# Patient Record
Sex: FEMALE | Race: BLACK OR AFRICAN AMERICAN | NOT HISPANIC OR LATINO | Employment: FULL TIME | URBAN - METROPOLITAN AREA
[De-identification: names, ages, dates, MRNs, and addresses within clinical notes are randomized per-mention and may not be internally consistent; named-entity substitution may affect disease eponyms.]

---

## 2023-10-06 ENCOUNTER — APPOINTMENT (OUTPATIENT)
Dept: RADIOLOGY | Facility: HOSPITAL | Age: 23
End: 2023-10-06
Payer: COMMERCIAL

## 2023-10-06 ENCOUNTER — HOSPITAL ENCOUNTER (EMERGENCY)
Facility: HOSPITAL | Age: 23
Discharge: HOME | End: 2023-10-06
Attending: EMERGENCY MEDICINE
Payer: COMMERCIAL

## 2023-10-06 VITALS
BODY MASS INDEX: 27.42 KG/M2 | HEART RATE: 78 BPM | OXYGEN SATURATION: 96 % | WEIGHT: 149 LBS | HEIGHT: 62 IN | SYSTOLIC BLOOD PRESSURE: 113 MMHG | TEMPERATURE: 36.8 F | DIASTOLIC BLOOD PRESSURE: 64 MMHG | RESPIRATION RATE: 18 BRPM

## 2023-10-06 LAB
ABO GROUP (TYPE) IN BLOOD: NORMAL
ALBUMIN SERPL BCP-MCNC: 5.3 G/DL (ref 3.4–5)
ALP SERPL-CCNC: 71 U/L (ref 33–110)
ALT SERPL W P-5'-P-CCNC: 18 U/L (ref 7–45)
ANION GAP SERPL CALC-SCNC: 14 MMOL/L (ref 10–20)
ANTIBODY SCREEN: NORMAL
AST SERPL W P-5'-P-CCNC: 28 U/L (ref 9–39)
B-HCG SERPL-ACNC: 5093 MIU/ML
BASO STIPL BLD QL SMEAR: PRESENT
BASOPHILS # BLD MANUAL: 0.17 X10*3/UL (ref 0–0.1)
BASOPHILS NFR BLD MANUAL: 0.9 %
BILIRUB SERPL-MCNC: 9.1 MG/DL (ref 0–1.2)
BLOOD EXPIRATION DATE: NORMAL
BUN SERPL-MCNC: 6 MG/DL (ref 6–23)
CALCIUM SERPL-MCNC: 9.8 MG/DL (ref 8.6–10.6)
CHLORIDE SERPL-SCNC: 106 MMOL/L (ref 98–107)
CO2 SERPL-SCNC: 22 MMOL/L (ref 21–32)
CREAT SERPL-MCNC: 0.32 MG/DL (ref 0.5–1.05)
DISPENSE STATUS: NORMAL
EOSINOPHIL # BLD MANUAL: 0 X10*3/UL (ref 0–0.7)
EOSINOPHIL NFR BLD MANUAL: 0 %
ERYTHROCYTE [DISTWIDTH] IN BLOOD BY AUTOMATED COUNT: 23.9 % (ref 11.5–14.5)
GFR SERPL CREATININE-BSD FRML MDRD: >90 ML/MIN/1.73M*2
GLUCOSE SERPL-MCNC: 120 MG/DL (ref 74–99)
HCT VFR BLD AUTO: 17.3 % (ref 36–46)
HGB BLD-MCNC: 6.2 G/DL (ref 12–16)
HGB RETIC QN: 31 PG (ref 28–38)
IMM GRANULOCYTES # BLD AUTO: 0.12 X10*3/UL (ref 0–0.7)
IMM GRANULOCYTES NFR BLD AUTO: 0.6 % (ref 0–0.9)
IMMATURE RETIC FRACTION: 24.8 %
LDH SERPL L TO P-CCNC: 316 U/L (ref 84–246)
LYMPHOCYTES # BLD MANUAL: 1.61 X10*3/UL (ref 1.2–4.8)
LYMPHOCYTES NFR BLD MANUAL: 8.7 %
MCH RBC QN AUTO: 30.1 PG (ref 26–34)
MCHC RBC AUTO-ENTMCNC: 35.8 G/DL (ref 32–36)
MCV RBC AUTO: 84 FL (ref 80–100)
MONOCYTES # BLD MANUAL: 0.31 X10*3/UL (ref 0.1–1)
MONOCYTES NFR BLD MANUAL: 1.7 %
NEUTROPHILS # BLD MANUAL: 16.41 X10*3/UL (ref 1.2–7.7)
NEUTS BAND # BLD MANUAL: 0.17 X10*3/UL (ref 0–0.7)
NEUTS BAND NFR BLD MANUAL: 0.9 %
NEUTS SEG # BLD MANUAL: 16.24 X10*3/UL (ref 1.2–7)
NEUTS SEG NFR BLD MANUAL: 87.8 %
NRBC BLD-RTO: 1.5 /100 WBCS (ref 0–0)
PLATELET # BLD AUTO: 642 X10*3/UL (ref 150–450)
PMV BLD AUTO: 9.6 FL (ref 7.5–11.5)
POLYCHROMASIA BLD QL SMEAR: ABNORMAL
POTASSIUM SERPL-SCNC: 3.4 MMOL/L (ref 3.5–5.3)
PRODUCT BLOOD TYPE: 9500
PRODUCT CODE: NORMAL
PROT SERPL-MCNC: 8.8 G/DL (ref 6.4–8.2)
RBC # BLD AUTO: 2.06 X10*6/UL (ref 4–5.2)
RBC MORPH BLD: ABNORMAL
RETICS #: 0.57 X10*6/UL (ref 0.02–0.08)
RETICS/RBC NFR AUTO: 27.8 % (ref 0.5–2)
RH FACTOR (ANTIGEN D): NORMAL
SICKLE CELLS BLD QL SMEAR: ABNORMAL
SODIUM SERPL-SCNC: 139 MMOL/L (ref 136–145)
TARGETS BLD QL SMEAR: ABNORMAL
TOTAL CELLS COUNTED BLD: 115
UNIT ABO: NORMAL
UNIT NUMBER: NORMAL
UNIT RH: NORMAL
UNIT VOLUME: 271
WBC # BLD AUTO: 18.5 X10*3/UL (ref 4.4–11.3)
XM INTEP: NORMAL

## 2023-10-06 PROCEDURE — P9016 RBC LEUKOCYTES REDUCED: HCPCS

## 2023-10-06 PROCEDURE — 96361 HYDRATE IV INFUSION ADD-ON: CPT

## 2023-10-06 PROCEDURE — 2500000004 HC RX 250 GENERAL PHARMACY W/ HCPCS (ALT 636 FOR OP/ED): Mod: SE

## 2023-10-06 PROCEDURE — 36430 TRANSFUSION BLD/BLD COMPNT: CPT

## 2023-10-06 PROCEDURE — 76817 TRANSVAGINAL US OBSTETRIC: CPT | Performed by: RADIOLOGY

## 2023-10-06 PROCEDURE — 85027 COMPLETE CBC AUTOMATED: CPT | Performed by: EMERGENCY MEDICINE

## 2023-10-06 PROCEDURE — 2500000004 HC RX 250 GENERAL PHARMACY W/ HCPCS (ALT 636 FOR OP/ED): Mod: SE | Performed by: EMERGENCY MEDICINE

## 2023-10-06 PROCEDURE — 85045 AUTOMATED RETICULOCYTE COUNT: CPT | Performed by: EMERGENCY MEDICINE

## 2023-10-06 PROCEDURE — 83615 LACTATE (LD) (LDH) ENZYME: CPT | Performed by: EMERGENCY MEDICINE

## 2023-10-06 PROCEDURE — 36415 COLL VENOUS BLD VENIPUNCTURE: CPT | Performed by: EMERGENCY MEDICINE

## 2023-10-06 PROCEDURE — 96375 TX/PRO/DX INJ NEW DRUG ADDON: CPT

## 2023-10-06 PROCEDURE — 86901 BLOOD TYPING SEROLOGIC RH(D): CPT | Performed by: EMERGENCY MEDICINE

## 2023-10-06 PROCEDURE — 76830 TRANSVAGINAL US NON-OB: CPT

## 2023-10-06 PROCEDURE — 99284 EMERGENCY DEPT VISIT MOD MDM: CPT | Performed by: EMERGENCY MEDICINE

## 2023-10-06 PROCEDURE — 2580000001 HC RX 258 IV SOLUTIONS: Mod: SE | Performed by: EMERGENCY MEDICINE

## 2023-10-06 PROCEDURE — 76815 OB US LIMITED FETUS(S): CPT | Performed by: RADIOLOGY

## 2023-10-06 PROCEDURE — 84702 CHORIONIC GONADOTROPIN TEST: CPT | Performed by: EMERGENCY MEDICINE

## 2023-10-06 PROCEDURE — 86922 COMPATIBILITY TEST ANTIGLOB: CPT

## 2023-10-06 PROCEDURE — 96374 THER/PROPH/DIAG INJ IV PUSH: CPT

## 2023-10-06 PROCEDURE — 99285 EMERGENCY DEPT VISIT HI MDM: CPT | Performed by: EMERGENCY MEDICINE

## 2023-10-06 PROCEDURE — 96376 TX/PRO/DX INJ SAME DRUG ADON: CPT

## 2023-10-06 PROCEDURE — 80053 COMPREHEN METABOLIC PANEL: CPT | Performed by: EMERGENCY MEDICINE

## 2023-10-06 PROCEDURE — 85007 BL SMEAR W/DIFF WBC COUNT: CPT | Performed by: EMERGENCY MEDICINE

## 2023-10-06 RX ORDER — HYDROMORPHONE HYDROCHLORIDE 1 MG/ML
1 INJECTION, SOLUTION INTRAMUSCULAR; INTRAVENOUS; SUBCUTANEOUS
Status: DISPENSED | OUTPATIENT
Start: 2023-10-06 | End: 2023-10-06

## 2023-10-06 RX ORDER — HYDROMORPHONE HYDROCHLORIDE 1 MG/ML
INJECTION, SOLUTION INTRAMUSCULAR; INTRAVENOUS; SUBCUTANEOUS
Status: COMPLETED
Start: 2023-10-06 | End: 2023-10-06

## 2023-10-06 RX ORDER — METOCLOPRAMIDE HYDROCHLORIDE 5 MG/ML
10 INJECTION INTRAMUSCULAR; INTRAVENOUS ONCE
Status: COMPLETED | OUTPATIENT
Start: 2023-10-06 | End: 2023-10-06

## 2023-10-06 RX ORDER — HYDROMORPHONE HYDROCHLORIDE 1 MG/ML
1 INJECTION, SOLUTION INTRAMUSCULAR; INTRAVENOUS; SUBCUTANEOUS ONCE
Status: COMPLETED | OUTPATIENT
Start: 2023-10-06 | End: 2023-10-06

## 2023-10-06 RX ADMIN — METOCLOPRAMIDE 10 MG: 5 INJECTION, SOLUTION INTRAMUSCULAR; INTRAVENOUS at 08:30

## 2023-10-06 RX ADMIN — SODIUM CHLORIDE, POTASSIUM CHLORIDE, SODIUM LACTATE AND CALCIUM CHLORIDE 1000 ML: 600; 310; 30; 20 INJECTION, SOLUTION INTRAVENOUS at 09:15

## 2023-10-06 RX ADMIN — HYDROMORPHONE HYDROCHLORIDE 1 MG: 1 INJECTION, SOLUTION INTRAMUSCULAR; INTRAVENOUS; SUBCUTANEOUS at 10:05

## 2023-10-06 RX ADMIN — HYDROMORPHONE HYDROCHLORIDE 1 MG: 1 INJECTION, SOLUTION INTRAMUSCULAR; INTRAVENOUS; SUBCUTANEOUS at 19:03

## 2023-10-06 RX ADMIN — HYDROMORPHONE HYDROCHLORIDE 1 MG: 1 INJECTION, SOLUTION INTRAMUSCULAR; INTRAVENOUS; SUBCUTANEOUS at 09:16

## 2023-10-06 ASSESSMENT — ENCOUNTER SYMPTOMS
SHORTNESS OF BREATH: 0
NAUSEA: 1
BLOOD IN STOOL: 0
DIZZINESS: 1
CONSTIPATION: 0
HEADACHES: 0
COUGH: 0
DIAPHORESIS: 1
ABDOMINAL DISTENTION: 0
LIGHT-HEADEDNESS: 1
CHILLS: 1
VOMITING: 1
MYALGIAS: 1
DIARRHEA: 1

## 2023-10-06 ASSESSMENT — COLUMBIA-SUICIDE SEVERITY RATING SCALE - C-SSRS
6. HAVE YOU EVER DONE ANYTHING, STARTED TO DO ANYTHING, OR PREPARED TO DO ANYTHING TO END YOUR LIFE?: NO
1. IN THE PAST MONTH, HAVE YOU WISHED YOU WERE DEAD OR WISHED YOU COULD GO TO SLEEP AND NOT WAKE UP?: NO
2. HAVE YOU ACTUALLY HAD ANY THOUGHTS OF KILLING YOURSELF?: NO

## 2023-10-06 ASSESSMENT — PAIN DESCRIPTION - PAIN TYPE: TYPE: ACUTE PAIN

## 2023-10-06 ASSESSMENT — PAIN SCALES - GENERAL
PAINLEVEL_OUTOF10: 10 - WORST POSSIBLE PAIN
PAINLEVEL_OUTOF10: 6
PAINLEVEL_OUTOF10: 10 - WORST POSSIBLE PAIN

## 2023-10-06 ASSESSMENT — PAIN DESCRIPTION - LOCATION: LOCATION: ABDOMEN

## 2023-10-06 ASSESSMENT — PAIN - FUNCTIONAL ASSESSMENT: PAIN_FUNCTIONAL_ASSESSMENT: 0-10

## 2023-10-06 NOTE — DISCHARGE INSTRUCTIONS
Please follow up with OBGYN clinic within 2 days for a trend of a quantified HCG, follow up for ovarian cyst and miscarriage found on ultrasound, and to have a pelvic exam at that time. Please return to the Emergency Department if you begin to pass large clots, are soaking more than one pad per hour, if you are feeling light-headed or dizzy, if you develop fevers, worsening pain, or for any new worsening symptoms/concerns.

## 2023-10-06 NOTE — ED PROVIDER NOTES
Limitations to History: None  External Records Reviewed: Admission on 7/11/23  Independent Historians: Patient     HPI  Ruperto Pang is a 23 y.o. female with a history of sickle cell disease and 5 weeks pregnant presents today with abdominal cramping and spotting. The patient woke up this morning with nausea, vomiting, vaginal spotting, chills, lightheadedness, and sever lower abdominal cramping. The patient had a positive at home pregnancy test on 9/29. Last menstrual period was on 8/31. She reports passing a small clot with mostly vaginal spotting, not filling pads. She also reports hip pain feeling like this is triggering her sickle cell disease. She has one male sexual partner and no concern for STIs. She denies any SOB, CP, LE swelling.     She had a recent admission to the hospital for sickle cell disease with pain on 7/11/23. Her scd pain has been stable since. She takes folic acid everyday. She is currently having increased pain in her hips.    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     ROS  Review of Systems   Constitutional:  Positive for chills and diaphoresis.   Respiratory:  Negative for cough and shortness of breath.    Cardiovascular:  Negative for chest pain and leg swelling.  "  Gastrointestinal:  Positive for diarrhea, nausea and vomiting. Negative for abdominal distention, blood in stool and constipation.   Genitourinary:  Positive for pelvic pain and vaginal bleeding.   Musculoskeletal:  Positive for myalgias.   Neurological:  Positive for dizziness and light-headedness. Negative for syncope and headaches.     ------------------------------------------------------------------------------------------------------------------------------------------    /72   Pulse 72   Temp 36.5 °C (97.7 °F) (Tympanic)   Resp 16   Ht 1.575 m (5' 2\")   Wt 67.6 kg (149 lb)   LMP 2023 Comment:   SpO2 100%   BMI 27.25 kg/m²     Physical Exam  Constitutional:       General: She is not in acute distress.  HENT:      Head: Normocephalic and atraumatic.   Eyes:      General: Scleral icterus present.      Pupils: Pupils are equal, round, and reactive to light.   Cardiovascular:      Rate and Rhythm: Normal rate and regular rhythm.      Pulses: Normal pulses.      Heart sounds: Normal heart sounds.   Pulmonary:      Effort: Pulmonary effort is normal.      Breath sounds: Normal breath sounds.   Abdominal:      General: Abdomen is flat. There is no distension.      Palpations: Abdomen is soft. There is no mass.      Tenderness: There is generalized abdominal tenderness and tenderness in the right upper quadrant and suprapubic area. There is guarding. There is no rebound.   Skin:     General: Skin is warm and dry.   Neurological:      Mental Status: She is alert.     ------------------------------------------------------------------------------------------------------------------------------------------    Medical Decision Making:   Ruperto Pang is a 22 yo female with a past medical history of sickle cell disease and is 5 weeks pregnant with an at home pregnancy test who is here today for abdominal cramping and vaginal spotting. Dilaudid and Reglan were given for pain and nausea. Ruperto was " given 1 unit of blood for a hemoglobin of 6.2. Differential includes but is not limited to miscarriage, ectopic pregnancy, PID, ovarian torsion, appendicitis, menstruation, cyst.    CBC - WBC count elevated at 18.5, infection likely. Hemoglobin is 6.2 bellow baseline. Planning to transfuse a unit of blood.   CMP - Elevated total bilirubin at 9.1 potentially due to hemolysis. Previous bilirubin of 12.6 two months ago during admission, that dropped to around 7.   Quant HCG - 5,093 today. Will need a repeat quant in 2 days to trend in order to rule out or in miscarriage.   LDH - Elevated at 316 but decreased compared to labs 2 mo ago at 376.   Reticulocyte - increased at 27.8 which is elevated since last labs.  Type and screen - B positive. No need for Rhogam.     Transvaginal Ultrasound - Per Radiology read: Blood products within the endometrial canal with open cervical os. Findings are suggestive of failed pregnancy/miscarriage in progress. Multiple right ovarian cystic structures including a 5.5 cm complex structure consistent with a hemorrhagic cyst.    Symptoms, physical exam, and workup are consistent with an incomplete  with an open cervical os. The patient refused a pelvic exam at this time. She will be discharged and follow up with OBGYN clinic in 2 days for a trend of a quantified HCG, follow up for hemorrhagic cyst, and a pelvic exam at that time. We are transfusing the patient today due to hemoglobin being 6.2.     Patient signed off to incoming care team.     Discussed with: Attending.          Lenore Hernandez  10/06/23 [id number]

## 2023-10-06 NOTE — ED TRIAGE NOTES
"Pt states she is 5 weeks OB and has cramping and bleeding. Reports this is also \"triggering my sickle cell\"  "

## 2023-10-06 NOTE — ED PROVIDER NOTES
Limitations to History: None  External Records Reviewed: Admission on 7/11/23  Independent Historians: Patient      HPI  Ruperto Pang is a 23 y.o. female with a history of sickle cell disease and 5 weeks pregnant presents today with abdominal cramping and spotting. The patient woke up this morning with nausea, vomiting, vaginal spotting, chills, lightheadedness, and sever lower abdominal cramping. The patient had a positive at home pregnancy test on 9/29. Last menstrual period was on 8/31. She reports passing a small clot with mostly vaginal spotting, not filling pads. She also reports hip pain feeling like this is triggering her sickle cell disease. She has one male sexual partner and no concern for STIs. She denies any SOB, CP, LE swelling.      She had a recent admission to the hospital for sickle cell disease with pain on 7/11/23. Her scd pain has been stable since. She takes folic acid everyday. She is currently having increased pain in her hips.     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      ROS  Review of Systems   Constitutional:  Positive for chills and diaphoresis.   Respiratory:  Negative for cough and shortness of breath.    Cardiovascular:  Negative  "for chest pain and leg swelling.   Gastrointestinal:  Positive for diarrhea, nausea and vomiting. Negative for abdominal distention, blood in stool and constipation.   Genitourinary:  Positive for pelvic pain and vaginal bleeding.   Musculoskeletal:  Positive for myalgias.   Neurological:  Positive for dizziness and light-headedness. Negative for syncope and headaches.      ------------------------------------------------------------------------------------------------------------------------------------------     /72   Pulse 72   Temp 36.5 °C (97.7 °F) (Tympanic)   Resp 16   Ht 1.575 m (5' 2\")   Wt 67.6 kg (149 lb)   LMP 2023 Comment:   SpO2 100%   BMI 27.25 kg/m²      Physical Exam  Constitutional:       General: She is not in acute distress.  HENT:      Head: Normocephalic and atraumatic.   Eyes:      General: Scleral icterus present.      Pupils: Pupils are equal, round, and reactive to light.   Cardiovascular:      Rate and Rhythm: Normal rate and regular rhythm.      Pulses: Normal pulses.      Heart sounds: Normal heart sounds.   Pulmonary:      Effort: Pulmonary effort is normal.      Breath sounds: Normal breath sounds.   Abdominal:      General: Abdomen is flat. There is no distension.      Palpations: Abdomen is soft. There is no mass.      Tenderness: There is generalized abdominal tenderness and tenderness in the right upper quadrant and suprapubic area. There is guarding. There is no rebound.   Skin:     General: Skin is warm and dry.   Neurological:      Mental Status: She is alert.      ------------------------------------------------------------------------------------------------------------------------------------------     Medical Decision Making:   Ruperto Pang is a 24 yo female with a past medical history of sickle cell disease and is 5 weeks pregnant with an at home pregnancy test who is here today for abdominal cramping and vaginal spotting. Dilaudid and Reglan were " given for pain and nausea. Ruperto was given 1 unit of blood for a hemoglobin of 6.2. Differential includes but is not limited to miscarriage, ectopic pregnancy, PID, ovarian torsion, appendicitis, menstruation, cyst.     CBC - WBC count elevated at 18.5, infection likely. Hemoglobin is 6.2 bellow baseline. Planning to transfuse a unit of blood.   CMP - Elevated total bilirubin at 9.1 potentially due to hemolysis. Previous bilirubin of 12.6 two months ago during admission, that dropped to around 7.   Quant HCG - 5,093 today. Will need a repeat quant in 2 days to trend in order to rule out or in miscarriage.   LDH - Elevated at 316 but decreased compared to labs 2 mo ago at 376.   Reticulocyte - increased at 27.8 which is elevated since last labs.  Type and screen - B positive. No need for Rhogam.      Transvaginal Ultrasound - Per Radiology read: Blood products within the endometrial canal with open cervical os. Findings are suggestive of failed pregnancy/miscarriage in progress. Multiple right ovarian cystic structures including a 5.5 cm complex structure consistent with a hemorrhagic cyst.     Symptoms, physical exam, and workup are consistent with an incomplete  with an open cervical os. The patient declined a pelvic exam at this time. She is able to verbalize the risks of declining recommended pelvic exam and has capacity to decline. She will be discharged and follow up with OBGYN clinic in 2 days for a trend of a quantified HCG, follow up for hemorrhagic cyst, and a pelvic exam at that time. We are transfusing the patient today due to hemoglobin being 6.2.      Patient handed off to incoming care team pending transfusion and reevaluation.      Discussed with: Attending.     Lenore Hernandez, Acting Intern    I, or a resident under my supervision, was present with the medical student who participated in the documentation of this note.  I have personally seen and examined the patient and performed the medical  decision-making components. I have reviewed the medical student documentation and/or resident documentation and verified the findings in the note as written with additions or exceptions as stated in the body of the note.    MD Nitin Alfredo MD  10/06/23 [id number]

## 2023-10-08 NOTE — PROGRESS NOTES
Patient received in handoff at approximately 1500. Please see initial ED provider note for full history. In brief, this is a 23 year old female, reportedly now , with a history of sickle cell disease, at approximately 5 weeks OB (LMP ) who presented to the ED today with abdominal cramping and spotting.  Per signout discussion “reports passing a small clot with mostly vaginal spotting, not filling pads. She has one male sexual partner and no concern for STIs.” She refused pelvic exam, however, TVUS demonstrated the following:    IMPRESSION:  1.  Blood products within the endometrial canal with open cervical  os. No adnexal mass separate from the ovary is evident. The  constellation of findings are suggestive of failed  pregnancy/miscarriage in progress. Continued follow-up with serial  beta HCG levels and short-term ultrasound follow-up is recommended.  2. Multiple right ovarian cystic structures including a 5.5 cm  complex structure consistent with a hemorrhagic cyst. Attention on  follow-up is recommended.      Patient's labs were notable for leukocytosis of 18.5 (per chart review, prior WBC 21.4, 18.8, 18.1, 16.4, 13.0 within the last 2-3 months), anemia with Hb 6.2 (per chart review, prior Hb 6.5, 7.7, 7.2, 7.6 within the last 2-3 months), CMP without SYED or HAGMA, HCG 5093, reticulocyte % 27.8,  (prior 379).  Type and screen B positive (no indication for Rho-GUERLINE). Plan per signout is to transfuse 1 U PRBCs (ordered by prior team) and discharge with OB-GYN follow-up within 48 hours.  Patient re-evaluated.  She was awake, alert, talking with her significant other, and reported pain well controlled. She notes history of prior spontaneous . She also reports history of requiring prior transfusions.  She received her unit of PRBCs.  Discussed admission for observation and repeat CBC, as well as possible OB-GYN consult.  However, patient requested discharge without repeat labs and preferred  outpatient follow-up.  Discussed need to follow-up with OB-GYN within 48 hours, or if unable, could return to ED if needed.  The patient remained hemodynamically stable during ED shift.  All results from this ED visit were discussed.  Strict warning signs/precautions for return to the ED were discussed. The patient was instructed to follow-up with OB-GYN and their PCP or FP/IM clinic. If any medications were prescribed, common side effects and instructions for use were discussed.  An opportunity to ask questions was provided and all that were expressed were addressed at that time. The patient and/or family member(s) verbalized understanding and were in agreement with plan.

## 2023-10-09 ENCOUNTER — TELEPHONE (OUTPATIENT)
Dept: OBSTETRICS AND GYNECOLOGY | Facility: HOSPITAL | Age: 23
End: 2023-10-09
Payer: COMMERCIAL

## 2023-10-09 DIAGNOSIS — O20.9 VAGINAL BLEEDING IN PREGNANCY, FIRST TRIMESTER (HHS-HCC): Primary | ICD-10-CM

## 2023-10-09 NOTE — TELEPHONE ENCOUNTER
"Ms. Pang is a 23 y.o. female with sickle cell disease, calling in today after ED visit on 10/6, with a LMP of 8/31/23.  She presented to ED with vaginal bleeding, in pregnancy (approx 5 weeks based on LMP) she is s/p 1 unit of prBCs, and a TVU  (transvaginal ultrasound) that showed \"Blood products within the endometrial canal with open cervical os. Findings are suggestive of failed pregnancy/miscarriage in progress\" per radiology report.     The patient denies dizziness, endorses intermittent uterine  cramping. Denies bleeding through >1 pad an hour, reports that she has changed her pad 3 times today starting form 6am. She reports that she continues to pass very small clots, but is not enough to fill up a pad. She is calling to request a D&C.     I reviewed the patients case with Dr. Downey in office, she reviewed the patients Transvaginal ultrasound, and noted that the uterus does not appear to have a fetal sac/products of conception. Discussed findings with patient and reviewed warning signs.    Plan of care is as follows:  -Serial Beta HcG levels, plan to obtain follow up valueds tomorrow, then weekly.  -Patient to call office or present to ED with any signs/symptoms of infection.  -Advised to continue antibiotics as prescribed by ED.  -Provided emotional support, acknowledged that loss is a difficult life transition offered OB psych referral, patient declined at this time, warning signs reviewed, advised to call office if she desires psych referral in the future.  -Reviewed comfort measures with patient, encouraged rest, an hydration.  -Bleeding precautions, with signs and symptoms of infection closely reviewed with the patient.    Dispo: The patient verbalized understanding and is agreeable with plan above.   Provided patient with the offices direct extension for follow up.    Jahaira Zabala BSN, RN, CLC     "

## 2023-10-20 ENCOUNTER — APPOINTMENT (OUTPATIENT)
Dept: OBSTETRICS AND GYNECOLOGY | Facility: CLINIC | Age: 23
End: 2023-10-20
Payer: COMMERCIAL

## 2023-10-25 ENCOUNTER — HOSPITAL ENCOUNTER (EMERGENCY)
Facility: HOSPITAL | Age: 23
Discharge: LEFT BEFORE DISCHARGE | End: 2023-10-26
Payer: COMMERCIAL

## 2023-10-25 VITALS
HEART RATE: 73 BPM | WEIGHT: 148 LBS | BODY MASS INDEX: 27.23 KG/M2 | DIASTOLIC BLOOD PRESSURE: 72 MMHG | SYSTOLIC BLOOD PRESSURE: 114 MMHG | RESPIRATION RATE: 16 BRPM | TEMPERATURE: 98.4 F | HEIGHT: 62 IN | OXYGEN SATURATION: 93 %

## 2023-10-25 PROCEDURE — 99281 EMR DPT VST MAYX REQ PHY/QHP: CPT | Mod: 25

## 2023-10-25 PROCEDURE — 99283 EMERGENCY DEPT VISIT LOW MDM: CPT

## 2023-10-25 ASSESSMENT — COLUMBIA-SUICIDE SEVERITY RATING SCALE - C-SSRS
6. HAVE YOU EVER DONE ANYTHING, STARTED TO DO ANYTHING, OR PREPARED TO DO ANYTHING TO END YOUR LIFE?: NO
2. HAVE YOU ACTUALLY HAD ANY THOUGHTS OF KILLING YOURSELF?: NO
1. IN THE PAST MONTH, HAVE YOU WISHED YOU WERE DEAD OR WISHED YOU COULD GO TO SLEEP AND NOT WAKE UP?: NO

## 2023-10-26 ENCOUNTER — CLINICAL SUPPORT (OUTPATIENT)
Dept: EMERGENCY MEDICINE | Facility: HOSPITAL | Age: 23
End: 2023-10-26
Payer: COMMERCIAL

## 2023-10-26 LAB
ATRIAL RATE: 73 BPM
P AXIS: 29 DEGREES
P OFFSET: 166 MS
P ONSET: 124 MS
PR INTERVAL: 194 MS
Q ONSET: 221 MS
QRS COUNT: 12 BEATS
QRS DURATION: 86 MS
QT INTERVAL: 372 MS
QTC CALCULATION(BAZETT): 409 MS
QTC FREDERICIA: 397 MS
R AXIS: 62 DEGREES
T AXIS: 11 DEGREES
T OFFSET: 407 MS
VENTRICULAR RATE: 73 BPM

## 2023-10-26 PROCEDURE — 93005 ELECTROCARDIOGRAM TRACING: CPT

## 2023-10-26 NOTE — ED TRIAGE NOTES
Patient presents to ED for c/c of sickle cell pain x2 days. Patient reports this is typical of her pain, it is radiating down her legs, also endorsing headache and nausea. Patient takes oxycodone at home but has been out.

## 2023-11-25 ENCOUNTER — HOSPITAL ENCOUNTER (EMERGENCY)
Facility: HOSPITAL | Age: 23
Discharge: HOME | End: 2023-11-26
Attending: EMERGENCY MEDICINE
Payer: COMMERCIAL

## 2023-11-25 DIAGNOSIS — N39.0 UTI (URINARY TRACT INFECTION) WITH PYURIA: ICD-10-CM

## 2023-11-25 DIAGNOSIS — D57.09 SICKLE CELL DISEASE WITH CRISIS AND OTHER COMPLICATION (MULTI): Primary | ICD-10-CM

## 2023-11-25 PROCEDURE — 81025 URINE PREGNANCY TEST: CPT

## 2023-11-25 PROCEDURE — 99285 EMERGENCY DEPT VISIT HI MDM: CPT | Performed by: EMERGENCY MEDICINE

## 2023-11-25 PROCEDURE — 99285 EMERGENCY DEPT VISIT HI MDM: CPT | Mod: 25

## 2023-11-25 PROCEDURE — 99284 EMERGENCY DEPT VISIT MOD MDM: CPT | Mod: 25 | Performed by: EMERGENCY MEDICINE

## 2023-11-25 RX ORDER — HYDROMORPHONE HYDROCHLORIDE 1 MG/ML
1 INJECTION, SOLUTION INTRAMUSCULAR; INTRAVENOUS; SUBCUTANEOUS
Status: COMPLETED | OUTPATIENT
Start: 2023-11-25 | End: 2023-11-26

## 2023-11-25 RX ORDER — ONDANSETRON HYDROCHLORIDE 2 MG/ML
4 INJECTION, SOLUTION INTRAVENOUS ONCE
Status: COMPLETED | OUTPATIENT
Start: 2023-11-26 | End: 2023-11-26

## 2023-11-25 ASSESSMENT — PAIN SCALES - GENERAL
PAINLEVEL_OUTOF10: 8
PAINLEVEL_OUTOF10: 8

## 2023-11-25 ASSESSMENT — LIFESTYLE VARIABLES
HAVE PEOPLE ANNOYED YOU BY CRITICIZING YOUR DRINKING: NO
REASON UNABLE TO ASSESS: YES
EVER HAD A DRINK FIRST THING IN THE MORNING TO STEADY YOUR NERVES TO GET RID OF A HANGOVER: NO
EVER FELT BAD OR GUILTY ABOUT YOUR DRINKING: NO
HAVE YOU EVER FELT YOU SHOULD CUT DOWN ON YOUR DRINKING: NO

## 2023-11-25 ASSESSMENT — PAIN DESCRIPTION - DESCRIPTORS: DESCRIPTORS: ACHING

## 2023-11-25 ASSESSMENT — PAIN DESCRIPTION - PAIN TYPE: TYPE: ACUTE PAIN

## 2023-11-25 ASSESSMENT — PAIN DESCRIPTION - LOCATION: LOCATION: WRIST

## 2023-11-25 ASSESSMENT — PAIN DESCRIPTION - PROGRESSION: CLINICAL_PROGRESSION: GRADUALLY WORSENING

## 2023-11-25 ASSESSMENT — PAIN - FUNCTIONAL ASSESSMENT: PAIN_FUNCTIONAL_ASSESSMENT: 0-10

## 2023-11-26 VITALS
SYSTOLIC BLOOD PRESSURE: 114 MMHG | HEART RATE: 64 BPM | BODY MASS INDEX: 25.87 KG/M2 | RESPIRATION RATE: 16 BRPM | OXYGEN SATURATION: 93 % | DIASTOLIC BLOOD PRESSURE: 65 MMHG | WEIGHT: 146 LBS | TEMPERATURE: 98.5 F | HEIGHT: 63 IN

## 2023-11-26 LAB
ABO GROUP (TYPE) IN BLOOD: NORMAL
ALBUMIN SERPL BCP-MCNC: 4.5 G/DL (ref 3.4–5)
ALP SERPL-CCNC: 79 U/L (ref 33–110)
ALT SERPL W P-5'-P-CCNC: 22 U/L (ref 7–45)
ANION GAP SERPL CALC-SCNC: 15 MMOL/L (ref 10–20)
ANTIBODY SCREEN: NORMAL
APPEARANCE UR: CLEAR
AST SERPL W P-5'-P-CCNC: 25 U/L (ref 9–39)
BASOPHILS # BLD AUTO: 0.1 X10*3/UL (ref 0–0.1)
BASOPHILS NFR BLD AUTO: 0.7 %
BILIRUB SERPL-MCNC: 6.6 MG/DL (ref 0–1.2)
BILIRUB UR STRIP.AUTO-MCNC: NEGATIVE MG/DL
BLOOD EXPIRATION DATE: NORMAL
BUN SERPL-MCNC: 6 MG/DL (ref 6–23)
CALCIUM SERPL-MCNC: 9.6 MG/DL (ref 8.6–10.6)
CHLORIDE SERPL-SCNC: 107 MMOL/L (ref 98–107)
CO2 SERPL-SCNC: 20 MMOL/L (ref 21–32)
COLOR UR: YELLOW
CREAT SERPL-MCNC: 0.43 MG/DL (ref 0.5–1.05)
DISPENSE STATUS: NORMAL
EOSINOPHIL # BLD AUTO: 0.17 X10*3/UL (ref 0–0.7)
EOSINOPHIL NFR BLD AUTO: 1.3 %
ERYTHROCYTE [DISTWIDTH] IN BLOOD BY AUTOMATED COUNT: 23.9 % (ref 11.5–14.5)
ERYTHROCYTE [DISTWIDTH] IN BLOOD BY AUTOMATED COUNT: 26 % (ref 11.5–14.5)
GFR SERPL CREATININE-BSD FRML MDRD: >90 ML/MIN/1.73M*2
GLUCOSE SERPL-MCNC: 91 MG/DL (ref 74–99)
GLUCOSE UR STRIP.AUTO-MCNC: NEGATIVE MG/DL
HCT VFR BLD AUTO: 18.1 % (ref 36–46)
HCT VFR BLD AUTO: 19.9 % (ref 36–46)
HGB BLD-MCNC: 6.3 G/DL (ref 12–16)
HGB BLD-MCNC: 6.9 G/DL (ref 12–16)
HGB RETIC QN: 32 PG (ref 28–38)
HOLD SPECIMEN: NORMAL
IMM GRANULOCYTES # BLD AUTO: 0.06 X10*3/UL (ref 0–0.7)
IMM GRANULOCYTES NFR BLD AUTO: 0.4 % (ref 0–0.9)
IMMATURE RETIC FRACTION: 33.9 %
KETONES UR STRIP.AUTO-MCNC: NEGATIVE MG/DL
LEUKOCYTE ESTERASE UR QL STRIP.AUTO: ABNORMAL
LIPASE SERPL-CCNC: 30 U/L (ref 9–82)
LYMPHOCYTES # BLD AUTO: 5.11 X10*3/UL (ref 1.2–4.8)
LYMPHOCYTES NFR BLD AUTO: 38.2 %
MCH RBC QN AUTO: 30.5 PG (ref 26–34)
MCH RBC QN AUTO: 30.6 PG (ref 26–34)
MCHC RBC AUTO-ENTMCNC: 34.7 G/DL (ref 32–36)
MCHC RBC AUTO-ENTMCNC: 34.8 G/DL (ref 32–36)
MCV RBC AUTO: 88 FL (ref 80–100)
MCV RBC AUTO: 88 FL (ref 80–100)
MONOCYTES # BLD AUTO: 0.84 X10*3/UL (ref 0.1–1)
MONOCYTES NFR BLD AUTO: 6.3 %
MUCOUS THREADS #/AREA URNS AUTO: ABNORMAL /LPF
NEUTROPHILS # BLD AUTO: 7.1 X10*3/UL (ref 1.2–7.7)
NEUTROPHILS NFR BLD AUTO: 53.1 %
NITRITE UR QL STRIP.AUTO: NEGATIVE
NRBC BLD-RTO: 2.8 /100 WBCS (ref 0–0)
NRBC BLD-RTO: 5.6 /100 WBCS (ref 0–0)
PH UR STRIP.AUTO: 6 [PH]
PLATELET # BLD AUTO: 567 X10*3/UL (ref 150–450)
PLATELET # BLD AUTO: 632 X10*3/UL (ref 150–450)
POLYCHROMASIA BLD QL SMEAR: NORMAL
POTASSIUM SERPL-SCNC: 3.7 MMOL/L (ref 3.5–5.3)
PREGNANCY TEST URINE, POC: NEGATIVE
PRODUCT BLOOD TYPE: 5100
PRODUCT CODE: NORMAL
PROT SERPL-MCNC: 7.8 G/DL (ref 6.4–8.2)
PROT UR STRIP.AUTO-MCNC: NEGATIVE MG/DL
RBC # BLD AUTO: 2.06 X10*6/UL (ref 4–5.2)
RBC # BLD AUTO: 2.26 X10*6/UL (ref 4–5.2)
RBC # UR STRIP.AUTO: NEGATIVE /UL
RBC #/AREA URNS AUTO: ABNORMAL /HPF
RBC MORPH BLD: NORMAL
RETICS #: 0.72 X10*6/UL (ref 0.02–0.08)
RETICS/RBC NFR AUTO: 34.8 % (ref 0.5–2)
RH FACTOR (ANTIGEN D): NORMAL
SCHISTOCYTES BLD QL SMEAR: NORMAL
SICKLE CELLS BLD QL SMEAR: NORMAL
SODIUM SERPL-SCNC: 138 MMOL/L (ref 136–145)
SP GR UR STRIP.AUTO: 1.01
SQUAMOUS #/AREA URNS AUTO: ABNORMAL /HPF
TARGETS BLD QL SMEAR: NORMAL
UNIT ABO: NORMAL
UNIT NUMBER: NORMAL
UNIT RH: NORMAL
UNIT VOLUME: 350
UROBILINOGEN UR STRIP.AUTO-MCNC: <2 MG/DL
WBC # BLD AUTO: 12.3 X10*3/UL (ref 4.4–11.3)
WBC # BLD AUTO: 13.4 X10*3/UL (ref 4.4–11.3)
WBC #/AREA URNS AUTO: ABNORMAL /HPF
XM INTEP: NORMAL
YEAST BUDDING #/AREA UR COMP ASSIST: PRESENT /HPF

## 2023-11-26 PROCEDURE — 96374 THER/PROPH/DIAG INJ IV PUSH: CPT

## 2023-11-26 PROCEDURE — 85045 AUTOMATED RETICULOCYTE COUNT: CPT

## 2023-11-26 PROCEDURE — 36415 COLL VENOUS BLD VENIPUNCTURE: CPT

## 2023-11-26 PROCEDURE — P9016 RBC LEUKOCYTES REDUCED: HCPCS

## 2023-11-26 PROCEDURE — 86850 RBC ANTIBODY SCREEN: CPT

## 2023-11-26 PROCEDURE — 2500000001 HC RX 250 WO HCPCS SELF ADMINISTERED DRUGS (ALT 637 FOR MEDICARE OP): Mod: SE

## 2023-11-26 PROCEDURE — 86922 COMPATIBILITY TEST ANTIGLOB: CPT

## 2023-11-26 PROCEDURE — 96375 TX/PRO/DX INJ NEW DRUG ADDON: CPT

## 2023-11-26 PROCEDURE — 96376 TX/PRO/DX INJ SAME DRUG ADON: CPT

## 2023-11-26 PROCEDURE — 2500000004 HC RX 250 GENERAL PHARMACY W/ HCPCS (ALT 636 FOR OP/ED): Mod: SE

## 2023-11-26 PROCEDURE — 80053 COMPREHEN METABOLIC PANEL: CPT

## 2023-11-26 PROCEDURE — 36415 COLL VENOUS BLD VENIPUNCTURE: CPT | Performed by: EMERGENCY MEDICINE

## 2023-11-26 PROCEDURE — 83690 ASSAY OF LIPASE: CPT

## 2023-11-26 PROCEDURE — 85025 COMPLETE CBC W/AUTO DIFF WBC: CPT

## 2023-11-26 PROCEDURE — 85027 COMPLETE CBC AUTOMATED: CPT | Mod: 91 | Performed by: EMERGENCY MEDICINE

## 2023-11-26 PROCEDURE — 81001 URINALYSIS AUTO W/SCOPE: CPT

## 2023-11-26 PROCEDURE — 36430 TRANSFUSION BLD/BLD COMPNT: CPT

## 2023-11-26 PROCEDURE — 87086 URINE CULTURE/COLONY COUNT: CPT

## 2023-11-26 RX ORDER — HYDROMORPHONE HYDROCHLORIDE 1 MG/ML
1 INJECTION, SOLUTION INTRAMUSCULAR; INTRAVENOUS; SUBCUTANEOUS ONCE
Status: COMPLETED | OUTPATIENT
Start: 2023-11-26 | End: 2023-11-26

## 2023-11-26 RX ORDER — FLUCONAZOLE 150 MG/1
150 TABLET ORAL ONCE
Status: COMPLETED | OUTPATIENT
Start: 2023-11-26 | End: 2023-11-26

## 2023-11-26 RX ORDER — NITROFURANTOIN 25; 75 MG/1; MG/1
100 CAPSULE ORAL 2 TIMES DAILY
Qty: 10 CAPSULE | Refills: 0 | Status: SHIPPED | OUTPATIENT
Start: 2023-11-26 | End: 2023-12-01

## 2023-11-26 RX ORDER — FLUCONAZOLE 150 MG/1
150 TABLET ORAL ONCE
Qty: 1 TABLET | Refills: 0 | Status: SHIPPED | OUTPATIENT
Start: 2023-11-26 | End: 2023-11-26

## 2023-11-26 RX ORDER — OXYCODONE HYDROCHLORIDE 5 MG/1
5 CAPSULE ORAL EVERY 6 HOURS PRN
Qty: 12 CAPSULE | Refills: 0 | Status: SHIPPED | OUTPATIENT
Start: 2023-11-26 | End: 2023-12-01

## 2023-11-26 RX ORDER — NITROFURANTOIN 25; 75 MG/1; MG/1
100 CAPSULE ORAL ONCE
Status: COMPLETED | OUTPATIENT
Start: 2023-11-26 | End: 2023-11-26

## 2023-11-26 RX ADMIN — FLUCONAZOLE 150 MG: 150 TABLET ORAL at 03:03

## 2023-11-26 RX ADMIN — HYDROMORPHONE HYDROCHLORIDE 1 MG: 1 INJECTION, SOLUTION INTRAMUSCULAR; INTRAVENOUS; SUBCUTANEOUS at 00:15

## 2023-11-26 RX ADMIN — ONDANSETRON 4 MG: 2 INJECTION INTRAMUSCULAR; INTRAVENOUS at 00:15

## 2023-11-26 RX ADMIN — HYDROMORPHONE HYDROCHLORIDE 1 MG: 1 INJECTION, SOLUTION INTRAMUSCULAR; INTRAVENOUS; SUBCUTANEOUS at 01:16

## 2023-11-26 RX ADMIN — NITROFURANTOIN MONOHYDRATE/MACROCRYSTALS 100 MG: 75; 25 CAPSULE ORAL at 03:03

## 2023-11-26 RX ADMIN — HYDROMORPHONE HYDROCHLORIDE 1 MG: 1 INJECTION, SOLUTION INTRAMUSCULAR; INTRAVENOUS; SUBCUTANEOUS at 09:31

## 2023-11-26 RX ADMIN — HYDROMORPHONE HYDROCHLORIDE 1 MG: 1 INJECTION, SOLUTION INTRAMUSCULAR; INTRAVENOUS; SUBCUTANEOUS at 02:48

## 2023-11-26 ASSESSMENT — PAIN SCALES - GENERAL
PAINLEVEL_OUTOF10: 6
PAINLEVEL_OUTOF10: 8
PAINLEVEL_OUTOF10: 7
PAINLEVEL_OUTOF10: 6
PAINLEVEL_OUTOF10: 8

## 2023-11-26 ASSESSMENT — PAIN DESCRIPTION - LOCATION: LOCATION: BACK

## 2023-11-26 ASSESSMENT — PAIN - FUNCTIONAL ASSESSMENT
PAIN_FUNCTIONAL_ASSESSMENT: 0-10

## 2023-11-26 NOTE — ED TRIAGE NOTES
Patient reports 8/10 pain in her bilateral legs, ankles and wrist that feels lie her sickle cell pain

## 2023-11-26 NOTE — PROGRESS NOTES
I received Ruperto Pang in signout from Dr. Huff.  Please see the previous note for all HPI, PE and MDM up to the time of signout at 0100.    Labs Reviewed   CBC WITH AUTO DIFFERENTIAL - Abnormal       Result Value    WBC 13.4 (*)     nRBC 5.6 (*)     RBC 2.06 (*)     Hemoglobin 6.3 (*)     Hematocrit 18.1 (*)     MCV 88      MCH 30.6      MCHC 34.8      RDW 26.0 (*)     Platelets 632 (*)     Neutrophils % 53.1      Immature Granulocytes %, Automated 0.4      Lymphocytes % 38.2      Monocytes % 6.3      Eosinophils % 1.3      Basophils % 0.7      Neutrophils Absolute 7.10      Immature Granulocytes Absolute, Automated 0.06      Lymphocytes Absolute 5.11 (*)     Monocytes Absolute 0.84      Eosinophils Absolute 0.17      Basophils Absolute 0.10     COMPREHENSIVE METABOLIC PANEL - Abnormal    Glucose 91      Sodium 138      Potassium 3.7      Chloride 107      Bicarbonate 20 (*)     Anion Gap 15      Urea Nitrogen 6      Creatinine 0.43 (*)     eGFR >90      Calcium 9.6      Albumin 4.5      Alkaline Phosphatase 79      Total Protein 7.8      AST 25      Bilirubin, Total 6.6 (*)     ALT 22     RETICULOCYTES - Abnormal    Retic % 34.8 (*)     Retic Absolute 0.718 (*)     Reticulocyte Hemoglobin 32      Immature Retic fraction 33.9 (*)    URINALYSIS WITH REFLEX MICROSCOPIC AND CULTURE - Abnormal    Color, Urine Yellow      Appearance, Urine Clear      Specific Gravity, Urine 1.010      pH, Urine 6.0      Protein, Urine NEGATIVE      Glucose, Urine NEGATIVE      Blood, Urine NEGATIVE      Ketones, Urine NEGATIVE      Bilirubin, Urine NEGATIVE      Urobilinogen, Urine <2.0      Nitrite, Urine NEGATIVE      Leukocyte Esterase, Urine MODERATE (2+) (*)    MICROSCOPIC ONLY, URINE - Abnormal    WBC, Urine 21-50 (*)     RBC, Urine 3-5      Squamous Epithelial Cells, Urine 1-9 (SPARSE)      Budding Yeast, Urine PRESENT (*)     Mucus, Urine 1+     CBC - Abnormal    WBC 12.3 (*)     nRBC 2.8 (*)     RBC 2.26 (*)      Hemoglobin 6.9 (*)     Hematocrit 19.9 (*)     MCV 88      MCH 30.5      MCHC 34.7      RDW 23.9 (*)     Platelets 567 (*)    LIPASE - Normal    Lipase 30      Narrative:     Venipuncture immediately after or during the administration of Metamizole may lead to falsely low results. Testing should be performed immediately prior to Metamizole dosing.   POCT PREGNANCY, URINE - Normal    Preg Test, Ur Negative     URINE CULTURE   URINALYSIS WITH REFLEX MICROSCOPIC AND CULTURE    Narrative:     The following orders were created for panel order Urinalysis with Reflex Microscopic and Culture.  Procedure                               Abnormality         Status                     ---------                               -----------         ------                     Urinalysis with Reflex M...[[id number]]  Abnormal            Final result               Extra Urine Gray Tube[[id number]]                            Final result                 Please view results for these tests on the individual orders.   TYPE AND SCREEN    ABO TYPE B      Rh TYPE POS      ANTIBODY SCREEN NEG     EXTRA URINE GRAY TUBE    Extra Tube Hold for add-ons.     PREPARE RBC    PRODUCT CODE [id number]      Unit Number [id number]      Unit ABO O      Unit RH POS      XM INTEP COMP      Dispense Status TR      Blood Expiration Date December 12, 2023 23:59 EST      PRODUCT BLOOD TYPE 5100      UNIT VOLUME 350     MORPHOLOGY    RBC Morphology See Below      Polychromasia Marked      RBC Fragments Few      Sickle Cells Few      Target Cells Few         In brief Ruperto Pang is an 23 y.o. female presenting for sickle cell pain crisis. Patient has been treated with IV dilaudid. At the time of signout we were awaiting 1U blood transfusion. Labs reviewed, significant for UA showing 21-50 WBCs and budding yeast. Upon further questioning of the pt, she endorses urinary frequency and urgency. Will treat UTI with macrobid and likely vaginal candidiasis with  diflucan.     There was a transition of care from myself to the oncoming provider. At that time, post-transfusion CBC pending. Anticipate discharge afterwards.

## 2023-11-26 NOTE — ED PROVIDER NOTES
CC: Sickle Cell Pain Crisis     History provided by: Patient  Limitations to History: None    HPI:    This is a 22 y/o F with PMHx of Sickle Cell who presents with acute sickle cell pain crisis. Patient states symptoms began this morning with back pain that progressively radiated diffusely. States this pain is consistent with previous pain crises. Denies any chest pain, shortness of breath, abdominal pain, nausea, vomiting, fever, chills, dizziness, or lightheadedness. Has not followed up with sickle cell specialist in over two months. Only medication taken today was extra strength tylenol, last dose at 1800, however unable to control pain.       ???????????????????????????????????????????????????????????????  Triage Vitals:  T 36.8 °C (98.3 °F)  HR 94  /72  RR 18  O2 98 % None (Room air)    Physical Exam  Constitutional:       General: She is not in acute distress.  HENT:      Head: Atraumatic.   Eyes:      General: Scleral icterus present.   Cardiovascular:      Rate and Rhythm: Normal rate and regular rhythm.   Pulmonary:      Effort: Pulmonary effort is normal.      Breath sounds: Normal breath sounds. No wheezing.   Abdominal:      General: Abdomen is flat.      Tenderness: There is abdominal tenderness (RUQ). There is no guarding or rebound.   Musculoskeletal:         General: No swelling. Normal range of motion.   Skin:     General: Skin is warm and dry.   Neurological:      General: No focal deficit present.      Mental Status: She is alert and oriented to person, place, and time.   Psychiatric:         Mood and Affect: Mood normal.         Behavior: Behavior normal.        ???????????????????????????????????????????????????????????????  ED Course/Treatment/Medical Decision Making  MDM:    Patient presenting with acute sickle cell pain crisis. Labs ordered. Patient given Dilaudid 1 mg q1h PRN and Zofran. Hgb was 6.3, patient was subsequently consented and administered another unit of blood.  Reticulocyte count elevated c/w Sickle Cell Pain Crisis. Repeat CBC will be done and assessment for more blood at that time. Patient was signed out to overnight resident.     ED Course: See MDM     EKG Interpretation:   See ED Course/Below: See MDM     Independent Interpretation of Studies:  I independently interpreted labs/imaging as stated in ED Course or below.    Differential diagnoses considered include but are not limited to: See MDM/Below: See MDM    Social Determinants Limiting Care:  None identified      Disposition: Signed out to overnight resident, pending repeat CBC and pain progression in ED.       Dominguez Huff MD   Transitional Year, PGY-1    I reviewed the case with the attending ED physician. The attending ED physician agrees with the plan. Patient and/or patient´s representative was counseled regarding labs, imaging, likely diagnosis, and plan. All questions were answered.    Disclaimer: This note was dictated by speech recognition.  Attempt at proofreading was made to minimize errors.  Errors in transcription may be present.  Please call if questions.    Procedures ? SmartLinks last updated 11/25/2023 11:56 PM        Dominguez Huff MD  Resident  11/26/23 0112       Dominguez Huff MD  Resident  11/26/23 0115  --------------------------------------------    This patient was seen by the resident physician. I have seen and examined the patient, agree with the workup, evaluation, management and diagnosis. The care plan has been discussed and I concur.    My assessment reveals the following:    HPI:  Patient is a 22 y/o female with h/o sickle cell disease presenting with typical pain crisis in back spreading to arms and legs. No F/C/CP/SOB/cough/N/V/abd pain. Usually has oxycodone at home, but ran out because has not been to see her sickle cell doctor as schedule; supposed to see every 2 months; last seen in July on last admission.     PE:  Vital signs reviewed in nursing triage note, EMR  flowsheets, and at patient's bedside  GEN: Patient is awake, alert, calm, cooperative, and in mild painful distress.  HEAD: Normocephalic and atraumatic.  EYES: Anicteric sclera.  MOUTH: Mucous membranes moist.  CV: Regular rate and rhythm. (+) s1/s2. No murmurs/rubs/gallops.  PULM: CTAB. No wheezes, rales, or crackles.  GI: Soft, non-tender, non-distended without rebound or guarding.  EXT: No deformities noted.   NEURO: Moves all extremities.   SKIN: Warm, dry. No erythema or ecchymosis.    Labs Reviewed   CBC WITH AUTO DIFFERENTIAL - Abnormal       Result Value    WBC 13.4 (*)     nRBC 5.6 (*)     RBC 2.06 (*)     Hemoglobin 6.3 (*)     Hematocrit 18.1 (*)     MCV 88      MCH 30.6      MCHC 34.8      RDW 26.0 (*)     Platelets 632 (*)     Neutrophils % 53.1      Immature Granulocytes %, Automated 0.4      Lymphocytes % 38.2      Monocytes % 6.3      Eosinophils % 1.3      Basophils % 0.7      Neutrophils Absolute 7.10      Immature Granulocytes Absolute, Automated 0.06      Lymphocytes Absolute 5.11 (*)     Monocytes Absolute 0.84      Eosinophils Absolute 0.17      Basophils Absolute 0.10     COMPREHENSIVE METABOLIC PANEL - Abnormal    Glucose 91      Sodium 138      Potassium 3.7      Chloride 107      Bicarbonate 20 (*)     Anion Gap 15      Urea Nitrogen 6      Creatinine 0.43 (*)     eGFR >90      Calcium 9.6      Albumin 4.5      Alkaline Phosphatase 79      Total Protein 7.8      AST 25      Bilirubin, Total 6.6 (*)     ALT 22     RETICULOCYTES - Abnormal    Retic % 34.8 (*)     Retic Absolute 0.718 (*)     Reticulocyte Hemoglobin 32      Immature Retic fraction 33.9 (*)    URINALYSIS WITH REFLEX MICROSCOPIC AND CULTURE - Abnormal    Color, Urine Yellow      Appearance, Urine Clear      Specific Gravity, Urine 1.010      pH, Urine 6.0      Protein, Urine NEGATIVE      Glucose, Urine NEGATIVE      Blood, Urine NEGATIVE      Ketones, Urine NEGATIVE      Bilirubin, Urine NEGATIVE      Urobilinogen, Urine <2.0       Nitrite, Urine NEGATIVE      Leukocyte Esterase, Urine MODERATE (2+) (*)    MICROSCOPIC ONLY, URINE - Abnormal    WBC, Urine 21-50 (*)     RBC, Urine 3-5      Squamous Epithelial Cells, Urine 1-9 (SPARSE)      Budding Yeast, Urine PRESENT (*)     Mucus, Urine 1+     LIPASE - Normal    Lipase 30      Narrative:     Venipuncture immediately after or during the administration of Metamizole may lead to falsely low results. Testing should be performed immediately prior to Metamizole dosing.   POCT PREGNANCY, URINE - Normal    Preg Test, Ur Negative     URINE CULTURE   TYPE AND SCREEN    ABO TYPE B      Rh TYPE POS      ANTIBODY SCREEN NEG     URINALYSIS WITH REFLEX MICROSCOPIC AND CULTURE    Narrative:     The following orders were created for panel order Urinalysis with Reflex Microscopic and Culture.  Procedure                               Abnormality         Status                     ---------                               -----------         ------                     Urinalysis with Reflex M...[[id number]]  Abnormal            Final result               Extra Urine Gray Tube[[id number]]                            In process                   Please view results for these tests on the individual orders.   EXTRA URINE GRAY TUBE   PREPARE RBC   MORPHOLOGY    RBC Morphology See Below      Polychromasia Marked      RBC Fragments Few      Sickle Cells Few      Target Cells Few         Medical Decision Making:  - IV  - Labs  - Dilaudid IV  - Zofran IV  - Transfuse pRBC to 7 which patient's baseline per her.   - Re-evaluation    Differential Diagnoses Considered: Sickle cell pain crisis    Chronic Medical Conditions Significantly Affecting Care: Sickle cell disease    External Records Reviewed: I reviewed recent and relevant outside records including: Previous ED visits and hospital admission discharge summaries.     MD Sanjiv Ramirez MD  11/26/23 7153

## 2023-11-26 NOTE — PROGRESS NOTES
I received Ruperto Pang in signout from Dr. Bhatt.  Please see the previous note for all HPI, PE and MDM up to the time of signout at 0700.    In brief Ruperto Pang is an 23 y.o. female presenting for   Chief Complaint   Patient presents with    Sickle Cell Pain Crisis   .  At the time of signout we were awaiting: CBC and pain control    Patients Hb idalia to 6.9 after 1u pRBCs, with a baseline Hb around 6-7. She did request an additional dose of pain medication so 1mg IV dilaudid ordered and felt symptomatically better after that dose and comfortable with discharge home.  We did talk about admission for sickle cell crisis however she declined this and states that she just needs to follow-up with her hematologist.  She is hemodynamically stable and safe for discharge home      Pt Disposition: discharge home    Procedures          Claudia Archer DO

## 2023-11-27 LAB — BACTERIA UR CULT: NO GROWTH

## 2023-12-04 ENCOUNTER — LAB (OUTPATIENT)
Dept: LAB | Facility: HOSPITAL | Age: 23
End: 2023-12-04
Payer: COMMERCIAL

## 2023-12-04 ENCOUNTER — OFFICE VISIT (OUTPATIENT)
Dept: HEMATOLOGY/ONCOLOGY | Facility: HOSPITAL | Age: 23
End: 2023-12-04
Payer: COMMERCIAL

## 2023-12-04 ENCOUNTER — SOCIAL WORK (OUTPATIENT)
Dept: HEMATOLOGY/ONCOLOGY | Facility: HOSPITAL | Age: 23
End: 2023-12-04

## 2023-12-04 VITALS
RESPIRATION RATE: 20 BRPM | DIASTOLIC BLOOD PRESSURE: 64 MMHG | WEIGHT: 144.18 LBS | BODY MASS INDEX: 25.54 KG/M2 | SYSTOLIC BLOOD PRESSURE: 121 MMHG | TEMPERATURE: 98.4 F | HEART RATE: 82 BPM | OXYGEN SATURATION: 93 %

## 2023-12-04 DIAGNOSIS — D57.00 SICKLE CELL DISEASE WITH CRISIS (MULTI): Primary | ICD-10-CM

## 2023-12-04 DIAGNOSIS — D57.00 SICKLE CELL DISEASE WITH CRISIS (MULTI): ICD-10-CM

## 2023-12-04 LAB
ALBUMIN SERPL BCP-MCNC: 5.1 G/DL (ref 3.4–5)
ALP SERPL-CCNC: 83 U/L (ref 33–110)
ALT SERPL W P-5'-P-CCNC: 30 U/L (ref 7–45)
ANION GAP SERPL CALC-SCNC: 14 MMOL/L (ref 10–20)
AST SERPL W P-5'-P-CCNC: 32 U/L (ref 9–39)
BASOPHILS # BLD AUTO: 0.17 X10*3/UL (ref 0–0.1)
BASOPHILS NFR BLD AUTO: 1.4 %
BILIRUB SERPL-MCNC: 7 MG/DL (ref 0–1.2)
BUN SERPL-MCNC: 6 MG/DL (ref 6–23)
CALCIUM SERPL-MCNC: 10.1 MG/DL (ref 8.6–10.3)
CHLORIDE SERPL-SCNC: 105 MMOL/L (ref 98–107)
CO2 SERPL-SCNC: 23 MMOL/L (ref 21–32)
CREAT SERPL-MCNC: 0.39 MG/DL (ref 0.5–1.05)
EOSINOPHIL # BLD AUTO: 0.1 X10*3/UL (ref 0–0.7)
EOSINOPHIL NFR BLD AUTO: 0.8 %
ERYTHROCYTE [DISTWIDTH] IN BLOOD BY AUTOMATED COUNT: 21.8 % (ref 11.5–14.5)
FERRITIN SERPL-MCNC: 1834 NG/ML (ref 8–150)
FOLATE SERPL-MCNC: 11.6 NG/ML
GFR SERPL CREATININE-BSD FRML MDRD: >90 ML/MIN/1.73M*2
GLUCOSE SERPL-MCNC: 93 MG/DL (ref 74–99)
HCT VFR BLD AUTO: 21.8 % (ref 36–46)
HGB BLD-MCNC: 7.6 G/DL (ref 12–16)
HGB RETIC QN: 32 PG (ref 28–38)
IMM GRANULOCYTES # BLD AUTO: 0.04 X10*3/UL (ref 0–0.7)
IMM GRANULOCYTES NFR BLD AUTO: 0.3 % (ref 0–0.9)
IMMATURE RETIC FRACTION: 23.8 %
IRON SATN MFR SERPL: 23 % (ref 25–45)
IRON SERPL-MCNC: 66 UG/DL (ref 35–150)
LDH SERPL L TO P-CCNC: 317 U/L (ref 84–246)
LYMPHOCYTES # BLD AUTO: 3.7 X10*3/UL (ref 1.2–4.8)
LYMPHOCYTES NFR BLD AUTO: 30.5 %
MCH RBC QN AUTO: 30.5 PG (ref 26–34)
MCHC RBC AUTO-ENTMCNC: 34.9 G/DL (ref 32–36)
MCV RBC AUTO: 88 FL (ref 80–100)
MONOCYTES # BLD AUTO: 0.75 X10*3/UL (ref 0.1–1)
MONOCYTES NFR BLD AUTO: 6.2 %
NEUTROPHILS # BLD AUTO: 7.36 X10*3/UL (ref 1.2–7.7)
NEUTROPHILS NFR BLD AUTO: 60.8 %
NRBC BLD-RTO: 0.3 /100 WBCS (ref 0–0)
PLATELET # BLD AUTO: 718 X10*3/UL (ref 150–450)
POLYCHROMASIA BLD QL SMEAR: NORMAL
POTASSIUM SERPL-SCNC: 4 MMOL/L (ref 3.5–5.3)
PROT SERPL-MCNC: 8.8 G/DL (ref 6.4–8.2)
RBC # BLD AUTO: 2.49 X10*6/UL (ref 4–5.2)
RBC MORPH BLD: NORMAL
RETICS #: 0.52 X10*6/UL (ref 0.02–0.08)
RETICS/RBC NFR AUTO: 20.9 % (ref 0.5–2)
SICKLE CELLS BLD QL SMEAR: NORMAL
SODIUM SERPL-SCNC: 138 MMOL/L (ref 136–145)
TARGETS BLD QL SMEAR: NORMAL
TIBC SERPL-MCNC: 283 UG/DL (ref 240–445)
UIBC SERPL-MCNC: 217 UG/DL (ref 110–370)
VIT B12 SERPL-MCNC: 451 PG/ML (ref 211–911)
WBC # BLD AUTO: 12.1 X10*3/UL (ref 4.4–11.3)

## 2023-12-04 PROCEDURE — 82746 ASSAY OF FOLIC ACID SERUM: CPT

## 2023-12-04 PROCEDURE — 83550 IRON BINDING TEST: CPT

## 2023-12-04 PROCEDURE — 99214 OFFICE O/P EST MOD 30 MIN: CPT | Performed by: INTERNAL MEDICINE

## 2023-12-04 PROCEDURE — 83540 ASSAY OF IRON: CPT

## 2023-12-04 PROCEDURE — 82607 VITAMIN B-12: CPT

## 2023-12-04 PROCEDURE — 83615 LACTATE (LD) (LDH) ENZYME: CPT

## 2023-12-04 PROCEDURE — 82728 ASSAY OF FERRITIN: CPT

## 2023-12-04 PROCEDURE — 85045 AUTOMATED RETICULOCYTE COUNT: CPT

## 2023-12-04 PROCEDURE — 84075 ASSAY ALKALINE PHOSPHATASE: CPT

## 2023-12-04 PROCEDURE — 36415 COLL VENOUS BLD VENIPUNCTURE: CPT

## 2023-12-04 PROCEDURE — 1036F TOBACCO NON-USER: CPT | Performed by: INTERNAL MEDICINE

## 2023-12-04 PROCEDURE — 85025 COMPLETE CBC W/AUTO DIFF WBC: CPT

## 2023-12-04 RX ORDER — OXYCODONE HYDROCHLORIDE 5 MG/1
5 TABLET ORAL EVERY 6 HOURS PRN
Qty: 10 TABLET | Refills: 0 | Status: SHIPPED | OUTPATIENT
Start: 2023-12-04 | End: 2023-12-11

## 2023-12-04 RX ORDER — AMOXICILLIN 250 MG
1 CAPSULE ORAL DAILY
Qty: 30 TABLET | Refills: 2 | Status: SHIPPED | OUTPATIENT
Start: 2023-12-04

## 2023-12-04 ASSESSMENT — ENCOUNTER SYMPTOMS
PSYCHIATRIC NEGATIVE: 1
RESPIRATORY NEGATIVE: 1
EYES NEGATIVE: 1
CONSTITUTIONAL NEGATIVE: 1
HEMATOLOGIC/LYMPHATIC NEGATIVE: 1
BACK PAIN: 1
CONSTIPATION: 1
NEUROLOGICAL NEGATIVE: 1
CARDIOVASCULAR NEGATIVE: 1

## 2023-12-04 ASSESSMENT — PAIN SCALES - GENERAL: PAINLEVEL: 6

## 2023-12-04 NOTE — PROGRESS NOTES
[facility] received [facility]  documentation for patient on 12/4/23. Forms were completed and signed by Abi Segundo CNP     Documentation submitted via fax to [facility] [phone]    Patient updated and provided copy of documents for their records.     Patient denies any further psychosocial or support service needs at this time.     Patient encouraged to contact [facility] if any needs arise.      This is a [place] Patient. A note was placed in the demographic area under comments indicating:     PER TASK / DR. FORD 10/14/21 - PLEASE SCHEDULE THIS PATIENT AS THE LAST PATIENT OF THE DAY WHEN SCHEDULING

## 2023-12-04 NOTE — PROGRESS NOTES
Patient ID: Ruperto Pang is a 23 y.o. female.  Referring Physician: No referring provider defined for this encounter.  Primary Care Provider: No Assigned PCP Generic Provider, MD  Visit Type: Follow Up      Subjective    HPI  Ruperto presents for visit. 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 Last week she went to ED for pain and was treated and released after a unit of PRBCS. She has a few pills of oxycodone, left. She has night time oxygen at home. Is not on hydroxyurea right now. Would like to review oxybryta info again, gave info and will discuss in one months time.     HbSS disease, intermittently treated with hydroxyurea  History of miscarriage 2020  Chronic pain  COVID-19 infection, 2020  History of Acute Chest Syndrome  COVID-19 infection 2020    Review of Systems   Constitutional: Negative.    HENT:  Negative.     Eyes: Negative.    Respiratory: Negative.     Cardiovascular: Negative.    Gastrointestinal:  Positive for constipation.   Genitourinary: Negative.     Musculoskeletal:  Positive for back pain.   Skin: Negative.    Neurological: Negative.    Hematological: Negative.    Psychiatric/Behavioral: Negative.          Objective   BSA: 1.71 meters squared  /64 (BP Location: Left arm, Patient Position: Sitting, BP Cuff Size: Adult)   Pulse 82   Temp 36.9 °C (98.4 °F) (Temporal)   Resp 20   Wt 65.4 kg (144 lb 2.9 oz)   LMP 2023 Comment:   SpO2 93%   BMI 25.54 kg/m²      has a past medical history of Encounter for contraceptive management, unspecified (10/14/2016), Encounter for screening for disorder due to exposure to contaminants, Hb-SS disease with acute chest syndrome (CMS/HCC) (2016), and Personal history of diseases of the blood and blood-forming organs and certain disorders involving the immune mechanism (2022).   has no past surgical history on file.  No family history on file.  Oncology History    No history exists.       Ruperto Pang  reports that she has  never smoked. She has never used smokeless tobacco.  She  has no history on file for alcohol use.  She  has no history on file for drug use.    Physical Exam  HENT:      Head: Normocephalic.      Nose: Nose normal.      Mouth/Throat:      Mouth: Mucous membranes are moist.   Eyes:      Pupils: Pupils are equal, round, and reactive to light.   Cardiovascular:      Rate and Rhythm: Normal rate and regular rhythm.      Pulses: Normal pulses.      Heart sounds: Normal heart sounds.   Pulmonary:      Effort: Pulmonary effort is normal.      Breath sounds: Normal breath sounds.   Abdominal:      General: Bowel sounds are normal.      Palpations: Abdomen is soft.   Musculoskeletal:         General: Normal range of motion.   Skin:     General: Skin is warm and dry.   Neurological:      General: No focal deficit present.      Mental Status: She is alert and oriented to person, place, and time.   Psychiatric:         Mood and Affect: Mood normal.         Behavior: Behavior normal.        No results found for requested labs within last 30 days.   WBC   Date/Time Value Ref Range Status   11/26/2023 09:00 AM 12.3 (H) 4.4 - 11.3 x10*3/uL Final   11/26/2023 12:08 AM 13.4 (H) 4.4 - 11.3 x10*3/uL Final   10/06/2023 08:32 AM 18.5 (H) 4.4 - 11.3 x10*3/uL Final     nRBC   Date Value Ref Range Status   11/26/2023 2.8 (H) 0.0 - 0.0 /100 WBCs Final   11/26/2023 5.6 (H) 0.0 - 0.0 /100 WBCs Final   10/06/2023 1.5 (H) 0.0 - 0.0 /100 WBCs Final     RBC   Date Value Ref Range Status   11/26/2023 2.26 (L) 4.00 - 5.20 x10*6/uL Final   11/26/2023 2.06 (L) 4.00 - 5.20 x10*6/uL Final   10/06/2023 2.06 (L) 4.00 - 5.20 x10*6/uL Final     Hemoglobin   Date Value Ref Range Status   11/26/2023 6.9 (L) 12.0 - 16.0 g/dL Final   11/26/2023 6.3 (LL) 12.0 - 16.0 g/dL Final   10/06/2023 6.2 (LL) 12.0 - 16.0 g/dL Final     Hematocrit   Date Value Ref Range Status   11/26/2023 19.9 (L) 36.0 - 46.0 % Final   11/26/2023 18.1 (L) 36.0 - 46.0 % Final   10/06/2023  17.3 (L) 36.0 - 46.0 % Final     MCV   Date/Time Value Ref Range Status   11/26/2023 09:00 AM 88 80 - 100 fL Final   11/26/2023 12:08 AM 88 80 - 100 fL Final   10/06/2023 08:32 AM 84 80 - 100 fL Final     MCH   Date/Time Value Ref Range Status   11/26/2023 09:00 AM 30.5 26.0 - 34.0 pg Final   11/26/2023 12:08 AM 30.6 26.0 - 34.0 pg Final   10/06/2023 08:32 AM 30.1 26.0 - 34.0 pg Final     MCHC   Date/Time Value Ref Range Status   11/26/2023 09:00 AM 34.7 32.0 - 36.0 g/dL Final   11/26/2023 12:08 AM 34.8 32.0 - 36.0 g/dL Final   10/06/2023 08:32 AM 35.8 32.0 - 36.0 g/dL Final     RDW   Date/Time Value Ref Range Status   11/26/2023 09:00 AM 23.9 (H) 11.5 - 14.5 % Final   11/26/2023 12:08 AM 26.0 (H) 11.5 - 14.5 % Final   10/06/2023 08:32 AM 23.9 (H) 11.5 - 14.5 % Final     Platelets   Date/Time Value Ref Range Status   11/26/2023 09:00  (H) 150 - 450 x10*3/uL Final   11/26/2023 12:08  (H) 150 - 450 x10*3/uL Final   10/06/2023 08:32  (H) 150 - 450 x10*3/uL Final     MPV   Date/Time Value Ref Range Status   10/06/2023 08:32 AM 9.6 7.5 - 11.5 fL Final     Neutrophils %   Date/Time Value Ref Range Status   11/26/2023 12:08 AM 53.1 40.0 - 80.0 % Final   08/17/2023 01:38 PM 62.2 40.0 - 80.0 % Final   07/14/2023 07:22 AM 69.7 40.0 - 80.0 % Final   07/13/2023 07:01 AM 68.8 40.0 - 80.0 % Final     Immature Granulocytes %, Automated   Date/Time Value Ref Range Status   11/26/2023 12:08 AM 0.4 0.0 - 0.9 % Final     Comment:     Immature Granulocyte Count (IG) includes promyelocytes, myelocytes and metamyelocytes but does not include bands. Percent differential counts (%) should be interpreted in the context of the absolute cell counts (cells/UL).   10/06/2023 08:32 AM 0.6 0.0 - 0.9 % Final     Comment:     Immature Granulocyte Count (IG) includes promyelocytes, myelocytes and metamyelocytes but does not include bands. Percent differential counts (%) should be interpreted in the context of the absolute cell  counts (cells/UL).   08/17/2023 01:38 PM 0.5 0.0 - 0.9 % Final     Comment:      Immature Granulocyte Count (IG) includes promyelocytes,    myelocytes and metamyelocytes but does not include bands.   Percent differential counts (%) should be interpreted in the   context of the absolute cell counts (cells/L).     07/14/2023 07:22 AM 0.7 0.0 - 0.9 % Final     Comment:      Immature Granulocyte Count (IG) includes promyelocytes,    myelocytes and metamyelocytes but does not include bands.   Percent differential counts (%) should be interpreted in the   context of the absolute cell counts (cells/L).     07/13/2023 07:01 AM 0.6 0.0 - 0.9 % Final     Comment:      Immature Granulocyte Count (IG) includes promyelocytes,    myelocytes and metamyelocytes but does not include bands.   Percent differential counts (%) should be interpreted in the   context of the absolute cell counts (cells/L).       Lymphocytes %, Manual   Date/Time Value Ref Range Status   10/06/2023 08:32 AM 8.7 13.0 - 44.0 % Final     Lymphocytes %   Date/Time Value Ref Range Status   11/26/2023 12:08 AM 38.2 13.0 - 44.0 % Final   08/17/2023 01:38 PM 29.1 13.0 - 44.0 % Final   07/14/2023 07:22 AM 17.9 13.0 - 44.0 % Final   07/13/2023 07:01 AM 19.2 13.0 - 44.0 % Final   01/09/2018 06:27 PM 7.0 (L) 28.0 - 48.0 % Final   01/08/2018 10:41 PM 6.0 (L) 28.0 - 48.0 % Final   01/08/2018 02:58 AM 2.0 (L) 28.0 - 48.0 % Final     Monocytes %, Manual   Date/Time Value Ref Range Status   10/06/2023 08:32 AM 1.7 2.0 - 10.0 % Final     Monocytes %   Date/Time Value Ref Range Status   11/26/2023 12:08 AM 6.3 2.0 - 10.0 % Final   08/17/2023 01:38 PM 6.3 2.0 - 10.0 % Final   07/14/2023 07:22 AM 9.2 2.0 - 10.0 % Final   07/13/2023 07:01 AM 9.5 2.0 - 10.0 % Final   01/09/2018 06:27 PM 8.0 3.0 - 9.0 % Final   01/08/2018 10:41 PM 2.0 (L) 3.0 - 9.0 % Final   01/08/2018 02:58 AM 2.0 (L) 3.0 - 9.0 % Final     Eosinophils %, Manual   Date/Time Value Ref Range Status   10/06/2023  08:32 AM 0.0 0.0 - 6.0 % Final     Eosinophils %   Date/Time Value Ref Range Status   11/26/2023 12:08 AM 1.3 0.0 - 6.0 % Final   08/17/2023 01:38 PM 0.6 0.0 - 6.0 % Final   07/14/2023 07:22 AM 1.8 0.0 - 6.0 % Final   07/13/2023 07:01 AM 1.2 0.0 - 6.0 % Final   01/09/2018 06:27 PM 3.0 0.0 - 5.0 % Final   01/08/2018 10:41 PM 0.0 0.0 - 5.0 % Final   01/08/2018 02:58 AM 2.0 0.0 - 5.0 % Final     Basophils %, Manual   Date/Time Value Ref Range Status   10/06/2023 08:32 AM 0.9 0.0 - 2.0 % Final     Basophils %   Date/Time Value Ref Range Status   11/26/2023 12:08 AM 0.7 0.0 - 2.0 % Final   08/17/2023 01:38 PM 1.3 0.0 - 2.0 % Final   07/14/2023 07:22 AM 0.7 0.0 - 2.0 % Final   07/13/2023 07:01 AM 0.7 0.0 - 2.0 % Final   01/09/2018 06:27 PM 0.0 0.0 - 1.0 % Final   01/08/2018 10:41 PM 0.0 0.0 - 1.0 % Final   01/08/2018 02:58 AM 0.0 0.0 - 1.0 % Final     Neutrophils Absolute   Date/Time Value Ref Range Status   11/26/2023 12:08 AM 7.10 1.20 - 7.70 x10*3/uL Final     Comment:     Percent differential counts (%) should be interpreted in the context of the absolute cell counts (cells/uL).   08/17/2023 01:38 PM 6.31 1.20 - 7.70 x10E9/L Final   07/14/2023 07:22 AM 9.07 (H) 1.20 - 7.70 x10E9/L Final   07/13/2023 07:01 AM 11.30 (H) 1.20 - 7.70 x10E9/L Final     Immature Granulocytes Absolute, Automated   Date/Time Value Ref Range Status   11/26/2023 12:08 AM 0.06 0.00 - 0.70 x10*3/uL Final   10/06/2023 08:32 AM 0.12 0.00 - 0.70 x10*3/uL Final     Lymphocytes Absolute   Date/Time Value Ref Range Status   11/26/2023 12:08 AM 5.11 (H) 1.20 - 4.80 x10*3/uL Final   08/17/2023 01:38 PM 2.95 1.20 - 4.80 x10E9/L Final   07/14/2023 07:22 AM 2.33 1.20 - 4.80 x10E9/L Final   07/13/2023 07:01 AM 3.16 1.20 - 4.80 x10E9/L Final     Monocytes Absolute   Date/Time Value Ref Range Status   11/26/2023 12:08 AM 0.84 0.10 - 1.00 x10*3/uL Final   08/17/2023 01:38 PM 0.64 0.10 - 1.00 x10E9/L Final   07/14/2023 07:22 AM 1.20 (H) 0.10 - 1.00 x10E9/L Final  "  07/13/2023 07:01 AM 1.56 (H) 0.10 - 1.00 x10E9/L Final     Eosinophils Absolute   Date/Time Value Ref Range Status   11/26/2023 12:08 AM 0.17 0.00 - 0.70 x10*3/uL Final   08/17/2023 01:38 PM 0.06 0.00 - 0.70 x10E9/L Final   07/14/2023 07:22 AM 0.24 0.00 - 0.70 x10E9/L Final   07/13/2023 07:01 AM 0.20 0.00 - 0.70 x10E9/L Final   01/09/2018 06:27 PM 0.58 0.00 - 0.70 x10E9/L Final   01/08/2018 10:41 PM 0.00 0.00 - 0.70 x10E9/L Final   01/08/2018 02:58 AM 0.85 (H) 0.00 - 0.70 x10E9/L Final     Eosinophils Absolute, Manual   Date/Time Value Ref Range Status   10/06/2023 08:32 AM 0.00 0.00 - 0.70 x10*3/uL Final     Basophils Absolute   Date/Time Value Ref Range Status   11/26/2023 12:08 AM 0.10 0.00 - 0.10 x10*3/uL Final   08/17/2023 01:38 PM 0.13 (H) 0.00 - 0.10 x10E9/L Final     Comment:     Automated WBC differential has been confirmed by manual smear.   07/14/2023 07:22 AM 0.09 0.00 - 0.10 x10E9/L Final   07/13/2023 07:01 AM 0.12 (H) 0.00 - 0.10 x10E9/L Final   01/09/2018 06:27 PM 0.00 0.00 - 0.10 x10E9/L Final   01/08/2018 10:41 PM 0.00 0.00 - 0.10 x10E9/L Final   01/08/2018 02:58 AM 0.00 0.00 - 0.10 x10E9/L Final     Basophils Absolute, Manual   Date/Time Value Ref Range Status   10/06/2023 08:32 AM 0.17 (H) 0.00 - 0.10 x10*3/uL Final       No components found for: \"PT\"  aPTT   Date/Time Value Ref Range Status   07/13/2023 10:50 AM 28 27 - 38 sec Final     Comment:     Note new reference range as of 6/20/2023 at 10:00am.   07/12/2023 04:17 PM Canceled 27 - 38 sec Corrected     Comment:     Note new reference range as of 6/20/2023 at 10:00am.  This is a corrected result. Previous value was 36 , verified at 07/12/2023   17:18     07/10/2023 01:57 PM CANCELED       Comment:     Note new reference range as of 6/20/2023 at 10:00am.    Result canceled by the ancillary.         Assessment/Plan      Refer to optho  Refer to PCP  Refer to GYN for contraception management   RTC in 1 month   Refilled oxycodone and senna for " constipation     Labs reviewed and stable     No changes in her current oral pain regimen.  Discussed with oral Tylenol or ibuprofen 600 mg every 6-8 hours as needed for mild-to-moderate pain.  Oral oxycodone 5 mg every 6 hours as needed for moderate to severe and breakthrough pain.  I did review her OARRS and there were no aberrant opioid prescriptions of concern.  Also reviewed nonpharmacologic methods of pain control which includes warm pads or baths, massage and distraction techniques.  2.  Discussed restarting disease modifying therapy with oral hydroxyurea 1000 mg daily Monday through Sunday.  Emphasized importance of medication compliance.  Discussed possible side effects of hydroxyurea and need for monitoring for toxicities of medication with a CBC and CMP.   Also discussed other disease modifying therapy with oral Oxbryta which she would be a good candidate for on account of her chronic anemia as well as IV Adakveo. These can be given in addition to oxbryta  3.  Observe elevated ferritin levels for now, and will hold off starting her on chelation therapy.  4.  We will observe urine microalbuminuria and repeat a urine albumin levels in 6 months time.  In the meantime we will follow renal function closely.  5.  Observe anemia and no PRBC transfusion is currently indicated.  She will continue daily folic acid.  6.  We will make arrangements for a sleep study and nocturnal oximetry test. Depending on saturation level, we will consider restarting supplemental oxygen at night       Problem List Items Addressed This Visit    None           Abi Segundo, APRN-CNP

## 2023-12-18 ENCOUNTER — APPOINTMENT (OUTPATIENT)
Dept: HEMATOLOGY/ONCOLOGY | Facility: HOSPITAL | Age: 23
End: 2023-12-18
Payer: COMMERCIAL

## 2023-12-27 PROCEDURE — 87086 URINE CULTURE/COLONY COUNT: CPT

## 2023-12-28 ENCOUNTER — LAB REQUISITION (OUTPATIENT)
Dept: LAB | Facility: HOSPITAL | Age: 23
End: 2023-12-28
Payer: COMMERCIAL

## 2023-12-28 DIAGNOSIS — N39.0 URINARY TRACT INFECTION, SITE NOT SPECIFIED: ICD-10-CM

## 2023-12-28 LAB — BACTERIA UR CULT: NORMAL

## 2024-01-06 ENCOUNTER — HOSPITAL ENCOUNTER (EMERGENCY)
Facility: HOSPITAL | Age: 24
Discharge: HOME | End: 2024-01-06
Attending: EMERGENCY MEDICINE
Payer: COMMERCIAL

## 2024-01-06 VITALS
SYSTOLIC BLOOD PRESSURE: 110 MMHG | HEIGHT: 61 IN | BODY MASS INDEX: 28.13 KG/M2 | TEMPERATURE: 97.3 F | OXYGEN SATURATION: 94 % | DIASTOLIC BLOOD PRESSURE: 64 MMHG | RESPIRATION RATE: 16 BRPM | WEIGHT: 149 LBS | HEART RATE: 88 BPM

## 2024-01-06 DIAGNOSIS — D57.00 SICKLE CELL PAIN CRISIS (MULTI): Primary | ICD-10-CM

## 2024-01-06 DIAGNOSIS — G44.209 TENSION HEADACHE: ICD-10-CM

## 2024-01-06 LAB
ALBUMIN SERPL BCP-MCNC: 4.6 G/DL (ref 3.4–5)
ALP SERPL-CCNC: 83 U/L (ref 33–110)
ALT SERPL W P-5'-P-CCNC: 33 U/L (ref 7–45)
ANION GAP SERPL CALC-SCNC: 14 MMOL/L (ref 10–20)
AST SERPL W P-5'-P-CCNC: 46 U/L (ref 9–39)
BASOPHILS # BLD AUTO: 0.1 X10*3/UL (ref 0–0.1)
BASOPHILS NFR BLD AUTO: 1 %
BILIRUB SERPL-MCNC: 7.2 MG/DL (ref 0–1.2)
BUN SERPL-MCNC: 7 MG/DL (ref 6–23)
CALCIUM SERPL-MCNC: 9.5 MG/DL (ref 8.6–10.6)
CHLORIDE SERPL-SCNC: 106 MMOL/L (ref 98–107)
CO2 SERPL-SCNC: 23 MMOL/L (ref 21–32)
CREAT SERPL-MCNC: 0.39 MG/DL (ref 0.5–1.05)
EOSINOPHIL # BLD AUTO: 0.09 X10*3/UL (ref 0–0.7)
EOSINOPHIL NFR BLD AUTO: 0.9 %
ERYTHROCYTE [DISTWIDTH] IN BLOOD BY AUTOMATED COUNT: 19.4 % (ref 11.5–14.5)
FLUAV RNA RESP QL NAA+PROBE: NOT DETECTED
FLUBV RNA RESP QL NAA+PROBE: NOT DETECTED
GFR SERPL CREATININE-BSD FRML MDRD: >90 ML/MIN/1.73M*2
GLUCOSE SERPL-MCNC: 96 MG/DL (ref 74–99)
HCT VFR BLD AUTO: 18.2 % (ref 36–46)
HGB BLD-MCNC: 6.9 G/DL (ref 12–16)
HGB RETIC QN: 28 PG (ref 28–38)
HOLD SPECIMEN: NORMAL
IMM GRANULOCYTES # BLD AUTO: 0.04 X10*3/UL (ref 0–0.7)
IMM GRANULOCYTES NFR BLD AUTO: 0.4 % (ref 0–0.9)
IMMATURE RETIC FRACTION: 23.5 %
LDH SERPL L TO P-CCNC: 337 U/L (ref 84–246)
LYMPHOCYTES # BLD AUTO: 2.57 X10*3/UL (ref 1.2–4.8)
LYMPHOCYTES NFR BLD AUTO: 25.4 %
MCH RBC QN AUTO: 31.9 PG (ref 26–34)
MCHC RBC AUTO-ENTMCNC: 37.9 G/DL (ref 32–36)
MCV RBC AUTO: 84 FL (ref 80–100)
MONOCYTES # BLD AUTO: 0.63 X10*3/UL (ref 0.1–1)
MONOCYTES NFR BLD AUTO: 6.2 %
NEUTROPHILS # BLD AUTO: 6.68 X10*3/UL (ref 1.2–7.7)
NEUTROPHILS NFR BLD AUTO: 66.1 %
NRBC BLD-RTO: 3.6 /100 WBCS (ref 0–0)
PLATELET # BLD AUTO: 548 X10*3/UL (ref 150–450)
POTASSIUM SERPL-SCNC: 3.9 MMOL/L (ref 3.5–5.3)
PREGNANCY TEST URINE, POC: NEGATIVE
PROT SERPL-MCNC: 7.9 G/DL (ref 6.4–8.2)
RBC # BLD AUTO: 2.16 X10*6/UL (ref 4–5.2)
RETICS #: 0.63 X10*6/UL (ref 0.02–0.08)
RETICS/RBC NFR AUTO: 29 % (ref 0.5–2)
SARS-COV-2 RNA RESP QL NAA+PROBE: NOT DETECTED
SODIUM SERPL-SCNC: 139 MMOL/L (ref 136–145)
WBC # BLD AUTO: 10.1 X10*3/UL (ref 4.4–11.3)

## 2024-01-06 PROCEDURE — 99285 EMERGENCY DEPT VISIT HI MDM: CPT | Performed by: EMERGENCY MEDICINE

## 2024-01-06 PROCEDURE — 87636 SARSCOV2 & INF A&B AMP PRB: CPT

## 2024-01-06 PROCEDURE — 99284 EMERGENCY DEPT VISIT MOD MDM: CPT | Performed by: EMERGENCY MEDICINE

## 2024-01-06 PROCEDURE — 85025 COMPLETE CBC W/AUTO DIFF WBC: CPT

## 2024-01-06 PROCEDURE — 96375 TX/PRO/DX INJ NEW DRUG ADDON: CPT

## 2024-01-06 PROCEDURE — 85045 AUTOMATED RETICULOCYTE COUNT: CPT

## 2024-01-06 PROCEDURE — 96374 THER/PROPH/DIAG INJ IV PUSH: CPT

## 2024-01-06 PROCEDURE — 96361 HYDRATE IV INFUSION ADD-ON: CPT

## 2024-01-06 PROCEDURE — 2500000004 HC RX 250 GENERAL PHARMACY W/ HCPCS (ALT 636 FOR OP/ED)

## 2024-01-06 PROCEDURE — 96372 THER/PROPH/DIAG INJ SC/IM: CPT

## 2024-01-06 PROCEDURE — 36415 COLL VENOUS BLD VENIPUNCTURE: CPT

## 2024-01-06 PROCEDURE — 81025 URINE PREGNANCY TEST: CPT

## 2024-01-06 PROCEDURE — 83615 LACTATE (LD) (LDH) ENZYME: CPT

## 2024-01-06 PROCEDURE — 80053 COMPREHEN METABOLIC PANEL: CPT

## 2024-01-06 RX ORDER — PROCHLORPERAZINE EDISYLATE 5 MG/ML
INJECTION INTRAMUSCULAR; INTRAVENOUS
Status: COMPLETED
Start: 2024-01-06 | End: 2024-01-06

## 2024-01-06 RX ORDER — KETOROLAC TROMETHAMINE 30 MG/ML
30 INJECTION, SOLUTION INTRAMUSCULAR; INTRAVENOUS ONCE
Status: COMPLETED | OUTPATIENT
Start: 2024-01-06 | End: 2024-01-06

## 2024-01-06 RX ORDER — ONDANSETRON HYDROCHLORIDE 2 MG/ML
4 INJECTION, SOLUTION INTRAVENOUS ONCE
Status: DISCONTINUED | OUTPATIENT
Start: 2024-01-06 | End: 2024-01-06

## 2024-01-06 RX ORDER — PROCHLORPERAZINE EDISYLATE 5 MG/ML
5 INJECTION INTRAMUSCULAR; INTRAVENOUS ONCE
Status: COMPLETED | OUTPATIENT
Start: 2024-01-06 | End: 2024-01-06

## 2024-01-06 RX ORDER — HYDROMORPHONE HYDROCHLORIDE 1 MG/ML
1 INJECTION, SOLUTION INTRAMUSCULAR; INTRAVENOUS; SUBCUTANEOUS ONCE
Status: COMPLETED | OUTPATIENT
Start: 2024-01-06 | End: 2024-01-06

## 2024-01-06 RX ADMIN — KETOROLAC TROMETHAMINE 30 MG: 30 INJECTION, SOLUTION INTRAMUSCULAR; INTRAVENOUS at 17:28

## 2024-01-06 RX ADMIN — PROCHLORPERAZINE EDISYLATE 5 MG: 5 INJECTION INTRAMUSCULAR; INTRAVENOUS at 16:49

## 2024-01-06 RX ADMIN — HYDROMORPHONE HYDROCHLORIDE 1 MG: 1 INJECTION, SOLUTION INTRAMUSCULAR; INTRAVENOUS; SUBCUTANEOUS at 16:45

## 2024-01-06 RX ADMIN — SODIUM CHLORIDE, POTASSIUM CHLORIDE, SODIUM LACTATE AND CALCIUM CHLORIDE 1000 ML: 600; 310; 30; 20 INJECTION, SOLUTION INTRAVENOUS at 16:49

## 2024-01-06 ASSESSMENT — PAIN SCALES - GENERAL
PAINLEVEL_OUTOF10: 8
PAINLEVEL_OUTOF10: 7
PAINLEVEL_OUTOF10: 10 - WORST POSSIBLE PAIN

## 2024-01-06 ASSESSMENT — LIFESTYLE VARIABLES
HAVE YOU EVER FELT YOU SHOULD CUT DOWN ON YOUR DRINKING: NO
HAVE PEOPLE ANNOYED YOU BY CRITICIZING YOUR DRINKING: NO
EVER FELT BAD OR GUILTY ABOUT YOUR DRINKING: NO
REASON UNABLE TO ASSESS: NO
EVER HAD A DRINK FIRST THING IN THE MORNING TO STEADY YOUR NERVES TO GET RID OF A HANGOVER: NO

## 2024-01-06 ASSESSMENT — COLUMBIA-SUICIDE SEVERITY RATING SCALE - C-SSRS
2. HAVE YOU ACTUALLY HAD ANY THOUGHTS OF KILLING YOURSELF?: NO
1. IN THE PAST MONTH, HAVE YOU WISHED YOU WERE DEAD OR WISHED YOU COULD GO TO SLEEP AND NOT WAKE UP?: NO
6. HAVE YOU EVER DONE ANYTHING, STARTED TO DO ANYTHING, OR PREPARED TO DO ANYTHING TO END YOUR LIFE?: NO

## 2024-01-06 ASSESSMENT — PAIN - FUNCTIONAL ASSESSMENT: PAIN_FUNCTIONAL_ASSESSMENT: 0-10

## 2024-01-06 NOTE — DISCHARGE INSTRUCTIONS
Please follow up with you sickle cell clinic provider for continued management of pain medications at home.

## 2024-01-06 NOTE — ED PROVIDER NOTES
HPI   Chief Complaint   Patient presents with    Sickle Cell Pain Crisis     Pain in joints       HPI    Patient is a 23-year-old woman with past medical history of sickle cell anemia to the emergency room for joint pain.  Patient states that for the past 2 days she has been having increased joint pain, back pain that feels similar to her sickle cell crisis.  Patient additionally has headaches 2 days ago, at intermittent associated with nausea but no vomiting.  Patient has a history of headaches onset this feels similar to her previous headaches, rated 7 out of 10, denies photophobia, phonophobia.  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  Patient currently is being treated for a urinary tract infection and is on Macrobid about 3 doses left.  She says she ran out of her oxycodone that she takes for her sickle cell pain crisis about 2 days ago and has not been able to refill her prescription.  Patient has taken Excedrin and has provided some relief for the headache.          [place] Coma Scale Score: 15                  Patient History   Past Medical History:   Diagnosis Date    Encounter for contraceptive management, unspecified 10/14/2016    Contraception management    Encounter for screening for disorder due to exposure to contaminants     Screening for lead exposure    Hb-SS disease with acute chest syndrome (CMS/[facility]) 12/14/2016    Acute chest syndrome due to Hgb-S disease    Personal history of diseases of the blood and blood-forming organs and certain disorders involving the immune mechanism 03/02/2022    History of sickle cell anemia     No past surgical history on file.  No family history on file.  Social History     Tobacco Use    Smoking status: Never    Smokeless tobacco: Never   Substance Use Topics    Alcohol use: Not on file    Drug use: Not on file       Physical Exam   ED Triage Vitals [01/06/24 1531]   Temp  Heart Rate Resp BP   36.3 °C (97.3 °F) 88 16 110/64      SpO2 Temp src Heart Rate Source Patient Position   94 % -- -- Sitting      BP Location FiO2 (%)     Right arm --       Physical Exam  Vitals reviewed.   Constitutional:       Appearance: Normal appearance. She is normal weight.   HENT:      Head: Normocephalic and atraumatic.      Right Ear: External ear normal.      Left Ear: External ear normal.      Nose: Nose normal.      Mouth/Throat:      Mouth: Mucous membranes are moist.      Pharynx: Oropharynx is clear.   Eyes:      Conjunctiva/sclera: Conjunctivae normal.      Pupils: Pupils are equal, round, and reactive to light.   Cardiovascular:      Rate and Rhythm: Normal rate and regular rhythm.   Pulmonary:      Effort: Pulmonary effort is normal. No respiratory distress.      Breath sounds: Normal breath sounds. No stridor. No wheezing, rhonchi or rales.   Abdominal:      General: Abdomen is flat. Bowel sounds are normal. There is no distension.      Palpations: Abdomen is soft.      Tenderness: There is no abdominal tenderness. There is no guarding or rebound.   Musculoskeletal:         General: Normal range of motion.      Cervical back: Normal range of motion and neck supple.   Skin:     General: Skin is warm.   Neurological:      General: No focal deficit present.      Mental Status: She is alert and oriented to person, place, and time. Mental status is at baseline.      Cranial Nerves: No cranial nerve deficit.      Sensory: No sensory deficit.      Motor: No weakness.      Coordination: Coordination normal.      Gait: Gait normal.   Psychiatric:         Mood and Affect: Mood normal.         Behavior: Behavior normal.         Thought Content: Thought content normal.         Judgment: Judgment normal.         ED Course & MDM   Diagnoses as of 01/07/24 0653   Sickle cell pain crisis (CMS/HCC)   Tension headache       Medical Decision Making  Patient is a 23-year-old woman with past medical history of  sickle cell anemia to the emergency room for joint pain.  Patient states that for the past 2 days she has been having increased joint pain, back pain that feels similar to her sickle cell crisis.  Patient additionally has headaches 2 days ago, at intermittent associated with nausea but no vomiting.  Patient has a history of headaches onset this feels similar to her previous headaches, rated 7 out of 10, denies photophobia, phonophobia.     Differential diagnosis includes sickle cell crisis, acute chest syndrome, pneumonia, PE, tension headache, cluster headache, migraine, subarachnoid hemorrhage, meningitis.    Pregnancy is negative.  COVID, influenza not detected.  CMP shows a minimally elevated AST, total bili of 7.2 around her baseline.  Reticulocyte percent, absolute, immature were elevated but at her baseline.  CBC shows a hemoglobin of 6.9 which is around her baseline.  LDH is elevated but at her baseline.    Patient offered transfusion of packed red blood cells but patient denied.  Patient states that she normally gets transfused around hemoglobin of 6.2.  Was given 1 L of LR, 30 mg of Toradol IM, Compazine 5 mg IV, Dilaudid 1 mg IV.  States that she she feels much better, pain is resolved and her body, headache is improved along with the nausea.  Given the patient has no febrile or cold-like symptoms, no neck stiffness or tenderness, this is less likely meningitis.  Patient is not complaining of any chest pain, shortness of breath, not tachypneic making this less likely pneumonia, acute chest syndrome, PE.  Patient is not complaining of a sudden onset headache that is the worst of her life, no neurodeficits, making this less likely a subarachnoid hemorrhage.  This is most likely a sickle cell pain crisis concomitant with a primary tension headache.  Patient states that she is back to her baseline and like to head home. Please return to the ED or see your doctor if any worsening or new symptoms.      Procedure  Procedures     Yoko Blanchard MD  Resident  01/07/24 0869

## 2024-01-06 NOTE — Clinical Note
Ruperto Pang was seen and treated in our emergency department on 1/6/2024.  She may return to work on 01/08/2024.       If you have any questions or concerns, please don't hesitate to call.      Yoko Blanchard MD

## 2024-01-10 ENCOUNTER — APPOINTMENT (OUTPATIENT)
Dept: OBSTETRICS AND GYNECOLOGY | Facility: CLINIC | Age: 24
End: 2024-01-10
Payer: COMMERCIAL

## 2024-01-24 ENCOUNTER — APPOINTMENT (OUTPATIENT)
Dept: OBSTETRICS AND GYNECOLOGY | Facility: CLINIC | Age: 24
End: 2024-01-24
Payer: COMMERCIAL

## 2024-01-27 ENCOUNTER — HOSPITAL ENCOUNTER (EMERGENCY)
Facility: HOSPITAL | Age: 24
Discharge: HOME | End: 2024-01-27
Attending: STUDENT IN AN ORGANIZED HEALTH CARE EDUCATION/TRAINING PROGRAM
Payer: COMMERCIAL

## 2024-01-27 ENCOUNTER — CLINICAL SUPPORT (OUTPATIENT)
Dept: EMERGENCY MEDICINE | Facility: HOSPITAL | Age: 24
End: 2024-01-27
Payer: COMMERCIAL

## 2024-01-27 VITALS
RESPIRATION RATE: 16 BRPM | TEMPERATURE: 98.2 F | DIASTOLIC BLOOD PRESSURE: 59 MMHG | WEIGHT: 152 LBS | HEIGHT: 61 IN | BODY MASS INDEX: 28.7 KG/M2 | HEART RATE: 71 BPM | OXYGEN SATURATION: 93 % | SYSTOLIC BLOOD PRESSURE: 99 MMHG

## 2024-01-27 DIAGNOSIS — D57.00 SICKLE CELL DISEASE WITH CRISIS (MULTI): ICD-10-CM

## 2024-01-27 DIAGNOSIS — D64.9 ANEMIA, UNSPECIFIED TYPE: Primary | ICD-10-CM

## 2024-01-27 LAB
ABO GROUP (TYPE) IN BLOOD: NORMAL
ALBUMIN SERPL BCP-MCNC: 4.7 G/DL (ref 3.4–5)
ALP SERPL-CCNC: 82 U/L (ref 33–110)
ALT SERPL W P-5'-P-CCNC: 28 U/L (ref 7–45)
ANION GAP SERPL CALC-SCNC: 11 MMOL/L (ref 10–20)
ANTIBODY SCREEN: NORMAL
AST SERPL W P-5'-P-CCNC: 34 U/L (ref 9–39)
ATRIAL RATE: 75 BPM
BASOPHILS # BLD MANUAL: 0.59 X10*3/UL (ref 0–0.1)
BASOPHILS NFR BLD MANUAL: 4 %
BILIRUB SERPL-MCNC: 7.1 MG/DL (ref 0–1.2)
BLOOD EXPIRATION DATE: NORMAL
BUN SERPL-MCNC: 7 MG/DL (ref 6–23)
CALCIUM SERPL-MCNC: 9.5 MG/DL (ref 8.6–10.6)
CHLORIDE SERPL-SCNC: 108 MMOL/L (ref 98–107)
CO2 SERPL-SCNC: 25 MMOL/L (ref 21–32)
CREAT SERPL-MCNC: 0.47 MG/DL (ref 0.5–1.05)
DISPENSE STATUS: NORMAL
EGFRCR SERPLBLD CKD-EPI 2021: >90 ML/MIN/1.73M*2
EOSINOPHIL # BLD MANUAL: 0.15 X10*3/UL (ref 0–0.7)
EOSINOPHIL NFR BLD MANUAL: 1 %
ERYTHROCYTE [DISTWIDTH] IN BLOOD BY AUTOMATED COUNT: 24.3 % (ref 11.5–14.5)
GLUCOSE SERPL-MCNC: 92 MG/DL (ref 74–99)
HCT VFR BLD AUTO: 17 % (ref 36–46)
HGB BLD-MCNC: 6 G/DL (ref 12–16)
HGB RETIC QN: 30 PG (ref 28–38)
HOWELL-JOLLY BOD BLD QL SMEAR: PRESENT
IMM GRANULOCYTES # BLD AUTO: 0.08 X10*3/UL (ref 0–0.7)
IMM GRANULOCYTES NFR BLD AUTO: 0.5 % (ref 0–0.9)
IMMATURE RETIC FRACTION: 34.4 %
LDH SERPL L TO P-CCNC: 291 U/L (ref 84–246)
LYMPHOCYTES # BLD MANUAL: 4.14 X10*3/UL (ref 1.2–4.8)
LYMPHOCYTES NFR BLD MANUAL: 28 %
MCH RBC QN AUTO: 29.4 PG (ref 26–34)
MCHC RBC AUTO-ENTMCNC: 35.3 G/DL (ref 32–36)
MCV RBC AUTO: 83 FL (ref 80–100)
MONOCYTES # BLD MANUAL: 0.3 X10*3/UL (ref 0.1–1)
MONOCYTES NFR BLD MANUAL: 2 %
NEUTS SEG # BLD MANUAL: 11.84 X10*3/UL (ref 1.2–7)
NEUTS SEG NFR BLD MANUAL: 80 %
NRBC BLD-RTO: 4.6 /100 WBCS (ref 0–0)
P AXIS: 2 DEGREES
P OFFSET: 183 MS
P ONSET: 131 MS
PAPPENHEIMER BOD BLD QL SMEAR: PRESENT
PLATELET # BLD AUTO: 606 X10*3/UL (ref 150–450)
POLYCHROMASIA BLD QL SMEAR: ABNORMAL
POTASSIUM SERPL-SCNC: 3.9 MMOL/L (ref 3.5–5.3)
PR INTERVAL: 180 MS
PRODUCT BLOOD TYPE: 5100
PRODUCT CODE: NORMAL
PROT SERPL-MCNC: 8.3 G/DL (ref 6.4–8.2)
Q ONSET: 221 MS
QRS COUNT: 12 BEATS
QRS DURATION: 78 MS
QT INTERVAL: 364 MS
QTC CALCULATION(BAZETT): 406 MS
QTC FREDERICIA: 391 MS
R AXIS: 9 DEGREES
RBC # BLD AUTO: 2.04 X10*6/UL (ref 4–5.2)
RBC MORPH BLD: ABNORMAL
RETICS #: 0.6 X10*6/UL (ref 0.02–0.08)
RETICS/RBC NFR AUTO: 29.3 % (ref 0.5–2)
RH FACTOR (ANTIGEN D): NORMAL
SCHISTOCYTES BLD QL SMEAR: ABNORMAL
SICKLE CELLS BLD QL SMEAR: ABNORMAL
SODIUM SERPL-SCNC: 140 MMOL/L (ref 136–145)
T AXIS: -89 DEGREES
T OFFSET: 403 MS
TARGETS BLD QL SMEAR: ABNORMAL
UNIT ABO: NORMAL
UNIT NUMBER: NORMAL
UNIT RH: NORMAL
UNIT VOLUME: 288
VENTRICULAR RATE: 75 BPM
WBC # BLD AUTO: 14.8 X10*3/UL (ref 4.4–11.3)
XM INTEP: NORMAL

## 2024-01-27 PROCEDURE — 36430 TRANSFUSION BLD/BLD COMPNT: CPT

## 2024-01-27 PROCEDURE — 86901 BLOOD TYPING SEROLOGIC RH(D): CPT | Performed by: STUDENT IN AN ORGANIZED HEALTH CARE EDUCATION/TRAINING PROGRAM

## 2024-01-27 PROCEDURE — 85045 AUTOMATED RETICULOCYTE COUNT: CPT | Performed by: STUDENT IN AN ORGANIZED HEALTH CARE EDUCATION/TRAINING PROGRAM

## 2024-01-27 PROCEDURE — 93005 ELECTROCARDIOGRAM TRACING: CPT

## 2024-01-27 PROCEDURE — 96374 THER/PROPH/DIAG INJ IV PUSH: CPT

## 2024-01-27 PROCEDURE — 83615 LACTATE (LD) (LDH) ENZYME: CPT | Performed by: STUDENT IN AN ORGANIZED HEALTH CARE EDUCATION/TRAINING PROGRAM

## 2024-01-27 PROCEDURE — 96375 TX/PRO/DX INJ NEW DRUG ADDON: CPT

## 2024-01-27 PROCEDURE — 36415 COLL VENOUS BLD VENIPUNCTURE: CPT | Performed by: STUDENT IN AN ORGANIZED HEALTH CARE EDUCATION/TRAINING PROGRAM

## 2024-01-27 PROCEDURE — 85027 COMPLETE CBC AUTOMATED: CPT | Performed by: STUDENT IN AN ORGANIZED HEALTH CARE EDUCATION/TRAINING PROGRAM

## 2024-01-27 PROCEDURE — 99285 EMERGENCY DEPT VISIT HI MDM: CPT | Performed by: STUDENT IN AN ORGANIZED HEALTH CARE EDUCATION/TRAINING PROGRAM

## 2024-01-27 PROCEDURE — 85007 BL SMEAR W/DIFF WBC COUNT: CPT | Performed by: STUDENT IN AN ORGANIZED HEALTH CARE EDUCATION/TRAINING PROGRAM

## 2024-01-27 PROCEDURE — 96376 TX/PRO/DX INJ SAME DRUG ADON: CPT

## 2024-01-27 PROCEDURE — 2500000004 HC RX 250 GENERAL PHARMACY W/ HCPCS (ALT 636 FOR OP/ED): Mod: SE | Performed by: STUDENT IN AN ORGANIZED HEALTH CARE EDUCATION/TRAINING PROGRAM

## 2024-01-27 PROCEDURE — 99285 EMERGENCY DEPT VISIT HI MDM: CPT | Mod: 25

## 2024-01-27 PROCEDURE — 84075 ASSAY ALKALINE PHOSPHATASE: CPT | Performed by: STUDENT IN AN ORGANIZED HEALTH CARE EDUCATION/TRAINING PROGRAM

## 2024-01-27 PROCEDURE — P9016 RBC LEUKOCYTES REDUCED: HCPCS

## 2024-01-27 PROCEDURE — 86922 COMPATIBILITY TEST ANTIGLOB: CPT

## 2024-01-27 RX ORDER — HYDROMORPHONE HYDROCHLORIDE 1 MG/ML
1 INJECTION, SOLUTION INTRAMUSCULAR; INTRAVENOUS; SUBCUTANEOUS
Status: COMPLETED | OUTPATIENT
Start: 2024-01-27 | End: 2024-01-27

## 2024-01-27 RX ORDER — HYDROMORPHONE HYDROCHLORIDE 1 MG/ML
1 INJECTION, SOLUTION INTRAMUSCULAR; INTRAVENOUS; SUBCUTANEOUS ONCE
Status: COMPLETED | OUTPATIENT
Start: 2024-01-27 | End: 2024-01-27

## 2024-01-27 RX ORDER — KETOROLAC TROMETHAMINE 15 MG/ML
15 INJECTION, SOLUTION INTRAMUSCULAR; INTRAVENOUS ONCE
Status: COMPLETED | OUTPATIENT
Start: 2024-01-27 | End: 2024-01-27

## 2024-01-27 RX ORDER — ONDANSETRON HYDROCHLORIDE 2 MG/ML
4 INJECTION, SOLUTION INTRAVENOUS ONCE
Status: COMPLETED | OUTPATIENT
Start: 2024-01-27 | End: 2024-01-27

## 2024-01-27 RX ADMIN — KETOROLAC TROMETHAMINE 15 MG: 15 INJECTION, SOLUTION INTRAMUSCULAR; INTRAVENOUS at 10:45

## 2024-01-27 RX ADMIN — HYDROMORPHONE HYDROCHLORIDE 1 MG: 1 INJECTION, SOLUTION INTRAMUSCULAR; INTRAVENOUS; SUBCUTANEOUS at 11:58

## 2024-01-27 RX ADMIN — HYDROMORPHONE HYDROCHLORIDE 1 MG: 1 INJECTION, SOLUTION INTRAMUSCULAR; INTRAVENOUS; SUBCUTANEOUS at 13:22

## 2024-01-27 RX ADMIN — HYDROMORPHONE HYDROCHLORIDE 1 MG: 1 INJECTION, SOLUTION INTRAMUSCULAR; INTRAVENOUS; SUBCUTANEOUS at 16:48

## 2024-01-27 RX ADMIN — HYDROMORPHONE HYDROCHLORIDE 1 MG: 1 INJECTION, SOLUTION INTRAMUSCULAR; INTRAVENOUS; SUBCUTANEOUS at 10:45

## 2024-01-27 RX ADMIN — ONDANSETRON 4 MG: 2 INJECTION INTRAMUSCULAR; INTRAVENOUS at 10:45

## 2024-01-27 ASSESSMENT — LIFESTYLE VARIABLES
EVER HAD A DRINK FIRST THING IN THE MORNING TO STEADY YOUR NERVES TO GET RID OF A HANGOVER: NO
REASON UNABLE TO ASSESS: NO
EVER FELT BAD OR GUILTY ABOUT YOUR DRINKING: NO
HAVE PEOPLE ANNOYED YOU BY CRITICIZING YOUR DRINKING: NO
HAVE YOU EVER FELT YOU SHOULD CUT DOWN ON YOUR DRINKING: NO

## 2024-01-27 ASSESSMENT — PAIN SCALES - GENERAL
PAINLEVEL_OUTOF10: 9
PAINLEVEL_OUTOF10: 7
PAINLEVEL_OUTOF10: 10 - WORST POSSIBLE PAIN
PAINLEVEL_OUTOF10: 6

## 2024-01-27 ASSESSMENT — PAIN - FUNCTIONAL ASSESSMENT
PAIN_FUNCTIONAL_ASSESSMENT: 0-10

## 2024-01-27 NOTE — DISCHARGE INSTRUCTIONS
Follow-up with sickle cell clinic, call to get refills of your medications.  Return to the emergency department if you get worsening dizziness or lightheadedness, pain, or other acute concerns.

## 2024-01-27 NOTE — ED PROVIDER NOTES
CC: Sickle Cell Pain Crisis (SCC, Dizziness, Nausea no Vomiting )     HPI:  23-year-old female with history of sickle cell disease and chronic anemia presents to the emergency department with concern for hip and leg pain consistent with her usual vaso-occlusive pain, starting yesterday.  Patient in her usual state of health until yesterday, started to experience pain in her hips radiating into her legs.  No chest pain or trouble breathing, no headaches.  Has also had dizziness and lightheadedness over the last 2 to 3 days, has a history of anemia and prior blood transfusions.    Usually takes oxycodone at home for pain, but states she ran out a few days ago.    Records Reviewed:  Recent available ED and inpatient notes reviewed in EMR.    PMHx/PSHx:  Per HPI.   - has a past medical history of Encounter for contraceptive management, unspecified, Encounter for screening for disorder due to exposure to contaminants, Hb-SS disease with acute chest syndrome (CMS/HCC), and Personal history of diseases of the blood and blood-forming organs and certain disorders involving the immune mechanism.  - has no past surgical history on file.  - does not have a problem list on file.    Medications:  Reviewed in EMR. See EMR for complete list of medications and doses.    Allergies:  Iodinated contrast media    Social History:  - Tobacco:  reports that she has never smoked. She has never used smokeless tobacco.   - Alcohol:  has no history on file for alcohol use.   - Illicit Drugs:  has no history on file for drug use.     ROS:  Per HPI.       ???????????????????????????????????????????????????????????????  Triage Vitals:  T 36.4 °C (97.5 °F)  HR 84  /69  RR 16  O2 (!) 93 % None (Room air)    Physical Exam  Vitals and nursing note reviewed.   Constitutional:       General: She is not in acute distress.  HENT:      Head: Atraumatic.   Eyes:      General: No scleral icterus.     Conjunctiva/sclera: Conjunctivae normal.    Cardiovascular:      Rate and Rhythm: Regular rhythm.      Heart sounds: No murmur heard.     No friction rub. No gallop.   Pulmonary:      Effort: No respiratory distress.      Breath sounds: Normal breath sounds. No wheezing or rales.   Chest:      Chest wall: No tenderness.   Abdominal:      Palpations: Abdomen is soft.      Tenderness: There is no abdominal tenderness.   Musculoskeletal:      Right lower leg: No edema.      Left lower leg: No edema.   Skin:     General: Skin is warm and dry.      Findings: No rash.   Neurological:      Mental Status: She is alert.      Cranial Nerves: No facial asymmetry.   Psychiatric:         Mood and Affect: Mood normal.         Behavior: Behavior normal.       ???????????????????????????????????????????????????????????????  Assessment and Plan:  23-year-old female with history of sickle cell disease presents to the emergency department with concern for pain consistent with her usual vaso-occlusive pain.  Lab work was ordered along with a type and screen given history of anemia and recent hemoglobin of 6.9 without transfusion at that time (patient preference).  The patient was given Dilaudid 1 mg every 1 hour x 3 doses for pain along with Toradol and Zofran.  No chest pain or trouble breathing or other symptoms of acute chest to warrant chest imaging.    ED Course:  Pain improved after IV pain medications.  Lab work demonstrates worsening anemia with hemoglobin 6.0.  Patient consented and transfused 1 unit PRBC without complication.  Feeling better on reevaluation, lightheadedness is improved.  Requesting discharge, the patient is comfortable with plan to follow-up with her hematologist as outpatient to receive refills on her home medications.    Social Determinants Limiting Care:  None identified    Disposition:  Discharge home    --  Salud Robert MD  Emergency Medicine, PGY-3      Procedures ? [employer] last updated 1/27/2024 10:45 AM         Salud Robert,  MD  Resident  01/27/24 3581

## 2024-01-30 ENCOUNTER — HOSPITAL ENCOUNTER (EMERGENCY)
Facility: HOSPITAL | Age: 24
Discharge: HOME | End: 2024-01-30
Attending: EMERGENCY MEDICINE
Payer: COMMERCIAL

## 2024-01-30 ENCOUNTER — APPOINTMENT (OUTPATIENT)
Dept: RADIOLOGY | Facility: HOSPITAL | Age: 24
End: 2024-01-30
Payer: COMMERCIAL

## 2024-01-30 ENCOUNTER — CLINICAL SUPPORT (OUTPATIENT)
Dept: EMERGENCY MEDICINE | Facility: HOSPITAL | Age: 24
End: 2024-01-30
Payer: COMMERCIAL

## 2024-01-30 VITALS
BODY MASS INDEX: 28.7 KG/M2 | DIASTOLIC BLOOD PRESSURE: 53 MMHG | OXYGEN SATURATION: 96 % | HEIGHT: 61 IN | WEIGHT: 152 LBS | RESPIRATION RATE: 18 BRPM | TEMPERATURE: 97.8 F | SYSTOLIC BLOOD PRESSURE: 124 MMHG | HEART RATE: 95 BPM

## 2024-01-30 DIAGNOSIS — R68.89 FLU-LIKE SYMPTOMS: Primary | ICD-10-CM

## 2024-01-30 LAB
ALBUMIN SERPL BCP-MCNC: 4.6 G/DL (ref 3.4–5)
ALP SERPL-CCNC: 100 U/L (ref 33–110)
ALT SERPL W P-5'-P-CCNC: 48 U/L (ref 7–45)
ANION GAP SERPL CALC-SCNC: 11 MMOL/L (ref 10–20)
AST SERPL W P-5'-P-CCNC: 52 U/L (ref 9–39)
B-HCG SERPL-ACNC: 3 MIU/ML
BASOPHILS # BLD AUTO: 0.08 X10*3/UL (ref 0–0.1)
BASOPHILS NFR BLD AUTO: 0.5 %
BILIRUB SERPL-MCNC: 7.2 MG/DL (ref 0–1.2)
BUN SERPL-MCNC: 7 MG/DL (ref 6–23)
CALCIUM SERPL-MCNC: 9.8 MG/DL (ref 8.6–10.6)
CHLORIDE SERPL-SCNC: 103 MMOL/L (ref 98–107)
CO2 SERPL-SCNC: 26 MMOL/L (ref 21–32)
CREAT SERPL-MCNC: 0.45 MG/DL (ref 0.5–1.05)
EGFRCR SERPLBLD CKD-EPI 2021: >90 ML/MIN/1.73M*2
EOSINOPHIL # BLD AUTO: 0.14 X10*3/UL (ref 0–0.7)
EOSINOPHIL NFR BLD AUTO: 0.9 %
ERYTHROCYTE [DISTWIDTH] IN BLOOD BY AUTOMATED COUNT: 19.3 % (ref 11.5–14.5)
FLUAV RNA RESP QL NAA+PROBE: NOT DETECTED
FLUBV RNA RESP QL NAA+PROBE: NOT DETECTED
GLUCOSE SERPL-MCNC: 83 MG/DL (ref 74–99)
HCT VFR BLD AUTO: 18.6 % (ref 36–46)
HGB BLD-MCNC: 6.6 G/DL (ref 12–16)
HGB RETIC QN: 27 PG (ref 28–38)
IMM GRANULOCYTES # BLD AUTO: 0.05 X10*3/UL (ref 0–0.7)
IMM GRANULOCYTES NFR BLD AUTO: 0.3 % (ref 0–0.9)
IMMATURE RETIC FRACTION: 14.8 %
LDH SERPL L TO P-CCNC: 299 U/L (ref 84–246)
LYMPHOCYTES # BLD AUTO: 1.36 X10*3/UL (ref 1.2–4.8)
LYMPHOCYTES NFR BLD AUTO: 8.6 %
MCH RBC QN AUTO: 28.9 PG (ref 26–34)
MCHC RBC AUTO-ENTMCNC: 35.5 G/DL (ref 32–36)
MCV RBC AUTO: 82 FL (ref 80–100)
MONOCYTES # BLD AUTO: 0.79 X10*3/UL (ref 0.1–1)
MONOCYTES NFR BLD AUTO: 5 %
NEUTROPHILS # BLD AUTO: 13.45 X10*3/UL (ref 1.2–7.7)
NEUTROPHILS NFR BLD AUTO: 84.7 %
NRBC BLD-RTO: 0.3 /100 WBCS (ref 0–0)
PLATELET # BLD AUTO: 672 X10*3/UL (ref 150–450)
POTASSIUM SERPL-SCNC: 3.7 MMOL/L (ref 3.5–5.3)
PROT SERPL-MCNC: 8.5 G/DL (ref 6.4–8.2)
RBC # BLD AUTO: 2.28 X10*6/UL (ref 4–5.2)
RETICS #: 0.44 X10*6/UL (ref 0.02–0.08)
RETICS/RBC NFR AUTO: 19.4 % (ref 0.5–2)
SARS-COV-2 RNA RESP QL NAA+PROBE: NOT DETECTED
SODIUM SERPL-SCNC: 136 MMOL/L (ref 136–145)
WBC # BLD AUTO: 15.9 X10*3/UL (ref 4.4–11.3)

## 2024-01-30 PROCEDURE — 83615 LACTATE (LD) (LDH) ENZYME: CPT

## 2024-01-30 PROCEDURE — 87502 INFLUENZA DNA AMP PROBE: CPT

## 2024-01-30 PROCEDURE — 71046 X-RAY EXAM CHEST 2 VIEWS: CPT | Mod: FOREIGN READ | Performed by: RADIOLOGY

## 2024-01-30 PROCEDURE — 83010 ASSAY OF HAPTOGLOBIN QUANT: CPT

## 2024-01-30 PROCEDURE — 84075 ASSAY ALKALINE PHOSPHATASE: CPT

## 2024-01-30 PROCEDURE — 85025 COMPLETE CBC W/AUTO DIFF WBC: CPT

## 2024-01-30 PROCEDURE — 99284 EMERGENCY DEPT VISIT MOD MDM: CPT | Performed by: EMERGENCY MEDICINE

## 2024-01-30 PROCEDURE — 36415 COLL VENOUS BLD VENIPUNCTURE: CPT | Mod: WESLAB

## 2024-01-30 PROCEDURE — 93005 ELECTROCARDIOGRAM TRACING: CPT

## 2024-01-30 PROCEDURE — 84702 CHORIONIC GONADOTROPIN TEST: CPT

## 2024-01-30 PROCEDURE — 71046 X-RAY EXAM CHEST 2 VIEWS: CPT

## 2024-01-30 PROCEDURE — 85045 AUTOMATED RETICULOCYTE COUNT: CPT

## 2024-01-30 PROCEDURE — 99283 EMERGENCY DEPT VISIT LOW MDM: CPT | Performed by: EMERGENCY MEDICINE

## 2024-01-30 ASSESSMENT — PAIN - FUNCTIONAL ASSESSMENT: PAIN_FUNCTIONAL_ASSESSMENT: 0-10

## 2024-01-30 ASSESSMENT — PAIN SCALES - GENERAL: PAINLEVEL_OUTOF10: 7

## 2024-01-30 NOTE — ED TRIAGE NOTES
Pt c/o chills, weakness, runny nose, palms and asoles of feet tender, hives. Pt received a blood transfusion on Saturday and is concerned for allergic reaction. Pt is a sickle cell pt, resp even and unlabored, feels like her breathing is shallow, denies sickle cell pain

## 2024-01-30 NOTE — ED PROVIDER NOTES
CC: Allergic Reaction     HPI: Patient is a 23-year-old female with history of sickle cell disease, chronic anemia presenting to the emergency department today for flulike illness in the setting of recent blood transfusion.  Reportedly was seen in the emergency department 3 days ago for sickle cell pain crisis at that time was found to have a hemoglobin of 6.  Given 1 units of packed red blood cells without complication at that time.  Over the course of the past 24 hours, she has developed fevers, chills, dermatitis-like rash to the face and back that is pruritic, and generalized malaise.  Has never had a reaction like this before with blood.  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  Reports intermittent shortness of breath as well without chest pain or her typical sickle cell symptoms.  Denies abdominal pain or changes in urination/stool for us today      Records Reviewed:  Recent available ED and inpatient notes reviewed in EMR.    PMHx/PSHx:  Per HPI.   - has a past medical history of Encounter for contraceptive management, unspecified, Encounter for screening for disorder due to exposure to contaminants, Hb-SS disease with acute chest syndrome (CMS/HCC), and Personal history of diseases of the blood and blood-forming organs and certain disorders involving the immune mechanism.  - has no past surgical history on file.  - does not have a problem list on file.    Medications:  Reviewed in EMR. See EMR for complete list of medications and doses.    Allergies:  Iodinated contrast media    Social History:  - Tobacco:  reports that she has never smoked. She has never used smokeless tobacco.   - Alcohol:  has no history on file for alcohol use.   - Illicit Drugs:  has no history on file for drug use.     ROS:  Per HPI.       ???????????????????????????????????????????????????????????????  Triage Vitals:  T 36.6 °C (97.8 °F)  HR 95  /53  RR 18  O2 96 %       Physical Exam  Vitals and nursing note reviewed.   Constitutional:       General: She is not in acute distress.     Appearance: She is well-developed.   HENT:      Head: Normocephalic and atraumatic.   Eyes:      Conjunctiva/sclera: Conjunctivae normal.   Cardiovascular:      Rate and Rhythm: Normal rate and regular rhythm.      Heart sounds: No murmur heard.  Pulmonary:      Effort: Pulmonary effort is normal. No respiratory distress.      Breath sounds: Normal breath sounds.   Abdominal:      Palpations: Abdomen is soft.      Tenderness: There is no abdominal tenderness.   Musculoskeletal:         General: No swelling.      Cervical back: Neck supple.   Skin:     General: Skin is warm and dry.      Capillary Refill: Capillary refill takes less than 2 seconds.      Comments: Eczema-like papular rash to the right cheek and right flank   Neurological:      Mental Status: She is alert.   Psychiatric:         Mood and Affect: Mood normal.       ???????????????????????????????????????????????????????????????    Medical Decision Making   Patient is a 23-year-old female presenting the emergency department today for flulike illness in the setting of recent blood transfusion.  On arrival, vital signs within normal limits, afebrile for us.  On examination, patient has very mild eczema-like macular rash to the right cheek and right flank region.  Clear heart and lung sounds bilaterally.  Given her recent transfusion I think it is possible that she could be having a delayed transfusion reaction or [id number] disease secondary to the blood transfusion versus a superimposed viral process occurring today.  Will get repeat labs including CBC, CMP, haptoglobin, reticulocyte count, and LDH for hemolytic rule out today.  Will also get a chest x-ray and EKG to rule out acute chest.    Update: COVID/flu negative.  LDH at baseline of 299.  Reticulocyte count appropriately elevated at 19.4%.  White count mildly elevated from  baseline of 15.9 with associated left shift today.  Chest x-ray showed no acute cardiopulmonary process.  Lower concern for acute chest at this time.  Chemistries relatively unremarkable.  At this time I think the likely cause of patient's symptoms today is viral process rather than a delayed transfusion reaction given her normal vital signs, labs, and appearance.  Will be discharged home with close PCP follow-up and strict return precautions.      Diagnoses as of 01/30/24 1154   Flu-like symptoms       Social Determinants Limiting Care:  None identified    Disposition:   Discharge    Jun Ingram MD  Emergency Medicine PGY2      Procedures ? [employer] last updated 1/30/2024 11:54 AM          Jun Ingram MD  Resident  01/30/24 1153

## 2024-01-31 LAB
ATRIAL RATE: 72 BPM
HAPTOGLOB SERPL-MCNC: <10 MG/DL (ref 37–246)
P AXIS: 39 DEGREES
P OFFSET: 171 MS
P ONSET: 115 MS
PR INTERVAL: 212 MS
Q ONSET: 221 MS
QRS COUNT: 12 BEATS
QRS DURATION: 80 MS
QT INTERVAL: 350 MS
QTC CALCULATION(BAZETT): 383 MS
QTC FREDERICIA: 372 MS
R AXIS: 24 DEGREES
T AXIS: 266 DEGREES
T OFFSET: 396 MS
VENTRICULAR RATE: 72 BPM

## 2024-02-21 ENCOUNTER — TELEPHONE (OUTPATIENT)
Dept: HEMATOLOGY/ONCOLOGY | Facility: HOSPITAL | Age: 24
End: 2024-02-21
Payer: COMMERCIAL

## 2024-02-21 ENCOUNTER — HOSPITAL ENCOUNTER (EMERGENCY)
Facility: HOSPITAL | Age: 24
Discharge: HOME | End: 2024-02-21
Attending: EMERGENCY MEDICINE
Payer: COMMERCIAL

## 2024-02-21 ENCOUNTER — APPOINTMENT (OUTPATIENT)
Dept: HEMATOLOGY/ONCOLOGY | Facility: HOSPITAL | Age: 24
End: 2024-02-21
Payer: COMMERCIAL

## 2024-02-21 VITALS
TEMPERATURE: 97.9 F | HEART RATE: 80 BPM | SYSTOLIC BLOOD PRESSURE: 117 MMHG | OXYGEN SATURATION: 98 % | DIASTOLIC BLOOD PRESSURE: 73 MMHG | RESPIRATION RATE: 16 BRPM

## 2024-02-21 DIAGNOSIS — D57.00 SICKLE CELL PAIN CRISIS (MULTI): Primary | ICD-10-CM

## 2024-02-21 LAB
ALBUMIN SERPL BCP-MCNC: 4.4 G/DL (ref 3.4–5)
ALP SERPL-CCNC: 66 U/L (ref 33–110)
ALT SERPL W P-5'-P-CCNC: 20 U/L (ref 7–45)
ANION GAP SERPL CALC-SCNC: 15 MMOL/L (ref 10–20)
AST SERPL W P-5'-P-CCNC: 57 U/L (ref 9–39)
BASOPHILS # BLD AUTO: 0.08 X10*3/UL (ref 0–0.1)
BASOPHILS NFR BLD AUTO: 0.7 %
BILIRUB SERPL-MCNC: 6.7 MG/DL (ref 0–1.2)
BUN SERPL-MCNC: 3 MG/DL (ref 6–23)
CALCIUM SERPL-MCNC: 9 MG/DL (ref 8.6–10.6)
CHLORIDE SERPL-SCNC: 107 MMOL/L (ref 98–107)
CO2 SERPL-SCNC: 22 MMOL/L (ref 21–32)
CREAT SERPL-MCNC: 0.42 MG/DL (ref 0.5–1.05)
EGFRCR SERPLBLD CKD-EPI 2021: >90 ML/MIN/1.73M*2
EOSINOPHIL # BLD AUTO: 0.19 X10*3/UL (ref 0–0.7)
EOSINOPHIL NFR BLD AUTO: 1.7 %
ERYTHROCYTE [DISTWIDTH] IN BLOOD BY AUTOMATED COUNT: 28.4 % (ref 11.5–14.5)
GLUCOSE SERPL-MCNC: 82 MG/DL (ref 74–99)
HCT VFR BLD AUTO: 17.6 % (ref 36–46)
HGB BLD-MCNC: 6.5 G/DL (ref 12–16)
HGB RETIC QN: 31 PG (ref 28–38)
HOWELL-JOLLY BOD BLD QL SMEAR: PRESENT
IMM GRANULOCYTES # BLD AUTO: 0.05 X10*3/UL (ref 0–0.7)
IMM GRANULOCYTES NFR BLD AUTO: 0.4 % (ref 0–0.9)
IMMATURE RETIC FRACTION: 28.4 %
LDH SERPL L TO P-CCNC: 483 U/L (ref 84–246)
LYMPHOCYTES # BLD AUTO: 2.92 X10*3/UL (ref 1.2–4.8)
LYMPHOCYTES NFR BLD AUTO: 25.7 %
MCH RBC QN AUTO: 31.1 PG (ref 26–34)
MCHC RBC AUTO-ENTMCNC: 36.9 G/DL (ref 32–36)
MCV RBC AUTO: 84 FL (ref 80–100)
MONOCYTES # BLD AUTO: 0.81 X10*3/UL (ref 0.1–1)
MONOCYTES NFR BLD AUTO: 7.1 %
NEUTROPHILS # BLD AUTO: 7.29 X10*3/UL (ref 1.2–7.7)
NEUTROPHILS NFR BLD AUTO: 64.4 %
NRBC BLD-RTO: 14.6 /100 WBCS (ref 0–0)
PAPPENHEIMER BOD BLD QL SMEAR: PRESENT
PLATELET # BLD AUTO: 603 X10*3/UL (ref 150–450)
POLYCHROMASIA BLD QL SMEAR: NORMAL
POTASSIUM SERPL-SCNC: 4.7 MMOL/L (ref 3.5–5.3)
PREGNANCY TEST URINE, POC: NEGATIVE
PROT SERPL-MCNC: 7.5 G/DL (ref 6.4–8.2)
RBC # BLD AUTO: 2.09 X10*6/UL (ref 4–5.2)
RBC MORPH BLD: NORMAL
RETICS #: 0.72 X10*6/UL (ref 0.02–0.08)
RETICS/RBC NFR AUTO: 34.6 % (ref 0.5–2)
SCHISTOCYTES BLD QL SMEAR: NORMAL
SICKLE CELLS BLD QL SMEAR: NORMAL
SODIUM SERPL-SCNC: 139 MMOL/L (ref 136–145)
TARGETS BLD QL SMEAR: NORMAL
WBC # BLD AUTO: 11.3 X10*3/UL (ref 4.4–11.3)

## 2024-02-21 PROCEDURE — 2500000004 HC RX 250 GENERAL PHARMACY W/ HCPCS (ALT 636 FOR OP/ED): Performed by: STUDENT IN AN ORGANIZED HEALTH CARE EDUCATION/TRAINING PROGRAM

## 2024-02-21 PROCEDURE — 96375 TX/PRO/DX INJ NEW DRUG ADDON: CPT

## 2024-02-21 PROCEDURE — 85045 AUTOMATED RETICULOCYTE COUNT: CPT | Performed by: PHYSICIAN ASSISTANT

## 2024-02-21 PROCEDURE — 83615 LACTATE (LD) (LDH) ENZYME: CPT | Performed by: PHYSICIAN ASSISTANT

## 2024-02-21 PROCEDURE — 96374 THER/PROPH/DIAG INJ IV PUSH: CPT

## 2024-02-21 PROCEDURE — 36415 COLL VENOUS BLD VENIPUNCTURE: CPT | Performed by: PHYSICIAN ASSISTANT

## 2024-02-21 PROCEDURE — 85025 COMPLETE CBC W/AUTO DIFF WBC: CPT | Performed by: PHYSICIAN ASSISTANT

## 2024-02-21 PROCEDURE — 2500000004 HC RX 250 GENERAL PHARMACY W/ HCPCS (ALT 636 FOR OP/ED): Performed by: PHYSICIAN ASSISTANT

## 2024-02-21 PROCEDURE — 80053 COMPREHEN METABOLIC PANEL: CPT | Performed by: PHYSICIAN ASSISTANT

## 2024-02-21 PROCEDURE — 96376 TX/PRO/DX INJ SAME DRUG ADON: CPT

## 2024-02-21 PROCEDURE — 99285 EMERGENCY DEPT VISIT HI MDM: CPT | Performed by: PHYSICIAN ASSISTANT

## 2024-02-21 PROCEDURE — 99284 EMERGENCY DEPT VISIT MOD MDM: CPT | Mod: 25

## 2024-02-21 PROCEDURE — 96372 THER/PROPH/DIAG INJ SC/IM: CPT

## 2024-02-21 RX ORDER — HYDROMORPHONE HYDROCHLORIDE 1 MG/ML
1 INJECTION, SOLUTION INTRAMUSCULAR; INTRAVENOUS; SUBCUTANEOUS ONCE
Status: COMPLETED | OUTPATIENT
Start: 2024-02-21 | End: 2024-02-21

## 2024-02-21 RX ORDER — KETOROLAC TROMETHAMINE 15 MG/ML
15 INJECTION, SOLUTION INTRAMUSCULAR; INTRAVENOUS ONCE
Status: COMPLETED | OUTPATIENT
Start: 2024-02-21 | End: 2024-02-21

## 2024-02-21 RX ORDER — HYDROMORPHONE HYDROCHLORIDE 1 MG/ML
1 INJECTION, SOLUTION INTRAMUSCULAR; INTRAVENOUS; SUBCUTANEOUS
Status: COMPLETED | OUTPATIENT
Start: 2024-02-21 | End: 2024-02-21

## 2024-02-21 RX ORDER — ONDANSETRON HYDROCHLORIDE 2 MG/ML
4 INJECTION, SOLUTION INTRAVENOUS ONCE
Status: COMPLETED | OUTPATIENT
Start: 2024-02-21 | End: 2024-02-21

## 2024-02-21 RX ADMIN — ONDANSETRON 4 MG: 2 INJECTION INTRAMUSCULAR; INTRAVENOUS at 12:38

## 2024-02-21 RX ADMIN — HYDROMORPHONE HYDROCHLORIDE 1 MG: 1 INJECTION, SOLUTION INTRAMUSCULAR; INTRAVENOUS; SUBCUTANEOUS at 13:27

## 2024-02-21 RX ADMIN — HYDROMORPHONE HYDROCHLORIDE 1 MG: 1 INJECTION, SOLUTION INTRAMUSCULAR; INTRAVENOUS; SUBCUTANEOUS at 18:59

## 2024-02-21 RX ADMIN — KETOROLAC TROMETHAMINE 15 MG: 15 INJECTION, SOLUTION INTRAMUSCULAR; INTRAVENOUS at 13:01

## 2024-02-21 RX ADMIN — HYDROMORPHONE HYDROCHLORIDE 1 MG: 1 INJECTION, SOLUTION INTRAMUSCULAR; INTRAVENOUS; SUBCUTANEOUS at 15:19

## 2024-02-21 RX ADMIN — HYDROMORPHONE HYDROCHLORIDE 1 MG: 1 INJECTION, SOLUTION INTRAMUSCULAR; INTRAVENOUS; SUBCUTANEOUS at 12:35

## 2024-02-21 ASSESSMENT — COLUMBIA-SUICIDE SEVERITY RATING SCALE - C-SSRS
1. IN THE PAST MONTH, HAVE YOU WISHED YOU WERE DEAD OR WISHED YOU COULD GO TO SLEEP AND NOT WAKE UP?: NO
2. HAVE YOU ACTUALLY HAD ANY THOUGHTS OF KILLING YOURSELF?: NO
6. HAVE YOU EVER DONE ANYTHING, STARTED TO DO ANYTHING, OR PREPARED TO DO ANYTHING TO END YOUR LIFE?: NO

## 2024-02-21 ASSESSMENT — PAIN SCALES - GENERAL
PAINLEVEL_OUTOF10: 6
PAINLEVEL_OUTOF10: 8
PAINLEVEL_OUTOF10: 8

## 2024-02-21 NOTE — ED PROVIDER NOTES
This is a 24-year-old female with a past medical history of sickle cell disease who presents to the ED with sickle cell crisis pain.  Pain is located in her low back, radiates down her hips and into her thighs bilaterally.  She states that this is the normal location for her sickle cell pain.  Her pain started yesterday.  She denies any redness or swelling associated with her pain.  She tried taking ibuprofen at home without any relief of her symptoms.  She states that she does not currently have any opiate medications to take at home for her pain which is why she came to the ED.  She denies chest pain, shortness of breath, fevers, chills, joint swelling/redness, difficulty ambulating, recent falls, trauma, or injuries.  She states otherwise she has been in her normal state of health.      History provided by:  Patient   used: No             Visit Vitals  /73   Pulse 80   Temp 36.6 °C (97.9 °F) (Temporal)   Resp 16   LMP 2023 Comment:    SpO2 98%   Breastfeeding Unknown   OB Status Having periods   Smoking Status Never          Physical Exam     Physical exam:   General: Vitals noted, no distress. Afebrile.   EENT:  Hearing grossly intact. Normal phonation. MMM. Airway patient. PERRL. EOMI.   Neck: No midline tenderness or paraspinal tenderness. FROM.   Cardiac: Regular, rate, rhythm. Normal S1 and S2.  No murmurs, gallops, rubs.   Pulmonary: Good air exchange. Lungs clear bilaterally. No wheezes, rhonchi, rales. No accessory muscle use.   Abdomen: Soft, nonsurgical. Nontender. No peritoneal signs. Normoactive bowel sounds.   Back: No CVA tenderness.  No midline T or L-spine tenderness palpation.  Bilateral paraspinal lumbar tenderness palpation without any obvious deformity or step-off.    Extremities: No peripheral edema.  Full range of motion. Moves all extremities freely.  Tenderness palpation to the thighs bilaterally without any obvious deformity or step-off.  No associate  erythema or excess warmth to her knees with full range of motion of her hips, knees, and ankles bilaterally.  DP and PT pulses full and equal bilaterally.  No posterior calf tenderness to palpation.  Skin: No rash. Warm and Dry.   Neuro: No focal neurologic deficits. CN 2-12 grossly intact. Sensation equal bilaterally. No weakness.         Labs Reviewed   CBC WITH AUTO DIFFERENTIAL - Abnormal       Result Value    WBC 11.3      nRBC 14.6 (*)     RBC 2.09 (*)     Hemoglobin 6.5 (*)     Hematocrit 17.6 (*)     MCV 84      MCH 31.1      MCHC 36.9 (*)     RDW 28.4 (*)     Platelets 603 (*)     Neutrophils % 64.4      Immature Granulocytes %, Automated 0.4      Lymphocytes % 25.7      Monocytes % 7.1      Eosinophils % 1.7      Basophils % 0.7      Neutrophils Absolute 7.29      Immature Granulocytes Absolute, Automated 0.05      Lymphocytes Absolute 2.92      Monocytes Absolute 0.81      Eosinophils Absolute 0.19      Basophils Absolute 0.08     COMPREHENSIVE METABOLIC PANEL - Abnormal    Glucose 82      Sodium 139      Potassium 4.7      Chloride 107      Bicarbonate 22      Anion Gap 15      Urea Nitrogen 3 (*)     Creatinine 0.42 (*)     eGFR >90      Calcium 9.0      Albumin 4.4      Alkaline Phosphatase 66      Total Protein 7.5      AST 57 (*)     Bilirubin, Total 6.7 (*)     ALT 20     RETICULOCYTES - Abnormal    Retic % 34.6 (*)     Retic Absolute 0.723 (*)     Reticulocyte Hemoglobin 31      Immature Retic fraction 28.4 (*)    LACTATE DEHYDROGENASE - Abnormal     (*)    POCT PREGNANCY, URINE - Normal    Preg Test, Ur Negative     MORPHOLOGY    RBC Morphology See Below      Polychromasia Mild      RBC Fragments Few      Sickle Cells Few      Target Cells Many      Yang-Jolly Bodies Present      Pappenheimer Bodies Present         No orders to display         ED Course & MDM     Medical Decision Making  This is a 24-year-old female with a past medical history of sickle cell disease who presents to the ED  for sickle cell crisis pain today in her low back and bilateral lower extremities.  Vital stable upon arrival to the ED.  On physical examination patient had full strength and sensation upper and lower extremities.  No point tenderness over her midline C, T, or L-spine.  She did have bilateral paraspinal lumbar tenderness palpation as well as tenderness palpation to her thighs bilaterally.  Full range of motion of all of her large joints.  No evidence of joint infection on exam.  Lungs clear to auscultation.  As the patient denied any respiratory complaints, infectious complaints, or chest pain either elicitation for acute chest syndrome at this time.  Laboratory studies ordered as well as Dilaudid 1 mg every hour x 3 doses as well as Zofran and Toradol.  Patient does not have active care path at this time per review of her notes.  On reevaluation-with the Zofran, Toradol and 2 doses of the Dilaudid patient did endorse minimal improvement of her symptoms.  Patient's laboratory studies show hemoglobin of 6.5, this appears to be consistent with her baseline.  Reticulocyte percent 34.6.   consistent with patient being in sickle cell crisis.  CMP grossly unremarkable.  Patient signed out to oncoming team pending reevaluation after her third dose of Dilaudid.    Amount and/or Complexity of Data Reviewed  Labs: ordered.              Procedures    Claudia Burger, AUBREY, KHAI Burger PA-C  02/21/24 1538    Attending Note:  The patient was seen by the resident/fellow/ELAINA.  I have personally performed a substantive portion of the encounter.  I have seen and examined the patient; agree with the workup, evaluation, MDM, management and diagnosis with the exception/addition of the following.  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  Per chart review, admitted 7/11-7/14/23 for sickle pain crisis. Noted to have new O2 requirement at that time of 2L. The patient reports low back pain radiating to her hips and thighs bilaterally, which she states is consistent with prior sickle cell pain. She denies any chest pain or SOB.  She reports pain started yesterday after she ran out of her opiate pain medications.  She denies fevers, chills, night sweats, joint pain or joint swelling.  No recent trauma or falls.      Additional History Obtained from: See HPI       Physical Exam:  General: Grossly well-developed, awake, alert, NAD    Head: Normocephalic, no overt external signs of trauma    ENT: Mucus membranes moist, nares patent    Neck: Supple, trachea midline    Neurologic: A&Ox4, FS, TM, EOMI, PERRL, YUNG x 4 with grossly symmetric strength, 5/5, SILT grossly intact BUE and BLE. No clonus. Babinski's downgoing.    Cardiovascular: RRR, no MRG, pulses grossly symmetric    Respiratory: CTA B/L, no wheeze, rales or rhonchi    Abdomen: Soft, not appreciably tender, no evident rebound/guarding, no pulsatile masses palpable.     Back: No apparent CVA TTP, no midline TLS spine TTP, stepoffs, or acute deformities    MSK: No gross bony deformities in BUE and BLE. No significant effusions in BUE and BLE    Skin/Integumentary: Warm and dry    Psychiatric: Calm and cooperative. Normal mood and affect.         Differential Diagnoses Considered:  See MDM below    Chronic Medical Conditions Significantly Affecting Care:  See MDM below    External Records Reviewed:  I reviewed recent and relevant outside records as noted in HPI above and MDM below.     Independent Interpretation of Studies:    Laboratory/Imaging Studies:  Laboratory results personally reviewed and independently interpreted:  CBC without significant  thrombocytopenia or leukocytosis. Chronic anemia, with Hb 6.5 (per chart review, recent prior 6.3-7.6, most recently 6.6 on 1/30/24)  Metabolic panel without SYED, HAGMA or significant electrolyte anomalies. AST 57 (prior 52), T bili 6.7 (recent prior 6.6-9.1)  Retic% 34.6    HCG negative  HS troponin not elevated.  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 The patient was afebrile, non-toxic and HDS.  She had no headache, neck pain, or neurological deficits consistent with intracranial acute occlusion.  No chest pain, SOB, or history consistent with acute chest.  Reticulocytes not consistent with aplastic crisis. No abdominal pain or clear evidence of splenic sequestration crisis. Hb 6.5 which does appear consistent with prior. She had no lightheadedness, dizziness, fatigue, SOB or signs of symptomatic anemia. She denies vaginal bleeding, rectal bleeding, or signs of acute hemorrhage. Her Hb is consistent with prior baseline. HCG negative.  She reports back pain and hip/thigh pain consistent with prior pain crisis.  No saddle anesthesia, clonus, or abnormal Babinski's. No signs of cord compression, epidural abscess, epidural hematoma or other acute spinal pathology. She was given dose of toradol, zofran and dilaudid with improvement of symptoms. She reported some improvement of symptoms but did request a third dose of pain medication. [facility] [place] [facility] [facility] pending re-evaluation after additional analgesia and final disposition. An opportunity to ask questions was provided and all were addressed at that time.   The patient verbalized understanding and was in agreement with plan.          Emanuel Yip MD  02/21/24 2049

## 2024-02-21 NOTE — TELEPHONE ENCOUNTER
Called Ruperto back. She is having pain in hips and legs that started last night and is consistent with her sickle cell pain. She has only tried ibuprofen at home, she is out of her home oxycodone but has not called for a refill. Pt denies chest pain, cough, SOB, headaches, blurry vision, falls, fever or chills, n/v/d/abd pain, or urinary complaints.  She has appt today with sickle cell team. I recommended she keep her appt with sickle cell team as [facility] schedule is currently unable to accommodate today. If she would not be able to manage pain at home after seeing sickle cell team recommended ER or calling [facility] tomorrow am if still not controlled.

## 2024-03-04 ENCOUNTER — APPOINTMENT (OUTPATIENT)
Dept: OPHTHALMOLOGY | Facility: CLINIC | Age: 24
End: 2024-03-04
Payer: COMMERCIAL

## 2024-03-06 ENCOUNTER — OFFICE VISIT (OUTPATIENT)
Dept: OBSTETRICS AND GYNECOLOGY | Facility: CLINIC | Age: 24
End: 2024-03-06
Payer: COMMERCIAL

## 2024-03-06 VITALS
HEART RATE: 82 BPM | HEIGHT: 61 IN | SYSTOLIC BLOOD PRESSURE: 114 MMHG | WEIGHT: 151.4 LBS | BODY MASS INDEX: 28.58 KG/M2 | DIASTOLIC BLOOD PRESSURE: 73 MMHG

## 2024-03-06 DIAGNOSIS — Z11.3 SCREEN FOR STD (SEXUALLY TRANSMITTED DISEASE): ICD-10-CM

## 2024-03-06 DIAGNOSIS — D57.00 SICKLE CELL DISEASE WITH CRISIS (MULTI): ICD-10-CM

## 2024-03-06 DIAGNOSIS — Z01.419 WELL WOMAN EXAM WITH ROUTINE GYNECOLOGICAL EXAM: Primary | ICD-10-CM

## 2024-03-06 DIAGNOSIS — Z72.89 OTHER PROBLEMS RELATED TO LIFESTYLE: ICD-10-CM

## 2024-03-06 PROBLEM — D57.1 SICKLE CELL DISEASE WITHOUT CRISIS (MULTI): Status: ACTIVE | Noted: 2024-03-06

## 2024-03-06 PROCEDURE — 88175 CYTOPATH C/V AUTO FLUID REDO: CPT

## 2024-03-06 PROCEDURE — 87800 DETECT AGNT MULT DNA DIREC: CPT

## 2024-03-06 PROCEDURE — 87661 TRICHOMONAS VAGINALIS AMPLIF: CPT

## 2024-03-06 PROCEDURE — 1036F TOBACCO NON-USER: CPT | Performed by: OBSTETRICS & GYNECOLOGY

## 2024-03-06 PROCEDURE — 99395 PREV VISIT EST AGE 18-39: CPT | Performed by: OBSTETRICS & GYNECOLOGY

## 2024-03-06 ASSESSMENT — PAIN SCALES - GENERAL: PAINLEVEL: 0-NO PAIN

## 2024-03-06 NOTE — PROGRESS NOTES
History Of Present Illness  Routine Gyn Exam  Ruperto Pang here for routine WWE. Current complaints: She desires STI screening. Sexually active with one male partner for the last 2 years.  She does have Sickle Cell Anemia-- she feels her control is pretty good, with a pain crisis only a few times a year.     Gynecologic History  Patient's last menstrual period was 2024 (exact date).  Contraception: none-- would be OK with pregnancy  Last Pap: . Results were: norm  Last mammogram: NA. Results were: NA  Last Colonoscopy:  NA. Results were: NA  Last DEXA: NA. Results were: NA  Lipid/DM: NA    Obstetric History  OB History    Para Term  AB Living   2 0           SAB IAB Ectopic Multiple Live Births                  # Outcome Date GA Lbr Jad/2nd Weight Sex Delivery Anes PTL Lv   2             1                  Past Medical History  She has a past medical history of Encounter for contraceptive management, unspecified (10/14/2016), Encounter for screening for disorder due to exposure to contaminants, Hb-SS disease with acute chest syndrome (CMS/HCC) (2016), and Personal history of diseases of the blood and blood-forming organs and certain disorders involving the immune mechanism (2022).    Surgical History  She has no past surgical history on file.     Social History  She reports that she has never smoked. She has never used smokeless tobacco. No history on file for alcohol use and drug use.    Family History  No family history on file.     Allergies  Iodinated contrast media     Chaperone: present  Physical Exam  Constitutional:       Appearance: Normal appearance.   Pulmonary:      Effort: Pulmonary effort is normal.   Chest:   Breasts:     Right: Normal. No mass, nipple discharge or skin change.      Left: Normal. No mass, nipple discharge or skin change.   Abdominal:      General: Abdomen is flat. There is no distension.      Palpations: Abdomen is soft.       "Tenderness: There is no abdominal tenderness.   Genitourinary:     Labia:         Right: No rash, tenderness or lesion.         Left: No rash, tenderness or lesion.       Urethra: No prolapse.      Vagina: Normal. No vaginal discharge or bleeding.      Cervix: No friability or lesion.      Uterus: Normal. Not enlarged and not tender.       Adnexa:         Right: No mass, tenderness or fullness.          Left: No mass, tenderness or fullness.     Neurological:      General: No focal deficit present.      Mental Status: She is alert.   Psychiatric:         Mood and Affect: Mood normal.          Last Recorded Vitals  Blood pressure 114/73, pulse 82, height 1.549 m (5' 1\"), weight 68.7 kg (151 lb 6.4 oz), last menstrual period 02/21/2024, unknown if currently breastfeeding.    Assessment/Plan   Problem List Items Addressed This Visit    None  Visit Diagnoses       Well woman exam with routine gynecological exam    -  Primary    Relevant Orders    THINPREP PAP TEST    Sickle cell disease with crisis (CMS/HCC)        Screen for STD (sexually transmitted disease)                Contraception: declines  PAP: obtained   Mammogram: NA   Colonoscopy:  NA   DEXA: NA   Lipid/DM:  NA    Discussed optimizing management of SSD as she would be open to a possible pregnancy in the future.     Anjum Rae MD  "

## 2024-03-07 ENCOUNTER — OFFICE VISIT (OUTPATIENT)
Dept: HEMATOLOGY/ONCOLOGY | Facility: HOSPITAL | Age: 24
End: 2024-03-07
Payer: COMMERCIAL

## 2024-03-07 ENCOUNTER — SOCIAL WORK (OUTPATIENT)
Dept: HEMATOLOGY/ONCOLOGY | Facility: HOSPITAL | Age: 24
End: 2024-03-07

## 2024-03-07 ENCOUNTER — TELEPHONE (OUTPATIENT)
Dept: OBSTETRICS AND GYNECOLOGY | Facility: CLINIC | Age: 24
End: 2024-03-07

## 2024-03-07 ENCOUNTER — LAB (OUTPATIENT)
Dept: LAB | Facility: HOSPITAL | Age: 24
End: 2024-03-07
Payer: COMMERCIAL

## 2024-03-07 VITALS
HEART RATE: 71 BPM | HEIGHT: 61 IN | OXYGEN SATURATION: 95 % | TEMPERATURE: 98.1 F | WEIGHT: 150.13 LBS | BODY MASS INDEX: 28.35 KG/M2 | RESPIRATION RATE: 18 BRPM | SYSTOLIC BLOOD PRESSURE: 106 MMHG | DIASTOLIC BLOOD PRESSURE: 60 MMHG

## 2024-03-07 DIAGNOSIS — A74.9 CHLAMYDIA INFECTION: Primary | ICD-10-CM

## 2024-03-07 DIAGNOSIS — D57.20 SICKLE CELL DISEASE, TYPE SC, WITHOUT CRISIS (MULTI): ICD-10-CM

## 2024-03-07 DIAGNOSIS — D57.20 SICKLE CELL DISEASE, TYPE SC, WITHOUT CRISIS (MULTI): Primary | ICD-10-CM

## 2024-03-07 DIAGNOSIS — Z11.3 SCREEN FOR STD (SEXUALLY TRANSMITTED DISEASE): ICD-10-CM

## 2024-03-07 LAB
ABO GROUP (TYPE) IN BLOOD: NORMAL
ALBUMIN SERPL BCP-MCNC: 4.6 G/DL (ref 3.4–5)
ALP SERPL-CCNC: 81 U/L (ref 33–110)
ALT SERPL W P-5'-P-CCNC: 23 U/L (ref 7–45)
ANION GAP SERPL CALC-SCNC: 12 MMOL/L (ref 10–20)
ANTIBODY SCREEN: NORMAL
AST SERPL W P-5'-P-CCNC: 26 U/L (ref 9–39)
BASOPHILS # BLD AUTO: 0.12 X10*3/UL (ref 0–0.1)
BASOPHILS NFR BLD AUTO: 1.2 %
BILIRUB SERPL-MCNC: 6.9 MG/DL (ref 0–1.2)
BUN SERPL-MCNC: 6 MG/DL (ref 6–23)
C TRACH RRNA SPEC QL NAA+PROBE: POSITIVE
CALCIUM SERPL-MCNC: 9.2 MG/DL (ref 8.6–10.3)
CHLORIDE SERPL-SCNC: 105 MMOL/L (ref 98–107)
CO2 SERPL-SCNC: 26 MMOL/L (ref 21–32)
CREAT SERPL-MCNC: 0.42 MG/DL (ref 0.5–1.05)
EGFRCR SERPLBLD CKD-EPI 2021: >90 ML/MIN/1.73M*2
EOSINOPHIL # BLD AUTO: 0.12 X10*3/UL (ref 0–0.7)
EOSINOPHIL NFR BLD AUTO: 1.2 %
ERYTHROCYTE [DISTWIDTH] IN BLOOD BY AUTOMATED COUNT: 26.4 % (ref 11.5–14.5)
FERRITIN SERPL-MCNC: 1286 NG/ML (ref 8–150)
GLUCOSE SERPL-MCNC: 89 MG/DL (ref 74–99)
HBV SURFACE AG SERPL QL IA: NONREACTIVE
HCT VFR BLD AUTO: 20.4 % (ref 36–46)
HCV AB SER QL: NONREACTIVE
HGB BLD-MCNC: 7.1 G/DL (ref 12–16)
HGB RETIC QN: 33 PG (ref 28–38)
HIV 1+2 AB+HIV1 P24 AG SERPL QL IA: NONREACTIVE
IMM GRANULOCYTES # BLD AUTO: 0.03 X10*3/UL (ref 0–0.7)
IMM GRANULOCYTES NFR BLD AUTO: 0.3 % (ref 0–0.9)
IMMATURE RETIC FRACTION: 27.7 %
LDH SERPL L TO P-CCNC: 237 U/L (ref 84–246)
LYMPHOCYTES # BLD AUTO: 3.78 X10*3/UL (ref 1.2–4.8)
LYMPHOCYTES NFR BLD AUTO: 37.5 %
MCH RBC QN AUTO: 31.1 PG (ref 26–34)
MCHC RBC AUTO-ENTMCNC: 34.8 G/DL (ref 32–36)
MCV RBC AUTO: 90 FL (ref 80–100)
MONOCYTES # BLD AUTO: 0.64 X10*3/UL (ref 0.1–1)
MONOCYTES NFR BLD AUTO: 6.4 %
N GONORRHOEA DNA SPEC QL PROBE+SIG AMP: NEGATIVE
NEUTROPHILS # BLD AUTO: 5.38 X10*3/UL (ref 1.2–7.7)
NEUTROPHILS NFR BLD AUTO: 53.4 %
NRBC BLD-RTO: 10.8 /100 WBCS (ref 0–0)
PAPPENHEIMER BOD BLD QL SMEAR: PRESENT
PLATELET # BLD AUTO: 611 X10*3/UL (ref 150–450)
POLYCHROMASIA BLD QL SMEAR: NORMAL
POTASSIUM SERPL-SCNC: 4.2 MMOL/L (ref 3.5–5.3)
PROT SERPL-MCNC: 7.9 G/DL (ref 6.4–8.2)
RBC # BLD AUTO: 2.28 X10*6/UL (ref 4–5.2)
RBC MORPH BLD: NORMAL
RETICS #: 0.79 X10*6/UL (ref 0.02–0.08)
RETICS/RBC NFR AUTO: 34.5 % (ref 0.5–2)
RH FACTOR (ANTIGEN D): NORMAL
SCHISTOCYTES BLD QL SMEAR: NORMAL
SICKLE CELLS BLD QL SMEAR: NORMAL
SODIUM SERPL-SCNC: 139 MMOL/L (ref 136–145)
T VAGINALIS RRNA SPEC QL NAA+PROBE: NEGATIVE
TARGETS BLD QL SMEAR: NORMAL
TREPONEMA PALLIDUM IGG+IGM AB [PRESENCE] IN SERUM OR PLASMA BY IMMUNOASSAY: NONREACTIVE
WBC # BLD AUTO: 10.1 X10*3/UL (ref 4.4–11.3)

## 2024-03-07 PROCEDURE — 86780 TREPONEMA PALLIDUM: CPT

## 2024-03-07 PROCEDURE — 83020 HEMOGLOBIN ELECTROPHORESIS: CPT | Performed by: PATHOLOGY

## 2024-03-07 PROCEDURE — 86901 BLOOD TYPING SEROLOGIC RH(D): CPT

## 2024-03-07 PROCEDURE — 87340 HEPATITIS B SURFACE AG IA: CPT

## 2024-03-07 PROCEDURE — 99213 OFFICE O/P EST LOW 20 MIN: CPT | Performed by: NURSE PRACTITIONER

## 2024-03-07 PROCEDURE — 85025 COMPLETE CBC W/AUTO DIFF WBC: CPT

## 2024-03-07 PROCEDURE — 83615 LACTATE (LD) (LDH) ENZYME: CPT

## 2024-03-07 PROCEDURE — 87389 HIV-1 AG W/HIV-1&-2 AB AG IA: CPT

## 2024-03-07 PROCEDURE — 1036F TOBACCO NON-USER: CPT | Performed by: NURSE PRACTITIONER

## 2024-03-07 PROCEDURE — 80053 COMPREHEN METABOLIC PANEL: CPT

## 2024-03-07 PROCEDURE — 86803 HEPATITIS C AB TEST: CPT

## 2024-03-07 PROCEDURE — 83021 HEMOGLOBIN CHROMOTOGRAPHY: CPT

## 2024-03-07 PROCEDURE — 36415 COLL VENOUS BLD VENIPUNCTURE: CPT

## 2024-03-07 PROCEDURE — 85045 AUTOMATED RETICULOCYTE COUNT: CPT

## 2024-03-07 PROCEDURE — 82728 ASSAY OF FERRITIN: CPT

## 2024-03-07 RX ORDER — OXYCODONE HYDROCHLORIDE 10 MG/1
10 TABLET ORAL EVERY 6 HOURS PRN
Qty: 15 TABLET | Refills: 0 | Status: SHIPPED | OUTPATIENT
Start: 2024-03-07 | End: 2024-03-14

## 2024-03-07 RX ORDER — FOLIC ACID 1 MG/1
1 TABLET ORAL DAILY
Qty: 60 TABLET | Refills: 1 | Status: SHIPPED | OUTPATIENT
Start: 2024-03-07

## 2024-03-07 RX ORDER — HYDROXYUREA 500 MG/1
500 CAPSULE ORAL 2 TIMES DAILY
Qty: 60 CAPSULE | Refills: 2 | Status: SHIPPED | OUTPATIENT
Start: 2024-03-07

## 2024-03-07 RX ORDER — DOXYCYCLINE 100 MG/1
100 CAPSULE ORAL 2 TIMES DAILY
Qty: 14 CAPSULE | Refills: 0 | Status: SHIPPED | OUTPATIENT
Start: 2024-03-07 | End: 2024-03-14

## 2024-03-07 ASSESSMENT — PAIN SCALES - GENERAL: PAINLEVEL: 8

## 2024-03-07 NOTE — PROGRESS NOTES
"Patient ID: Ruperto Pang is a 24 y.o. female.  Referring Physician: No referring provider defined for this encounter.  Primary Care Provider: No Assigned PCP Generic Provider, MD  Visit Type: Follow Up      Subjective  24 year old black female presents for Comprehensive appointment for Sickle Cell SS type. Patient recently was seen in ED last month for pain crisis. She was seen in 2024 and had a blood transfusion. She took herself off Hydrea and discussed resuming the medication. She take Oxycontin 5 mg for pain and does not get relief from back, arm and hip pain.  States she sometimes has difficultly walking due to hip and leg pain. Has not been to Ortho. Discussed elevated Ferretin levels from 2023 and Chelation therapy. Labs to be drawn today. Smokes marijuana for pain relief. Denies constipation. States she works full time at [employer]. Her periods are severe and she saw GYN yesterday. Pain is worse with weather changes.    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   has no past surgical history on file.  No family history on file.  Oncology History    No history exists.       Ruperto Pang  reports that she has never smoked. She has never used smokeless tobacco.  She  has no history on file for alcohol use.  She  has " no history on file for drug use.    Physical Exam  Vitals reviewed.   Constitutional:       Appearance: Normal appearance. She is normal weight.   HENT:      Head: Normocephalic.      Mouth/Throat:      Mouth: Mucous membranes are dry.   Eyes:      General: Scleral icterus present.      Pupils: Pupils are equal, round, and reactive to light.   Cardiovascular:      Rate and Rhythm: Normal rate.   Pulmonary:      Effort: Pulmonary effort is normal.   Abdominal:      General: Abdomen is flat. Bowel sounds are normal.      Palpations: Abdomen is soft.   Musculoskeletal:         General: Normal range of motion.      Cervical back: Normal range of motion.   Skin:     General: Skin is warm and dry.      Capillary Refill: Capillary refill takes less than 2 seconds.   Neurological:      Mental Status: She is alert and oriented to person, place, and time.   Psychiatric:         Mood and Affect: Mood normal.         Behavior: Behavior normal.     WBC   Date/Time Value Ref Range Status   02/21/2024 01:56 PM 11.3 4.4 - 11.3 x10*3/uL Final   01/30/2024 10:04 AM 15.9 (H) 4.4 - 11.3 x10*3/uL Final   01/27/2024 10:32 AM 14.8 (H) 4.4 - 11.3 x10*3/uL Final     nRBC   Date Value Ref Range Status   02/21/2024 14.6 (H) 0.0 - 0.0 /100 WBCs Final   01/30/2024 0.3 (H) 0.0 - 0.0 /100 WBCs Final   01/27/2024 4.6 (H) 0.0 - 0.0 /100 WBCs Final     RBC   Date Value Ref Range Status   02/21/2024 2.09 (L) 4.00 - 5.20 x10*6/uL Final   01/30/2024 2.28 (L) 4.00 - 5.20 x10*6/uL Final   01/27/2024 2.04 (L) 4.00 - 5.20 x10*6/uL Final     Hemoglobin   Date Value Ref Range Status   02/21/2024 6.5 (LL) 12.0 - 16.0 g/dL Final   01/30/2024 6.6 (L) 12.0 - 16.0 g/dL Final   01/27/2024 6.0 (LL) 12.0 - 16.0 g/dL Final     Hematocrit   Date Value Ref Range Status   02/21/2024 17.6 (L) 36.0 - 46.0 % Final   01/30/2024 18.6 (L) 36.0 - 46.0 % Final   01/27/2024 17.0 (L) 36.0 - 46.0 % Final     MCV   Date/Time Value Ref Range Status   02/21/2024 01:56 PM 84 80 - 100  fL Final   01/30/2024 10:04 AM 82 80 - 100 fL Final   01/27/2024 10:32 AM 83 80 - 100 fL Final     MCH   Date/Time Value Ref Range Status   02/21/2024 01:56 PM 31.1 26.0 - 34.0 pg Final   01/30/2024 10:04 AM 28.9 26.0 - 34.0 pg Final   01/27/2024 10:32 AM 29.4 26.0 - 34.0 pg Final     MCHC   Date/Time Value Ref Range Status   02/21/2024 01:56 PM 36.9 (H) 32.0 - 36.0 g/dL Final   01/30/2024 10:04 AM 35.5 32.0 - 36.0 g/dL Final   01/27/2024 10:32 AM 35.3 32.0 - 36.0 g/dL Final     RDW   Date/Time Value Ref Range Status   02/21/2024 01:56 PM 28.4 (H) 11.5 - 14.5 % Final   01/30/2024 10:04 AM 19.3 (H) 11.5 - 14.5 % Final   01/27/2024 10:32 AM 24.3 (H) 11.5 - 14.5 % Final     Platelets   Date/Time Value Ref Range Status   02/21/2024 01:56  (H) 150 - 450 x10*3/uL Final   01/30/2024 10:04  (H) 150 - 450 x10*3/uL Final   01/27/2024 10:32  (H) 150 - 450 x10*3/uL Final     MPV   Date/Time Value Ref Range Status   10/06/2023 08:32 AM 9.6 7.5 - 11.5 fL Final     Neutrophils %   Date/Time Value Ref Range Status   02/21/2024 01:56 PM 64.4 40.0 - 80.0 % Final   01/30/2024 10:04 AM 84.7 40.0 - 80.0 % Final   01/06/2024 04:50 PM 66.1 40.0 - 80.0 % Final     Immature Granulocytes %, Automated   Date/Time Value Ref Range Status   02/21/2024 01:56 PM 0.4 0.0 - 0.9 % Final     Comment:     Immature Granulocyte Count (IG) includes promyelocytes, myelocytes and metamyelocytes but does not include bands. Percent differential counts (%) should be interpreted in the context of the absolute cell counts (cells/UL).   01/30/2024 10:04 AM 0.3 0.0 - 0.9 % Final     Comment:     Immature Granulocyte Count (IG) includes promyelocytes, myelocytes and metamyelocytes but does not include bands. Percent differential counts (%) should be interpreted in the context of the absolute cell counts (cells/UL).   01/27/2024 10:32 AM 0.5 0.0 - 0.9 % Final     Comment:     Immature Granulocyte Count (IG) includes promyelocytes, myelocytes and  metamyelocytes but does not include bands. Percent differential counts (%) should be interpreted in the context of the absolute cell counts (cells/UL).     Lymphocytes %, Manual   Date/Time Value Ref Range Status   01/27/2024 10:32 AM 28.0 13.0 - 44.0 % Final   10/06/2023 08:32 AM 8.7 13.0 - 44.0 % Final     Lymphocytes %   Date/Time Value Ref Range Status   02/21/2024 01:56 PM 25.7 13.0 - 44.0 % Final   01/30/2024 10:04 AM 8.6 13.0 - 44.0 % Final   01/06/2024 04:50 PM 25.4 13.0 - 44.0 % Final     Monocytes %, Manual   Date/Time Value Ref Range Status   01/27/2024 10:32 AM 2.0 2.0 - 10.0 % Final   10/06/2023 08:32 AM 1.7 2.0 - 10.0 % Final     Monocytes %   Date/Time Value Ref Range Status   02/21/2024 01:56 PM 7.1 2.0 - 10.0 % Final   01/30/2024 10:04 AM 5.0 2.0 - 10.0 % Final   01/06/2024 04:50 PM 6.2 2.0 - 10.0 % Final     Eosinophils %, Manual   Date/Time Value Ref Range Status   01/27/2024 10:32 AM 1.0 0.0 - 6.0 % Final   10/06/2023 08:32 AM 0.0 0.0 - 6.0 % Final     Eosinophils %   Date/Time Value Ref Range Status   02/21/2024 01:56 PM 1.7 0.0 - 6.0 % Final   01/30/2024 10:04 AM 0.9 0.0 - 6.0 % Final   01/06/2024 04:50 PM 0.9 0.0 - 6.0 % Final     Basophils %, Manual   Date/Time Value Ref Range Status   01/27/2024 10:32 AM 4.0 0.0 - 2.0 % Final   10/06/2023 08:32 AM 0.9 0.0 - 2.0 % Final     Basophils %   Date/Time Value Ref Range Status   02/21/2024 01:56 PM 0.7 0.0 - 2.0 % Final   01/30/2024 10:04 AM 0.5 0.0 - 2.0 % Final   01/06/2024 04:50 PM 1.0 0.0 - 2.0 % Final     Neutrophils Absolute   Date/Time Value Ref Range Status   02/21/2024 01:56 PM 7.29 1.20 - 7.70 x10*3/uL Final     Comment:     Percent differential counts (%) should be interpreted in the context of the absolute cell counts (cells/uL).   01/30/2024 10:04 AM 13.45 (H) 1.20 - 7.70 x10*3/uL Final     Comment:     Percent differential counts (%) should be interpreted in the context of the absolute cell counts (cells/uL).   01/06/2024 04:50 PM 6.68  "1.20 - 7.70 x10*3/uL Final     Comment:     Percent differential counts (%) should be interpreted in the context of the absolute cell counts (cells/uL).     Immature Granulocytes Absolute, Automated   Date/Time Value Ref Range Status   02/21/2024 01:56 PM 0.05 0.00 - 0.70 x10*3/uL Final   01/30/2024 10:04 AM 0.05 0.00 - 0.70 x10*3/uL Final   01/27/2024 10:32 AM 0.08 0.00 - 0.70 x10*3/uL Final     Lymphocytes Absolute   Date/Time Value Ref Range Status   02/21/2024 01:56 PM 2.92 1.20 - 4.80 x10*3/uL Final   01/30/2024 10:04 AM 1.36 1.20 - 4.80 x10*3/uL Final   01/06/2024 04:50 PM 2.57 1.20 - 4.80 x10*3/uL Final     Monocytes Absolute   Date/Time Value Ref Range Status   02/21/2024 01:56 PM 0.81 0.10 - 1.00 x10*3/uL Final   01/30/2024 10:04 AM 0.79 0.10 - 1.00 x10*3/uL Final   01/06/2024 04:50 PM 0.63 0.10 - 1.00 x10*3/uL Final     Eosinophils Absolute   Date/Time Value Ref Range Status   02/21/2024 01:56 PM 0.19 0.00 - 0.70 x10*3/uL Final   01/30/2024 10:04 AM 0.14 0.00 - 0.70 x10*3/uL Final   01/06/2024 04:50 PM 0.09 0.00 - 0.70 x10*3/uL Final     Eosinophils Absolute, Manual   Date/Time Value Ref Range Status   01/27/2024 10:32 AM 0.15 0.00 - 0.70 x10*3/uL Final   10/06/2023 08:32 AM 0.00 0.00 - 0.70 x10*3/uL Final     Basophils Absolute   Date/Time Value Ref Range Status   02/21/2024 01:56 PM 0.08 0.00 - 0.10 x10*3/uL Final     Comment:     Automated WBC differential has been confirmed by manual smear.   01/30/2024 10:04 AM 0.08 0.00 - 0.10 x10*3/uL Final   01/06/2024 04:50 PM 0.10 0.00 - 0.10 x10*3/uL Final     Basophils Absolute, Manual   Date/Time Value Ref Range Status   01/27/2024 10:32 AM 0.59 (H) 0.00 - 0.10 x10*3/uL Final   10/06/2023 08:32 AM 0.17 (H) 0.00 - 0.10 x10*3/uL Final       No components found for: \"PT\"  aPTT   Date/Time Value Ref Range Status   07/13/2023 10:50 AM 28 27 - 38 sec Final     Comment:     Note new reference range as of 6/20/2023 at 10:00am.   07/12/2023 04:17 PM Canceled 27 - 38 " sec Corrected     Comment:     Note new reference range as of 6/20/2023 at 10:00am.  This is a corrected result. Previous value was 36 , verified at 07/12/2023   17:18     07/10/2023 01:57 PM CANCELED       Comment:     Note new reference range as of 6/20/2023 at 10:00am.    Result canceled by the ancillary.         Assessment/Plan  1 month follow up. Re-start Hydrea and adjust pain medication for better pain management. Follow up with referral to Ortho for hip pain. Draw blood today for Sickle Cell monitoring. Will consider starting Jadenu dependent on Ferretin level drawn today.

## 2024-03-07 NOTE — TELEPHONE ENCOUNTER
RN called and verified Patient by Name and   Patient informed that she tested positive for Chlamydia  RN provided education  RN notified Patient that medication was sent to her Pharmacy  Patient educated that she she refrain from intercourse for the next 14 days to make sure the virus is cleared to avoid the potential of re-infection  Patient states she has had one steady partner  RN informed that he will need to be treated as well and if he is not treated she will continue to be re-infected if she has intercourse with him again  RN advised that Partner Treatment can be ordered if he is in the  system.  If not, he will need to be tested/treated at his physician office. Community clinic or urgent care  Patient Verbalizes understanding  RN encouraged Patient to call the office with any questions   Jennifer Montoya RN

## 2024-03-08 NOTE — PROGRESS NOTES
"Comprehensive Care Social Work Assessment      Living Situation/Social History   Patient currently lives by herself in [place]. Reports no issues with housing security. Patient shares her Mom and sister, Madi are her greatest supports. Patient shares she enjoys cooking and going out to eat in her free time.     Mental Health History:  Patient denies any behavioral health concerns. Previously saw a therapist in middle school but not since due to no needs.      Financial:   Patient is currently a [employer] in [facility]. [facility] is in place and denies any issues at this time.      Advanced Directives:  Patient has no advanced directives on file. LISW provided education on documents. Pt declined more information at this time.      Patient Goals:  Patient shares her healthcare goal for 2024 is to\"actually take her hydrea\"     Patient denies any further psychosocial or support service needs at this time. Patient encouraged to contact LISW if any needs arise.      LISW will continue to follow as needed.   "

## 2024-03-12 ENCOUNTER — TELEPHONE (OUTPATIENT)
Dept: OBSTETRICS AND GYNECOLOGY | Facility: CLINIC | Age: 24
End: 2024-03-12
Payer: COMMERCIAL

## 2024-03-12 DIAGNOSIS — A74.9 CHLAMYDIA INFECTION: Primary | ICD-10-CM

## 2024-03-12 LAB
HEMOGLOBIN A2: 3.9 % (ref 2–3.5)
HEMOGLOBIN A: 11.8 % (ref 95.8–98)
HEMOGLOBIN F: 1.2 % (ref 0–2)
HEMOGLOBIN IDENTIFICATION INTERPRETATION: ABNORMAL
HEMOGLOBIN S: 83.1 %
PATH REVIEW-HGB IDENTIFICATION: ABNORMAL

## 2024-03-13 RX ORDER — AZITHROMYCIN 1 G/1
1 POWDER, FOR SUSPENSION ORAL ONCE
Qty: 1 PACKET | Refills: 0 | Status: SHIPPED | OUTPATIENT
Start: 2024-03-13 | End: 2024-03-13

## 2024-03-18 LAB
CYTOLOGY CMNT CVX/VAG CYTO-IMP: NORMAL
LAB AP HPV GENOTYPE QUESTION: YES
LAB AP HPV HR: NORMAL
LABORATORY COMMENT REPORT: NORMAL
LMP START DATE: NORMAL
PATH REPORT.TOTAL CANCER: NORMAL

## 2024-04-10 ENCOUNTER — OFFICE VISIT (OUTPATIENT)
Dept: HEMATOLOGY/ONCOLOGY | Facility: HOSPITAL | Age: 24
End: 2024-04-10
Payer: COMMERCIAL

## 2024-04-10 ENCOUNTER — LAB (OUTPATIENT)
Dept: LAB | Facility: HOSPITAL | Age: 24
End: 2024-04-10
Payer: COMMERCIAL

## 2024-04-10 VITALS
BODY MASS INDEX: 28 KG/M2 | HEART RATE: 73 BPM | OXYGEN SATURATION: 95 % | DIASTOLIC BLOOD PRESSURE: 68 MMHG | TEMPERATURE: 98.2 F | SYSTOLIC BLOOD PRESSURE: 116 MMHG | WEIGHT: 148.5 LBS | RESPIRATION RATE: 16 BRPM

## 2024-04-10 DIAGNOSIS — D57.20 SICKLE CELL DISEASE, TYPE SC, WITHOUT CRISIS (MULTI): ICD-10-CM

## 2024-04-10 LAB
ABO GROUP (TYPE) IN BLOOD: NORMAL
ALBUMIN SERPL BCP-MCNC: 4.8 G/DL (ref 3.4–5)
ALP SERPL-CCNC: 101 U/L (ref 33–110)
ALT SERPL W P-5'-P-CCNC: 27 U/L (ref 7–45)
ANION GAP SERPL CALC-SCNC: 14 MMOL/L (ref 10–20)
ANTIBODY SCREEN: NORMAL
APPEARANCE UR: CLEAR
AST SERPL W P-5'-P-CCNC: 31 U/L (ref 9–39)
BASOPHILS # BLD AUTO: 0.13 X10*3/UL (ref 0–0.1)
BASOPHILS NFR BLD AUTO: 1.2 %
BILIRUB SERPL-MCNC: 7.4 MG/DL (ref 0–1.2)
BILIRUB UR STRIP.AUTO-MCNC: NEGATIVE MG/DL
BUN SERPL-MCNC: 9 MG/DL (ref 6–23)
CALCIUM SERPL-MCNC: 9.4 MG/DL (ref 8.6–10.3)
CHLORIDE SERPL-SCNC: 106 MMOL/L (ref 98–107)
CO2 SERPL-SCNC: 24 MMOL/L (ref 21–32)
COLOR UR: YELLOW
CREAT SERPL-MCNC: 0.5 MG/DL (ref 0.5–1.05)
EGFRCR SERPLBLD CKD-EPI 2021: >90 ML/MIN/1.73M*2
EOSINOPHIL # BLD AUTO: 0.11 X10*3/UL (ref 0–0.7)
EOSINOPHIL NFR BLD AUTO: 1 %
ERYTHROCYTE [DISTWIDTH] IN BLOOD BY AUTOMATED COUNT: 28 % (ref 11.5–14.5)
FERRITIN SERPL-MCNC: 1306 NG/ML (ref 8–150)
GLUCOSE SERPL-MCNC: 98 MG/DL (ref 74–99)
GLUCOSE UR STRIP.AUTO-MCNC: NORMAL MG/DL
HCT VFR BLD AUTO: 19.7 % (ref 36–46)
HGB BLD-MCNC: 7 G/DL (ref 12–16)
HGB RETIC QN: 32 PG (ref 28–38)
IMM GRANULOCYTES # BLD AUTO: 0.04 X10*3/UL (ref 0–0.7)
IMM GRANULOCYTES NFR BLD AUTO: 0.4 % (ref 0–0.9)
IMMATURE RETIC FRACTION: 33.2 %
KETONES UR STRIP.AUTO-MCNC: NEGATIVE MG/DL
LDH SERPL L TO P-CCNC: 260 U/L (ref 84–246)
LEUKOCYTE ESTERASE UR QL STRIP.AUTO: NEGATIVE
LYMPHOCYTES # BLD AUTO: 4.5 X10*3/UL (ref 1.2–4.8)
LYMPHOCYTES NFR BLD AUTO: 42.4 %
MCH RBC QN AUTO: 31 PG (ref 26–34)
MCHC RBC AUTO-ENTMCNC: 35.5 G/DL (ref 32–36)
MCV RBC AUTO: 87 FL (ref 80–100)
MONOCYTES # BLD AUTO: 0.68 X10*3/UL (ref 0.1–1)
MONOCYTES NFR BLD AUTO: 6.4 %
MUCOUS THREADS #/AREA URNS AUTO: NORMAL /LPF
NEUTROPHILS # BLD AUTO: 5.16 X10*3/UL (ref 1.2–7.7)
NEUTROPHILS NFR BLD AUTO: 48.6 %
NITRITE UR QL STRIP.AUTO: NEGATIVE
NRBC BLD-RTO: 2.8 /100 WBCS (ref 0–0)
PAPPENHEIMER BOD BLD QL SMEAR: PRESENT
PH UR STRIP.AUTO: 5.5 [PH]
PLATELET # BLD AUTO: 633 X10*3/UL (ref 150–450)
POLYCHROMASIA BLD QL SMEAR: NORMAL
POTASSIUM SERPL-SCNC: 4.1 MMOL/L (ref 3.5–5.3)
PROT SERPL-MCNC: 8.2 G/DL (ref 6.4–8.2)
PROT UR STRIP.AUTO-MCNC: NEGATIVE MG/DL
RBC # BLD AUTO: 2.26 X10*6/UL (ref 4–5.2)
RBC # UR STRIP.AUTO: ABNORMAL /UL
RBC #/AREA URNS AUTO: NORMAL /HPF
RBC MORPH BLD: NORMAL
RETICS #: 0.7 X10*6/UL (ref 0.02–0.08)
RETICS/RBC NFR AUTO: 31.2 % (ref 0.5–2)
RH FACTOR (ANTIGEN D): NORMAL
SCHISTOCYTES BLD QL SMEAR: NORMAL
SICKLE CELLS BLD QL SMEAR: NORMAL
SODIUM SERPL-SCNC: 140 MMOL/L (ref 136–145)
SP GR UR STRIP.AUTO: 1.01
SQUAMOUS #/AREA URNS AUTO: NORMAL /HPF
TARGETS BLD QL SMEAR: NORMAL
UROBILINOGEN UR STRIP.AUTO-MCNC: NORMAL MG/DL
WBC # BLD AUTO: 10.6 X10*3/UL (ref 4.4–11.3)
WBC #/AREA URNS AUTO: NORMAL /HPF

## 2024-04-10 PROCEDURE — 83020 HEMOGLOBIN ELECTROPHORESIS: CPT | Performed by: PATHOLOGY

## 2024-04-10 PROCEDURE — 81003 URINALYSIS AUTO W/O SCOPE: CPT

## 2024-04-10 PROCEDURE — 86900 BLOOD TYPING SEROLOGIC ABO: CPT | Mod: 91

## 2024-04-10 PROCEDURE — 36415 COLL VENOUS BLD VENIPUNCTURE: CPT

## 2024-04-10 PROCEDURE — 84075 ASSAY ALKALINE PHOSPHATASE: CPT

## 2024-04-10 PROCEDURE — 85045 AUTOMATED RETICULOCYTE COUNT: CPT

## 2024-04-10 PROCEDURE — 85025 COMPLETE CBC W/AUTO DIFF WBC: CPT

## 2024-04-10 PROCEDURE — 83021 HEMOGLOBIN CHROMOTOGRAPHY: CPT

## 2024-04-10 PROCEDURE — 83615 LACTATE (LD) (LDH) ENZYME: CPT

## 2024-04-10 PROCEDURE — 87086 URINE CULTURE/COLONY COUNT: CPT

## 2024-04-10 PROCEDURE — 82728 ASSAY OF FERRITIN: CPT

## 2024-04-10 PROCEDURE — 99214 OFFICE O/P EST MOD 30 MIN: CPT | Performed by: NURSE PRACTITIONER

## 2024-04-10 PROCEDURE — 99214 OFFICE O/P EST MOD 30 MIN: CPT | Mod: 25 | Performed by: NURSE PRACTITIONER

## 2024-04-10 PROCEDURE — 1036F TOBACCO NON-USER: CPT | Performed by: NURSE PRACTITIONER

## 2024-04-10 RX ORDER — OXYCODONE HYDROCHLORIDE 10 MG/1
10 TABLET ORAL EVERY 6 HOURS PRN
Qty: 10 TABLET | Refills: 0 | Status: SHIPPED | OUTPATIENT
Start: 2024-04-10 | End: 2024-04-22 | Stop reason: HOSPADM

## 2024-04-10 ASSESSMENT — ENCOUNTER SYMPTOMS
OCCASIONAL FEELINGS OF UNSTEADINESS: 0
LOSS OF SENSATION IN FEET: 0
DEPRESSION: 0

## 2024-04-10 ASSESSMENT — PAIN SCALES - GENERAL: PAINLEVEL: 4

## 2024-04-10 NOTE — PROGRESS NOTES
Patient ID: Ruperto Pang is a 24 y.o. female.  Referring Physician: Brook Harris, APRN-CNP  [address]  Department of Medicine-Hematology [place] [address]  Primary Care Provider: No Assigned PCP Generic Provider, MD  Visit Type: Follow Up      Subjective  24 year old black female with Sickle Cess disease SS type. She takes Hydrea for disease modification. Uses Ibuprofen and Oxycodone for pain management. Uses Oxycodone when she does not have to go to work. Pain is in her hips, back, wrists and arms. She smoke marijuana for pain relief occaisonally. States she feels achy and woozy today. Her period is due any day now. She is pleasant and does work. She uses oxygen at home at night. No recent hospitalizations or use of ED or ACC. Denies constipation.        HPI    Review of Systems - Oncology     Objective   BSA: 1.7 meters squared  /68 (BP Location: Right arm, Patient Position: Sitting, BP Cuff Size: Adult)   Pulse 73   Temp 36.8 °C (98.2 °F) (Skin)   Resp 16   Wt 67.4 kg (148 lb 8 oz)   SpO2 95%   BMI 28.00 kg/m²      has a past medical history of Encounter for contraceptive management, unspecified (10/14/2016), Encounter for screening for disorder due to exposure to contaminants, Hb-SS disease with acute chest syndrome (CMS/HCC) (12/14/2016), and Personal history of diseases of the blood and blood-forming organs and certain disorders involving the immune mechanism (03/02/2022).   has no past surgical history on file.  No family history on file.  Oncology History    No history exists.       Ruperto Pang  reports that she has never smoked. She has never used smokeless tobacco.  She  has no history on file for alcohol use.  She  has no history on file for drug use.    Physical Exam  Vitals reviewed.   Constitutional:       Appearance: Normal appearance.   HENT:      Head: Atraumatic.      Nose: Nose normal.      Mouth/Throat:      Mouth: Mucous membranes are moist.   Eyes:       General: Scleral icterus present.      Pupils: Pupils are equal, round, and reactive to light.   Cardiovascular:      Rate and Rhythm: Regular rhythm. Tachycardia present.   Pulmonary:      Breath sounds: Normal breath sounds.   Abdominal:      General: Abdomen is flat.   Musculoskeletal:         General: Normal range of motion.      Cervical back: Normal range of motion.   Skin:     General: Skin is warm and dry.      Capillary Refill: Capillary refill takes less than 2 seconds.   Neurological:      General: No focal deficit present.      Mental Status: She is alert and oriented to person, place, and time.   Psychiatric:         Mood and Affect: Mood normal.         Behavior: Behavior normal.     WBC   Date/Time Value Ref Range Status   04/10/2024 03:04 PM 10.6 4.4 - 11.3 x10*3/uL Preliminary   03/07/2024 12:02 PM 10.1 4.4 - 11.3 x10*3/uL Final   02/21/2024 01:56 PM 11.3 4.4 - 11.3 x10*3/uL Final     nRBC   Date Value Ref Range Status   04/10/2024 2.8 (H) 0.0 - 0.0 /100 WBCs Preliminary   03/07/2024 10.8 (H) 0.0 - 0.0 /100 WBCs Final   02/21/2024 14.6 (H) 0.0 - 0.0 /100 WBCs Final     RBC   Date Value Ref Range Status   04/10/2024 2.26 (L) 4.00 - 5.20 x10*6/uL Preliminary   03/07/2024 2.28 (L) 4.00 - 5.20 x10*6/uL Final   02/21/2024 2.09 (L) 4.00 - 5.20 x10*6/uL Final     Hemoglobin   Date Value Ref Range Status   04/10/2024 7.0 (L) 12.0 - 16.0 g/dL Preliminary   03/07/2024 7.1 (L) 12.0 - 16.0 g/dL Final   02/21/2024 6.5 (LL) 12.0 - 16.0 g/dL Final     Hematocrit   Date Value Ref Range Status   04/10/2024 19.7 (L) 36.0 - 46.0 % Preliminary   03/07/2024 20.4 (L) 36.0 - 46.0 % Final   02/21/2024 17.6 (L) 36.0 - 46.0 % Final     MCV   Date/Time Value Ref Range Status   04/10/2024 03:04 PM 87 80 - 100 fL Preliminary   03/07/2024 12:02 PM 90 80 - 100 fL Final   02/21/2024 01:56 PM 84 80 - 100 fL Final     MCH   Date/Time Value Ref Range Status   04/10/2024 03:04 PM 31.0 26.0 - 34.0 pg Preliminary   03/07/2024 12:02  PM 31.1 26.0 - 34.0 pg Final   02/21/2024 01:56 PM 31.1 26.0 - 34.0 pg Final     MCHC   Date/Time Value Ref Range Status   04/10/2024 03:04 PM 35.5 32.0 - 36.0 g/dL Preliminary   03/07/2024 12:02 PM 34.8 32.0 - 36.0 g/dL Final   02/21/2024 01:56 PM 36.9 (H) 32.0 - 36.0 g/dL Final     RDW   Date/Time Value Ref Range Status   04/10/2024 03:04 PM 28.0 (H) 11.5 - 14.5 % Preliminary   03/07/2024 12:02 PM 26.4 (H) 11.5 - 14.5 % Final   02/21/2024 01:56 PM 28.4 (H) 11.5 - 14.5 % Final     Platelets   Date/Time Value Ref Range Status   04/10/2024 03:04  (H) 150 - 450 x10*3/uL Preliminary   03/07/2024 12:02  (H) 150 - 450 x10*3/uL Final   02/21/2024 01:56  (H) 150 - 450 x10*3/uL Final     MPV   Date/Time Value Ref Range Status   10/06/2023 08:32 AM 9.6 7.5 - 11.5 fL Final     Neutrophils %   Date/Time Value Ref Range Status   03/07/2024 12:02 PM 53.4 40.0 - 80.0 % Final   02/21/2024 01:56 PM 64.4 40.0 - 80.0 % Final   01/30/2024 10:04 AM 84.7 40.0 - 80.0 % Final     Immature Granulocytes %, Automated   Date/Time Value Ref Range Status   03/07/2024 12:02 PM 0.3 0.0 - 0.9 % Final     Comment:     Immature Granulocyte Count (IG) includes promyelocytes, myelocytes and metamyelocytes but does not include bands. Percent differential counts (%) should be interpreted in the context of the absolute cell counts (cells/UL).   02/21/2024 01:56 PM 0.4 0.0 - 0.9 % Final     Comment:     Immature Granulocyte Count (IG) includes promyelocytes, myelocytes and metamyelocytes but does not include bands. Percent differential counts (%) should be interpreted in the context of the absolute cell counts (cells/UL).   01/30/2024 10:04 AM 0.3 0.0 - 0.9 % Final     Comment:     Immature Granulocyte Count (IG) includes promyelocytes, myelocytes and metamyelocytes but does not include bands. Percent differential counts (%) should be interpreted in the context of the absolute cell counts (cells/UL).     Lymphocytes %, Manual   Date/Time  Value Ref Range Status   01/27/2024 10:32 AM 28.0 13.0 - 44.0 % Final   10/06/2023 08:32 AM 8.7 13.0 - 44.0 % Final     Lymphocytes %   Date/Time Value Ref Range Status   03/07/2024 12:02 PM 37.5 13.0 - 44.0 % Final   02/21/2024 01:56 PM 25.7 13.0 - 44.0 % Final   01/30/2024 10:04 AM 8.6 13.0 - 44.0 % Final     Monocytes %, Manual   Date/Time Value Ref Range Status   01/27/2024 10:32 AM 2.0 2.0 - 10.0 % Final   10/06/2023 08:32 AM 1.7 2.0 - 10.0 % Final     Monocytes %   Date/Time Value Ref Range Status   03/07/2024 12:02 PM 6.4 2.0 - 10.0 % Final   02/21/2024 01:56 PM 7.1 2.0 - 10.0 % Final   01/30/2024 10:04 AM 5.0 2.0 - 10.0 % Final     Eosinophils %, Manual   Date/Time Value Ref Range Status   01/27/2024 10:32 AM 1.0 0.0 - 6.0 % Final   10/06/2023 08:32 AM 0.0 0.0 - 6.0 % Final     Eosinophils %   Date/Time Value Ref Range Status   03/07/2024 12:02 PM 1.2 0.0 - 6.0 % Final   02/21/2024 01:56 PM 1.7 0.0 - 6.0 % Final   01/30/2024 10:04 AM 0.9 0.0 - 6.0 % Final     Basophils %, Manual   Date/Time Value Ref Range Status   01/27/2024 10:32 AM 4.0 0.0 - 2.0 % Final   10/06/2023 08:32 AM 0.9 0.0 - 2.0 % Final     Basophils %   Date/Time Value Ref Range Status   03/07/2024 12:02 PM 1.2 0.0 - 2.0 % Final   02/21/2024 01:56 PM 0.7 0.0 - 2.0 % Final   01/30/2024 10:04 AM 0.5 0.0 - 2.0 % Final     Neutrophils Absolute   Date/Time Value Ref Range Status   03/07/2024 12:02 PM 5.38 1.20 - 7.70 x10*3/uL Final     Comment:     Percent differential counts (%) should be interpreted in the context of the absolute cell counts (cells/uL).   02/21/2024 01:56 PM 7.29 1.20 - 7.70 x10*3/uL Final     Comment:     Percent differential counts (%) should be interpreted in the context of the absolute cell counts (cells/uL).   01/30/2024 10:04 AM 13.45 (H) 1.20 - 7.70 x10*3/uL Final     Comment:     Percent differential counts (%) should be interpreted in the context of the absolute cell counts (cells/uL).     Immature Granulocytes Absolute,  "Automated   Date/Time Value Ref Range Status   03/07/2024 12:02 PM 0.03 0.00 - 0.70 x10*3/uL Final   02/21/2024 01:56 PM 0.05 0.00 - 0.70 x10*3/uL Final   01/30/2024 10:04 AM 0.05 0.00 - 0.70 x10*3/uL Final     Lymphocytes Absolute   Date/Time Value Ref Range Status   03/07/2024 12:02 PM 3.78 1.20 - 4.80 x10*3/uL Final   02/21/2024 01:56 PM 2.92 1.20 - 4.80 x10*3/uL Final   01/30/2024 10:04 AM 1.36 1.20 - 4.80 x10*3/uL Final     Monocytes Absolute   Date/Time Value Ref Range Status   03/07/2024 12:02 PM 0.64 0.10 - 1.00 x10*3/uL Final   02/21/2024 01:56 PM 0.81 0.10 - 1.00 x10*3/uL Final   01/30/2024 10:04 AM 0.79 0.10 - 1.00 x10*3/uL Final     Eosinophils Absolute   Date/Time Value Ref Range Status   03/07/2024 12:02 PM 0.12 0.00 - 0.70 x10*3/uL Final   02/21/2024 01:56 PM 0.19 0.00 - 0.70 x10*3/uL Final   01/30/2024 10:04 AM 0.14 0.00 - 0.70 x10*3/uL Final     Eosinophils Absolute, Manual   Date/Time Value Ref Range Status   01/27/2024 10:32 AM 0.15 0.00 - 0.70 x10*3/uL Final   10/06/2023 08:32 AM 0.00 0.00 - 0.70 x10*3/uL Final     Basophils Absolute   Date/Time Value Ref Range Status   03/07/2024 12:02 PM 0.12 (H) 0.00 - 0.10 x10*3/uL Final     Comment:     Automated WBC differential has been confirmed by manual smear.   02/21/2024 01:56 PM 0.08 0.00 - 0.10 x10*3/uL Final     Comment:     Automated WBC differential has been confirmed by manual smear.   01/30/2024 10:04 AM 0.08 0.00 - 0.10 x10*3/uL Final     Basophils Absolute, Manual   Date/Time Value Ref Range Status   01/27/2024 10:32 AM 0.59 (H) 0.00 - 0.10 x10*3/uL Final   10/06/2023 08:32 AM 0.17 (H) 0.00 - 0.10 x10*3/uL Final       No components found for: \"PT\"  aPTT   Date/Time Value Ref Range Status   07/13/2023 10:50 AM 28 27 - 38 sec Final     Comment:     Note new reference range as of 6/20/2023 at 10:00am.   07/12/2023 04:17 PM Canceled 27 - 38 sec Corrected     Comment:     Note new reference range as of 6/20/2023 at 10:00am.  This is a corrected " result. Previous value was 36 , verified at 07/12/2023   17:18     07/10/2023 01:57 PM CANCELED       Comment:     Note new reference range as of 6/20/2023 at 10:00am.    Result canceled by the ancillary.         Assessment/Plan  2 month follow up.   Continue with Hydrea.   Follow up with Ortho on 4/26/2024. Continue with Ibuprofen for mild pain and Oxycodone for more severe pain.   Do blood work today for Sickle Cell panel.

## 2024-04-11 LAB
BACTERIA UR CULT: NORMAL
HEMOGLOBIN A2: 3.4 % (ref 2–3.5)
HEMOGLOBIN A: 6.1 % (ref 95.8–98)
HEMOGLOBIN F: 2 % (ref 0–2)
HEMOGLOBIN IDENTIFICATION INTERPRETATION: ABNORMAL
HEMOGLOBIN S: 88.5 %
PATH REVIEW-HGB IDENTIFICATION: ABNORMAL

## 2024-04-12 ENCOUNTER — APPOINTMENT (OUTPATIENT)
Dept: ORTHOPEDIC SURGERY | Facility: HOSPITAL | Age: 24
End: 2024-04-12
Payer: COMMERCIAL

## 2024-04-19 ENCOUNTER — HOSPITAL ENCOUNTER (INPATIENT)
Facility: HOSPITAL | Age: 24
LOS: 3 days | Discharge: HOME | DRG: 811 | End: 2024-04-22
Attending: EMERGENCY MEDICINE | Admitting: INTERNAL MEDICINE
Payer: COMMERCIAL

## 2024-04-19 ENCOUNTER — CLINICAL SUPPORT (OUTPATIENT)
Dept: EMERGENCY MEDICINE | Facility: HOSPITAL | Age: 24
DRG: 811 | End: 2024-04-19
Payer: COMMERCIAL

## 2024-04-19 ENCOUNTER — APPOINTMENT (OUTPATIENT)
Dept: RADIOLOGY | Facility: HOSPITAL | Age: 24
DRG: 811 | End: 2024-04-19
Payer: COMMERCIAL

## 2024-04-19 DIAGNOSIS — J18.9 PNEUMONIA OF RIGHT UPPER LOBE DUE TO INFECTIOUS ORGANISM: ICD-10-CM

## 2024-04-19 DIAGNOSIS — D57.00 SICKLE CELL CRISIS (MULTI): Primary | ICD-10-CM

## 2024-04-19 LAB
ABO GROUP (TYPE) IN BLOOD: NORMAL
ALBUMIN SERPL BCP-MCNC: 4.6 G/DL (ref 3.4–5)
ALP SERPL-CCNC: 75 U/L (ref 33–110)
ALT SERPL W P-5'-P-CCNC: 21 U/L (ref 7–45)
AMPHETAMINES UR QL SCN: ABNORMAL
ANION GAP SERPL CALC-SCNC: 14 MMOL/L (ref 10–20)
ANTIBODY SCREEN: NORMAL
APPEARANCE UR: CLEAR
AST SERPL W P-5'-P-CCNC: 35 U/L (ref 9–39)
BACTERIA #/AREA URNS AUTO: ABNORMAL /HPF
BARBITURATES UR QL SCN: ABNORMAL
BASOPHILS # BLD MANUAL: 0 X10*3/UL (ref 0–0.1)
BASOPHILS NFR BLD MANUAL: 0 %
BENZODIAZ UR QL SCN: ABNORMAL
BILIRUB SERPL-MCNC: 10.4 MG/DL (ref 0–1.2)
BILIRUB UR STRIP.AUTO-MCNC: NEGATIVE MG/DL
BUN SERPL-MCNC: 6 MG/DL (ref 6–23)
BZE UR QL SCN: ABNORMAL
CALCIUM SERPL-MCNC: 9.3 MG/DL (ref 8.6–10.6)
CANNABINOIDS UR QL SCN: ABNORMAL
CHLORIDE SERPL-SCNC: 104 MMOL/L (ref 98–107)
CO2 SERPL-SCNC: 23 MMOL/L (ref 21–32)
COLOR UR: YELLOW
CREAT SERPL-MCNC: 0.5 MG/DL (ref 0.5–1.05)
EGFRCR SERPLBLD CKD-EPI 2021: >90 ML/MIN/1.73M*2
EOSINOPHIL # BLD MANUAL: 0 X10*3/UL (ref 0–0.7)
EOSINOPHIL NFR BLD MANUAL: 0 %
ERYTHROCYTE [DISTWIDTH] IN BLOOD BY AUTOMATED COUNT: 23.1 % (ref 11.5–14.5)
FENTANYL+NORFENTANYL UR QL SCN: ABNORMAL
FLUAV RNA RESP QL NAA+PROBE: NOT DETECTED
FLUBV RNA RESP QL NAA+PROBE: NOT DETECTED
GLUCOSE SERPL-MCNC: 75 MG/DL (ref 74–99)
GLUCOSE UR STRIP.AUTO-MCNC: NORMAL MG/DL
HCT VFR BLD AUTO: 16.4 % (ref 36–46)
HGB BLD-MCNC: 6 G/DL (ref 12–16)
HGB RETIC QN: 28 PG (ref 28–38)
IMM GRANULOCYTES # BLD AUTO: 0.07 X10*3/UL (ref 0–0.7)
IMM GRANULOCYTES NFR BLD AUTO: 0.6 % (ref 0–0.9)
IMMATURE RETIC FRACTION: 26.5 %
KETONES UR STRIP.AUTO-MCNC: NEGATIVE MG/DL
LACTATE SERPL-SCNC: 0.7 MMOL/L (ref 0.4–2)
LDH SERPL L TO P-CCNC: 367 U/L (ref 84–246)
LEUKOCYTE ESTERASE UR QL STRIP.AUTO: NEGATIVE
LYMPHOCYTES # BLD MANUAL: 1.1 X10*3/UL (ref 1.2–4.8)
LYMPHOCYTES NFR BLD MANUAL: 8.8 %
MCH RBC QN AUTO: 29.6 PG (ref 26–34)
MCHC RBC AUTO-ENTMCNC: 36.6 G/DL (ref 32–36)
MCV RBC AUTO: 81 FL (ref 80–100)
METHADONE UR QL SCN: ABNORMAL
MONOCYTES # BLD MANUAL: 0.21 X10*3/UL (ref 0.1–1)
MONOCYTES NFR BLD MANUAL: 1.7 %
NEUTS SEG # BLD MANUAL: 11.19 X10*3/UL (ref 1.2–7)
NEUTS SEG NFR BLD MANUAL: 89.5 %
NITRITE UR QL STRIP.AUTO: NEGATIVE
NRBC BLD-RTO: 4.6 /100 WBCS (ref 0–0)
OPIATES UR QL SCN: ABNORMAL
OXYCODONE+OXYMORPHONE UR QL SCN: ABNORMAL
PCP UR QL SCN: ABNORMAL
PH UR STRIP.AUTO: 6 [PH]
PLATELET # BLD AUTO: 487 X10*3/UL (ref 150–450)
POLYCHROMASIA BLD QL SMEAR: ABNORMAL
POTASSIUM SERPL-SCNC: 3.9 MMOL/L (ref 3.5–5.3)
PREGNANCY TEST URINE, POC: NEGATIVE
PROT SERPL-MCNC: 8.1 G/DL (ref 6.4–8.2)
PROT UR STRIP.AUTO-MCNC: ABNORMAL MG/DL
RBC # BLD AUTO: 2.03 X10*6/UL (ref 4–5.2)
RBC # UR STRIP.AUTO: ABNORMAL /UL
RBC #/AREA URNS AUTO: ABNORMAL /HPF
RBC MORPH BLD: ABNORMAL
RETICS #: 0.61 X10*6/UL (ref 0.02–0.08)
RETICS/RBC NFR AUTO: 30.1 % (ref 0.5–2)
RH FACTOR (ANTIGEN D): NORMAL
RSV RNA RESP QL NAA+PROBE: NOT DETECTED
SARS-COV-2 RNA RESP QL NAA+PROBE: NOT DETECTED
SCHISTOCYTES BLD QL SMEAR: ABNORMAL
SICKLE CELLS BLD QL SMEAR: ABNORMAL
SODIUM SERPL-SCNC: 137 MMOL/L (ref 136–145)
SP GR UR STRIP.AUTO: 1.01
SQUAMOUS #/AREA URNS AUTO: ABNORMAL /HPF
TARGETS BLD QL SMEAR: ABNORMAL
TOTAL CELLS COUNTED BLD: 114
UROBILINOGEN UR STRIP.AUTO-MCNC: ABNORMAL MG/DL
WBC # BLD AUTO: 12.5 X10*3/UL (ref 4.4–11.3)
WBC #/AREA URNS AUTO: ABNORMAL /HPF

## 2024-04-19 PROCEDURE — 96374 THER/PROPH/DIAG INJ IV PUSH: CPT

## 2024-04-19 PROCEDURE — 83020 HEMOGLOBIN ELECTROPHORESIS: CPT | Performed by: PHYSICIAN ASSISTANT

## 2024-04-19 PROCEDURE — 81001 URINALYSIS AUTO W/SCOPE: CPT

## 2024-04-19 PROCEDURE — 85027 COMPLETE CBC AUTOMATED: CPT

## 2024-04-19 PROCEDURE — 85007 BL SMEAR W/DIFF WBC COUNT: CPT

## 2024-04-19 PROCEDURE — 2500000001 HC RX 250 WO HCPCS SELF ADMINISTERED DRUGS (ALT 637 FOR MEDICARE OP)

## 2024-04-19 PROCEDURE — 80307 DRUG TEST PRSMV CHEM ANLYZR: CPT | Performed by: PHYSICIAN ASSISTANT

## 2024-04-19 PROCEDURE — 71046 X-RAY EXAM CHEST 2 VIEWS: CPT

## 2024-04-19 PROCEDURE — 36415 COLL VENOUS BLD VENIPUNCTURE: CPT

## 2024-04-19 PROCEDURE — 86900 BLOOD TYPING SEROLOGIC ABO: CPT | Mod: 91

## 2024-04-19 PROCEDURE — 99223 1ST HOSP IP/OBS HIGH 75: CPT | Performed by: PHYSICIAN ASSISTANT

## 2024-04-19 PROCEDURE — 86922 COMPATIBILITY TEST ANTIGLOB: CPT | Mod: 91

## 2024-04-19 PROCEDURE — 87637 SARSCOV2&INF A&B&RSV AMP PRB: CPT

## 2024-04-19 PROCEDURE — 85045 AUTOMATED RETICULOCYTE COUNT: CPT

## 2024-04-19 PROCEDURE — 1170000001 HC PRIVATE ONCOLOGY ROOM DAILY

## 2024-04-19 PROCEDURE — 83021 HEMOGLOBIN CHROMOTOGRAPHY: CPT | Performed by: PHYSICIAN ASSISTANT

## 2024-04-19 PROCEDURE — 87040 BLOOD CULTURE FOR BACTERIA: CPT

## 2024-04-19 PROCEDURE — 80053 COMPREHEN METABOLIC PANEL: CPT

## 2024-04-19 PROCEDURE — 2500000004 HC RX 250 GENERAL PHARMACY W/ HCPCS (ALT 636 FOR OP/ED): Performed by: PHYSICIAN ASSISTANT

## 2024-04-19 PROCEDURE — 81025 URINE PREGNANCY TEST: CPT

## 2024-04-19 PROCEDURE — 99285 EMERGENCY DEPT VISIT HI MDM: CPT | Performed by: EMERGENCY MEDICINE

## 2024-04-19 PROCEDURE — 93010 ELECTROCARDIOGRAM REPORT: CPT | Performed by: EMERGENCY MEDICINE

## 2024-04-19 PROCEDURE — 71046 X-RAY EXAM CHEST 2 VIEWS: CPT | Mod: FOREIGN READ | Performed by: RADIOLOGY

## 2024-04-19 PROCEDURE — 83615 LACTATE (LD) (LDH) ENZYME: CPT

## 2024-04-19 PROCEDURE — 96375 TX/PRO/DX INJ NEW DRUG ADDON: CPT

## 2024-04-19 PROCEDURE — 83605 ASSAY OF LACTIC ACID: CPT

## 2024-04-19 PROCEDURE — 99285 EMERGENCY DEPT VISIT HI MDM: CPT | Mod: 25

## 2024-04-19 PROCEDURE — 93005 ELECTROCARDIOGRAM TRACING: CPT

## 2024-04-19 PROCEDURE — 2500000004 HC RX 250 GENERAL PHARMACY W/ HCPCS (ALT 636 FOR OP/ED)

## 2024-04-19 RX ORDER — ENOXAPARIN SODIUM 100 MG/ML
40 INJECTION SUBCUTANEOUS EVERY 24 HOURS
Status: DISCONTINUED | OUTPATIENT
Start: 2024-04-19 | End: 2024-04-22 | Stop reason: HOSPADM

## 2024-04-19 RX ORDER — LIDOCAINE 560 MG/1
1 PATCH PERCUTANEOUS; TOPICAL; TRANSDERMAL DAILY
Status: DISCONTINUED | OUTPATIENT
Start: 2024-04-19 | End: 2024-04-22 | Stop reason: HOSPADM

## 2024-04-19 RX ORDER — CYCLOBENZAPRINE HCL 10 MG
10 TABLET ORAL ONCE
Status: COMPLETED | OUTPATIENT
Start: 2024-04-19 | End: 2024-04-19

## 2024-04-19 RX ORDER — KETOROLAC TROMETHAMINE 15 MG/ML
15 INJECTION, SOLUTION INTRAMUSCULAR; INTRAVENOUS ONCE
Status: COMPLETED | OUTPATIENT
Start: 2024-04-19 | End: 2024-04-19

## 2024-04-19 RX ORDER — HYDROMORPHONE HYDROCHLORIDE 1 MG/ML
1 INJECTION, SOLUTION INTRAMUSCULAR; INTRAVENOUS; SUBCUTANEOUS
Status: DISCONTINUED | OUTPATIENT
Start: 2024-04-19 | End: 2024-04-19

## 2024-04-19 RX ORDER — VANCOMYCIN HYDROCHLORIDE 1 G/200ML
2 INJECTION, SOLUTION INTRAVENOUS ONCE
Status: COMPLETED | OUTPATIENT
Start: 2024-04-19 | End: 2024-04-19

## 2024-04-19 RX ORDER — DOCUSATE SODIUM 100 MG/1
100 CAPSULE, LIQUID FILLED ORAL 2 TIMES DAILY PRN
Status: DISCONTINUED | OUTPATIENT
Start: 2024-04-19 | End: 2024-04-22 | Stop reason: HOSPADM

## 2024-04-19 RX ORDER — KETOROLAC TROMETHAMINE 15 MG/ML
15 INJECTION, SOLUTION INTRAMUSCULAR; INTRAVENOUS EVERY 6 HOURS
Status: DISCONTINUED | OUTPATIENT
Start: 2024-04-19 | End: 2024-04-22 | Stop reason: HOSPADM

## 2024-04-19 RX ORDER — POLYETHYLENE GLYCOL 3350 17 G/17G
17 POWDER, FOR SOLUTION ORAL 2 TIMES DAILY PRN
Status: DISCONTINUED | OUTPATIENT
Start: 2024-04-19 | End: 2024-04-22 | Stop reason: HOSPADM

## 2024-04-19 RX ORDER — HYDROMORPHONE HYDROCHLORIDE 1 MG/ML
1 INJECTION, SOLUTION INTRAMUSCULAR; INTRAVENOUS; SUBCUTANEOUS
Status: DISCONTINUED | OUTPATIENT
Start: 2024-04-19 | End: 2024-04-21

## 2024-04-19 RX ORDER — CEFTRIAXONE 1 G/50ML
1 INJECTION, SOLUTION INTRAVENOUS EVERY 24 HOURS
Status: DISCONTINUED | OUTPATIENT
Start: 2024-04-19 | End: 2024-04-20

## 2024-04-19 RX ORDER — PANTOPRAZOLE SODIUM 40 MG/1
40 TABLET, DELAYED RELEASE ORAL
Status: DISCONTINUED | OUTPATIENT
Start: 2024-04-20 | End: 2024-04-22 | Stop reason: HOSPADM

## 2024-04-19 RX ORDER — ACETAMINOPHEN 325 MG/1
975 TABLET ORAL ONCE
Status: COMPLETED | OUTPATIENT
Start: 2024-04-19 | End: 2024-04-19

## 2024-04-19 RX ORDER — HYDROMORPHONE HYDROCHLORIDE 1 MG/ML
1 INJECTION, SOLUTION INTRAMUSCULAR; INTRAVENOUS; SUBCUTANEOUS
Status: COMPLETED | OUTPATIENT
Start: 2024-04-19 | End: 2024-04-19

## 2024-04-19 RX ORDER — DIPHENHYDRAMINE HCL 25 MG
25 CAPSULE ORAL ONCE AS NEEDED
Status: DISCONTINUED | OUTPATIENT
Start: 2024-04-19 | End: 2024-04-21

## 2024-04-19 RX ORDER — FOLIC ACID 1 MG/1
1 TABLET ORAL DAILY
Status: DISCONTINUED | OUTPATIENT
Start: 2024-04-19 | End: 2024-04-22 | Stop reason: HOSPADM

## 2024-04-19 RX ORDER — DIPHENHYDRAMINE HCL 25 MG
25 CAPSULE ORAL EVERY 6 HOURS PRN
Status: DISCONTINUED | OUTPATIENT
Start: 2024-04-19 | End: 2024-04-22 | Stop reason: HOSPADM

## 2024-04-19 RX ORDER — ONDANSETRON HYDROCHLORIDE 2 MG/ML
4 INJECTION, SOLUTION INTRAVENOUS EVERY 6 HOURS PRN
Status: DISCONTINUED | OUTPATIENT
Start: 2024-04-19 | End: 2024-04-22 | Stop reason: HOSPADM

## 2024-04-19 RX ADMIN — HYDROMORPHONE HYDROCHLORIDE 1 MG: 1 INJECTION, SOLUTION INTRAMUSCULAR; INTRAVENOUS; SUBCUTANEOUS at 14:52

## 2024-04-19 RX ADMIN — HYDROMORPHONE HYDROCHLORIDE 1 MG: 1 INJECTION, SOLUTION INTRAMUSCULAR; INTRAVENOUS; SUBCUTANEOUS at 13:53

## 2024-04-19 RX ADMIN — CYCLOBENZAPRINE HYDROCHLORIDE 10 MG: 10 TABLET, FILM COATED ORAL at 13:54

## 2024-04-19 RX ADMIN — PIPERACILLIN SODIUM AND TAZOBACTAM SODIUM 4.5 G: 4; .5 INJECTION, SOLUTION INTRAVENOUS at 14:58

## 2024-04-19 RX ADMIN — AZITHROMYCIN 500 MG: 500 INJECTION, POWDER, LYOPHILIZED, FOR SOLUTION INTRAVENOUS at 19:40

## 2024-04-19 RX ADMIN — KETOROLAC TROMETHAMINE 15 MG: 15 INJECTION, SOLUTION INTRAMUSCULAR; INTRAVENOUS at 19:40

## 2024-04-19 RX ADMIN — HYDROMORPHONE HYDROCHLORIDE 1 MG: 1 INJECTION, SOLUTION INTRAMUSCULAR; INTRAVENOUS; SUBCUTANEOUS at 19:40

## 2024-04-19 RX ADMIN — HYDROMORPHONE HYDROCHLORIDE 1 MG: 1 INJECTION, SOLUTION INTRAMUSCULAR; INTRAVENOUS; SUBCUTANEOUS at 16:30

## 2024-04-19 RX ADMIN — KETOROLAC TROMETHAMINE 15 MG: 15 INJECTION, SOLUTION INTRAMUSCULAR; INTRAVENOUS at 13:53

## 2024-04-19 RX ADMIN — ACETAMINOPHEN 975 MG: 325 TABLET ORAL at 13:55

## 2024-04-19 RX ADMIN — CEFTRIAXONE SODIUM 1 G: 1 INJECTION, SOLUTION INTRAVENOUS at 21:51

## 2024-04-19 RX ADMIN — VANCOMYCIN HYDROCHLORIDE 2 G: 1 INJECTION, SOLUTION INTRAVENOUS at 16:30

## 2024-04-19 RX ADMIN — ONDANSETRON 4 MG: 2 INJECTION INTRAMUSCULAR; INTRAVENOUS at 13:53

## 2024-04-19 SDOH — SOCIAL STABILITY: SOCIAL INSECURITY: HAVE YOU HAD ANY THOUGHTS OF HARMING ANYONE ELSE?: NO

## 2024-04-19 SDOH — SOCIAL STABILITY: SOCIAL INSECURITY: DOES ANYONE TRY TO KEEP YOU FROM HAVING/CONTACTING OTHER FRIENDS OR DOING THINGS OUTSIDE YOUR HOME?: NO

## 2024-04-19 SDOH — SOCIAL STABILITY: SOCIAL INSECURITY: WERE YOU ABLE TO COMPLETE ALL THE BEHAVIORAL HEALTH SCREENINGS?: YES

## 2024-04-19 SDOH — SOCIAL STABILITY: SOCIAL INSECURITY: HAS ANYONE EVER THREATENED TO HURT YOUR FAMILY OR YOUR PETS?: NO

## 2024-04-19 SDOH — SOCIAL STABILITY: SOCIAL INSECURITY: ARE THERE ANY APPARENT SIGNS OF INJURIES/BEHAVIORS THAT COULD BE RELATED TO ABUSE/NEGLECT?: NO

## 2024-04-19 SDOH — SOCIAL STABILITY: SOCIAL INSECURITY: DO YOU FEEL UNSAFE GOING BACK TO THE PLACE WHERE YOU ARE LIVING?: NO

## 2024-04-19 SDOH — SOCIAL STABILITY: SOCIAL INSECURITY: ABUSE: ADULT

## 2024-04-19 SDOH — SOCIAL STABILITY: SOCIAL INSECURITY: ARE YOU OR HAVE YOU BEEN THREATENED OR ABUSED PHYSICALLY, EMOTIONALLY, OR SEXUALLY BY ANYONE?: NO

## 2024-04-19 SDOH — SOCIAL STABILITY: SOCIAL INSECURITY: HAVE YOU HAD THOUGHTS OF HARMING ANYONE ELSE?: NO

## 2024-04-19 SDOH — SOCIAL STABILITY: SOCIAL INSECURITY: DO YOU FEEL ANYONE HAS EXPLOITED OR TAKEN ADVANTAGE OF YOU FINANCIALLY OR OF YOUR PERSONAL PROPERTY?: NO

## 2024-04-19 ASSESSMENT — ACTIVITIES OF DAILY LIVING (ADL)
HEARING - RIGHT EAR: FUNCTIONAL
WALKS IN HOME: INDEPENDENT
JUDGMENT_ADEQUATE_SAFELY_COMPLETE_DAILY_ACTIVITIES: YES
FEEDING YOURSELF: INDEPENDENT
GROOMING: INDEPENDENT
BATHING: INDEPENDENT
ADEQUATE_TO_COMPLETE_ADL: YES
DRESSING YOURSELF: INDEPENDENT
HEARING - LEFT EAR: FUNCTIONAL
TOILETING: INDEPENDENT
PATIENT'S MEMORY ADEQUATE TO SAFELY COMPLETE DAILY ACTIVITIES?: YES
LACK_OF_TRANSPORTATION: PATIENT DECLINED

## 2024-04-19 ASSESSMENT — ENCOUNTER SYMPTOMS
CHEST TIGHTNESS: 0
WHEEZING: 0
APPETITE CHANGE: 0
WEAKNESS: 0
NAUSEA: 1
FATIGUE: 1
ABDOMINAL PAIN: 0
HEADACHES: 0
VOMITING: 0
COUGH: 0
DIZZINESS: 0
CHILLS: 1
DYSURIA: 0
SHORTNESS OF BREATH: 0
DIARRHEA: 0
RHINORRHEA: 1
CONSTIPATION: 0
FEVER: 1

## 2024-04-19 ASSESSMENT — PATIENT HEALTH QUESTIONNAIRE - PHQ9
SUM OF ALL RESPONSES TO PHQ9 QUESTIONS 1 & 2: 0
1. LITTLE INTEREST OR PLEASURE IN DOING THINGS: NOT AT ALL
2. FEELING DOWN, DEPRESSED OR HOPELESS: NOT AT ALL

## 2024-04-19 ASSESSMENT — COGNITIVE AND FUNCTIONAL STATUS - GENERAL
DAILY ACTIVITIY SCORE: 24
PATIENT BASELINE BEDBOUND: NO
MOBILITY SCORE: 24

## 2024-04-19 ASSESSMENT — LIFESTYLE VARIABLES
HOW MANY STANDARD DRINKS CONTAINING ALCOHOL DO YOU HAVE ON A TYPICAL DAY: 1 OR 2
HAVE YOU EVER FELT YOU SHOULD CUT DOWN ON YOUR DRINKING: NO
HOW OFTEN DO YOU HAVE 6 OR MORE DRINKS ON ONE OCCASION: NEVER
HAVE PEOPLE ANNOYED YOU BY CRITICIZING YOUR DRINKING: NO
TOTAL SCORE: 0
EVER FELT BAD OR GUILTY ABOUT YOUR DRINKING: NO
AUDIT-C TOTAL SCORE: 1
SKIP TO QUESTIONS 9-10: 1
HOW OFTEN DO YOU HAVE A DRINK CONTAINING ALCOHOL: MONTHLY OR LESS
AUDIT-C TOTAL SCORE: 1
EVER HAD A DRINK FIRST THING IN THE MORNING TO STEADY YOUR NERVES TO GET RID OF A HANGOVER: NO

## 2024-04-19 ASSESSMENT — PAIN SCALES - GENERAL
PAINLEVEL_OUTOF10: 6
PAINLEVEL_OUTOF10: 6
PAINLEVEL_OUTOF10: 7

## 2024-04-19 ASSESSMENT — PAIN - FUNCTIONAL ASSESSMENT
PAIN_FUNCTIONAL_ASSESSMENT: 0-10

## 2024-04-19 ASSESSMENT — PAIN DESCRIPTION - LOCATION: LOCATION: BACK

## 2024-04-19 ASSESSMENT — PAIN DESCRIPTION - PAIN TYPE: TYPE: ACUTE PAIN

## 2024-04-19 ASSESSMENT — PAIN DESCRIPTION - PROGRESSION: CLINICAL_PROGRESSION: NOT CHANGED

## 2024-04-19 NOTE — ED PROVIDER NOTES
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  She has 1 day of rhinorrhea.  She is not having chest pain but does have midthoracic back pain and arm and leg pain that is consistent with her typical sickle cell pain.  She has had acute chest in the past as a child.  She did not take any pain medications prior to arrival.  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  Feeling nauseous but no vomiting or diarrhea.  No falls or traumas.  No history of blood clots is not on thinners.                          [place] Coma Scale Score: 15                  Patient History   Past Medical History:   Diagnosis Date    Encounter for contraceptive management, unspecified 10/14/2016    Contraception management    Encounter for screening for disorder due to exposure to contaminants     Screening for lead exposure    Hb-SS disease with acute chest syndrome (Multi) 12/14/2016    Acute chest syndrome due to Hgb-S disease    Personal history of diseases of the blood and blood-forming organs and certain disorders involving the immune mechanism 03/02/2022    History of sickle cell anemia     No past surgical history on file.  No family history on file.  Social History     Tobacco Use    Smoking status: Never    Smokeless tobacco: Never   Substance Use Topics    Alcohol use: Not on file    Drug use: Not on file       Physical Exam   ED Triage Vitals [04/19/24 1220]   Temperature Heart Rate Respirations BP   (!) 38.2 °C (100.8 °F) (!) 105 16 115/68      Pulse Ox Temp src Heart Rate Source Patient Position   96 % -- -- --      BP Location FiO2 (%)     -- --       Physical Exam  Vitals and nursing note reviewed.   Constitutional:       General: She is  not in acute distress.     Appearance: She is well-developed.   HENT:      Head: Normocephalic and atraumatic.      Mouth/Throat:      Mouth: Mucous membranes are dry.   Eyes:      Extraocular Movements: Extraocular movements intact.      Conjunctiva/sclera: Conjunctivae normal.      Pupils: Pupils are equal, round, and reactive to light.   Cardiovascular:      Rate and Rhythm: Regular rhythm. Tachycardia present.      Pulses: Normal pulses.      Heart sounds: Normal heart sounds. No murmur heard.  Pulmonary:      Effort: Pulmonary effort is normal. No respiratory distress.      Breath sounds: Normal breath sounds. No wheezing.   Abdominal:      Palpations: Abdomen is soft.      Tenderness: There is no abdominal tenderness.   Musculoskeletal:         General: Tenderness (Back arms and legs) present. No swelling.      Cervical back: Neck supple.   Skin:     General: Skin is warm and dry.      Capillary Refill: Capillary refill takes less than 2 seconds.   Neurological:      General: No focal deficit present.      Mental Status: She is alert.   Psychiatric:         Mood and Affect: Mood normal.         ED Course & MDM   ED Course as of 04/19/24 1426   Fri Apr 19, 2024   1249 EKG 4/19/2024 1239 - sinus rhythm with first-degree AV block, rate 80.  Leftward axis.  AR of 220 with incomplete right bundle branch block.  QT prolonged at 516.  No ST segment changes or signs of acute ischemia.  Largely unchanged from prior EKGs dated 4/8/2024.  Abnormal EKG. [BK]   8709 Patient seen in conjunction with assigned resident, agree with management.  24-year-old female presenting for sickle cell pain in back.  Febrile on arrival, has had several days of myalgias with runny nose today.  No cough, chest pain, or trouble breathing.  Sickle cell labs were drawn along with viral swabs, Dilaudid for pain control.  Fluids for rehydration.  Chest x-ray to rule out acute chest.  Given her fever will likely need admission. [BK]      ED Course  User Index  [BK] Salud Rboert MD         Diagnoses as of 04/19/24 1426   Sickle cell crisis (Multi)   Pneumonia of right upper lobe due to infectious organism     Labs Reviewed   RETICULOCYTES - Abnormal       Result Value    Retic % 30.1 (*)     Retic Absolute 0.612 (*)     Reticulocyte Hemoglobin 28      Immature Retic fraction 26.5 (*)    LACTATE DEHYDROGENASE - Abnormal     (*)    COMPREHENSIVE METABOLIC PANEL - Abnormal    Glucose 75      Sodium 137      Potassium 3.9      Chloride 104      Bicarbonate 23      Anion Gap 14      Urea Nitrogen 6      Creatinine 0.50      eGFR >90      Calcium 9.3      Albumin 4.6      Alkaline Phosphatase 75      Total Protein 8.1      AST 35      Bilirubin, Total 10.4 (*)     ALT 21     CBC WITH AUTO DIFFERENTIAL - Abnormal    WBC 12.5 (*)     nRBC 4.6 (*)     RBC 2.03 (*)     Hemoglobin 6.0 (*)     Hematocrit 16.4 (*)     MCV 81      MCH 29.6      MCHC 36.6 (*)     RDW 23.1 (*)     Platelets 487 (*)     Neutrophils %        Immature Granulocytes %, Automated        Lymphocytes %        Monocytes %        Eosinophils %        Basophils %        Neutrophils Absolute        Lymphocytes Absolute        Monocytes Absolute        Eosinophils Absolute        Basophils Absolute       LACTATE - Normal    Lactate 0.7      Narrative:     Venipuncture immediately after or during the administration of Metamizole may lead to falsely low results. Testing should be performed immediately  prior to Metamizole dosing.   POCT PREGNANCY, URINE - Normal    Preg Test, Ur Negative     BLOOD CULTURE   BLOOD CULTURE   MRSA SURVEILLANCE FOR VANCOMYCIN DE-ESCALATION, PCR   URINALYSIS WITH REFLEX CULTURE AND MICROSCOPIC    Narrative:     The following orders were created for panel order Urinalysis with Reflex Culture and Microscopic.  Procedure                               Abnormality         Status                     ---------                               -----------         ------                      Urinalysis with Reflex C...[[id number]]                                                 Extra Urine Gray Tube[[id number]]                                                         Please view results for these tests on the individual orders.   URINALYSIS WITH REFLEX CULTURE AND MICROSCOPIC   EXTRA URINE CHRISTENSEN TUBE   INFLUENZA A AND B PCR   SARS-COV-2 PCR   RSV PCR   TYPE AND SCREEN       XR chest 2 views   Final Result   1.  Subtle opacity in the right lower lobe, which may indicate early   pneumonia.   2.  Mild cardiomegaly, consistent with sickle cell disease.   Signed by Von Loredo MD          Medical Decision Making  Patient is a 24-year-old female with sickle cell disease who presents for evaluation of pain fatigue malaise and general flulike symptoms.  She is initially febrile and tachycardic upon arrival.  She was cultured and sepsis timer was started.  She was given fluids.  She is not in any respiratory distress at this time but will obtain chest x-ray.  On evaluation there is a subtle right lower lobe opacity that is concerning for consolidation.  Will start being Zosyn and azithromycin.  Patient labs are significant for a 1 point hemoglobin dropped to 6 as well as elevated LDH reticulocyte.  Patient does have elevated leukocytosis at 12.5.  I do have concern for acute chest will versus early pneumonia.  Admit to hematology service for close monitoring and management.  Patient's sickle cell pain was treated with her typical sickle cell crisis management plan.  Patient is agreeable with plan for admission and all questions answered.    Patient was seen and discussed with Dr. Jones Gamboa DO  PGY-2 Emergency Medicine          Procedure  Procedures       Paige Gamboa DO  Resident  04/19/24 8582

## 2024-04-19 NOTE — H&P
"History Of Present Illness  Ruperto Pang is a 24 y.o. 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 Symptoms started 3 days ago who has gotten worse for 1 day.  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 Feeling nauseous but no vomiting or diarrhea. No falls or traumas. Denies chest pain, SOB, abdominal pain. Does have some mild rhinorrhea.     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    Surgical History  She has no past surgical history on file.    Oncology History    No history exists.     Social History  She reports that she has never smoked. She has never used smokeless tobacco. No history on file for alcohol use and drug use.     Allergies  Iodinated contrast media    Review of Systems   Constitutional:  Positive for chills, fatigue and fever. Negative for appetite change.   HENT:  Positive for rhinorrhea.    Respiratory:  Negative for cough, chest tightness, shortness of breath and wheezing.    Gastrointestinal:  Positive for nausea. Negative for abdominal pain, constipation, diarrhea and vomiting.   Genitourinary:  Negative for dysuria.   Neurological:  Negative for dizziness, weakness and headaches.        Physical Exam  Constitutional:       Appearance: Normal appearance.   Eyes:      Extraocular Movements: Extraocular movements intact.   Cardiovascular:      Rate and Rhythm: Normal rate and " "regular rhythm.   Pulmonary:      Effort: Pulmonary effort is normal.      Breath sounds: Normal breath sounds.   Abdominal:      General: Abdomen is flat. Bowel sounds are normal.      Palpations: Abdomen is soft.   Musculoskeletal:         General: Tenderness present. No swelling.   Neurological:      General: No focal deficit present.      Mental Status: She is alert and oriented to person, place, and time.          Last Recorded Vitals  Blood pressure 103/65, pulse 84, temperature (!) 38.2 °C (100.8 °F), resp. rate 18, height 1.549 m (5' 1\"), weight 67.1 kg (148 lb), SpO2 95%, unknown if currently breastfeeding.    Relevant Results  Labs reviewed.    XR chest 2 views  Result Date: 4/19/2024  1.  Subtle opacity in the right lower lobe, which may indicate early pneumonia. 2.  Mild cardiomegaly, consistent with sickle cell disease. Signed by Von Loredo MD      Assessment/Plan   Principal Problem:    Sickle cell crisis (Multi)  Ruperto Pagn is a 24 y.o. 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 Symptoms started 3 days ago who has gotten worse for 1 day. CXR done in the ED shows subtle opacity in the right lower lobe concerning for pneumonia. On admit started on Azithromycin and Ceftriaxone (4/19-->).     # Hg SS pain crisis   - OARRS reviewed, no aberrant behavior noted, last script for Oxycodone 10 mg X 7 days was filled on 3/7/2024   - No current carepath  - For pain management will start IV Dilaudid 1mg q3 hrs PRN   - Lidocaine patch ordered  - Toradol 15mg q 6hrs X3 days with Ppi support   - On Hydrea 500mg BID at home with questionable compliance, MCV 81, hgbF 2%. Will hold in setting of infection    - Hg on admit 6, baseline Hg~ 6.5-7  - Lysis labs above baseline  - HgbS 88.5% (4/10), repeat pending "   - Utox pending   - bowel regimen for opioid induced constipation, zofran PO for opioid induced nausea, benadryl PO for opioid induced pruritus   - Nocturnal hypoxia, uses 2L of oxygen PRN   - Encourage incentive spirometry     # Fever   - Likely from PNA   - T-max 100.8F  - WBC 12.5  - CXR Subtle opacity in the right lower lobe, which may indicate early pneumonia. 2.  Mild cardiomegaly, consistent with sickle cell disease.  - Given 1 dose of Azithromycin, vanco and Zosyn in ED (4/19), will start  CTX and Azithro (4/19--->)  - Blood cult sent 4/19   - Influenza A+B, Covid, RSV negative  - Respiratory viral panel pending  - Legionella, strep pneumo pending     # Iron overload  - Not on current therapy  - 4/10: Ferritin 1306, Avoid  simple transfusions     # GI/DVT ppx  - Protonix   - lovenox, SCDs, encourage ambulation      # Dispo  - Full code  - DC home pending improvement in pain     I spent 120 minutes in the professional and overall care of this patient.    Evelin Linares PA-C

## 2024-04-20 LAB
ALBUMIN SERPL BCP-MCNC: 4.3 G/DL (ref 3.4–5)
ALP SERPL-CCNC: 80 U/L (ref 33–110)
ALT SERPL W P-5'-P-CCNC: 27 U/L (ref 7–45)
AMPHETAMINES UR QL SCN: ABNORMAL
ANION GAP SERPL CALC-SCNC: 15 MMOL/L (ref 10–20)
APPEARANCE UR: ABNORMAL
AST SERPL W P-5'-P-CCNC: 37 U/L (ref 9–39)
BARBITURATES UR QL SCN: ABNORMAL
BILIRUB SERPL-MCNC: 9.6 MG/DL (ref 0–1.2)
BILIRUB UR STRIP.AUTO-MCNC: NEGATIVE MG/DL
BUN SERPL-MCNC: 12 MG/DL (ref 6–23)
BZE UR QL SCN: ABNORMAL
CALCIUM SERPL-MCNC: 8.9 MG/DL (ref 8.6–10.6)
CANNABINOIDS UR QL SCN: ABNORMAL
CHLORIDE SERPL-SCNC: 105 MMOL/L (ref 98–107)
CO2 SERPL-SCNC: 23 MMOL/L (ref 21–32)
COLOR UR: YELLOW
CREAT SERPL-MCNC: 0.84 MG/DL (ref 0.5–1.05)
CREAT UR-MCNC: 100.2 MG/DL (ref 20–320)
EGFRCR SERPLBLD CKD-EPI 2021: >90 ML/MIN/1.73M*2
ERYTHROCYTE [DISTWIDTH] IN BLOOD BY AUTOMATED COUNT: 23.5 % (ref 11.5–14.5)
GLUCOSE SERPL-MCNC: 84 MG/DL (ref 74–99)
GLUCOSE UR STRIP.AUTO-MCNC: NORMAL MG/DL
HCT VFR BLD AUTO: 15.3 % (ref 36–46)
HGB BLD-MCNC: 5.5 G/DL (ref 12–16)
HGB RETIC QN: 29 PG (ref 28–38)
HOLD SPECIMEN: NORMAL
IMMATURE RETIC FRACTION: 22.1 %
KETONES UR STRIP.AUTO-MCNC: NEGATIVE MG/DL
LDH SERPL L TO P-CCNC: 295 U/L (ref 84–246)
LEGIONELLA AG UR QL: NEGATIVE
LEUKOCYTE ESTERASE UR QL STRIP.AUTO: NEGATIVE
MCH RBC QN AUTO: 30.2 PG (ref 26–34)
MCHC RBC AUTO-ENTMCNC: 35.9 G/DL (ref 32–36)
MCV RBC AUTO: 84 FL (ref 80–100)
MRSA DNA SPEC QL NAA+PROBE: NOT DETECTED
NITRITE UR QL STRIP.AUTO: NEGATIVE
NRBC BLD-RTO: 0.6 /100 WBCS (ref 0–0)
PCP UR QL SCN: ABNORMAL
PH UR STRIP.AUTO: 5.5 [PH]
PLATELET # BLD AUTO: 581 X10*3/UL (ref 150–450)
POTASSIUM SERPL-SCNC: 3.7 MMOL/L (ref 3.5–5.3)
PROT SERPL-MCNC: 7.6 G/DL (ref 6.4–8.2)
PROT UR STRIP.AUTO-MCNC: ABNORMAL MG/DL
RBC # BLD AUTO: 1.82 X10*6/UL (ref 4–5.2)
RBC # UR STRIP.AUTO: NEGATIVE /UL
RBC #/AREA URNS AUTO: NORMAL /HPF
RETICS #: 0.56 X10*6/UL (ref 0.02–0.08)
RETICS/RBC NFR AUTO: 31 % (ref 0.5–2)
S PNEUM AG UR QL: POSITIVE
SODIUM SERPL-SCNC: 139 MMOL/L (ref 136–145)
SP GR UR STRIP.AUTO: 1.02
SQUAMOUS #/AREA URNS AUTO: NORMAL /HPF
UROBILINOGEN UR STRIP.AUTO-MCNC: ABNORMAL MG/DL
WBC # BLD AUTO: 8.6 X10*3/UL (ref 4.4–11.3)
WBC #/AREA URNS AUTO: NORMAL /HPF

## 2024-04-20 PROCEDURE — 87899 AGENT NOS ASSAY W/OPTIC: CPT | Performed by: PHYSICIAN ASSISTANT

## 2024-04-20 PROCEDURE — 2500000001 HC RX 250 WO HCPCS SELF ADMINISTERED DRUGS (ALT 637 FOR MEDICARE OP): Performed by: PHYSICIAN ASSISTANT

## 2024-04-20 PROCEDURE — 87641 MR-STAPH DNA AMP PROBE: CPT

## 2024-04-20 PROCEDURE — 83615 LACTATE (LD) (LDH) ENZYME: CPT | Performed by: PHYSICIAN ASSISTANT

## 2024-04-20 PROCEDURE — 80349 CANNABINOIDS NATURAL: CPT | Performed by: PHYSICIAN ASSISTANT

## 2024-04-20 PROCEDURE — 87632 RESP VIRUS 6-11 TARGETS: CPT | Performed by: PHYSICIAN ASSISTANT

## 2024-04-20 PROCEDURE — 85045 AUTOMATED RETICULOCYTE COUNT: CPT | Performed by: PHYSICIAN ASSISTANT

## 2024-04-20 PROCEDURE — 36415 COLL VENOUS BLD VENIPUNCTURE: CPT | Performed by: PHYSICIAN ASSISTANT

## 2024-04-20 PROCEDURE — 80307 DRUG TEST PRSMV CHEM ANLYZR: CPT | Mod: MUE | Performed by: PHYSICIAN ASSISTANT

## 2024-04-20 PROCEDURE — 80346 BENZODIAZEPINES1-12: CPT | Performed by: PHYSICIAN ASSISTANT

## 2024-04-20 PROCEDURE — 2500000001 HC RX 250 WO HCPCS SELF ADMINISTERED DRUGS (ALT 637 FOR MEDICARE OP)

## 2024-04-20 PROCEDURE — 81003 URINALYSIS AUTO W/O SCOPE: CPT | Performed by: PHYSICIAN ASSISTANT

## 2024-04-20 PROCEDURE — 85027 COMPLETE CBC AUTOMATED: CPT | Performed by: PHYSICIAN ASSISTANT

## 2024-04-20 PROCEDURE — 2500000004 HC RX 250 GENERAL PHARMACY W/ HCPCS (ALT 636 FOR OP/ED): Performed by: PHYSICIAN ASSISTANT

## 2024-04-20 PROCEDURE — 1170000001 HC PRIVATE ONCOLOGY ROOM DAILY

## 2024-04-20 PROCEDURE — 84075 ASSAY ALKALINE PHOSPHATASE: CPT | Performed by: PHYSICIAN ASSISTANT

## 2024-04-20 PROCEDURE — 87449 NOS EACH ORGANISM AG IA: CPT | Performed by: PHYSICIAN ASSISTANT

## 2024-04-20 RX ORDER — LEVOFLOXACIN 750 MG/1
750 TABLET ORAL
Status: DISCONTINUED | OUTPATIENT
Start: 2024-04-20 | End: 2024-04-22 | Stop reason: HOSPADM

## 2024-04-20 RX ORDER — IBUPROFEN 200 MG
400 TABLET ORAL EVERY 6 HOURS PRN
COMMUNITY

## 2024-04-20 RX ADMIN — KETOROLAC TROMETHAMINE 15 MG: 15 INJECTION, SOLUTION INTRAMUSCULAR; INTRAVENOUS at 18:50

## 2024-04-20 RX ADMIN — POLYETHYLENE GLYCOL 3350 17 G: 17 POWDER, FOR SOLUTION ORAL at 09:03

## 2024-04-20 RX ADMIN — DOCUSATE SODIUM 100 MG: 100 CAPSULE, LIQUID FILLED ORAL at 06:09

## 2024-04-20 RX ADMIN — HYDROMORPHONE HYDROCHLORIDE 1 MG: 1 INJECTION, SOLUTION INTRAMUSCULAR; INTRAVENOUS; SUBCUTANEOUS at 18:50

## 2024-04-20 RX ADMIN — KETOROLAC TROMETHAMINE 15 MG: 15 INJECTION, SOLUTION INTRAMUSCULAR; INTRAVENOUS at 08:56

## 2024-04-20 RX ADMIN — FOLIC ACID 1 MG: 1 TABLET ORAL at 08:56

## 2024-04-20 RX ADMIN — HYDROMORPHONE HYDROCHLORIDE 1 MG: 1 INJECTION, SOLUTION INTRAMUSCULAR; INTRAVENOUS; SUBCUTANEOUS at 11:04

## 2024-04-20 RX ADMIN — LEVOFLOXACIN 750 MG: 750 TABLET, FILM COATED ORAL at 13:57

## 2024-04-20 RX ADMIN — KETOROLAC TROMETHAMINE 15 MG: 15 INJECTION, SOLUTION INTRAMUSCULAR; INTRAVENOUS at 01:45

## 2024-04-20 RX ADMIN — KETOROLAC TROMETHAMINE 15 MG: 15 INJECTION, SOLUTION INTRAMUSCULAR; INTRAVENOUS at 13:57

## 2024-04-20 RX ADMIN — PANTOPRAZOLE SODIUM 40 MG: 40 TABLET, DELAYED RELEASE ORAL at 06:09

## 2024-04-20 ASSESSMENT — COGNITIVE AND FUNCTIONAL STATUS - GENERAL
DAILY ACTIVITIY SCORE: 24
DAILY ACTIVITIY SCORE: 24
MOBILITY SCORE: 24
MOBILITY SCORE: 24

## 2024-04-20 ASSESSMENT — PAIN SCALES - GENERAL
PAINLEVEL_OUTOF10: 4
PAINLEVEL_OUTOF10: 7
PAINLEVEL_OUTOF10: 4
PAINLEVEL_OUTOF10: 4
PAINLEVEL_OUTOF10: 5 - MODERATE PAIN
PAINLEVEL_OUTOF10: 7

## 2024-04-20 ASSESSMENT — PAIN - FUNCTIONAL ASSESSMENT
PAIN_FUNCTIONAL_ASSESSMENT: 0-10

## 2024-04-20 ASSESSMENT — PAIN DESCRIPTION - LOCATION: LOCATION: BACK

## 2024-04-20 ASSESSMENT — PAIN DESCRIPTION - DESCRIPTORS: DESCRIPTORS: ACHING

## 2024-04-20 NOTE — PROGRESS NOTES
Child Life Assessment:   Reason for Consult  Reason for Consult: Coping skill development/planning  Referral Source: Self    Patient Intervention(s)  Type of Intervention Performed: Healing environment interventions  Healing Environment Intervention(s): Assessment, Opportunity for choice and control, Orientation to services, Coping skill development/planning    Evaluation  Evaluation/Plan of Care: Patient/family receptive    Session Details: Certified Child Life Specialist (CCLS) introduced child life services to patient and engaged in conversation regarding patient's coping with hospitalization and pain.  Patient expressed feeling more comfortable than when she was first admitted.  CCLS offered additional coping tools for relaxation.  Patient chose a stress ball and adult coloring pages, denying the need for additional resources at this time.  Child life available if needed throughout patient's admission.    RIGOBERTO Arevalo, CCLS  Epic Secure Chat

## 2024-04-20 NOTE — PROGRESS NOTES
Pharmacy Medication History Review    Ruperto Pang is a 24 y.o. female admitted for Sickle cell crisis (Multi). Pharmacy reviewed the patient's npvcu-je-apxtuhvqa medications and allergies for accuracy.    The list below reflects the updated PTA list. Comments regarding how patient may be taking medications differently can be found in the Admit Orders Activity  Prior to Admission Medications   Prescriptions Informant   folic acid (Folvite) 1 mg tablet Self   Sig: Take 1 tablet (1 mg) by mouth once daily.   hydroxyurea (Hydrea) 500 mg capsule Self   Sig: Take 1 capsule (500 mg total) by mouth 2 times a day.   ibuprofen 200 mg tablet Self   Sig: Take 2 tablets (400 mg) by mouth every 6 hours if needed for mild pain (1 - 3) or moderate pain (4 - 6).   oxyCODONE (Roxicodone) 10 mg immediate release tablet    Sig: Take 1 tablet (10 mg) by mouth every 6 hours if needed for severe pain (7 - 10) for up to 7 days.   sennosides-docusate sodium (Senna-S) 8.6-50 mg tablet Self   Sig: Take 1 tablet by mouth once daily.      Facility-Administered Medications: None        The list below reflects the updated allergy list. Please review each documented allergy for additional clarification and justification.  Allergies  Reviewed by Ernestina Rose PharmD on 4/20/2024        Severity Reactions Comments    Iodinated Contrast Media Not Specified Hives             Patient accepts M2B at discharge. Pharmacy has been updated to [facility] pharmacy.    Sources used: Pharmacy dispense history, OARRs, patient interview (great historian- knew drug name, dose and frequency), and heme/onc note from 4/10    Below are additional concerns with the patient's PTA list.  NONE    Ernestina Rose PharmD, [facility]  Transitions of Care Pharmacist  [facility]  Please reach out via Secure Chat for questions, or if no response call [employer] or [employer]

## 2024-04-20 NOTE — CARE PLAN
The patient's goals for the shift include      The clinical goals for the shift include Patient will report decrease pain level < 7 by the end of the shift 4/20/24 @ 0700      Problem: Pain  Goal: Takes deep breaths with improved pain control throughout the shift  Outcome: Progressing  Goal: Turns in bed with improved pain control throughout the shift  Outcome: Progressing  Goal: Walks with improved pain control throughout the shift  Outcome: Progressing  Goal: Performs ADL's with improved pain control throughout shift  Outcome: Progressing  Goal: Participates in PT with improved pain control throughout the shift  Outcome: Progressing  Goal: Free from opioid side effects throughout the shift  Outcome: Progressing  Goal: Free from acute confusion related to pain meds throughout the shift  Outcome: Progressing

## 2024-04-20 NOTE — PROGRESS NOTES
"Ruperto Pang is a 24 y.o. female on day 1 of admission presenting with Sickle cell crisis (Multi).    Subjective   Seen at bedside, reports pain is a 5/10. Denies N/V/D/C, chest pain, cough, shortness of breath.  Appetite is poor, has not had a BM in a couple days. We discussed maintaining pain medications as ordered, and increasing her bowel regimen.        Objective     Physical Exam  Constitutional:       General: She is not in acute distress.     Appearance: She is not toxic-appearing.   HENT:      Head: Normocephalic and atraumatic.   Eyes:      General: Scleral icterus present.      Extraocular Movements: Extraocular movements intact.   Cardiovascular:      Rate and Rhythm: Normal rate and regular rhythm.   Pulmonary:      Effort: No respiratory distress.   Abdominal:      General: Abdomen is flat.      Palpations: Abdomen is soft.      Tenderness: There is no abdominal tenderness.   Musculoskeletal:         General: No swelling or tenderness.   Skin:     Coloration: Skin is not jaundiced.      Findings: No bruising or erythema.   Neurological:      Mental Status: She is oriented to person, place, and time. Mental status is at baseline.         Last Recorded Vitals  Blood pressure 99/60, pulse 79, temperature 36.4 °C (97.5 °F), temperature source Temporal, resp. rate 16, height 1.549 m (5' 1\"), weight 67.1 kg (148 lb), SpO2 96%, unknown if currently breastfeeding.  Intake/Output last 3 Shifts:  I/O last 3 completed shifts:  In: 400 (6 mL/kg) [IV Piggyback:400]  Out: 800 (11.9 mL/kg) [Urine:800 (0.3 mL/kg/hr)]  Weight: 67.1 kg       Assessment/Plan   Principal Problem:    Sickle cell crisis (Multi)    Ruperto Pang is a 24 y.o. 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 Symptoms started 3 days ago who " has gotten worse for 1 day. CXR done in the ED shows subtle opacity in the right lower lobe concerning for pneumonia. On admit started on Azithromycin and Ceftriaxone (4/19-4/20), DC'd given no infectious findings. DC pending pain improvement.       # Hg SS pain crisis   - OARRS reviewed, no aberrant behavior noted, last script for Oxycodone 10 mg X 7 days was filled on 3/7/2024   - No current carepath  - CXR (4/19) Subtle opacity in the right lower lobe, which may indicate early pneumonia  - Given 1 dose of Azithromycin, vanco and Zosyn in ED (4/19), started CTX and Azithro (4/19-4/20), DC'd  given no further infectious findings and CXR consistent with prior   - Blood cult sent 4/19, NGTD   - Influenza A+B, Covid, RSV negative  - Respiratory viral panel pending  - Legionella, strep pneumo pending   - For pain management started IV Dilaudid 1mg q3 hrs PRN   - Lidocaine patch ordered, pt refusing   - urine HCG negative on admit, started Toradol 15mg q 6hrs X3 days with PPI support   - On Hydrea 500mg BID at home with questionable compliance, MCV 81, hgbF 2%. Will hold   - Hg on admit 6, baseline Hgb ~ 6-7  - LDH and tBili increased from baseline on admit, now downtrending 4/20   - HgbS 88.5% (4/10), repeat pending   - Utox (4/19) positive for cannabis and oxycodone   - bowel regimen for opioid induced constipation, zofran PO for opioid induced nausea, benadryl PO for opioid induced pruritus   - Nocturnal hypoxia, uses 2L of oxygen PRN at home   - Encourage incentive spirometry      # Iron overload  - Not on current therapy  - 4/10: Ferritin 1306, Avoid  simple transfusions     # GI/DVT ppx  - Protonix   - lovenox, SCDs, encourage ambulation      # Dispo  - Full code  - DC home pending improvement in pain        I spent >60 minutes in the professional and overall care of this patient.      Erica Chong, CARLOS-CNP    Assessment and plan discussed with attending physician, Dr. Chester.

## 2024-04-21 LAB
ALBUMIN SERPL BCP-MCNC: 4.4 G/DL (ref 3.4–5)
ALP SERPL-CCNC: 84 U/L (ref 33–110)
ALT SERPL W P-5'-P-CCNC: 32 U/L (ref 7–45)
ANION GAP SERPL CALC-SCNC: 15 MMOL/L (ref 10–20)
AST SERPL W P-5'-P-CCNC: 37 U/L (ref 9–39)
ATRIAL RATE: 91 BPM
BILIRUB SERPL-MCNC: 6.7 MG/DL (ref 0–1.2)
BLOOD EXPIRATION DATE: NORMAL
BUN SERPL-MCNC: 10 MG/DL (ref 6–23)
CALCIUM SERPL-MCNC: 9.3 MG/DL (ref 8.6–10.6)
CHLORIDE SERPL-SCNC: 103 MMOL/L (ref 98–107)
CO2 SERPL-SCNC: 24 MMOL/L (ref 21–32)
CREAT SERPL-MCNC: 0.84 MG/DL (ref 0.5–1.05)
DISPENSE STATUS: NORMAL
EGFRCR SERPLBLD CKD-EPI 2021: >90 ML/MIN/1.73M*2
ERYTHROCYTE [DISTWIDTH] IN BLOOD BY AUTOMATED COUNT: 22.1 % (ref 11.5–14.5)
GLUCOSE SERPL-MCNC: 80 MG/DL (ref 74–99)
HCT VFR BLD AUTO: 15.5 % (ref 36–46)
HGB BLD-MCNC: 5.4 G/DL (ref 12–16)
HGB RETIC QN: 27 PG (ref 28–38)
IMMATURE RETIC FRACTION: 22.8 %
LDH SERPL L TO P-CCNC: 307 U/L (ref 84–246)
MCH RBC QN AUTO: 29.3 PG (ref 26–34)
MCHC RBC AUTO-ENTMCNC: 34.8 G/DL (ref 32–36)
MCV RBC AUTO: 84 FL (ref 80–100)
NRBC BLD-RTO: 3.8 /100 WBCS (ref 0–0)
P AXIS: 24 DEGREES
P OFFSET: 173 MS
P ONSET: 120 MS
PLATELET # BLD AUTO: 558 X10*3/UL (ref 150–450)
POTASSIUM SERPL-SCNC: 4.2 MMOL/L (ref 3.5–5.3)
PR INTERVAL: 202 MS
PRODUCT BLOOD TYPE: 7300
PRODUCT CODE: NORMAL
PROT SERPL-MCNC: 7.4 G/DL (ref 6.4–8.2)
Q ONSET: 221 MS
QRS COUNT: 15 BEATS
QRS DURATION: 80 MS
QT INTERVAL: 324 MS
QTC CALCULATION(BAZETT): 398 MS
QTC FREDERICIA: 372 MS
R AXIS: 0 DEGREES
RBC # BLD AUTO: 1.84 X10*6/UL (ref 4–5.2)
RETICS #: 0.54 X10*6/UL (ref 0.02–0.08)
RETICS/RBC NFR AUTO: 29.2 % (ref 0.5–2)
SODIUM SERPL-SCNC: 138 MMOL/L (ref 136–145)
T AXIS: 174 DEGREES
T OFFSET: 383 MS
UNIT ABO: NORMAL
UNIT NUMBER: NORMAL
UNIT RH: NORMAL
UNIT VOLUME: 350
VENTRICULAR RATE: 91 BPM
WBC # BLD AUTO: 9.4 X10*3/UL (ref 4.4–11.3)
XM INTEP: NORMAL

## 2024-04-21 PROCEDURE — 2500000004 HC RX 250 GENERAL PHARMACY W/ HCPCS (ALT 636 FOR OP/ED): Performed by: PHYSICIAN ASSISTANT

## 2024-04-21 PROCEDURE — 85045 AUTOMATED RETICULOCYTE COUNT: CPT | Performed by: PHYSICIAN ASSISTANT

## 2024-04-21 PROCEDURE — 2500000001 HC RX 250 WO HCPCS SELF ADMINISTERED DRUGS (ALT 637 FOR MEDICARE OP)

## 2024-04-21 PROCEDURE — 2500000005 HC RX 250 GENERAL PHARMACY W/O HCPCS: Performed by: PHYSICIAN ASSISTANT

## 2024-04-21 PROCEDURE — 36430 TRANSFUSION BLD/BLD COMPNT: CPT

## 2024-04-21 PROCEDURE — 2500000004 HC RX 250 GENERAL PHARMACY W/ HCPCS (ALT 636 FOR OP/ED)

## 2024-04-21 PROCEDURE — 1170000001 HC PRIVATE ONCOLOGY ROOM DAILY

## 2024-04-21 PROCEDURE — P9040 RBC LEUKOREDUCED IRRADIATED: HCPCS

## 2024-04-21 PROCEDURE — 85027 COMPLETE CBC AUTOMATED: CPT | Performed by: PHYSICIAN ASSISTANT

## 2024-04-21 PROCEDURE — 80053 COMPREHEN METABOLIC PANEL: CPT | Performed by: PHYSICIAN ASSISTANT

## 2024-04-21 PROCEDURE — 83615 LACTATE (LD) (LDH) ENZYME: CPT | Performed by: PHYSICIAN ASSISTANT

## 2024-04-21 PROCEDURE — 36415 COLL VENOUS BLD VENIPUNCTURE: CPT | Performed by: PHYSICIAN ASSISTANT

## 2024-04-21 PROCEDURE — 2500000001 HC RX 250 WO HCPCS SELF ADMINISTERED DRUGS (ALT 637 FOR MEDICARE OP): Performed by: PHYSICIAN ASSISTANT

## 2024-04-21 RX ORDER — HYDROMORPHONE HYDROCHLORIDE 1 MG/ML
1 INJECTION, SOLUTION INTRAMUSCULAR; INTRAVENOUS; SUBCUTANEOUS EVERY 4 HOURS PRN
Status: DISCONTINUED | OUTPATIENT
Start: 2024-04-21 | End: 2024-04-22 | Stop reason: HOSPADM

## 2024-04-21 RX ORDER — OXYCODONE HYDROCHLORIDE 10 MG/1
10 TABLET ORAL EVERY 4 HOURS PRN
Status: DISCONTINUED | OUTPATIENT
Start: 2024-04-21 | End: 2024-04-22 | Stop reason: HOSPADM

## 2024-04-21 RX ADMIN — LIDOCAINE 1 PATCH: 4 PATCH TOPICAL at 08:27

## 2024-04-21 RX ADMIN — PANTOPRAZOLE SODIUM 40 MG: 40 TABLET, DELAYED RELEASE ORAL at 06:36

## 2024-04-21 RX ADMIN — OXYCODONE HYDROCHLORIDE 10 MG: 10 TABLET ORAL at 21:45

## 2024-04-21 RX ADMIN — KETOROLAC TROMETHAMINE 15 MG: 15 INJECTION, SOLUTION INTRAMUSCULAR; INTRAVENOUS at 01:13

## 2024-04-21 RX ADMIN — KETOROLAC TROMETHAMINE 15 MG: 15 INJECTION, SOLUTION INTRAMUSCULAR; INTRAVENOUS at 12:50

## 2024-04-21 RX ADMIN — HYDROMORPHONE HYDROCHLORIDE 1 MG: 1 INJECTION, SOLUTION INTRAMUSCULAR; INTRAVENOUS; SUBCUTANEOUS at 16:57

## 2024-04-21 RX ADMIN — FOLIC ACID 1 MG: 1 TABLET ORAL at 08:27

## 2024-04-21 RX ADMIN — ONDANSETRON 4 MG: 2 INJECTION INTRAMUSCULAR; INTRAVENOUS at 15:40

## 2024-04-21 RX ADMIN — HYDROMORPHONE HYDROCHLORIDE 1 MG: 1 INJECTION, SOLUTION INTRAMUSCULAR; INTRAVENOUS; SUBCUTANEOUS at 06:36

## 2024-04-21 RX ADMIN — KETOROLAC TROMETHAMINE 15 MG: 15 INJECTION, SOLUTION INTRAMUSCULAR; INTRAVENOUS at 18:51

## 2024-04-21 RX ADMIN — LEVOFLOXACIN 750 MG: 750 TABLET, FILM COATED ORAL at 08:27

## 2024-04-21 RX ADMIN — KETOROLAC TROMETHAMINE 15 MG: 15 INJECTION, SOLUTION INTRAMUSCULAR; INTRAVENOUS at 08:27

## 2024-04-21 ASSESSMENT — PAIN - FUNCTIONAL ASSESSMENT
PAIN_FUNCTIONAL_ASSESSMENT: 0-10

## 2024-04-21 ASSESSMENT — COGNITIVE AND FUNCTIONAL STATUS - GENERAL
DAILY ACTIVITIY SCORE: 24
MOBILITY SCORE: 24
MOBILITY SCORE: 24
DAILY ACTIVITIY SCORE: 24

## 2024-04-21 ASSESSMENT — PAIN SCALES - GENERAL
PAINLEVEL_OUTOF10: 0 - NO PAIN
PAINLEVEL_OUTOF10: 5 - MODERATE PAIN
PAINLEVEL_OUTOF10: 6
PAINLEVEL_OUTOF10: 7
PAINLEVEL_OUTOF10: 6
PAINLEVEL_OUTOF10: 6
PAINLEVEL_OUTOF10: 7
PAINLEVEL_OUTOF10: 7

## 2024-04-21 ASSESSMENT — PAIN DESCRIPTION - DESCRIPTORS: DESCRIPTORS: ACHING;DISCOMFORT

## 2024-04-21 NOTE — CARE PLAN
The patient's goals for the shift include      The clinical goals for the shift include remain free from falls and harm - pain <7      Problem: Pain  Goal: Takes deep breaths with improved pain control throughout the shift  Outcome: Progressing  Goal: Turns in bed with improved pain control throughout the shift  Outcome: Progressing  Goal: Walks with improved pain control throughout the shift  Outcome: Progressing  Goal: Performs ADL's with improved pain control throughout shift  Outcome: Progressing  Goal: Participates in PT with improved pain control throughout the shift  Outcome: Progressing  Goal: Free from opioid side effects throughout the shift  Outcome: Progressing  Goal: Free from acute confusion related to pain meds throughout the shift  Outcome: Progressing

## 2024-04-21 NOTE — PROGRESS NOTES
04/21/24 1318   Discharge Planning   Living Arrangements Family members   Support Systems Family members   Assistance Needed None   Type of Residence Private residence   Home or Post Acute Services None       From home alone, support systems are Mother and Sister. Admit for sickle cell, is working and per chart has FMLA in place; anticipate no needs at DC once medically stable.

## 2024-04-21 NOTE — CARE PLAN
Problem: Pain  Goal: Takes deep breaths with improved pain control throughout the shift  Outcome: Progressing  Goal: Turns in bed with improved pain control throughout the shift  Outcome: Progressing  Goal: Walks with improved pain control throughout the shift  Outcome: Progressing  Goal: Performs ADL's with improved pain control throughout shift  Outcome: Progressing  Goal: Participates in PT with improved pain control throughout the shift  Outcome: Progressing  Goal: Free from opioid side effects throughout the shift  Outcome: Progressing  Goal: Free from acute confusion related to pain meds throughout the shift  Outcome: Progressing   The patient's goals for the shift include      The clinical goals for the shift include pt will remain safe and free from injury during shift on 04/21 @0700

## 2024-04-21 NOTE — PROGRESS NOTES
"Ruperto Pang is a 24 y.o. female on day 2 of admission presenting with Sickle cell crisis (Multi).    Subjective   Seen at bedside. Reports pain is better today than yesterday. Denies N/V/D/C, chest pain, cough, shortness of breath.  Has not eaten yet this AM but reports her appetite seems better. Last BM yesterday. Plan to rotate PO and IVP today.        Objective     Physical Exam  Constitutional:       General: She is not in acute distress.     Appearance: She is not toxic-appearing.   HENT:      Head: Normocephalic and atraumatic.   Eyes:      General: Scleral icterus present.      Extraocular Movements: Extraocular movements intact.   Cardiovascular:      Rate and Rhythm: Normal rate and regular rhythm.   Pulmonary:      Effort: No respiratory distress.   Abdominal:      General: Abdomen is flat.      Palpations: Abdomen is soft.      Tenderness: There is no abdominal tenderness.   Musculoskeletal:         General: No swelling or tenderness.   Skin:     Coloration: Skin is not jaundiced.      Findings: No bruising or erythema.   Neurological:      Mental Status: She is oriented to person, place, and time. Mental status is at baseline.         Last Recorded Vitals  Blood pressure 106/56, pulse 79, temperature 36.9 °C (98.4 °F), temperature source Temporal, resp. rate 16, height 1.549 m (5' 1\"), weight 67.1 kg (148 lb), SpO2 97%, unknown if currently breastfeeding.  Intake/Output last 3 Shifts:  I/O last 3 completed shifts:  In: - (0 mL/kg)   Out: 800 (11.9 mL/kg) [Urine:800 (0.3 mL/kg/hr)]  Weight: 67.1 kg         Assessment/Plan   Principal Problem:    Sickle cell crisis (Multi)    Ruperto Pang is a 24 y.o. 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 Symptoms started 3 days ago who has gotten worse for " 1 day. CXR done in the ED shows subtle opacity in the right lower lobe concerning for pneumonia. On admit started on Azithromycin and Ceftriaxone (4/19-4/20), DC'd 4/20 and started PO levofloxacin given + strep pneumo antigen. Started rotating PO/IVP on 4/21. DC pending pain improvement.       # Hg SS pain crisis   - OARRS reviewed, no aberrant behavior noted, last script for Oxycodone 10 mg X 7 days was filled on 3/7/2024   - No current carepath  - CXR (4/19) Subtle opacity in the right lower lobe, which may indicate early pneumonia  - Given 1 dose of Azithromycin, vanco and Zosyn in ED (4/19), started CTX and Azithro (4/19-4/20), DC'd 4/20 and started PO levofloxacin given + strep pneumo antigen   - Blood cultures 4/19, NGTD   - Influenza A+B, Covid, RSV negative  - Respiratory viral panel pending  - urine Legionella negative, strep pneumo POSITIVE 4/19   - pain management: IV Dilaudid 1mg q3 hrs PRN (4/19--4/21), transitioned to rotating PO oxycodone 10mg and IVP dilaudid q4h 4/21   - Lidocaine patch ordered, pt refusing   - urine HCG negative on admit, started Toradol 15mg q 6hrs X3 days with PPI support   - On Hydrea 500mg BID at home with questionable compliance, MCV 81, hgbF 2%. Will hold   - Hgb on admit 6, baseline Hgb ~ 6-7  - Hgb (4/20) = 5.5, possible transfusion pending 4/21 Hgb level    - LDH and tBili increased from baseline on admit, now downtrending 4/20   - HgbS 88.5% (4/10), repeat pending   - Utox (4/19) positive for cannabis and oxycodone   - bowel regimen for opioid induced constipation, zofran PO for opioid induced nausea, benadryl PO for opioid induced pruritus   - Nocturnal hypoxia, uses 2L of oxygen PRN at home   - Encourage incentive spirometry      # Iron overload  - Not on current therapy  - 4/10: Ferritin 1306, Avoid  simple transfusions     # GI/DVT ppx  - Protonix   - lovenox, SCDs, encourage ambulation      # Dispo  - Full code  - DC home pending improvement in pain        I spent  >60 minutes in the professional and overall care of this patient.      CARLOS Porter-CNP    Assessment and plan discussed with attending physician, Dr. Chester.

## 2024-04-22 ENCOUNTER — PHARMACY VISIT (OUTPATIENT)
Dept: PHARMACY | Facility: CLINIC | Age: 24
End: 2024-04-22
Payer: COMMERCIAL

## 2024-04-22 VITALS
SYSTOLIC BLOOD PRESSURE: 98 MMHG | WEIGHT: 148 LBS | OXYGEN SATURATION: 95 % | RESPIRATION RATE: 18 BRPM | BODY MASS INDEX: 27.94 KG/M2 | TEMPERATURE: 97.2 F | HEART RATE: 73 BPM | DIASTOLIC BLOOD PRESSURE: 60 MMHG | HEIGHT: 61 IN

## 2024-04-22 LAB
ALBUMIN SERPL BCP-MCNC: 4.1 G/DL (ref 3.4–5)
ALP SERPL-CCNC: 91 U/L (ref 33–110)
ALT SERPL W P-5'-P-CCNC: 28 U/L (ref 7–45)
ANION GAP SERPL CALC-SCNC: 12 MMOL/L (ref 10–20)
AST SERPL W P-5'-P-CCNC: 32 U/L (ref 9–39)
BILIRUB SERPL-MCNC: 4.8 MG/DL (ref 0–1.2)
BUN SERPL-MCNC: 15 MG/DL (ref 6–23)
CALCIUM SERPL-MCNC: 9.1 MG/DL (ref 8.6–10.6)
CHLORIDE SERPL-SCNC: 105 MMOL/L (ref 98–107)
CO2 SERPL-SCNC: 25 MMOL/L (ref 21–32)
CREAT SERPL-MCNC: 0.98 MG/DL (ref 0.5–1.05)
EGFRCR SERPLBLD CKD-EPI 2021: 83 ML/MIN/1.73M*2
ERYTHROCYTE [DISTWIDTH] IN BLOOD BY AUTOMATED COUNT: 21.1 % (ref 11.5–14.5)
GLUCOSE SERPL-MCNC: 78 MG/DL (ref 74–99)
HCT VFR BLD AUTO: 17.8 % (ref 36–46)
HEMOGLOBIN A2: 3.7 % (ref 2–3.5)
HEMOGLOBIN A: 5.5 % (ref 95.8–98)
HEMOGLOBIN F: 2.3 % (ref 0–2)
HEMOGLOBIN IDENTIFICATION INTERPRETATION: ABNORMAL
HEMOGLOBIN S: 88.5 %
HGB BLD-MCNC: 6.2 G/DL (ref 12–16)
HGB RETIC QN: 26 PG (ref 28–38)
IMMATURE RETIC FRACTION: 15.6 %
LDH SERPL L TO P-CCNC: 306 U/L (ref 84–246)
MCH RBC QN AUTO: 29.1 PG (ref 26–34)
MCHC RBC AUTO-ENTMCNC: 34.8 G/DL (ref 32–36)
MCV RBC AUTO: 84 FL (ref 80–100)
NRBC BLD-RTO: 2.8 /100 WBCS (ref 0–0)
PATH REVIEW-HGB IDENTIFICATION: ABNORMAL
PLATELET # BLD AUTO: 507 X10*3/UL (ref 150–450)
POTASSIUM SERPL-SCNC: 4.3 MMOL/L (ref 3.5–5.3)
PROT SERPL-MCNC: 7.3 G/DL (ref 6.4–8.2)
RBC # BLD AUTO: 2.13 X10*6/UL (ref 4–5.2)
RETICS #: 0.51 X10*6/UL (ref 0.02–0.08)
RETICS/RBC NFR AUTO: 23.6 % (ref 0.5–2)
SODIUM SERPL-SCNC: 138 MMOL/L (ref 136–145)
WBC # BLD AUTO: 10.6 X10*3/UL (ref 4.4–11.3)

## 2024-04-22 PROCEDURE — 2500000004 HC RX 250 GENERAL PHARMACY W/ HCPCS (ALT 636 FOR OP/ED): Performed by: PHYSICIAN ASSISTANT

## 2024-04-22 PROCEDURE — 2500000001 HC RX 250 WO HCPCS SELF ADMINISTERED DRUGS (ALT 637 FOR MEDICARE OP): Performed by: PHYSICIAN ASSISTANT

## 2024-04-22 PROCEDURE — 36415 COLL VENOUS BLD VENIPUNCTURE: CPT | Performed by: PHYSICIAN ASSISTANT

## 2024-04-22 PROCEDURE — 2500000001 HC RX 250 WO HCPCS SELF ADMINISTERED DRUGS (ALT 637 FOR MEDICARE OP)

## 2024-04-22 PROCEDURE — RXMED WILLOW AMBULATORY MEDICATION CHARGE

## 2024-04-22 PROCEDURE — 83615 LACTATE (LD) (LDH) ENZYME: CPT | Performed by: PHYSICIAN ASSISTANT

## 2024-04-22 PROCEDURE — 99239 HOSP IP/OBS DSCHRG MGMT >30: CPT | Performed by: PHYSICIAN ASSISTANT

## 2024-04-22 PROCEDURE — 85045 AUTOMATED RETICULOCYTE COUNT: CPT | Performed by: PHYSICIAN ASSISTANT

## 2024-04-22 PROCEDURE — 85027 COMPLETE CBC AUTOMATED: CPT | Performed by: PHYSICIAN ASSISTANT

## 2024-04-22 PROCEDURE — 80053 COMPREHEN METABOLIC PANEL: CPT | Performed by: PHYSICIAN ASSISTANT

## 2024-04-22 RX ORDER — LEVOFLOXACIN 750 MG/1
750 TABLET ORAL
Qty: 7 TABLET | Refills: 0 | Status: SHIPPED | OUTPATIENT
Start: 2024-04-23 | End: 2024-04-30

## 2024-04-22 RX ADMIN — KETOROLAC TROMETHAMINE 15 MG: 15 INJECTION, SOLUTION INTRAMUSCULAR; INTRAVENOUS at 01:17

## 2024-04-22 RX ADMIN — PANTOPRAZOLE SODIUM 40 MG: 40 TABLET, DELAYED RELEASE ORAL at 06:08

## 2024-04-22 RX ADMIN — KETOROLAC TROMETHAMINE 15 MG: 15 INJECTION, SOLUTION INTRAMUSCULAR; INTRAVENOUS at 08:15

## 2024-04-22 RX ADMIN — FOLIC ACID 1 MG: 1 TABLET ORAL at 08:15

## 2024-04-22 RX ADMIN — LEVOFLOXACIN 750 MG: 750 TABLET, FILM COATED ORAL at 08:15

## 2024-04-22 ASSESSMENT — COGNITIVE AND FUNCTIONAL STATUS - GENERAL
DAILY ACTIVITIY SCORE: 24
MOBILITY SCORE: 24

## 2024-04-22 ASSESSMENT — PAIN SCALES - GENERAL
PAINLEVEL_OUTOF10: 6
PAINLEVEL_OUTOF10: 5 - MODERATE PAIN

## 2024-04-22 ASSESSMENT — PAIN - FUNCTIONAL ASSESSMENT
PAIN_FUNCTIONAL_ASSESSMENT: 0-10
PAIN_FUNCTIONAL_ASSESSMENT: 0-10

## 2024-04-22 NOTE — CARE PLAN
The patient's goals for the shift include      The clinical goals for the shift include pt will remain safe and free from injury during shift on 04/21 @0700      Problem: Pain  Goal: Takes deep breaths with improved pain control throughout the shift  Outcome: Progressing  Goal: Turns in bed with improved pain control throughout the shift  Outcome: Progressing  Goal: Walks with improved pain control throughout the shift  Outcome: Progressing  Goal: Performs ADL's with improved pain control throughout shift  Outcome: Progressing  Goal: Participates in PT with improved pain control throughout the shift  Outcome: Progressing  Goal: Free from opioid side effects throughout the shift  Outcome: Progressing  Goal: Free from acute confusion related to pain meds throughout the shift  Outcome: Progressing

## 2024-04-22 NOTE — CARE PLAN
Problem: Pain  Goal: Takes deep breaths with improved pain control throughout the shift  Outcome: Progressing  Goal: Turns in bed with improved pain control throughout the shift  Outcome: Progressing  Goal: Walks with improved pain control throughout the shift  Outcome: Progressing  Goal: Performs ADL's with improved pain control throughout shift  Outcome: Progressing  Goal: Participates in PT with improved pain control throughout the shift  Outcome: Progressing  Goal: Free from opioid side effects throughout the shift  Outcome: Progressing  Goal: Free from acute confusion related to pain meds throughout the shift  Outcome: Progressing   The patient's goals for the shift include      The clinical goals for the shift include Pt will remain safe and free of injury during shift on 04/22 @0700

## 2024-04-22 NOTE — DISCHARGE SUMMARY
"Discharge Diagnosis  Sickle cell crisis (Multi)    Issues Requiring Follow-Up  None    Hospital Course  Ruperto Pang is a 24 y.o. 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 Symptoms started 3 days ago who has gotten worse for 1 day. CXR done in the ED shows subtle opacity in the right lower lobe concerning for pneumonia. On admit started on Azithromycin and Ceftriaxone (4/19-4/20), DC'd 4/20 and started PO levofloxacin given + strep pneumo antigen. She was discharge with Levaquin for 7 days. On the day of discharge, the patient reported feeling well and pain was controlled. Vitals and labs were stable. She has a FUV with sickle cell team on 5/2.  Attending has reviewed all labs and vitals, and discussed and agreed with the discharge plan prior to patient discharge.  Patient discharged in stable condition. > 30 minutes spent on discharge planning.    Pertinent Physical Exam At Time of Discharge  Physical Exam  General: alert, awake, and well-developed  Skin: warm, dry, intact  Head/Neck: NCAT, supple  Eyes: EOMI, no scleral icterus  Mouth: OMM, no erythema or discharge   Lungs: CTA, no adventitious breath sounds  Cardiovascular: RRR,no m/r/g, no edema  Abdomen: +BS, soft, non-tender, non-distended  Neuro: normal movement, normal speech   Psych: normal affect and mood    Home Medications     Medication List      START taking these medications     levoFLOXacin 750 mg tablet; Commonly known as: Levaquin; Take 1 tablet   (750 mg) by mouth once every 24 hours for 7 days.; Start taking on: April 23, 2024     CONTINUE taking these medications     folic acid 1 mg tablet; Commonly known as: Folvite; Take 1 tablet (1 mg)   by mouth once daily.   hydroxyurea 500 mg capsule; Commonly known as: Hydrea; Take 1 capsule "   (500 mg total) by mouth 2 times a day.   ibuprofen 200 mg tablet   sennosides-docusate sodium 8.6-50 mg tablet; Commonly known as: Senna-S;   Take 1 tablet by mouth once daily.     STOP taking these medications     oxyCODONE 10 mg immediate release tablet; Commonly known as: Roxicodone       Outpatient Follow-Up  Future Appointments   Date Time Provider Department Center   4/26/2024  1:45 PM Tyrese Hogan MD [id number] [facility]   5/2/2024 10:30 AM Miguelito Arroyo OD [id number] [place]   6/12/2024  2:30 PM Brook Harris APRN-CNP [id number] [facility]       Evelin Linares PA-C

## 2024-04-23 LAB
1OH-MIDAZOLAM UR CFM-MCNC: <25 NG/ML
6MAM UR CFM-MCNC: <25 NG/ML
7AMINOCLONAZEPAM UR CFM-MCNC: <25 NG/ML
A-OH ALPRAZ UR CFM-MCNC: <25 NG/ML
ADENOVIRUS RVP, VIRC: NOT DETECTED
ALPRAZ UR CFM-MCNC: <25 NG/ML
BACTERIA BLD CULT: NORMAL
BACTERIA BLD CULT: NORMAL
CHLORDIAZEP UR CFM-MCNC: <25 NG/ML
CLONAZEPAM UR CFM-MCNC: <25 NG/ML
CODEINE UR CFM-MCNC: <50 NG/ML
DIAZEPAM UR CFM-MCNC: <25 NG/ML
EDDP UR CFM-MCNC: <25 NG/ML
ENTEROVIRUS/RHINOVIRUS RVP, VIRC: NOT DETECTED
FENTANYL UR CFM-MCNC: <2.5 NG/ML
HUMAN BOCAVIRUS RVP, VIRC: NOT DETECTED
HUMAN CORONAVIRUS RVP, VIRC: NOT DETECTED
HYDROCODONE CTO UR CFM-MCNC: <25 NG/ML
HYDROMORPHONE UR CFM-MCNC: 1905 NG/ML
INFLUENZA A , VIRC: NOT DETECTED
INFLUENZA A H1N1-09 , VIRC: NOT DETECTED
INFLUENZA B PCR, VIRC: NOT DETECTED
LORAZEPAM UR CFM-MCNC: <25 NG/ML
METAPNEUMOVIRUS , VIRC: NOT DETECTED
METHADONE UR CFM-MCNC: <25 NG/ML
MIDAZOLAM UR CFM-MCNC: <25 NG/ML
MORPHINE UR CFM-MCNC: <50 NG/ML
NORDIAZEPAM UR CFM-MCNC: <25 NG/ML
NORFENTANYL UR CFM-MCNC: <2.5 NG/ML
NORHYDROCODONE UR CFM-MCNC: <25 NG/ML
NOROXYCODONE UR CFM-MCNC: 188 NG/ML
NORTRAMADOL UR-MCNC: <50 NG/ML
OXAZEPAM UR CFM-MCNC: <25 NG/ML
OXYCODONE UR CFM-MCNC: 50 NG/ML
OXYMORPHONE UR CFM-MCNC: 145 NG/ML
PARAINFLUENZA PCR, VIRC: NOT DETECTED
RSV PCR, RVP, VIRC: NOT DETECTED
TEMAZEPAM UR CFM-MCNC: <25 NG/ML
TRAMADOL UR CFM-MCNC: <50 NG/ML
ZOLPIDEM UR CFM-MCNC: <25 NG/ML
ZOLPIDEM UR-MCNC: <25 NG/ML

## 2024-04-25 LAB — CARBOXYTHC UR-MCNC: 152 NG/ML

## 2024-04-26 ENCOUNTER — APPOINTMENT (OUTPATIENT)
Dept: ORTHOPEDIC SURGERY | Facility: HOSPITAL | Age: 24
End: 2024-04-26
Payer: COMMERCIAL

## 2024-04-29 ENCOUNTER — APPOINTMENT (OUTPATIENT)
Dept: RADIOLOGY | Facility: CLINIC | Age: 24
End: 2024-04-29
Payer: COMMERCIAL

## 2024-05-02 ENCOUNTER — OFFICE VISIT (OUTPATIENT)
Dept: OPHTHALMOLOGY | Facility: CLINIC | Age: 24
End: 2024-05-02
Payer: COMMERCIAL

## 2024-05-02 DIAGNOSIS — H52.203 MYOPIC ASTIGMATISM OF BOTH EYES: Primary | ICD-10-CM

## 2024-05-02 DIAGNOSIS — H52.13 MYOPIC ASTIGMATISM OF BOTH EYES: Primary | ICD-10-CM

## 2024-05-02 DIAGNOSIS — D57.00 SICKLE CELL DISEASE WITH CRISIS (MULTI): ICD-10-CM

## 2024-05-02 PROCEDURE — 92014 COMPRE OPH EXAM EST PT 1/>: CPT | Performed by: OPTOMETRIST

## 2024-05-02 PROCEDURE — 92015 DETERMINE REFRACTIVE STATE: CPT | Performed by: OPTOMETRIST

## 2024-05-02 PROCEDURE — 92310 CONTACT LENS FITTING OU: CPT | Performed by: OPTOMETRIST

## 2024-05-02 ASSESSMENT — CONF VISUAL FIELD
OD_INFERIOR_NASAL_RESTRICTION: 0
OD_SUPERIOR_TEMPORAL_RESTRICTION: 0
OD_INFERIOR_TEMPORAL_RESTRICTION: 0
OS_NORMAL: 1
OD_SUPERIOR_NASAL_RESTRICTION: 0
OS_SUPERIOR_TEMPORAL_RESTRICTION: 0
OD_NORMAL: 1
OS_INFERIOR_TEMPORAL_RESTRICTION: 0
OS_SUPERIOR_NASAL_RESTRICTION: 0
OS_INFERIOR_NASAL_RESTRICTION: 0

## 2024-05-02 ASSESSMENT — REFRACTION_WEARINGRX
OD_SPHERE: -3.50
OS_AXIS: 040
OD_CYLINDER: -0.75
OS_SPHERE: -3.75
OD_AXIS: 135
OS_CYLINDER: -0.75

## 2024-05-02 ASSESSMENT — REFRACTION_MANIFEST
OD_AXIS: 135
OS_AXIS: 020
OS_CYLINDER: -0.50
OD_SPHERE: -3.25
OS_SPHERE: -3.25
OD_CYLINDER: -0.50

## 2024-05-02 ASSESSMENT — CUP TO DISC RATIO
OS_RATIO: .3
OD_RATIO: .3

## 2024-05-02 ASSESSMENT — VISUAL ACUITY
OD_SC: 20/250
OS_SC: 20/250
METHOD: SNELLEN - LINEAR

## 2024-05-02 ASSESSMENT — REFRACTION_CURRENTRX
OD_SPHERE: -3.50
OS_DIAMETER: 14.2
OS_BRAND: BIOTRUE 1 DAY
OD_BASECURVE: 8.6
OD_BRAND: BIOTRUE 1 DAY
OD_DIAMETER: 14.2
OS_BASECURVE: 8.6
OS_SPHERE: -3.50

## 2024-05-02 ASSESSMENT — EXTERNAL EXAM - RIGHT EYE: OD_EXAM: NORMAL

## 2024-05-02 ASSESSMENT — ENCOUNTER SYMPTOMS: EYES NEGATIVE: 1

## 2024-05-02 ASSESSMENT — TONOMETRY
OS_IOP_MMHG: 16
OD_IOP_MMHG: 16
IOP_METHOD: GOLDMANN APPLANATION

## 2024-05-02 ASSESSMENT — SLIT LAMP EXAM - LIDS
COMMENTS: GOOD POSITION
COMMENTS: GOOD POSITION

## 2024-05-02 ASSESSMENT — EXTERNAL EXAM - LEFT EYE: OS_EXAM: NORMAL

## 2024-05-02 NOTE — PROGRESS NOTES
A spectacle prescription was dispensed to be used as needed. A contact lens prescription was dispensed at the patient's request. CL fitting today and I and R completed.     (SS) sickle cell. No retinopathy.     The patient was asked to return to our clinic in one year or sooner if ocular or vision changes occur.

## 2024-05-03 ENCOUNTER — TELEPHONE (OUTPATIENT)
Dept: HEMATOLOGY/ONCOLOGY | Facility: HOSPITAL | Age: 24
End: 2024-05-03
Payer: COMMERCIAL

## 2024-05-03 ENCOUNTER — TELEPHONE (OUTPATIENT)
Dept: ADMISSION | Facility: HOSPITAL | Age: 24
End: 2024-05-03
Payer: COMMERCIAL

## 2024-05-03 DIAGNOSIS — D57.20 SICKLE CELL DISEASE, TYPE SC, WITHOUT CRISIS (MULTI): Primary | ICD-10-CM

## 2024-05-03 RX ORDER — OXYCODONE HYDROCHLORIDE 10 MG/1
10 TABLET ORAL EVERY 6 HOURS PRN
Qty: 10 TABLET | Refills: 0 | Status: SHIPPED | OUTPATIENT
Start: 2024-05-03 | End: 2024-05-10

## 2024-05-03 NOTE — TELEPHONE ENCOUNTER
Ruperto Pang called the refill line for Oxycodone. Requesting refills be sent to [facility] pharmacy; message sent to Sickle Cell team to submit.

## 2024-05-03 NOTE — TELEPHONE ENCOUNTER
This RN called patient in regard to message received about prescription for pain medicine not filled. RN noticed that order for pain medicine was in EPIC. RN called patient's pharmacy and spoke with pharmacy tech. Pharmacy tech confirmed that orders are shown in their system and they are now processing the request. RN relayed this information to patient. Patient verbalized understanding and was appreciative of call. No other issues at this time.

## 2024-05-09 ENCOUNTER — SOCIAL WORK (OUTPATIENT)
Dept: HEMATOLOGY/ONCOLOGY | Facility: HOSPITAL | Age: 24
End: 2024-05-09
Payer: COMMERCIAL

## 2024-05-09 NOTE — PROGRESS NOTES
[facility]W received FLMA documentation for patient on 5/9/2024. Forms were completed and signed by Magnus Dumas MD     Documentation submitted via fax ([phone]) Leave ID#: [id number]    Pt updated. Patient denies any further psychosocial or support service needs at this time. Patient encouraged to contact [facility] if any needs arise.     [facility] will continue to follow as needed.

## 2024-05-23 ENCOUNTER — SOCIAL WORK (OUTPATIENT)
Dept: HEMATOLOGY/ONCOLOGY | Facility: HOSPITAL | Age: 24
End: 2024-05-23
Payer: COMMERCIAL

## 2024-05-23 NOTE — PROGRESS NOTES
[facility]ISW received FLMA documentation for patient on 5/22/2024.  Forms were completed and signed by WAYNE Harris CNP. Documents submitted via fax to [facility] ([phone]) Pt updated.     LISW will continue to follow as needed.

## 2024-06-12 ENCOUNTER — OFFICE VISIT (OUTPATIENT)
Dept: HEMATOLOGY/ONCOLOGY | Facility: HOSPITAL | Age: 24
End: 2024-06-12
Payer: COMMERCIAL

## 2024-06-12 ENCOUNTER — LAB (OUTPATIENT)
Dept: LAB | Facility: HOSPITAL | Age: 24
End: 2024-06-12
Payer: COMMERCIAL

## 2024-06-12 ENCOUNTER — DOCUMENTATION (OUTPATIENT)
Dept: HEMATOLOGY/ONCOLOGY | Facility: HOSPITAL | Age: 24
End: 2024-06-12

## 2024-06-12 VITALS
OXYGEN SATURATION: 93 % | TEMPERATURE: 97.5 F | DIASTOLIC BLOOD PRESSURE: 61 MMHG | BODY MASS INDEX: 27.89 KG/M2 | SYSTOLIC BLOOD PRESSURE: 113 MMHG | WEIGHT: 147.6 LBS | RESPIRATION RATE: 16 BRPM | HEART RATE: 70 BPM

## 2024-06-12 DIAGNOSIS — D57.20 SICKLE CELL DISEASE, TYPE SC, WITHOUT CRISIS (MULTI): ICD-10-CM

## 2024-06-12 LAB
ABO GROUP (TYPE) IN BLOOD: NORMAL
ALBUMIN SERPL BCP-MCNC: 4.6 G/DL (ref 3.4–5)
ALP SERPL-CCNC: 65 U/L (ref 33–110)
ALT SERPL W P-5'-P-CCNC: 21 U/L (ref 7–45)
ANION GAP SERPL CALC-SCNC: 14 MMOL/L (ref 10–20)
ANTIBODY SCREEN: NORMAL
AST SERPL W P-5'-P-CCNC: 22 U/L (ref 9–39)
BASOPHILS # BLD MANUAL: 0.11 X10*3/UL (ref 0–0.1)
BASOPHILS NFR BLD MANUAL: 1 %
BILIRUB SERPL-MCNC: 7.4 MG/DL (ref 0–1.2)
BUN SERPL-MCNC: 3 MG/DL (ref 6–23)
BURR CELLS BLD QL SMEAR: ABNORMAL
CALCIUM SERPL-MCNC: 9.1 MG/DL (ref 8.6–10.3)
CHLORIDE SERPL-SCNC: 106 MMOL/L (ref 98–107)
CO2 SERPL-SCNC: 25 MMOL/L (ref 21–32)
CREAT SERPL-MCNC: 0.41 MG/DL (ref 0.5–1.05)
EGFRCR SERPLBLD CKD-EPI 2021: >90 ML/MIN/1.73M*2
EOSINOPHIL # BLD MANUAL: 0.11 X10*3/UL (ref 0–0.7)
EOSINOPHIL NFR BLD MANUAL: 1 %
ERYTHROCYTE [DISTWIDTH] IN BLOOD BY AUTOMATED COUNT: 24.9 % (ref 11.5–14.5)
FERRITIN SERPL-MCNC: 1132 NG/ML (ref 8–150)
GLUCOSE SERPL-MCNC: 88 MG/DL (ref 74–99)
HCT VFR BLD AUTO: 17.7 % (ref 36–46)
HGB BLD-MCNC: 6.1 G/DL (ref 12–16)
HGB RETIC QN: 32 PG (ref 28–38)
IMM GRANULOCYTES # BLD AUTO: 0.06 X10*3/UL (ref 0–0.7)
IMM GRANULOCYTES NFR BLD AUTO: 0.6 % (ref 0–0.9)
IMMATURE RETIC FRACTION: 22 %
LDH SERPL L TO P-CCNC: 231 U/L (ref 84–246)
LYMPHOCYTES # BLD MANUAL: 4.6 X10*3/UL (ref 1.2–4.8)
LYMPHOCYTES NFR BLD MANUAL: 43 %
MCH RBC QN AUTO: 29.9 PG (ref 26–34)
MCHC RBC AUTO-ENTMCNC: 34.5 G/DL (ref 32–36)
MCV RBC AUTO: 87 FL (ref 80–100)
MONOCYTES # BLD MANUAL: 0.64 X10*3/UL (ref 0.1–1)
MONOCYTES NFR BLD MANUAL: 6 %
NEUTS SEG # BLD MANUAL: 4.92 X10*3/UL (ref 1.2–7)
NEUTS SEG NFR BLD MANUAL: 46 %
NRBC BLD-RTO: 2.9 /100 WBCS (ref 0–0)
PAPPENHEIMER BOD BLD QL SMEAR: PRESENT
PLATELET # BLD AUTO: 637 X10*3/UL (ref 150–450)
POLYCHROMASIA BLD QL SMEAR: ABNORMAL
POTASSIUM SERPL-SCNC: 3.9 MMOL/L (ref 3.5–5.3)
PROT SERPL-MCNC: 7.8 G/DL (ref 6.4–8.2)
RBC # BLD AUTO: 2.04 X10*6/UL (ref 4–5.2)
RBC MORPH BLD: ABNORMAL
RETICS #: 0.8 X10*6/UL (ref 0.02–0.08)
RETICS/RBC NFR AUTO: 39.4 % (ref 0.5–2)
RH FACTOR (ANTIGEN D): NORMAL
SCHISTOCYTES BLD QL SMEAR: ABNORMAL
SICKLE CELLS BLD QL SMEAR: ABNORMAL
SODIUM SERPL-SCNC: 141 MMOL/L (ref 136–145)
TARGETS BLD QL SMEAR: ABNORMAL
TOTAL CELLS COUNTED BLD: 100
VARIANT LYMPHS # BLD MANUAL: 0.32 X10*3/UL (ref 0–0.5)
VARIANT LYMPHS NFR BLD: 3 %
WBC # BLD AUTO: 10.7 X10*3/UL (ref 4.4–11.3)

## 2024-06-12 PROCEDURE — 80053 COMPREHEN METABOLIC PANEL: CPT

## 2024-06-12 PROCEDURE — 83615 LACTATE (LD) (LDH) ENZYME: CPT

## 2024-06-12 PROCEDURE — 99214 OFFICE O/P EST MOD 30 MIN: CPT | Performed by: NURSE PRACTITIONER

## 2024-06-12 PROCEDURE — 86901 BLOOD TYPING SEROLOGIC RH(D): CPT

## 2024-06-12 PROCEDURE — 85007 BL SMEAR W/DIFF WBC COUNT: CPT

## 2024-06-12 PROCEDURE — 82728 ASSAY OF FERRITIN: CPT

## 2024-06-12 PROCEDURE — 1036F TOBACCO NON-USER: CPT | Performed by: NURSE PRACTITIONER

## 2024-06-12 PROCEDURE — 83021 HEMOGLOBIN CHROMOTOGRAPHY: CPT

## 2024-06-12 PROCEDURE — 85045 AUTOMATED RETICULOCYTE COUNT: CPT

## 2024-06-12 PROCEDURE — 36415 COLL VENOUS BLD VENIPUNCTURE: CPT

## 2024-06-12 PROCEDURE — 85027 COMPLETE CBC AUTOMATED: CPT

## 2024-06-12 RX ORDER — OXYCODONE HYDROCHLORIDE 10 MG/1
10 TABLET ORAL EVERY 6 HOURS PRN
Qty: 10 TABLET | Refills: 0 | Status: SHIPPED | OUTPATIENT
Start: 2024-06-12 | End: 2024-06-19

## 2024-06-12 RX ORDER — IBUPROFEN 200 MG
400 TABLET ORAL EVERY 6 HOURS PRN
Qty: 60 TABLET | Refills: 2 | Status: SHIPPED | OUTPATIENT
Start: 2024-06-12

## 2024-06-12 ASSESSMENT — PAIN SCALES - GENERAL: PAINLEVEL: 6

## 2024-06-12 NOTE — PROGRESS NOTES
Patient ID: Ruperto Pang is a 24 y.o. female.  Referring Physician: JANICE Castillo  [address]  Department of Medicine-Hematology [place] [address]  Primary Care Provider: JANICE Castillo  Visit Type: Follow Up      Subjective  24 year old black female presents for follow up for Sickle Cell S.S. type. She was hospitalized in April for crisis pain. She has not been taking Hydrea. She has been using Ibuprofen for pain since she is out of Oxycodone. Pain is in her legs and hips. Pain is worse with her periods and weather change. She has been checking her oxygen saturation and has been in mid 80's. She uses Oxygen at night but needs to call and re-order tank or compressor. Current pain level is 6/10. Denies constipation.        HPI    Review of Systems - Oncology     Objective   BSA: 1.7 meters squared  /61 (BP Location: Left arm, Patient Position: Sitting, BP Cuff Size: Adult)   Pulse 70   Temp 36.4 °C (97.5 °F) (Temporal)   Resp 16   Wt 67 kg (147 lb 9.6 oz)   SpO2 93%   BMI 27.89 kg/m²      has a past medical history of Encounter for contraceptive management, unspecified (10/14/2016), Encounter for screening for disorder due to exposure to contaminants, Hb-SS disease with acute chest syndrome (Multi) (12/14/2016), and Personal history of diseases of the blood and blood-forming organs and certain disorders involving the immune mechanism (03/02/2022).   has no past surgical history on file.  Family History   Problem Relation Name Age of Onset    Sickle cell anemia Sister       Oncology History    No history exists.       Ruperto Pang  reports that she has never smoked. She has never used smokeless tobacco.  She  reports no history of alcohol use.  She  reports no history of drug use.    Physical Exam  Vitals reviewed.   Constitutional:       Appearance: Normal appearance.   HENT:      Head: Normocephalic and atraumatic.   Eyes:      General: Scleral  icterus present.      Conjunctiva/sclera: Conjunctivae normal.      Pupils: Pupils are equal, round, and reactive to light.   Cardiovascular:      Rate and Rhythm: Normal rate and regular rhythm.   Pulmonary:      Effort: Pulmonary effort is normal.   Abdominal:      Palpations: Abdomen is soft.   Musculoskeletal:         General: Normal range of motion.      Cervical back: Normal range of motion.   Skin:     General: Skin is warm.      Capillary Refill: Capillary refill takes less than 2 seconds.   Neurological:      General: No focal deficit present.      Mental Status: She is alert.   Psychiatric:         Behavior: Behavior normal.       WBC   Date/Time Value Ref Range Status   04/22/2024 09:30 AM 10.6 4.4 - 11.3 x10*3/uL Final   04/21/2024 08:39 AM 9.4 4.4 - 11.3 x10*3/uL Final   04/20/2024 09:46 AM 8.6 4.4 - 11.3 x10*3/uL Final     nRBC   Date Value Ref Range Status   04/22/2024 2.8 (H) 0.0 - 0.0 /100 WBCs Final   04/21/2024 3.8 (H) 0.0 - 0.0 /100 WBCs Final   04/20/2024 0.6 (H) 0.0 - 0.0 /100 WBCs Final     RBC   Date Value Ref Range Status   04/22/2024 2.13 (L) 4.00 - 5.20 x10*6/uL Final   04/21/2024 1.84 (L) 4.00 - 5.20 x10*6/uL Final   04/20/2024 1.82 (L) 4.00 - 5.20 x10*6/uL Final     Hemoglobin   Date Value Ref Range Status   04/22/2024 6.2 (LL) 12.0 - 16.0 g/dL Final   04/21/2024 5.4 (LL) 12.0 - 16.0 g/dL Final   04/20/2024 5.5 (LL) 12.0 - 16.0 g/dL Final     Hematocrit   Date Value Ref Range Status   04/22/2024 17.8 (L) 36.0 - 46.0 % Final   04/21/2024 15.5 (L) 36.0 - 46.0 % Final   04/20/2024 15.3 (L) 36.0 - 46.0 % Final     MCV   Date/Time Value Ref Range Status   04/22/2024 09:30 AM 84 80 - 100 fL Final   04/21/2024 08:39 AM 84 80 - 100 fL Final   04/20/2024 09:46 AM 84 80 - 100 fL Final     MCH   Date/Time Value Ref Range Status   04/22/2024 09:30 AM 29.1 26.0 - 34.0 pg Final   04/21/2024 08:39 AM 29.3 26.0 - 34.0 pg Final   04/20/2024 09:46 AM 30.2 26.0 - 34.0 pg Final     MCHC   Date/Time Value  Ref Range Status   04/22/2024 09:30 AM 34.8 32.0 - 36.0 g/dL Final   04/21/2024 08:39 AM 34.8 32.0 - 36.0 g/dL Final   04/20/2024 09:46 AM 35.9 32.0 - 36.0 g/dL Final     RDW   Date/Time Value Ref Range Status   04/22/2024 09:30 AM 21.1 (H) 11.5 - 14.5 % Final   04/21/2024 08:39 AM 22.1 (H) 11.5 - 14.5 % Final   04/20/2024 09:46 AM 23.5 (H) 11.5 - 14.5 % Final     Platelets   Date/Time Value Ref Range Status   04/22/2024 09:30  (H) 150 - 450 x10*3/uL Final   04/21/2024 08:39  (H) 150 - 450 x10*3/uL Final   04/20/2024 09:46  (H) 150 - 450 x10*3/uL Final     MPV   Date/Time Value Ref Range Status   10/06/2023 08:32 AM 9.6 7.5 - 11.5 fL Final     Neutrophils %   Date/Time Value Ref Range Status   04/10/2024 03:04 PM 48.6 40.0 - 80.0 % Final   03/07/2024 12:02 PM 53.4 40.0 - 80.0 % Final   02/21/2024 01:56 PM 64.4 40.0 - 80.0 % Final     Immature Granulocytes %, Automated   Date/Time Value Ref Range Status   04/19/2024 01:16 PM 0.6 0.0 - 0.9 % Final     Comment:     Immature Granulocyte Count (IG) includes promyelocytes, myelocytes and metamyelocytes but does not include bands. Percent differential counts (%) should be interpreted in the context of the absolute cell counts (cells/UL).   04/10/2024 03:04 PM 0.4 0.0 - 0.9 % Final     Comment:     Immature Granulocyte Count (IG) includes promyelocytes, myelocytes and metamyelocytes but does not include bands. Percent differential counts (%) should be interpreted in the context of the absolute cell counts (cells/UL).   03/07/2024 12:02 PM 0.3 0.0 - 0.9 % Final     Comment:     Immature Granulocyte Count (IG) includes promyelocytes, myelocytes and metamyelocytes but does not include bands. Percent differential counts (%) should be interpreted in the context of the absolute cell counts (cells/UL).     Lymphocytes %, Manual   Date/Time Value Ref Range Status   04/19/2024 01:16 PM 8.8 13.0 - 44.0 % Final   01/27/2024 10:32 AM 28.0 13.0 - 44.0 % Final    10/06/2023 08:32 AM 8.7 13.0 - 44.0 % Final     Lymphocytes %   Date/Time Value Ref Range Status   04/10/2024 03:04 PM 42.4 13.0 - 44.0 % Final   03/07/2024 12:02 PM 37.5 13.0 - 44.0 % Final   02/21/2024 01:56 PM 25.7 13.0 - 44.0 % Final     Monocytes %, Manual   Date/Time Value Ref Range Status   04/19/2024 01:16 PM 1.7 2.0 - 10.0 % Final   01/27/2024 10:32 AM 2.0 2.0 - 10.0 % Final   10/06/2023 08:32 AM 1.7 2.0 - 10.0 % Final     Monocytes %   Date/Time Value Ref Range Status   04/10/2024 03:04 PM 6.4 2.0 - 10.0 % Final   03/07/2024 12:02 PM 6.4 2.0 - 10.0 % Final   02/21/2024 01:56 PM 7.1 2.0 - 10.0 % Final     Eosinophils %, Manual   Date/Time Value Ref Range Status   04/19/2024 01:16 PM 0.0 0.0 - 6.0 % Final   01/27/2024 10:32 AM 1.0 0.0 - 6.0 % Final   10/06/2023 08:32 AM 0.0 0.0 - 6.0 % Final     Eosinophils %   Date/Time Value Ref Range Status   04/10/2024 03:04 PM 1.0 0.0 - 6.0 % Final   03/07/2024 12:02 PM 1.2 0.0 - 6.0 % Final   02/21/2024 01:56 PM 1.7 0.0 - 6.0 % Final     Basophils %, Manual   Date/Time Value Ref Range Status   04/19/2024 01:16 PM 0.0 0.0 - 2.0 % Final   01/27/2024 10:32 AM 4.0 0.0 - 2.0 % Final   10/06/2023 08:32 AM 0.9 0.0 - 2.0 % Final     Basophils %   Date/Time Value Ref Range Status   04/10/2024 03:04 PM 1.2 0.0 - 2.0 % Final   03/07/2024 12:02 PM 1.2 0.0 - 2.0 % Final   02/21/2024 01:56 PM 0.7 0.0 - 2.0 % Final     Neutrophils Absolute   Date/Time Value Ref Range Status   04/10/2024 03:04 PM 5.16 1.20 - 7.70 x10*3/uL Final     Comment:     Percent differential counts (%) should be interpreted in the context of the absolute cell counts (cells/uL).   03/07/2024 12:02 PM 5.38 1.20 - 7.70 x10*3/uL Final     Comment:     Percent differential counts (%) should be interpreted in the context of the absolute cell counts (cells/uL).   02/21/2024 01:56 PM 7.29 1.20 - 7.70 x10*3/uL Final     Comment:     Percent differential counts (%) should be interpreted in the context of the absolute  "cell counts (cells/uL).     Immature Granulocytes Absolute, Automated   Date/Time Value Ref Range Status   04/19/2024 01:16 PM 0.07 0.00 - 0.70 x10*3/uL Final   04/10/2024 03:04 PM 0.04 0.00 - 0.70 x10*3/uL Final   03/07/2024 12:02 PM 0.03 0.00 - 0.70 x10*3/uL Final     Lymphocytes Absolute   Date/Time Value Ref Range Status   04/10/2024 03:04 PM 4.50 1.20 - 4.80 x10*3/uL Final   03/07/2024 12:02 PM 3.78 1.20 - 4.80 x10*3/uL Final   02/21/2024 01:56 PM 2.92 1.20 - 4.80 x10*3/uL Final     Monocytes Absolute   Date/Time Value Ref Range Status   04/10/2024 03:04 PM 0.68 0.10 - 1.00 x10*3/uL Final   03/07/2024 12:02 PM 0.64 0.10 - 1.00 x10*3/uL Final   02/21/2024 01:56 PM 0.81 0.10 - 1.00 x10*3/uL Final     Eosinophils Absolute   Date/Time Value Ref Range Status   04/10/2024 03:04 PM 0.11 0.00 - 0.70 x10*3/uL Final   03/07/2024 12:02 PM 0.12 0.00 - 0.70 x10*3/uL Final   02/21/2024 01:56 PM 0.19 0.00 - 0.70 x10*3/uL Final     Eosinophils Absolute, Manual   Date/Time Value Ref Range Status   04/19/2024 01:16 PM 0.00 0.00 - 0.70 x10*3/uL Final   01/27/2024 10:32 AM 0.15 0.00 - 0.70 x10*3/uL Final   10/06/2023 08:32 AM 0.00 0.00 - 0.70 x10*3/uL Final     Basophils Absolute   Date/Time Value Ref Range Status   04/10/2024 03:04 PM 0.13 (H) 0.00 - 0.10 x10*3/uL Final     Comment:     Automated WBC differential has been confirmed by manual smear.   03/07/2024 12:02 PM 0.12 (H) 0.00 - 0.10 x10*3/uL Final     Comment:     Automated WBC differential has been confirmed by manual smear.   02/21/2024 01:56 PM 0.08 0.00 - 0.10 x10*3/uL Final     Comment:     Automated WBC differential has been confirmed by manual smear.     Basophils Absolute, Manual   Date/Time Value Ref Range Status   04/19/2024 01:16 PM 0.00 0.00 - 0.10 x10*3/uL Final   01/27/2024 10:32 AM 0.59 (H) 0.00 - 0.10 x10*3/uL Final   10/06/2023 08:32 AM 0.17 (H) 0.00 - 0.10 x10*3/uL Final       No components found for: \"PT\"  aPTT   Date/Time Value Ref Range Status "   07/13/2023 10:50 AM 28 27 - 38 sec Final     Comment:     Note new reference range as of 6/20/2023 at 10:00am.   07/12/2023 04:17 PM Canceled 27 - 38 sec Corrected     Comment:     Note new reference range as of 6/20/2023 at 10:00am.  This is a corrected result. Previous value was 36 , verified at 07/12/2023   17:18     07/10/2023 01:57 PM CANCELED       Comment:     Note new reference range as of 6/20/2023 at 10:00am.    Result canceled by the ancillary.         Assessment/Plan  2 month follow up  Do blood work today  Start back taking Hydrea as ordered  Use Ibuprofen and Oxycodone 10 mg for pain management  Re-order Oxygen tanks and or compressor as needed

## 2024-06-13 LAB
HEMOGLOBIN A2: 3.3 % (ref 2–3.5)
HEMOGLOBIN A: 12.7 % (ref 95.8–98)
HEMOGLOBIN F: 2.1 % (ref 0–2)
HEMOGLOBIN IDENTIFICATION INTERPRETATION: ABNORMAL
HEMOGLOBIN S: 81.9 %
PATH REVIEW-HGB IDENTIFICATION: ABNORMAL

## 2024-06-29 ENCOUNTER — HOSPITAL ENCOUNTER (EMERGENCY)
Facility: HOSPITAL | Age: 24
Discharge: HOME | End: 2024-06-30
Attending: EMERGENCY MEDICINE
Payer: COMMERCIAL

## 2024-06-29 ENCOUNTER — APPOINTMENT (OUTPATIENT)
Dept: RADIOLOGY | Facility: HOSPITAL | Age: 24
End: 2024-06-29
Payer: COMMERCIAL

## 2024-06-29 ENCOUNTER — HOSPITAL ENCOUNTER (EMERGENCY)
Facility: HOSPITAL | Age: 24
Discharge: HOME | End: 2024-06-29
Payer: COMMERCIAL

## 2024-06-29 VITALS
HEART RATE: 74 BPM | RESPIRATION RATE: 17 BRPM | OXYGEN SATURATION: 95 % | SYSTOLIC BLOOD PRESSURE: 130 MMHG | TEMPERATURE: 97.6 F | DIASTOLIC BLOOD PRESSURE: 75 MMHG

## 2024-06-29 DIAGNOSIS — D57.00 SICKLE CELL PAIN CRISIS (MULTI): Primary | ICD-10-CM

## 2024-06-29 LAB — PREGNANCY TEST URINE, POC: NEGATIVE

## 2024-06-29 PROCEDURE — 81025 URINE PREGNANCY TEST: CPT

## 2024-06-29 PROCEDURE — 96375 TX/PRO/DX INJ NEW DRUG ADDON: CPT

## 2024-06-29 PROCEDURE — 99284 EMERGENCY DEPT VISIT MOD MDM: CPT | Mod: 25

## 2024-06-29 PROCEDURE — 85007 BL SMEAR W/DIFF WBC COUNT: CPT | Performed by: STUDENT IN AN ORGANIZED HEALTH CARE EDUCATION/TRAINING PROGRAM

## 2024-06-29 PROCEDURE — 2500000004 HC RX 250 GENERAL PHARMACY W/ HCPCS (ALT 636 FOR OP/ED): Performed by: STUDENT IN AN ORGANIZED HEALTH CARE EDUCATION/TRAINING PROGRAM

## 2024-06-29 PROCEDURE — 80053 COMPREHEN METABOLIC PANEL: CPT | Performed by: STUDENT IN AN ORGANIZED HEALTH CARE EDUCATION/TRAINING PROGRAM

## 2024-06-29 PROCEDURE — 96374 THER/PROPH/DIAG INJ IV PUSH: CPT

## 2024-06-29 PROCEDURE — 85045 AUTOMATED RETICULOCYTE COUNT: CPT | Performed by: STUDENT IN AN ORGANIZED HEALTH CARE EDUCATION/TRAINING PROGRAM

## 2024-06-29 PROCEDURE — 36415 COLL VENOUS BLD VENIPUNCTURE: CPT | Performed by: STUDENT IN AN ORGANIZED HEALTH CARE EDUCATION/TRAINING PROGRAM

## 2024-06-29 PROCEDURE — 85027 COMPLETE CBC AUTOMATED: CPT | Performed by: STUDENT IN AN ORGANIZED HEALTH CARE EDUCATION/TRAINING PROGRAM

## 2024-06-29 PROCEDURE — 4500999001 HC ED NO CHARGE

## 2024-06-29 PROCEDURE — 86901 BLOOD TYPING SEROLOGIC RH(D): CPT | Performed by: STUDENT IN AN ORGANIZED HEALTH CARE EDUCATION/TRAINING PROGRAM

## 2024-06-29 PROCEDURE — 83010 ASSAY OF HAPTOGLOBIN QUANT: CPT | Performed by: STUDENT IN AN ORGANIZED HEALTH CARE EDUCATION/TRAINING PROGRAM

## 2024-06-29 PROCEDURE — 71046 X-RAY EXAM CHEST 2 VIEWS: CPT

## 2024-06-29 RX ORDER — KETOROLAC TROMETHAMINE 15 MG/ML
15 INJECTION, SOLUTION INTRAMUSCULAR; INTRAVENOUS ONCE
Status: COMPLETED | OUTPATIENT
Start: 2024-06-29 | End: 2024-06-29

## 2024-06-29 RX ORDER — HYDROMORPHONE HYDROCHLORIDE 1 MG/ML
1 INJECTION, SOLUTION INTRAMUSCULAR; INTRAVENOUS; SUBCUTANEOUS
Status: COMPLETED | OUTPATIENT
Start: 2024-06-29 | End: 2024-06-30

## 2024-06-29 ASSESSMENT — LIFESTYLE VARIABLES
EVER FELT BAD OR GUILTY ABOUT YOUR DRINKING: NO
HAVE PEOPLE ANNOYED YOU BY CRITICIZING YOUR DRINKING: NO
EVER HAD A DRINK FIRST THING IN THE MORNING TO STEADY YOUR NERVES TO GET RID OF A HANGOVER: NO
HAVE YOU EVER FELT YOU SHOULD CUT DOWN ON YOUR DRINKING: NO
HAVE YOU EVER FELT YOU SHOULD CUT DOWN ON YOUR DRINKING: NO
TOTAL SCORE: 0
HAVE PEOPLE ANNOYED YOU BY CRITICIZING YOUR DRINKING: NO
EVER FELT BAD OR GUILTY ABOUT YOUR DRINKING: NO
EVER HAD A DRINK FIRST THING IN THE MORNING TO STEADY YOUR NERVES TO GET RID OF A HANGOVER: NO
TOTAL SCORE: 0

## 2024-06-29 ASSESSMENT — PAIN SCALES - GENERAL
PAINLEVEL_OUTOF10: 10 - WORST POSSIBLE PAIN
PAINLEVEL_OUTOF10: 10 - WORST POSSIBLE PAIN
PAINLEVEL_OUTOF10: 8

## 2024-06-29 ASSESSMENT — COLUMBIA-SUICIDE SEVERITY RATING SCALE - C-SSRS
2. HAVE YOU ACTUALLY HAD ANY THOUGHTS OF KILLING YOURSELF?: NO
6. HAVE YOU EVER DONE ANYTHING, STARTED TO DO ANYTHING, OR PREPARED TO DO ANYTHING TO END YOUR LIFE?: NO
6. HAVE YOU EVER DONE ANYTHING, STARTED TO DO ANYTHING, OR PREPARED TO DO ANYTHING TO END YOUR LIFE?: NO
1. IN THE PAST MONTH, HAVE YOU WISHED YOU WERE DEAD OR WISHED YOU COULD GO TO SLEEP AND NOT WAKE UP?: NO
1. IN THE PAST MONTH, HAVE YOU WISHED YOU WERE DEAD OR WISHED YOU COULD GO TO SLEEP AND NOT WAKE UP?: NO
2. HAVE YOU ACTUALLY HAD ANY THOUGHTS OF KILLING YOURSELF?: NO

## 2024-06-29 ASSESSMENT — PAIN - FUNCTIONAL ASSESSMENT
PAIN_FUNCTIONAL_ASSESSMENT: 0-10
PAIN_FUNCTIONAL_ASSESSMENT: 0-10

## 2024-06-29 ASSESSMENT — PAIN DESCRIPTION - PAIN TYPE: TYPE: ACUTE PAIN

## 2024-06-29 ASSESSMENT — PAIN DESCRIPTION - DESCRIPTORS
DESCRIPTORS: ACHING
DESCRIPTORS: ACHING

## 2024-06-29 ASSESSMENT — PAIN DESCRIPTION - FREQUENCY: FREQUENCY: CONSTANT/CONTINUOUS

## 2024-06-30 ENCOUNTER — CLINICAL SUPPORT (OUTPATIENT)
Dept: EMERGENCY MEDICINE | Facility: HOSPITAL | Age: 24
End: 2024-06-30
Payer: COMMERCIAL

## 2024-06-30 VITALS
RESPIRATION RATE: 14 BRPM | OXYGEN SATURATION: 96 % | DIASTOLIC BLOOD PRESSURE: 66 MMHG | SYSTOLIC BLOOD PRESSURE: 110 MMHG | TEMPERATURE: 97.9 F | HEART RATE: 70 BPM

## 2024-06-30 LAB
ABO GROUP (TYPE) IN BLOOD: NORMAL
ALBUMIN SERPL BCP-MCNC: 4.8 G/DL (ref 3.4–5)
ALP SERPL-CCNC: 87 U/L (ref 33–110)
ALT SERPL W P-5'-P-CCNC: 21 U/L (ref 7–45)
ANION GAP SERPL CALC-SCNC: 15 MMOL/L (ref 10–20)
ANTIBODY SCREEN: NORMAL
APPEARANCE UR: CLEAR
AST SERPL W P-5'-P-CCNC: 29 U/L (ref 9–39)
ATRIAL RATE: 67 BPM
BASO STIPL BLD QL SMEAR: PRESENT
BASOPHILS # BLD MANUAL: 0.44 X10*3/UL (ref 0–0.1)
BASOPHILS NFR BLD MANUAL: 3.4 %
BILIRUB SERPL-MCNC: 5.3 MG/DL (ref 0–1.2)
BILIRUB UR STRIP.AUTO-MCNC: NEGATIVE MG/DL
BUN SERPL-MCNC: 10 MG/DL (ref 6–23)
CALCIUM SERPL-MCNC: 9.5 MG/DL (ref 8.6–10.6)
CHLORIDE SERPL-SCNC: 106 MMOL/L (ref 98–107)
CO2 SERPL-SCNC: 20 MMOL/L (ref 21–32)
COLOR UR: NORMAL
CREAT SERPL-MCNC: 0.44 MG/DL (ref 0.5–1.05)
EGFRCR SERPLBLD CKD-EPI 2021: >90 ML/MIN/1.73M*2
EOSINOPHIL # BLD MANUAL: 0 X10*3/UL (ref 0–0.7)
EOSINOPHIL NFR BLD MANUAL: 0 %
ERYTHROCYTE [DISTWIDTH] IN BLOOD BY AUTOMATED COUNT: 22 % (ref 11.5–14.5)
GLUCOSE SERPL-MCNC: 84 MG/DL (ref 74–99)
GLUCOSE UR STRIP.AUTO-MCNC: NORMAL MG/DL
HAPTOGLOB SERPL-MCNC: <10 MG/DL (ref 37–246)
HCT VFR BLD AUTO: 16.5 % (ref 36–46)
HGB BLD-MCNC: 6.1 G/DL (ref 12–16)
HGB RETIC QN: 29 PG (ref 28–38)
HOLD SPECIMEN: NORMAL
IMM GRANULOCYTES # BLD AUTO: 0.06 X10*3/UL (ref 0–0.7)
IMM GRANULOCYTES NFR BLD AUTO: 0.5 % (ref 0–0.9)
IMMATURE RETIC FRACTION: 25.8 %
KETONES UR STRIP.AUTO-MCNC: NEGATIVE MG/DL
LEUKOCYTE ESTERASE UR QL STRIP.AUTO: NEGATIVE
LYMPHOCYTES # BLD MANUAL: 5.01 X10*3/UL (ref 1.2–4.8)
LYMPHOCYTES NFR BLD MANUAL: 38.5 %
MCH RBC QN AUTO: 29.9 PG (ref 26–34)
MCHC RBC AUTO-ENTMCNC: 37 G/DL (ref 32–36)
MCV RBC AUTO: 81 FL (ref 80–100)
MONOCYTES # BLD MANUAL: 1 X10*3/UL (ref 0.1–1)
MONOCYTES NFR BLD MANUAL: 7.7 %
NEUTROPHILS # BLD MANUAL: 5.89 X10*3/UL (ref 1.2–7.7)
NEUTS BAND # BLD MANUAL: 0.22 X10*3/UL (ref 0–0.7)
NEUTS BAND NFR BLD MANUAL: 1.7 %
NEUTS SEG # BLD MANUAL: 5.67 X10*3/UL (ref 1.2–7)
NEUTS SEG NFR BLD MANUAL: 43.6 %
NITRITE UR QL STRIP.AUTO: NEGATIVE
NRBC BLD-RTO: 3.6 /100 WBCS (ref 0–0)
P AXIS: 17 DEGREES
P OFFSET: 179 MS
P ONSET: 128 MS
PH UR STRIP.AUTO: 6 [PH]
PLATELET # BLD AUTO: 587 X10*3/UL (ref 150–450)
POLYCHROMASIA BLD QL SMEAR: ABNORMAL
POTASSIUM SERPL-SCNC: 3.8 MMOL/L (ref 3.5–5.3)
PR INTERVAL: 186 MS
PROT SERPL-MCNC: 8 G/DL (ref 6.4–8.2)
PROT UR STRIP.AUTO-MCNC: NEGATIVE MG/DL
Q ONSET: 221 MS
QRS COUNT: 11 BEATS
QRS DURATION: 78 MS
QT INTERVAL: 312 MS
QTC CALCULATION(BAZETT): 329 MS
QTC FREDERICIA: 323 MS
R AXIS: 21 DEGREES
RBC # BLD AUTO: 2.04 X10*6/UL (ref 4–5.2)
RBC # UR STRIP.AUTO: NEGATIVE /UL
RBC MORPH BLD: ABNORMAL
RETICS #: 0.6 X10*6/UL (ref 0.02–0.08)
RETICS/RBC NFR AUTO: 29.2 % (ref 0.5–2)
RH FACTOR (ANTIGEN D): NORMAL
SCHISTOCYTES BLD QL SMEAR: ABNORMAL
SICKLE CELLS BLD QL SMEAR: ABNORMAL
SODIUM SERPL-SCNC: 137 MMOL/L (ref 136–145)
SP GR UR STRIP.AUTO: 1.01
T AXIS: 249 DEGREES
T OFFSET: 377 MS
TARGETS BLD QL SMEAR: ABNORMAL
TOTAL CELLS COUNTED BLD: 117
UROBILINOGEN UR STRIP.AUTO-MCNC: NORMAL MG/DL
VARIANT LYMPHS # BLD MANUAL: 0.66 X10*3/UL (ref 0–0.5)
VARIANT LYMPHS NFR BLD: 5.1 %
VENTRICULAR RATE: 67 BPM
WBC # BLD AUTO: 13 X10*3/UL (ref 4.4–11.3)

## 2024-06-30 PROCEDURE — 81003 URINALYSIS AUTO W/O SCOPE: CPT | Performed by: STUDENT IN AN ORGANIZED HEALTH CARE EDUCATION/TRAINING PROGRAM

## 2024-06-30 PROCEDURE — 96376 TX/PRO/DX INJ SAME DRUG ADON: CPT

## 2024-06-30 PROCEDURE — 71046 X-RAY EXAM CHEST 2 VIEWS: CPT | Mod: FOREIGN READ | Performed by: RADIOLOGY

## 2024-06-30 PROCEDURE — 96375 TX/PRO/DX INJ NEW DRUG ADDON: CPT

## 2024-06-30 PROCEDURE — 93005 ELECTROCARDIOGRAM TRACING: CPT

## 2024-06-30 PROCEDURE — 2500000004 HC RX 250 GENERAL PHARMACY W/ HCPCS (ALT 636 FOR OP/ED): Performed by: STUDENT IN AN ORGANIZED HEALTH CARE EDUCATION/TRAINING PROGRAM

## 2024-06-30 RX ORDER — ONDANSETRON HYDROCHLORIDE 2 MG/ML
4 INJECTION, SOLUTION INTRAVENOUS ONCE
Status: COMPLETED | OUTPATIENT
Start: 2024-06-30 | End: 2024-06-30

## 2024-06-30 ASSESSMENT — PAIN DESCRIPTION - PROGRESSION: CLINICAL_PROGRESSION: GRADUALLY IMPROVING

## 2024-06-30 ASSESSMENT — PAIN - FUNCTIONAL ASSESSMENT: PAIN_FUNCTIONAL_ASSESSMENT: 0-10

## 2024-06-30 ASSESSMENT — PAIN SCALES - GENERAL
PAINLEVEL_OUTOF10: 6
PAINLEVEL_OUTOF10: 6

## 2024-06-30 NOTE — ED TRIAGE NOTES
Pt presents with sickle cell pain started 2 days ago with back, chest and leg pain with fatigue. Patient was here earlier but left without being seen.

## 2024-06-30 NOTE — DISCHARGE INSTRUCTIONS
Please follow-up with your sickle cell clinic as needed for further pain control, please return to the emergency department if you have any worsening symptoms.

## 2024-06-30 NOTE — ED PROVIDER NOTES
CC: Sickle Cell Pain Crisis     HPI:  Patient was 24-year-old female presenting emergency room chief concern of sickle cell pain crisis.  Patient is reporting that she is experiencing pain in her back, wrist, bilateral lower extremities.  She states that the symptoms are consistent with her typical sickle cell pain crisis.  She states that home symptom relief has not been effective.  She states that she has not experienced any chest pain, cough, is endorsing potential slight shortness of breath with the above symptoms.  Patient denies any swelling the bilateral lower extremities, as noted above denying fevers/chills/productive cough, states that she has had acute chest previously and current symptomatology is not consistent with this previous episode.  Patient states that her baseline hemoglobin is 6-7 and she is typically transfuse for hemoglobin less than 6.    Records Reviewed:  Recent available ED and inpatient notes reviewed in EMR.    PMHx/PSHx:  Per HPI.   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  - has no past surgical history on file.    Medications:  Reviewed in EMR. See EMR for complete list of medications and doses.    Allergies:  Iodinated contrast media    Social History:  - Tobacco:  reports that she has never smoked. She has never used smokeless tobacco.   - Alcohol:  reports no history of alcohol use.   - Illicit Drugs:  reports no history of drug use.     ROS:  Per HPI.       ???????????????????????????????????????????????????????????????  Triage Vitals:  T 36.6 °C (97.8 °F)  HR 80  /72  RR 16  O2 95 % None (Room air)    Physical Exam  Vitals and nursing note  reviewed.   Constitutional:       General: She is not in acute distress.     Appearance: Normal appearance. She is not toxic-appearing.   HENT:      Head: Normocephalic and atraumatic.      Nose: No congestion or rhinorrhea.      Mouth/Throat:      Mouth: Mucous membranes are moist.      Pharynx: Oropharynx is clear.   Eyes:      Extraocular Movements: Extraocular movements intact.      Conjunctiva/sclera: Conjunctivae normal.      Pupils: Pupils are equal, round, and reactive to light.   Cardiovascular:      Rate and Rhythm: Normal rate and regular rhythm.      Pulses: Normal pulses.      Heart sounds: No murmur heard.     No friction rub. No gallop.   Pulmonary:      Effort: Pulmonary effort is normal.      Breath sounds: No wheezing, rhonchi or rales.   Abdominal:      General: There is no distension.      Palpations: Abdomen is soft.      Tenderness: There is no abdominal tenderness. There is no guarding or rebound.   Musculoskeletal:         General: No swelling, deformity or signs of injury. Normal range of motion.      Cervical back: Normal range of motion.      Right lower leg: No edema.      Left lower leg: No edema.      Comments: No swelling noted in the bilateral lower extremities   Skin:     General: Skin is warm and dry.      Capillary Refill: Capillary refill takes less than 2 seconds.      Findings: No bruising, lesion or rash.   Neurological:      General: No focal deficit present.      Mental Status: She is alert and oriented to person, place, and time. Mental status is at baseline.      Cranial Nerves: No cranial nerve deficit.      Sensory: No sensory deficit.      Coordination: Coordination normal.   Psychiatric:         Mood and Affect: Mood normal.         Thought Content: Thought content normal.         Judgment: Judgment normal.       ???????????????????????????????????????????????????????????????  EKG:  EKG is obtained at 2110 and is noted be normal sinus rhythm with a ventricular rate of  67 bpm, parable 186, QRS duration of 78, QTc of 329 there are no significant T wave inversions, no significant ST elevations, my overall interpretation is a reassuring study with no signs of ischemia or infarction.    Assessment and Plan:  Patient is 24-year-old female with past medical history as noted above who is presenting emergency room with sickle cell pain crisis in multiple locations.  She states that this sickle cell pain crisis worsened acutely today, notes that home therapy has not improved symptomatology.  She reports no associated fevers/chills/productive cough that would make me concerned for acute chest syndrome.  She states typical locations of pain.  Will obtain basic labs given patient's persistent anemia in the outpatient setting.  Patient states that she is transfused.  Hemoglobin less than 6.  Will additionally obtain chest x-ray given that the patient reports very minor shortness of breath, no syncope, no unilateral swelling in the bilateral lower extremities.  No other significant findings at this time.  Please see course below for results and context patient clinical condition and the patient eventual disposition.    ED Course:  ED Course as of 06/30/24 2202   Sun Jun 30, 2024 0044 I reviewed patient's chest x-ray with no focal infiltrates appreciated, no additional cardiopulmonary process. [BN]      ED Course User Index  [BN] Nacho Kerns MD         Diagnoses as of 06/30/24 2202   Sickle cell pain crisis (Multi)      Labs appear to be at baseline, x-ray on remarkable for any signs of significant infiltrate, patient with significant improvement of pain.  Patient is noted to be slightly hypoxic on repeat vital signs prior to discharge.  Patient states that she does have home oxygen available to her at night as needed.  Patient is placed on 2 L it is noted to be 96%.  Patient reports that she has not been experienced any significant shortness of breath, minor shortness of breath was a  aspect of the patient's previous chief complaint.  Patient is able to ambulate without any significant difficulty, again has an unremarkable workup, does have a baseline anemia that is noted which may be contributing to the patient's decreased SpO2.  With no significant symptoms, significant resolution of pain the patient is appropriate for discharge and is discharged from the emergency department in stable condition.    Social Determinants Limiting Care:      Disposition:  Discharge    Nacho Kerns MD       Procedures ? SmartLinks last updated 6/29/2024 11:31 PM     Nacho Kerns MD  Resident  06/30/24 2205    ---------------------------------    This patient was seen by the resident physician. I have seen and examined the patient, agree with the workup, evaluation, management and diagnosis. The care plan has been discussed and I concur.    My assessment reveals the following:    HPI:  Patient is a 23 y/o female with h/o sickle cell disease presenting with typical pain crisis in back, wrist, and BLE. H/o acute chest syndrome, but does not think current episode is like her previous instances of acute chest. No F/C/cough. No SOB.     PE:  Vital signs reviewed in nursing triage note, EMR flowsheets, and at patient's bedside  GEN: Patient is awake, alert, calm, cooperative, and in mild to moderate painful distress.  HEAD: Normocephalic and atraumatic.  EYES: Anicteric sclera.  MOUTH: Mucous membranes moist.  CV: Regular rate and rhythm. (+) s1/s2. No murmurs/rubs/gallops.  PULM: CTAB. No wheezes, rales, or crackles.  GI: Soft, non-tender, non-distended without rebound or guarding.  EXT: No deformities noted. Full range of motion at all major joints. Mild diffuse TTP in BLE  NEURO: Moves all extremities. No gross focal deficits. Sensation grossly intact throughout.  SKIN: Warm, dry. No erythema or ecchymosis.    Labs Reviewed   CBC WITH AUTO DIFFERENTIAL - Abnormal       Result Value    WBC 13.0 (*)     nRBC 3.6  (*)     RBC 2.04 (*)     Hemoglobin 6.1 (*)     Hematocrit 16.5 (*)     MCV 81      MCH 29.9      MCHC 37.0 (*)     RDW 22.0 (*)     Platelets 587 (*)     Immature Granulocytes %, Automated 0.5      Immature Granulocytes Absolute, Automated 0.06     COMPREHENSIVE METABOLIC PANEL - Abnormal    Glucose 84      Sodium 137      Potassium 3.8      Chloride 106      Bicarbonate 20 (*)     Anion Gap 15      Urea Nitrogen 10      Creatinine 0.44 (*)     eGFR >90      Calcium 9.5      Albumin 4.8      Alkaline Phosphatase 87      Total Protein 8.0      AST 29      Bilirubin, Total 5.3 (*)     ALT 21     RETICULOCYTES - Abnormal    Retic % 29.2 (*)     Retic Absolute 0.596 (*)     Reticulocyte Hemoglobin 29      Immature Retic fraction 25.8 (*)    HAPTOGLOBIN - Abnormal    Haptoglobin <10 (*)    MANUAL DIFFERENTIAL - Abnormal    Neutrophils %, Manual 43.6      Bands %, Manual 1.7      Lymphocytes %, Manual 38.5      Monocytes %, Manual 7.7      Eosinophils %, Manual 0.0      Basophils %, Manual 3.4      Atypical Lymphocytes %, Manual 5.1      Seg Neutrophils Absolute, Manual 5.67      Bands Absolute, Manual 0.22      Lymphocytes Absolute, Manual 5.01 (*)     Monocytes Absolute, Manual 1.00      Eosinophils Absolute, Manual 0.00      Basophils Absolute, Manual 0.44 (*)     Atypical Lymphs Absolute, Manual 0.66 (*)     Total Cells Counted 117      Neutrophils Absolute, Manual 5.89      RBC Morphology See Below      Polychromasia Mild      RBC Fragments Few      Sickle Cells Few      Target Cells Few      Basophilic Stippling Present     URINALYSIS WITH REFLEX CULTURE AND MICROSCOPIC - Normal    Color, Urine Light-Yellow      Appearance, Urine Clear      Specific Gravity, Urine 1.009      pH, Urine 6.0      Protein, Urine NEGATIVE      Glucose, Urine Normal      Blood, Urine NEGATIVE      Ketones, Urine NEGATIVE      Bilirubin, Urine NEGATIVE      Urobilinogen, Urine Normal      Nitrite, Urine NEGATIVE      Leukocyte Esterase,  Urine NEGATIVE     POCT PREGNANCY, URINE - Normal    Preg Test, Ur Negative     TYPE AND SCREEN    ABO TYPE B      Rh TYPE POS      ANTIBODY SCREEN NEG     URINALYSIS WITH REFLEX CULTURE AND MICROSCOPIC    Narrative:     The following orders were created for panel order Urinalysis with Reflex Culture and Microscopic.  Procedure                               Abnormality         Status                     ---------                               -----------         ------                     Urinalysis with Reflex C...[[id number]]  Normal              Final result               Extra Urine Gray Tube[[id number]]                            Final result                 Please view results for these tests on the individual orders.   EXTRA URINE GRAY TUBE    Extra Tube Hold for add-ons.       XR chest 2 views   Final Result   Enlarged cardiac silhouette.   Signed by Edgard Lamas DO            Medical Decision Making:  - IV  - Labs  - Pain meds  - CXR  - Nursing reports pulse ox at discharge is high 80s. Patient states she intermittently wears 2 L O2 at home. Pulse ox on O2 up to 90s. Has O2 at home.   - Follow up with [facility].  - Patient advised to return to the ED for any worsening or new symptoms.     EKG: EKG independently reviewed by the attending ED physician, and I and concur with the resident's/advanced practice provider's interpretation. Sinus rhythm at 67 bpm with SA, normal axis, normal intervals, no ST or T wave changes. Similar to old EKG on 4/19/24.    Differential Diagnoses Considered: Sickle cell pain crisis    Chronic Medical Conditions Significantly Affecting Care: Sickle cell disease    Independent Interpretation of Studies:  I independently interpreted: CXR showing no acute cardiopulmonary disease.    Escalation of Care:  Appropriate for outpatient management     MD Sanjiv Ramirez MD  06/30/24 2937

## 2024-07-06 ENCOUNTER — TELEPHONE (OUTPATIENT)
Dept: HEMATOLOGY/ONCOLOGY | Facility: HOSPITAL | Age: 24
End: 2024-07-06
Payer: COMMERCIAL

## 2024-07-06 NOTE — TELEPHONE ENCOUNTER
Covering provider unable to refill opiods at this time.  Pt instructed to go to ED if pain persists.  Primary provider made aware of weekend request for refill and will hopefully address on Mon, 7/8 or sooner if they see message.  Pt aware and verbalized understanding of plan.

## 2024-07-08 ENCOUNTER — TELEPHONE (OUTPATIENT)
Dept: HEMATOLOGY/ONCOLOGY | Facility: HOSPITAL | Age: 24
End: 2024-07-08
Payer: COMMERCIAL

## 2024-07-08 DIAGNOSIS — D57.00 SICKLE CELL DISEASE WITH CRISIS (MULTI): Primary | ICD-10-CM

## 2024-07-08 RX ORDER — OXYCODONE HYDROCHLORIDE 10 MG/1
10 TABLET ORAL EVERY 6 HOURS PRN
Qty: 10 TABLET | Refills: 0 | Status: SHIPPED | OUTPATIENT
Start: 2024-07-08 | End: 2024-07-15

## 2024-07-08 NOTE — TELEPHONE ENCOUNTER
Pt requesting a refill of oxycodone 10mg q6 prn, #10.  Last prescription was written 6/12/24.  Preferred pharmacy is [facility] in [place].

## 2024-07-17 ENCOUNTER — TELEPHONE (OUTPATIENT)
Dept: HEMATOLOGY/ONCOLOGY | Facility: HOSPITAL | Age: 24
End: 2024-07-17
Payer: COMMERCIAL

## 2024-07-17 DIAGNOSIS — D57.00 SICKLE CELL DISEASE WITH CRISIS (MULTI): ICD-10-CM

## 2024-07-17 RX ORDER — OXYCODONE HYDROCHLORIDE 10 MG/1
10 TABLET ORAL EVERY 6 HOURS PRN
Qty: 10 TABLET | Refills: 0 | Status: SHIPPED | OUTPATIENT
Start: 2024-07-17 | End: 2024-07-24

## 2024-07-17 NOTE — TELEPHONE ENCOUNTER
Refill request received for Oxycodone 10mg.  Preferred pharmacy is [facility] at [address] in [place].  Message sent to Sickle Cell team.

## 2024-08-15 ENCOUNTER — DOCUMENTATION (OUTPATIENT)
Dept: HEMATOLOGY/ONCOLOGY | Facility: HOSPITAL | Age: 24
End: 2024-08-15

## 2024-08-15 ENCOUNTER — OFFICE VISIT (OUTPATIENT)
Dept: HEMATOLOGY/ONCOLOGY | Facility: HOSPITAL | Age: 24
End: 2024-08-15
Payer: COMMERCIAL

## 2024-08-15 ENCOUNTER — LAB (OUTPATIENT)
Dept: LAB | Facility: HOSPITAL | Age: 24
End: 2024-08-15
Payer: COMMERCIAL

## 2024-08-15 VITALS
WEIGHT: 149.03 LBS | SYSTOLIC BLOOD PRESSURE: 117 MMHG | BODY MASS INDEX: 28.16 KG/M2 | DIASTOLIC BLOOD PRESSURE: 53 MMHG | HEART RATE: 78 BPM | OXYGEN SATURATION: 90 % | TEMPERATURE: 98.4 F | RESPIRATION RATE: 20 BRPM

## 2024-08-15 DIAGNOSIS — D57.20 SICKLE CELL DISEASE, TYPE SC, WITHOUT CRISIS (MULTI): ICD-10-CM

## 2024-08-15 DIAGNOSIS — I49.02: Primary | ICD-10-CM

## 2024-08-15 LAB
ABO GROUP (TYPE) IN BLOOD: NORMAL
ALBUMIN SERPL BCP-MCNC: 4.7 G/DL (ref 3.4–5)
ALP SERPL-CCNC: 93 U/L (ref 33–110)
ALT SERPL W P-5'-P-CCNC: 27 U/L (ref 7–45)
ANION GAP SERPL CALC-SCNC: 16 MMOL/L (ref 10–20)
ANTIBODY SCREEN: NORMAL
AST SERPL W P-5'-P-CCNC: 36 U/L (ref 9–39)
BASOPHILS # BLD AUTO: 0.08 X10*3/UL (ref 0–0.1)
BASOPHILS NFR BLD AUTO: 0.8 %
BILIRUB SERPL-MCNC: 7.1 MG/DL (ref 0–1.2)
BUN SERPL-MCNC: 5 MG/DL (ref 6–23)
BURR CELLS BLD QL SMEAR: NORMAL
CALCIUM SERPL-MCNC: 9.5 MG/DL (ref 8.6–10.6)
CHLORIDE SERPL-SCNC: 105 MMOL/L (ref 98–107)
CO2 SERPL-SCNC: 22 MMOL/L (ref 21–32)
CREAT SERPL-MCNC: 0.4 MG/DL (ref 0.5–1.05)
EGFRCR SERPLBLD CKD-EPI 2021: >90 ML/MIN/1.73M*2
EOSINOPHIL # BLD AUTO: 0.04 X10*3/UL (ref 0–0.7)
EOSINOPHIL NFR BLD AUTO: 0.4 %
ERYTHROCYTE [DISTWIDTH] IN BLOOD BY AUTOMATED COUNT: 26.3 % (ref 11.5–14.5)
FERRITIN SERPL-MCNC: 1490 NG/ML (ref 8–150)
GLUCOSE SERPL-MCNC: 85 MG/DL (ref 74–99)
HCT VFR BLD AUTO: 17.7 % (ref 36–46)
HGB BLD-MCNC: 6.5 G/DL (ref 12–16)
HGB RETIC QN: 32 PG (ref 28–38)
IMM GRANULOCYTES # BLD AUTO: 0.05 X10*3/UL (ref 0–0.7)
IMM GRANULOCYTES NFR BLD AUTO: 0.5 % (ref 0–0.9)
IMMATURE RETIC FRACTION: 25.8 %
LDH SERPL L TO P-CCNC: 307 U/L (ref 84–246)
LYMPHOCYTES # BLD AUTO: 3.72 X10*3/UL (ref 1.2–4.8)
LYMPHOCYTES NFR BLD AUTO: 39.1 %
MCH RBC QN AUTO: 30.5 PG (ref 26–34)
MCHC RBC AUTO-ENTMCNC: 36.7 G/DL (ref 32–36)
MCV RBC AUTO: 83 FL (ref 80–100)
MONOCYTES # BLD AUTO: 0.41 X10*3/UL (ref 0.1–1)
MONOCYTES NFR BLD AUTO: 4.3 %
NEUTROPHILS # BLD AUTO: 5.22 X10*3/UL (ref 1.2–7.7)
NEUTROPHILS NFR BLD AUTO: 54.9 %
NRBC BLD-RTO: 1.7 /100 WBCS (ref 0–0)
OVALOCYTES BLD QL SMEAR: NORMAL
PLATELET # BLD AUTO: 610 X10*3/UL (ref 150–450)
PLATELET CLUMP BLD QL SMEAR: PRESENT
POLYCHROMASIA BLD QL SMEAR: NORMAL
POTASSIUM SERPL-SCNC: 3.8 MMOL/L (ref 3.5–5.3)
PROT SERPL-MCNC: 8.2 G/DL (ref 6.4–8.2)
RBC # BLD AUTO: 2.13 X10*6/UL (ref 4–5.2)
RBC MORPH BLD: NORMAL
RETICS #: 0.73 X10*6/UL (ref 0.02–0.08)
RETICS/RBC NFR AUTO: 34.3 % (ref 0.5–2)
RH FACTOR (ANTIGEN D): NORMAL
SCHISTOCYTES BLD QL SMEAR: NORMAL
SICKLE CELLS BLD QL SMEAR: NORMAL
SODIUM SERPL-SCNC: 139 MMOL/L (ref 136–145)
WBC # BLD AUTO: 9.5 X10*3/UL (ref 4.4–11.3)

## 2024-08-15 PROCEDURE — 83615 LACTATE (LD) (LDH) ENZYME: CPT

## 2024-08-15 PROCEDURE — 86901 BLOOD TYPING SEROLOGIC RH(D): CPT

## 2024-08-15 PROCEDURE — 85045 AUTOMATED RETICULOCYTE COUNT: CPT

## 2024-08-15 PROCEDURE — 83021 HEMOGLOBIN CHROMOTOGRAPHY: CPT

## 2024-08-15 PROCEDURE — 1036F TOBACCO NON-USER: CPT | Performed by: NURSE PRACTITIONER

## 2024-08-15 PROCEDURE — 36415 COLL VENOUS BLD VENIPUNCTURE: CPT

## 2024-08-15 PROCEDURE — 99214 OFFICE O/P EST MOD 30 MIN: CPT | Performed by: NURSE PRACTITIONER

## 2024-08-15 PROCEDURE — 82728 ASSAY OF FERRITIN: CPT

## 2024-08-15 PROCEDURE — 85025 COMPLETE CBC W/AUTO DIFF WBC: CPT

## 2024-08-15 PROCEDURE — 80053 COMPREHEN METABOLIC PANEL: CPT

## 2024-08-15 RX ORDER — POLYETHYLENE GLYCOL 3350 17 G/17G
17 POWDER, FOR SOLUTION ORAL DAILY
Qty: 3 PACKET | Refills: 0 | Status: SHIPPED | OUTPATIENT
Start: 2024-08-15 | End: 2024-08-18

## 2024-08-15 RX ORDER — OXYCODONE HYDROCHLORIDE 10 MG/1
10 TABLET ORAL EVERY 6 HOURS PRN
Qty: 10 TABLET | Refills: 0 | Status: SHIPPED | OUTPATIENT
Start: 2024-08-15 | End: 2024-08-22

## 2024-08-15 ASSESSMENT — PAIN SCALES - GENERAL: PAINLEVEL: 0-NO PAIN

## 2024-08-15 NOTE — PROGRESS NOTES
Patient ID: Ruperto Pang is a 24 y.o. female.  Referring Physician: JANICE Castillo  [address]  Department of Medicine-H[place]ology [place] [address]  Primary Care Provider: JANICE Castillo  Visit Type: Follow Up      Subjective  24 year old black female presents for follow up with Sickle Cell Disease SS type. She was in the ED on 6/29/2024 and 4/22/2024 for pain crisis. She denies pain today and does not experience it every day. When she has pain it is usually in her back and travels down to her knees. She has severe menstrual cramps. She has constipation when she takes Oxycodone and uses Miralax. She has home Oxygen and uses it at night occasionally. States she wakes up with headache and was advised to use the nocturnal Oxygen to help with the headaches. 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 She denies smoking. She takes Hydrea for disease modification. [place] reviewed.        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   has no past surgical history on file.  Family History   Problem Relation Name Age of Onset   • Sickle cell anemia Sister       Oncology History    No history exists.       Ruperto Pang  reports that she has never smoked. She has never used smokeless tobacco.  She  reports no history of alcohol use.  She  reports no history of drug  use.    Physical Exam  Vitals reviewed.   Constitutional:       Appearance: Normal appearance.   HENT:      Head: Normocephalic and atraumatic.      Nose: Nose normal.   Eyes:      General: Scleral icterus present.   Cardiovascular:      Rate and Rhythm: Normal rate.      Comments: 12 lead EKG done for NSR without A fib or flutter. No ectopics noted.  Pulmonary:      Effort: Pulmonary effort is normal.      Breath sounds: Normal breath sounds.   Abdominal:      Palpations: Abdomen is soft.   Musculoskeletal:      Cervical back: Normal range of motion.   Skin:     General: Skin is warm and dry.      Capillary Refill: Capillary refill takes less than 2 seconds.   Neurological:      General: No focal deficit present.      Mental Status: She is alert and oriented to person, place, and time.   Psychiatric:         Mood and Affect: Mood normal.     WBC   Date/Time Value Ref Range Status   06/29/2024 11:40 PM 13.0 (H) 4.4 - 11.3 x10*3/uL Final   06/12/2024 03:04 PM 10.7 4.4 - 11.3 x10*3/uL Final   04/22/2024 09:30 AM 10.6 4.4 - 11.3 x10*3/uL Final     nRBC   Date Value Ref Range Status   06/29/2024 3.6 (H) 0.0 - 0.0 /100 WBCs Final   06/12/2024 2.9 (H) 0.0 - 0.0 /100 WBCs Final   04/22/2024 2.8 (H) 0.0 - 0.0 /100 WBCs Final     RBC   Date Value Ref Range Status   06/29/2024 2.04 (L) 4.00 - 5.20 x10*6/uL Final   06/12/2024 2.04 (L) 4.00 - 5.20 x10*6/uL Final   04/22/2024 2.13 (L) 4.00 - 5.20 x10*6/uL Final     Hemoglobin   Date Value Ref Range Status   06/29/2024 6.1 (LL) 12.0 - 16.0 g/dL Final   06/12/2024 6.1 (LL) 12.0 - 16.0 g/dL Final   04/22/2024 6.2 (LL) 12.0 - 16.0 g/dL Final     Hematocrit   Date Value Ref Range Status   06/29/2024 16.5 (L) 36.0 - 46.0 % Final   06/12/2024 17.7 (L) 36.0 - 46.0 % Final   04/22/2024 17.8 (L) 36.0 - 46.0 % Final     MCV   Date/Time Value Ref Range Status   06/29/2024 11:40 PM 81 80 - 100 fL Final   06/12/2024 03:04 PM 87 80 - 100 fL Final   04/22/2024 09:30 AM 84 80 - 100 fL Final      MCH   Date/Time Value Ref Range Status   06/29/2024 11:40 PM 29.9 26.0 - 34.0 pg Final   06/12/2024 03:04 PM 29.9 26.0 - 34.0 pg Final   04/22/2024 09:30 AM 29.1 26.0 - 34.0 pg Final     MCHC   Date/Time Value Ref Range Status   06/29/2024 11:40 PM 37.0 (H) 32.0 - 36.0 g/dL Final   06/12/2024 03:04 PM 34.5 32.0 - 36.0 g/dL Final   04/22/2024 09:30 AM 34.8 32.0 - 36.0 g/dL Final     RDW   Date/Time Value Ref Range Status   06/29/2024 11:40 PM 22.0 (H) 11.5 - 14.5 % Final   06/12/2024 03:04 PM 24.9 (H) 11.5 - 14.5 % Final   04/22/2024 09:30 AM 21.1 (H) 11.5 - 14.5 % Final     Platelets   Date/Time Value Ref Range Status   06/29/2024 11:40  (H) 150 - 450 x10*3/uL Final   06/12/2024 03:04  (H) 150 - 450 x10*3/uL Final   04/22/2024 09:30  (H) 150 - 450 x10*3/uL Final     MPV   Date/Time Value Ref Range Status   10/06/2023 08:32 AM 9.6 7.5 - 11.5 fL Final     Neutrophils %   Date/Time Value Ref Range Status   04/10/2024 03:04 PM 48.6 40.0 - 80.0 % Final   03/07/2024 12:02 PM 53.4 40.0 - 80.0 % Final   02/21/2024 01:56 PM 64.4 40.0 - 80.0 % Final     Immature Granulocytes %, Automated   Date/Time Value Ref Range Status   06/29/2024 11:40 PM 0.5 0.0 - 0.9 % Final     Comment:     Immature Granulocyte Count (IG) includes promyelocytes, myelocytes and metamyelocytes but does not include bands. Percent differential counts (%) should be interpreted in the context of the absolute cell counts (cells/UL).   06/12/2024 03:04 PM 0.6 0.0 - 0.9 % Final     Comment:     Immature Granulocyte Count (IG) includes promyelocytes, myelocytes and metamyelocytes but does not include bands. Percent differential counts (%) should be interpreted in the context of the absolute cell counts (cells/UL).   04/19/2024 01:16 PM 0.6 0.0 - 0.9 % Final     Comment:     Immature Granulocyte Count (IG) includes promyelocytes, myelocytes and metamyelocytes but does not include bands. Percent differential counts (%) should be interpreted  in the context of the absolute cell counts (cells/UL).     Lymphocytes %, Manual   Date/Time Value Ref Range Status   06/29/2024 11:40 PM 38.5 13.0 - 44.0 % Final   06/12/2024 03:04 PM 43.0 13.0 - 44.0 % Final   04/19/2024 01:16 PM 8.8 13.0 - 44.0 % Final     Monocytes %, Manual   Date/Time Value Ref Range Status   06/29/2024 11:40 PM 7.7 2.0 - 10.0 % Final   06/12/2024 03:04 PM 6.0 2.0 - 10.0 % Final   04/19/2024 01:16 PM 1.7 2.0 - 10.0 % Final     Eosinophils %, Manual   Date/Time Value Ref Range Status   06/29/2024 11:40 PM 0.0 0.0 - 6.0 % Final   06/12/2024 03:04 PM 1.0 0.0 - 6.0 % Final   04/19/2024 01:16 PM 0.0 0.0 - 6.0 % Final     Basophils %, Manual   Date/Time Value Ref Range Status   06/29/2024 11:40 PM 3.4 0.0 - 2.0 % Final   06/12/2024 03:04 PM 1.0 0.0 - 2.0 % Final   04/19/2024 01:16 PM 0.0 0.0 - 2.0 % Final     Neutrophils Absolute   Date/Time Value Ref Range Status   04/10/2024 03:04 PM 5.16 1.20 - 7.70 x10*3/uL Final     Comment:     Percent differential counts (%) should be interpreted in the context of the absolute cell counts (cells/uL).   03/07/2024 12:02 PM 5.38 1.20 - 7.70 x10*3/uL Final     Comment:     Percent differential counts (%) should be interpreted in the context of the absolute cell counts (cells/uL).   02/21/2024 01:56 PM 7.29 1.20 - 7.70 x10*3/uL Final     Comment:     Percent differential counts (%) should be interpreted in the context of the absolute cell counts (cells/uL).     Immature Granulocytes Absolute, Automated   Date/Time Value Ref Range Status   06/29/2024 11:40 PM 0.06 0.00 - 0.70 x10*3/uL Final   06/12/2024 03:04 PM 0.06 0.00 - 0.70 x10*3/uL Final   04/19/2024 01:16 PM 0.07 0.00 - 0.70 x10*3/uL Final     Lymphocytes Absolute   Date/Time Value Ref Range Status   04/10/2024 03:04 PM 4.50 1.20 - 4.80 x10*3/uL Final   03/07/2024 12:02 PM 3.78 1.20 - 4.80 x10*3/uL Final   02/21/2024 01:56 PM 2.92 1.20 - 4.80 x10*3/uL Final     Monocytes Absolute   Date/Time Value Ref Range  "Status   04/10/2024 03:04 PM 0.68 0.10 - 1.00 x10*3/uL Final   03/07/2024 12:02 PM 0.64 0.10 - 1.00 x10*3/uL Final   02/21/2024 01:56 PM 0.81 0.10 - 1.00 x10*3/uL Final     Eosinophils Absolute, Manual   Date/Time Value Ref Range Status   06/29/2024 11:40 PM 0.00 0.00 - 0.70 x10*3/uL Final   06/12/2024 03:04 PM 0.11 0.00 - 0.70 x10*3/uL Final   04/19/2024 01:16 PM 0.00 0.00 - 0.70 x10*3/uL Final     Basophils Absolute, Manual   Date/Time Value Ref Range Status   06/29/2024 11:40 PM 0.44 (H) 0.00 - 0.10 x10*3/uL Final   06/12/2024 03:04 PM 0.11 (H) 0.00 - 0.10 x10*3/uL Final   04/19/2024 01:16 PM 0.00 0.00 - 0.10 x10*3/uL Final       No components found for: \"PT\"  aPTT   Date/Time Value Ref Range Status   07/13/2023 10:50 AM 28 27 - 38 sec Final     Comment:     Note new reference range as of 6/20/2023 at 10:00am.   07/12/2023 04:17 PM Canceled 27 - 38 sec Corrected     Comment:     Note new reference range as of 6/20/2023 at 10:00am.  This is a corrected result. Previous value was 36 , verified at 07/12/2023   17:18     07/10/2023 01:57 PM CANCELED       Comment:     Note new reference range as of 6/20/2023 at 10:00am.    Result canceled by the ancillary.         Assessment/Plan  3 months follow up  Do 12 lead EKG today  Continue use of Hydrea for disease modification  Continue use of Ibuprofen up to Oxycodoen 10 mg for pain.  Use nocturnal Oxygen to prevent headaches.  Have blood drawn for labs today.             "

## 2024-08-18 LAB
HEMOGLOBIN A2: 3.7 % (ref 2–3.5)
HEMOGLOBIN F: 1.8 % (ref 0–2)
HEMOGLOBIN IDENTIFICATION INTERPRETATION: ABNORMAL
HEMOGLOBIN S: 94.5 %
PATH REVIEW-HGB IDENTIFICATION: ABNORMAL

## 2024-09-01 ENCOUNTER — HOSPITAL ENCOUNTER (EMERGENCY)
Facility: HOSPITAL | Age: 24
Discharge: HOME | End: 2024-09-01
Attending: EMERGENCY MEDICINE
Payer: COMMERCIAL

## 2024-09-01 ENCOUNTER — APPOINTMENT (OUTPATIENT)
Dept: RADIOLOGY | Facility: HOSPITAL | Age: 24
End: 2024-09-01
Payer: COMMERCIAL

## 2024-09-01 ENCOUNTER — CLINICAL SUPPORT (OUTPATIENT)
Dept: EMERGENCY MEDICINE | Facility: HOSPITAL | Age: 24
End: 2024-09-01
Payer: COMMERCIAL

## 2024-09-01 VITALS
HEART RATE: 80 BPM | RESPIRATION RATE: 16 BRPM | TEMPERATURE: 97.2 F | SYSTOLIC BLOOD PRESSURE: 106 MMHG | OXYGEN SATURATION: 95 % | DIASTOLIC BLOOD PRESSURE: 58 MMHG

## 2024-09-01 DIAGNOSIS — D57.00 SICKLE CELL CRISIS (MULTI): Primary | ICD-10-CM

## 2024-09-01 DIAGNOSIS — D72.829 LEUKOCYTOSIS, UNSPECIFIED TYPE: ICD-10-CM

## 2024-09-01 LAB
ALBUMIN SERPL BCP-MCNC: 4.4 G/DL (ref 3.4–5)
ALP SERPL-CCNC: 89 U/L (ref 33–110)
ALT SERPL W P-5'-P-CCNC: 23 U/L (ref 7–45)
ANION GAP SERPL CALC-SCNC: 15 MMOL/L (ref 10–20)
APPEARANCE UR: ABNORMAL
AST SERPL W P-5'-P-CCNC: 34 U/L (ref 9–39)
ATRIAL RATE: 73 BPM
B-HCG SERPL-ACNC: <3 MIU/ML
BASO STIPL BLD QL SMEAR: PRESENT
BASOPHILS # BLD AUTO: 0.12 X10*3/UL (ref 0–0.1)
BASOPHILS NFR BLD AUTO: 0.7 %
BILIRUB SERPL-MCNC: 9.1 MG/DL (ref 0–1.2)
BILIRUB UR STRIP.AUTO-MCNC: NEGATIVE MG/DL
BUN SERPL-MCNC: 7 MG/DL (ref 6–23)
CALCIUM SERPL-MCNC: 9.1 MG/DL (ref 8.6–10.6)
CHLORIDE SERPL-SCNC: 103 MMOL/L (ref 98–107)
CO2 SERPL-SCNC: 22 MMOL/L (ref 21–32)
COLOR UR: YELLOW
CREAT SERPL-MCNC: 0.47 MG/DL (ref 0.5–1.05)
EGFRCR SERPLBLD CKD-EPI 2021: >90 ML/MIN/1.73M*2
EOSINOPHIL # BLD AUTO: 0.02 X10*3/UL (ref 0–0.7)
EOSINOPHIL NFR BLD AUTO: 0.1 %
ERYTHROCYTE [DISTWIDTH] IN BLOOD BY AUTOMATED COUNT: 24.1 % (ref 11.5–14.5)
GLUCOSE SERPL-MCNC: 96 MG/DL (ref 74–99)
GLUCOSE UR STRIP.AUTO-MCNC: NORMAL MG/DL
HCT VFR BLD AUTO: 16.7 % (ref 36–46)
HGB BLD-MCNC: 6 G/DL (ref 12–16)
HGB RETIC QN: 30 PG (ref 28–38)
HOLD SPECIMEN: NORMAL
HOWELL-JOLLY BOD BLD QL SMEAR: PRESENT
IMM GRANULOCYTES # BLD AUTO: 0.81 X10*3/UL (ref 0–0.7)
IMM GRANULOCYTES NFR BLD AUTO: 4.8 % (ref 0–0.9)
IMMATURE RETIC FRACTION: 32.3 %
KETONES UR STRIP.AUTO-MCNC: NEGATIVE MG/DL
LDH SERPL L TO P-CCNC: 367 U/L (ref 84–246)
LEUKOCYTE ESTERASE UR QL STRIP.AUTO: NEGATIVE
LYMPHOCYTES # BLD AUTO: 2.49 X10*3/UL (ref 1.2–4.8)
LYMPHOCYTES NFR BLD AUTO: 14.8 %
MCH RBC QN AUTO: 30.2 PG (ref 26–34)
MCHC RBC AUTO-ENTMCNC: 35.9 G/DL (ref 32–36)
MCV RBC AUTO: 84 FL (ref 80–100)
MONOCYTES # BLD AUTO: 0.7 X10*3/UL (ref 0.1–1)
MONOCYTES NFR BLD AUTO: 4.1 %
MUCOUS THREADS #/AREA URNS AUTO: NORMAL /LPF
NEUTROPHILS # BLD AUTO: 12.73 X10*3/UL (ref 1.2–7.7)
NEUTROPHILS NFR BLD AUTO: 75.5 %
NITRITE UR QL STRIP.AUTO: NEGATIVE
NRBC BLD-RTO: 10.5 /100 WBCS (ref 0–0)
OVALOCYTES BLD QL SMEAR: NORMAL
P AXIS: 25 DEGREES
P OFFSET: 172 MS
P ONSET: 121 MS
PAPPENHEIMER BOD BLD QL SMEAR: PRESENT
PH UR STRIP.AUTO: 6 [PH]
PLATELET # BLD AUTO: 532 X10*3/UL (ref 150–450)
POLYCHROMASIA BLD QL SMEAR: NORMAL
POTASSIUM SERPL-SCNC: 3.8 MMOL/L (ref 3.5–5.3)
PR INTERVAL: 200 MS
PREGNANCY TEST URINE, POC: NEGATIVE
PROT SERPL-MCNC: 7.4 G/DL (ref 6.4–8.2)
PROT UR STRIP.AUTO-MCNC: ABNORMAL MG/DL
Q ONSET: 221 MS
QRS COUNT: 12 BEATS
QRS DURATION: 80 MS
QT INTERVAL: 394 MS
QTC CALCULATION(BAZETT): 434 MS
QTC FREDERICIA: 420 MS
R AXIS: 7 DEGREES
RBC # BLD AUTO: 1.99 X10*6/UL (ref 4–5.2)
RBC # UR STRIP.AUTO: ABNORMAL /UL
RBC #/AREA URNS AUTO: NORMAL /HPF
RBC MORPH BLD: NORMAL
RETICS #: 0.66 X10*6/UL (ref 0.02–0.08)
RETICS/RBC NFR AUTO: 33.1 % (ref 0.5–2)
SICKLE CELLS BLD QL SMEAR: NORMAL
SODIUM SERPL-SCNC: 136 MMOL/L (ref 136–145)
SP GR UR STRIP.AUTO: 1.01
SQUAMOUS #/AREA URNS AUTO: NORMAL /HPF
T AXIS: 60 DEGREES
T OFFSET: 418 MS
TARGETS BLD QL SMEAR: NORMAL
UROBILINOGEN UR STRIP.AUTO-MCNC: NORMAL MG/DL
VENTRICULAR RATE: 73 BPM
WBC # BLD AUTO: 16.9 X10*3/UL (ref 4.4–11.3)
WBC #/AREA URNS AUTO: NORMAL /HPF

## 2024-09-01 PROCEDURE — 85045 AUTOMATED RETICULOCYTE COUNT: CPT | Performed by: EMERGENCY MEDICINE

## 2024-09-01 PROCEDURE — 96376 TX/PRO/DX INJ SAME DRUG ADON: CPT

## 2024-09-01 PROCEDURE — 36415 COLL VENOUS BLD VENIPUNCTURE: CPT | Performed by: EMERGENCY MEDICINE

## 2024-09-01 PROCEDURE — 81025 URINE PREGNANCY TEST: CPT | Performed by: EMERGENCY MEDICINE

## 2024-09-01 PROCEDURE — 93005 ELECTROCARDIOGRAM TRACING: CPT

## 2024-09-01 PROCEDURE — 85025 COMPLETE CBC W/AUTO DIFF WBC: CPT | Performed by: EMERGENCY MEDICINE

## 2024-09-01 PROCEDURE — 84702 CHORIONIC GONADOTROPIN TEST: CPT

## 2024-09-01 PROCEDURE — 2500000004 HC RX 250 GENERAL PHARMACY W/ HCPCS (ALT 636 FOR OP/ED)

## 2024-09-01 PROCEDURE — 36415 COLL VENOUS BLD VENIPUNCTURE: CPT

## 2024-09-01 PROCEDURE — 71046 X-RAY EXAM CHEST 2 VIEWS: CPT

## 2024-09-01 PROCEDURE — 99284 EMERGENCY DEPT VISIT MOD MDM: CPT | Mod: 25

## 2024-09-01 PROCEDURE — 84075 ASSAY ALKALINE PHOSPHATASE: CPT | Performed by: EMERGENCY MEDICINE

## 2024-09-01 PROCEDURE — 96374 THER/PROPH/DIAG INJ IV PUSH: CPT

## 2024-09-01 PROCEDURE — 83615 LACTATE (LD) (LDH) ENZYME: CPT | Performed by: EMERGENCY MEDICINE

## 2024-09-01 PROCEDURE — 96360 HYDRATION IV INFUSION INIT: CPT | Mod: 59

## 2024-09-01 PROCEDURE — 81001 URINALYSIS AUTO W/SCOPE: CPT | Performed by: EMERGENCY MEDICINE

## 2024-09-01 PROCEDURE — 71046 X-RAY EXAM CHEST 2 VIEWS: CPT | Mod: FOREIGN READ | Performed by: RADIOLOGY

## 2024-09-01 RX ORDER — HYDROMORPHONE HYDROCHLORIDE 1 MG/ML
1 INJECTION, SOLUTION INTRAMUSCULAR; INTRAVENOUS; SUBCUTANEOUS
Status: DISCONTINUED | OUTPATIENT
Start: 2024-09-01 | End: 2024-09-01

## 2024-09-01 RX ORDER — HYDROMORPHONE HYDROCHLORIDE 1 MG/ML
1 INJECTION, SOLUTION INTRAMUSCULAR; INTRAVENOUS; SUBCUTANEOUS
Status: COMPLETED | OUTPATIENT
Start: 2024-09-01 | End: 2024-09-01

## 2024-09-01 ASSESSMENT — PAIN SCALES - GENERAL
PAINLEVEL_OUTOF10: 8
PAINLEVEL_OUTOF10: 4
PAINLEVEL_OUTOF10: 6

## 2024-09-01 ASSESSMENT — PAIN - FUNCTIONAL ASSESSMENT: PAIN_FUNCTIONAL_ASSESSMENT: 0-10

## 2024-09-01 NOTE — ED TRIAGE NOTES
Pt to ED via EMS with c/c SCC. Pain in back started this morning. Home oxycodone did not help. EMS gave toradol IM, some improvement. 85% on RA, pt states she wears 2L NC at home as needed. Denies CP/SOB/cough.

## 2024-09-01 NOTE — ED PROVIDER NOTES
Emergency Department Provider Note        History of Present Illness     History provided by: Patient  Limitations to History: None  External Records Reviewed with Brief Summary: None    HPI:  Ruperto Pang is a 24 y.o. female with past medical history significant for sickle cell disease, presenting to the ED with back pain and bilateral lower extremity pain.  Patient notes her pain started last night, she took 2 Tylenol which seemed to be effective.  However she woke up early this morning with increasing pain, she took 1 Oxy that was not effective.  EMS gave her a dose of Toradol which helped improve her back pain.  She was satting at 85% on room air, so she was started on 2 L nasal cannula.  Patient is on 2 L as needed at home.  No chest pain, shortness of breath.  Patient denies any fever, chills, abdominal pain, urinary symptoms.  She denies any swelling in her lower extremities.  Notes this feels like her typical sickle cell pain.    Physical Exam   Triage vitals:  T 36.2 °C (97.2 °F)  HR 80  /72  RR 16  O2 (!) 93 % Supplemental oxygen    General: Awake, alert, in no acute distress  Eyes: Gaze conjugate.  No scleral icterus or injection  HENT: Normo-cephalic, atraumatic. No stridor  CV: Regular rate, regular rhythm. Radial pulses 2+ bilaterally  Resp: Breathing non-labored, speaking in full sentences.  Clear to auscultation bilaterally  GI: Soft, non-distended, non-tender. No rebound or guarding.  MSK/Extremities: No gross bony deformities. Moving all extremities  Skin: Warm. Appropriate color  Neuro: Alert. Oriented. Face symmetric. Speech is fluent.  Gross strength and sensation intact in b/l UE and LEs  Psych: Appropriate mood and affect    Medical Decision Making & ED Course   Medical Decision Makin y.o. female with history of sickle cell disease, presenting to the ED with a sickle cell pain crisis.  Vital signs are normal on arrival, patient satting 95% on 2 L nasal cannula.  Physical exam  is unremarkable, no abnormal lung sounds and no lower extremity edema.  Patient symptoms are likely related to her sickle cell disease.  Will treat with her care plan of Dilaudid 1 mg.  I have low suspicion to believe that this is a acute chest syndrome, however as patient was hypoxic on room air, a chest x-ray was ordered.  Basic labs also ordered.     Patient has mild leukocytosis on labs, otherwise chest x-ray and UTI not concerning for infection.  I believe that this leukocytosis could be the start I have a infection otherwise an inflammatory reaction from her vaso-occlusive crisis.  Shared decision making with patient, she would like to be discharged.  Recommended the patient follow-up with her hematologist as soon as possible.  Consulted her to return to the ED if she starts to develop any concerning symptoms.  Patient verbally agreed.    ----      Differential diagnoses considered include but are not limited to: Sickle cell pain crisis, acute chest syndrome, URI     Social Determinants of Health which Significantly Impact Care: None identified     EKG Independent Interpretation: The EKG obtained at 0731 was independently interpreted by myself. It demonstrates normal sinus rhythm with a ventricular rate of 73.  Normal axis. Intervals normal. ST segments showed no elevation or other signs of ischemia.  No significant changes when compared to prior EKG from 8/15/24.    Independent Result Review and Interpretation: Relevant laboratory and radiographic results were reviewed and independently interpreted by myself.  As necessary, they are commented on in the ED Course.    Chronic conditions affecting the patient's care: As documented above in [facility]    The patient was discussed with the following consultants/services: None    Care Considerations: As documented above in [facility]    ED Course:  ED Course as of 09/01/24 1033   Sun Sep 01, 2024   0849 CBC with Differential(!!)  Leukocytosis noted, otherwise CBC consistent with  prior. [NM]   0849 Microscopic Only, Urine  No evidence of UTI. [NM]      ED Course User Index  [NM] Jackie Black DO         Diagnoses as of 09/01/24 1033   Sickle cell crisis (Multi)   Leukocytosis, unspecified type     Disposition   As a result of the work-up, the patient was discharged home.  she was informed of her diagnosis and instructed to come back with any concerns or worsening of condition.  she and was agreeable to the plan as discussed above.  she was given the opportunity to ask questions.  All of the patient's questions were answered.    Procedures   Procedures    This was a shared visit with an ED attending.  The patient was seen and discussed with the ED attending    Jackie Black DO  Emergency Medicine     Jackie Black DO  Resident  09/01/24 1033

## 2024-09-01 NOTE — DISCHARGE INSTRUCTIONS
No You were seen in the ED for sickle cell pain crisis.  Your workup did not require any intervention at this time.  You were treated with IV fluids and care plan Dilaudid.  It was noted that you have an elevated white blood cell count, as we talked about this could mean a variety of things including start of an infection, from your sickle cell disease, etc.     Please make an appointment with your hematologist to see them as soon as possible, to repeat the CBC.    Please return to the ED if you start to experience worsening symptoms, including but not limited to fever, chills, chest pain, shortness of breath.

## 2024-09-12 ENCOUNTER — CLINICAL SUPPORT (OUTPATIENT)
Dept: EMERGENCY MEDICINE | Facility: HOSPITAL | Age: 24
DRG: 812 | End: 2024-09-12
Payer: COMMERCIAL

## 2024-09-12 PROCEDURE — 99285 EMERGENCY DEPT VISIT HI MDM: CPT | Performed by: EMERGENCY MEDICINE

## 2024-09-12 PROCEDURE — 99285 EMERGENCY DEPT VISIT HI MDM: CPT

## 2024-09-12 PROCEDURE — 93005 ELECTROCARDIOGRAM TRACING: CPT

## 2024-09-12 ASSESSMENT — PAIN DESCRIPTION - FREQUENCY: FREQUENCY: CONSTANT/CONTINUOUS

## 2024-09-12 ASSESSMENT — LIFESTYLE VARIABLES
HAVE PEOPLE ANNOYED YOU BY CRITICIZING YOUR DRINKING: NO
EVER FELT BAD OR GUILTY ABOUT YOUR DRINKING: NO
HAVE YOU EVER FELT YOU SHOULD CUT DOWN ON YOUR DRINKING: NO
EVER HAD A DRINK FIRST THING IN THE MORNING TO STEADY YOUR NERVES TO GET RID OF A HANGOVER: NO
TOTAL SCORE: 0

## 2024-09-12 ASSESSMENT — PAIN DESCRIPTION - LOCATION: LOCATION: BACK

## 2024-09-12 ASSESSMENT — PAIN DESCRIPTION - PAIN TYPE: TYPE: ACUTE PAIN

## 2024-09-12 ASSESSMENT — PAIN SCALES - GENERAL: PAINLEVEL_OUTOF10: 10 - WORST POSSIBLE PAIN

## 2024-09-12 ASSESSMENT — PAIN - FUNCTIONAL ASSESSMENT: PAIN_FUNCTIONAL_ASSESSMENT: 0-10

## 2024-09-12 ASSESSMENT — PAIN DESCRIPTION - DESCRIPTORS: DESCRIPTORS: ACHING

## 2024-09-13 ENCOUNTER — HOSPITAL ENCOUNTER (INPATIENT)
Facility: HOSPITAL | Age: 24
DRG: 812 | End: 2024-09-13
Attending: EMERGENCY MEDICINE
Payer: COMMERCIAL

## 2024-09-13 ENCOUNTER — APPOINTMENT (OUTPATIENT)
Dept: RADIOLOGY | Facility: HOSPITAL | Age: 24
DRG: 812 | End: 2024-09-13
Payer: COMMERCIAL

## 2024-09-13 DIAGNOSIS — D57.00 SICKLE CELL CRISIS (MULTI): Primary | ICD-10-CM

## 2024-09-13 DIAGNOSIS — E87.6 HYPOKALEMIA: ICD-10-CM

## 2024-09-13 LAB
ABO GROUP (TYPE) IN BLOOD: NORMAL
ALBUMIN SERPL BCP-MCNC: 4.5 G/DL (ref 3.4–5)
ALBUMIN SERPL BCP-MCNC: 4.7 G/DL (ref 3.4–5)
ALP SERPL-CCNC: 72 U/L (ref 33–110)
ALP SERPL-CCNC: 72 U/L (ref 33–110)
ALT SERPL W P-5'-P-CCNC: 30 U/L (ref 7–45)
ALT SERPL W P-5'-P-CCNC: 30 U/L (ref 7–45)
AMPHETAMINES UR QL SCN: ABNORMAL
ANION GAP BLDV CALCULATED.4IONS-SCNC: 15 MMOL/L (ref 10–25)
ANION GAP SERPL CALC-SCNC: 17 MMOL/L (ref 10–20)
ANION GAP SERPL CALC-SCNC: 20 MMOL/L (ref 10–20)
ANTIBODY SCREEN: NORMAL
APPEARANCE UR: CLEAR
APTT PPP: 30 SECONDS (ref 27–38)
AST SERPL W P-5'-P-CCNC: 29 U/L (ref 9–39)
AST SERPL W P-5'-P-CCNC: 40 U/L (ref 9–39)
ATRIAL RATE: 111 BPM
B-HCG SERPL-ACNC: <3 MIU/ML
BARBITURATES UR QL SCN: ABNORMAL
BASE EXCESS BLDV CALC-SCNC: -2.2 MMOL/L (ref -2–3)
BASOPHILS # BLD AUTO: 0.08 X10*3/UL (ref 0–0.1)
BASOPHILS # BLD AUTO: 0.12 X10*3/UL (ref 0–0.1)
BASOPHILS NFR BLD AUTO: 0.2 %
BASOPHILS NFR BLD AUTO: 0.4 %
BILIRUB SERPL-MCNC: 10.9 MG/DL (ref 0–1.2)
BILIRUB SERPL-MCNC: 13.2 MG/DL (ref 0–1.2)
BILIRUB UR STRIP.AUTO-MCNC: NEGATIVE MG/DL
BODY TEMPERATURE: 37 DEGREES CELSIUS
BUN SERPL-MCNC: 14 MG/DL (ref 6–23)
BUN SERPL-MCNC: 17 MG/DL (ref 6–23)
BZE UR QL SCN: ABNORMAL
CA-I BLD-SCNC: 1.02 MMOL/L (ref 1.1–1.33)
CA-I BLDV-SCNC: 1.08 MMOL/L (ref 1.1–1.33)
CALCIUM SERPL-MCNC: 8.5 MG/DL (ref 8.6–10.6)
CALCIUM SERPL-MCNC: 8.7 MG/DL (ref 8.6–10.6)
CANNABINOIDS UR QL SCN: ABNORMAL
CARDIAC TROPONIN I PNL SERPL HS: 3 NG/L (ref 0–34)
CARDIAC TROPONIN I PNL SERPL HS: 3 NG/L (ref 0–34)
CHLORIDE BLDV-SCNC: 102 MMOL/L (ref 98–107)
CHLORIDE SERPL-SCNC: 100 MMOL/L (ref 98–107)
CHLORIDE SERPL-SCNC: 101 MMOL/L (ref 98–107)
CO2 SERPL-SCNC: 18 MMOL/L (ref 21–32)
CO2 SERPL-SCNC: 19 MMOL/L (ref 21–32)
COLOR UR: YELLOW
CREAT SERPL-MCNC: 0.66 MG/DL (ref 0.5–1.05)
CREAT SERPL-MCNC: 0.81 MG/DL (ref 0.5–1.05)
CREAT UR-MCNC: 56.4 MG/DL (ref 20–320)
EGFRCR SERPLBLD CKD-EPI 2021: >90 ML/MIN/1.73M*2
EGFRCR SERPLBLD CKD-EPI 2021: >90 ML/MIN/1.73M*2
EOSINOPHIL # BLD AUTO: 0 X10*3/UL (ref 0–0.7)
EOSINOPHIL # BLD AUTO: 0.05 X10*3/UL (ref 0–0.7)
EOSINOPHIL NFR BLD AUTO: 0 %
EOSINOPHIL NFR BLD AUTO: 0.2 %
ERYTHROCYTE [DISTWIDTH] IN BLOOD BY AUTOMATED COUNT: 19.2 % (ref 11.5–14.5)
ERYTHROCYTE [DISTWIDTH] IN BLOOD BY AUTOMATED COUNT: 19.5 % (ref 11.5–14.5)
FLUAV RNA RESP QL NAA+PROBE: NOT DETECTED
FLUBV RNA RESP QL NAA+PROBE: NOT DETECTED
GLUCOSE BLDV-MCNC: 120 MG/DL (ref 74–99)
GLUCOSE SERPL-MCNC: 105 MG/DL (ref 74–99)
GLUCOSE SERPL-MCNC: 112 MG/DL (ref 74–99)
GLUCOSE UR STRIP.AUTO-MCNC: NORMAL MG/DL
HCG UR QL IA.RAPID: NEGATIVE
HCO3 BLDV-SCNC: 20.9 MMOL/L (ref 22–26)
HCT VFR BLD AUTO: 19.4 % (ref 36–46)
HCT VFR BLD AUTO: 20.3 % (ref 36–46)
HCT VFR BLD EST: 23 % (ref 36–46)
HEMOGLOBIN A2: ABNORMAL
HEMOGLOBIN F: ABNORMAL
HEMOGLOBIN IDENTIFICATION INTERPRETATION: ABNORMAL
HEMOGLOBIN S: 94.2 %
HGB BLD-MCNC: 6.7 G/DL (ref 12–16)
HGB BLD-MCNC: 7 G/DL (ref 12–16)
HGB BLDV-MCNC: 7.6 G/DL (ref 12–16)
HGB RETIC QN: 32 PG (ref 28–38)
HGB RETIC QN: 32 PG (ref 28–38)
HOLD SPECIMEN: NORMAL
IMM GRANULOCYTES # BLD AUTO: 0.41 X10*3/UL (ref 0–0.7)
IMM GRANULOCYTES # BLD AUTO: 0.68 X10*3/UL (ref 0–0.7)
IMM GRANULOCYTES NFR BLD AUTO: 1.2 % (ref 0–0.9)
IMM GRANULOCYTES NFR BLD AUTO: 1.8 % (ref 0–0.9)
IMMATURE RETIC FRACTION: 18.5 %
IMMATURE RETIC FRACTION: 21.4 %
INHALED O2 CONCENTRATION: 21 %
INR PPP: 1.5 (ref 0.9–1.1)
KETONES UR STRIP.AUTO-MCNC: NEGATIVE MG/DL
LACTATE BLDV-SCNC: 1.4 MMOL/L (ref 0.4–2)
LDH SERPL L TO P-CCNC: 253 U/L (ref 84–246)
LDH SERPL L TO P-CCNC: 326 U/L (ref 84–246)
LEUKOCYTE ESTERASE UR QL STRIP.AUTO: NEGATIVE
LYMPHOCYTES # BLD AUTO: 1.47 X10*3/UL (ref 1.2–4.8)
LYMPHOCYTES # BLD AUTO: 1.58 X10*3/UL (ref 1.2–4.8)
LYMPHOCYTES NFR BLD AUTO: 4.1 %
LYMPHOCYTES NFR BLD AUTO: 4.4 %
MCH RBC QN AUTO: 31 PG (ref 26–34)
MCH RBC QN AUTO: 31.9 PG (ref 26–34)
MCHC RBC AUTO-ENTMCNC: 34.5 G/DL (ref 32–36)
MCHC RBC AUTO-ENTMCNC: 34.5 G/DL (ref 32–36)
MCV RBC AUTO: 90 FL (ref 80–100)
MCV RBC AUTO: 92 FL (ref 80–100)
MONOCYTES # BLD AUTO: 1.17 X10*3/UL (ref 0.1–1)
MONOCYTES # BLD AUTO: 2.37 X10*3/UL (ref 0.1–1)
MONOCYTES NFR BLD AUTO: 3.1 %
MONOCYTES NFR BLD AUTO: 7.1 %
MRSA DNA SPEC QL NAA+PROBE: NOT DETECTED
NEUTROPHILS # BLD AUTO: 28.73 X10*3/UL (ref 1.2–7.7)
NEUTROPHILS # BLD AUTO: 34.58 X10*3/UL (ref 1.2–7.7)
NEUTROPHILS NFR BLD AUTO: 86.7 %
NEUTROPHILS NFR BLD AUTO: 90.8 %
NITRITE UR QL STRIP.AUTO: NEGATIVE
NRBC BLD-RTO: 0.5 /100 WBCS (ref 0–0)
NRBC BLD-RTO: 1.1 /100 WBCS (ref 0–0)
OXYHGB MFR BLDV: 67.4 % (ref 45–75)
P AXIS: 19 DEGREES
P OFFSET: 171 MS
P ONSET: 112 MS
PATH REVIEW-HGB IDENTIFICATION: ABNORMAL
PCO2 BLDV: 28 MM HG (ref 41–51)
PCP UR QL SCN: ABNORMAL
PH BLDV: 7.48 PH (ref 7.33–7.43)
PH UR STRIP.AUTO: 6 [PH]
PLATELET # BLD AUTO: 527 X10*3/UL (ref 150–450)
PLATELET # BLD AUTO: 587 X10*3/UL (ref 150–450)
PO2 BLDV: 49 MM HG (ref 35–45)
POTASSIUM BLDV-SCNC: 3.4 MMOL/L (ref 3.5–5.3)
POTASSIUM SERPL-SCNC: 3.2 MMOL/L (ref 3.5–5.3)
POTASSIUM SERPL-SCNC: 3.6 MMOL/L (ref 3.5–5.3)
PR INTERVAL: 220 MS
PREGNANCY TEST URINE, POC: NEGATIVE
PROT SERPL-MCNC: 7.6 G/DL (ref 6.4–8.2)
PROT SERPL-MCNC: 7.9 G/DL (ref 6.4–8.2)
PROT UR STRIP.AUTO-MCNC: ABNORMAL MG/DL
PROTHROMBIN TIME: 17.3 SECONDS (ref 9.8–12.8)
Q ONSET: 222 MS
QRS COUNT: 18 BEATS
QRS DURATION: 72 MS
QT INTERVAL: 306 MS
QTC CALCULATION(BAZETT): 416 MS
QTC FREDERICIA: 375 MS
R AXIS: -4 DEGREES
RBC # BLD AUTO: 2.1 X10*6/UL (ref 4–5.2)
RBC # BLD AUTO: 2.26 X10*6/UL (ref 4–5.2)
RBC # UR STRIP.AUTO: ABNORMAL /UL
RBC #/AREA URNS AUTO: NORMAL /HPF
RETICS #: 0.46 X10*6/UL (ref 0.02–0.08)
RETICS #: 0.46 X10*6/UL (ref 0.02–0.08)
RETICS/RBC NFR AUTO: 20.1 % (ref 0.5–2)
RETICS/RBC NFR AUTO: 21.8 % (ref 0.5–2)
RH FACTOR (ANTIGEN D): NORMAL
S PYO DNA THROAT QL NAA+PROBE: NOT DETECTED
SAO2 % BLDV: 71 % (ref 45–75)
SARS-COV-2 RNA RESP QL NAA+PROBE: NOT DETECTED
SODIUM BLDV-SCNC: 134 MMOL/L (ref 136–145)
SODIUM SERPL-SCNC: 133 MMOL/L (ref 136–145)
SODIUM SERPL-SCNC: 135 MMOL/L (ref 136–145)
SP GR UR STRIP.AUTO: 1.01
SQUAMOUS #/AREA URNS AUTO: NORMAL /HPF
T AXIS: 216 DEGREES
T OFFSET: 375 MS
UROBILINOGEN UR STRIP.AUTO-MCNC: ABNORMAL MG/DL
VENTRICULAR RATE: 111 BPM
WBC # BLD AUTO: 33.2 X10*3/UL (ref 4.4–11.3)
WBC # BLD AUTO: 38.1 X10*3/UL (ref 4.4–11.3)
WBC #/AREA URNS AUTO: NORMAL /HPF

## 2024-09-13 PROCEDURE — 70491 CT SOFT TISSUE NECK W/DYE: CPT

## 2024-09-13 PROCEDURE — 2500000004 HC RX 250 GENERAL PHARMACY W/ HCPCS (ALT 636 FOR OP/ED)

## 2024-09-13 PROCEDURE — 96375 TX/PRO/DX INJ NEW DRUG ADDON: CPT

## 2024-09-13 PROCEDURE — 84132 ASSAY OF SERUM POTASSIUM: CPT

## 2024-09-13 PROCEDURE — 81003 URINALYSIS AUTO W/O SCOPE: CPT

## 2024-09-13 PROCEDURE — 87636 SARSCOV2 & INF A&B AMP PRB: CPT

## 2024-09-13 PROCEDURE — 84484 ASSAY OF TROPONIN QUANT: CPT

## 2024-09-13 PROCEDURE — 85045 AUTOMATED RETICULOCYTE COUNT: CPT

## 2024-09-13 PROCEDURE — 85025 COMPLETE CBC W/AUTO DIFF WBC: CPT

## 2024-09-13 PROCEDURE — 80307 DRUG TEST PRSMV CHEM ANLYZR: CPT

## 2024-09-13 PROCEDURE — 83020 HEMOGLOBIN ELECTROPHORESIS: CPT | Performed by: PATHOLOGY

## 2024-09-13 PROCEDURE — 80349 CANNABINOIDS NATURAL: CPT

## 2024-09-13 PROCEDURE — 71275 CT ANGIOGRAPHY CHEST: CPT

## 2024-09-13 PROCEDURE — 84702 CHORIONIC GONADOTROPIN TEST: CPT

## 2024-09-13 PROCEDURE — 86922 COMPATIBILITY TEST ANTIGLOB: CPT

## 2024-09-13 PROCEDURE — 96365 THER/PROPH/DIAG IV INF INIT: CPT

## 2024-09-13 PROCEDURE — 80346 BENZODIAZEPINES1-12: CPT

## 2024-09-13 PROCEDURE — 96366 THER/PROPH/DIAG IV INF ADDON: CPT

## 2024-09-13 PROCEDURE — 1170000001 HC PRIVATE ONCOLOGY ROOM DAILY

## 2024-09-13 PROCEDURE — 70491 CT SOFT TISSUE NECK W/DYE: CPT | Performed by: RADIOLOGY

## 2024-09-13 PROCEDURE — 2550000001 HC RX 255 CONTRASTS: Performed by: INTERNAL MEDICINE

## 2024-09-13 PROCEDURE — 2500000004 HC RX 250 GENERAL PHARMACY W/ HCPCS (ALT 636 FOR OP/ED): Performed by: EMERGENCY MEDICINE

## 2024-09-13 PROCEDURE — 2500000001 HC RX 250 WO HCPCS SELF ADMINISTERED DRUGS (ALT 637 FOR MEDICARE OP)

## 2024-09-13 PROCEDURE — 36415 COLL VENOUS BLD VENIPUNCTURE: CPT

## 2024-09-13 PROCEDURE — 86900 BLOOD TYPING SEROLOGIC ABO: CPT

## 2024-09-13 PROCEDURE — 83615 LACTATE (LD) (LDH) ENZYME: CPT

## 2024-09-13 PROCEDURE — 85610 PROTHROMBIN TIME: CPT

## 2024-09-13 PROCEDURE — 87040 BLOOD CULTURE FOR BACTERIA: CPT

## 2024-09-13 PROCEDURE — 82330 ASSAY OF CALCIUM: CPT

## 2024-09-13 PROCEDURE — 83021 HEMOGLOBIN CHROMOTOGRAPHY: CPT

## 2024-09-13 PROCEDURE — 87641 MR-STAPH DNA AMP PROBE: CPT | Performed by: EMERGENCY MEDICINE

## 2024-09-13 PROCEDURE — 2500000002 HC RX 250 W HCPCS SELF ADMINISTERED DRUGS (ALT 637 FOR MEDICARE OP, ALT 636 FOR OP/ED)

## 2024-09-13 PROCEDURE — 81025 URINE PREGNANCY TEST: CPT

## 2024-09-13 PROCEDURE — 82436 ASSAY OF URINE CHLORIDE: CPT

## 2024-09-13 PROCEDURE — 2500000004 HC RX 250 GENERAL PHARMACY W/ HCPCS (ALT 636 FOR OP/ED): Performed by: PHYSICIAN ASSISTANT

## 2024-09-13 PROCEDURE — 99223 1ST HOSP IP/OBS HIGH 75: CPT

## 2024-09-13 PROCEDURE — 71275 CT ANGIOGRAPHY CHEST: CPT | Performed by: STUDENT IN AN ORGANIZED HEALTH CARE EDUCATION/TRAINING PROGRAM

## 2024-09-13 PROCEDURE — 71046 X-RAY EXAM CHEST 2 VIEWS: CPT

## 2024-09-13 PROCEDURE — 87651 STREP A DNA AMP PROBE: CPT

## 2024-09-13 PROCEDURE — 81025 URINE PREGNANCY TEST: CPT | Performed by: EMERGENCY MEDICINE

## 2024-09-13 PROCEDURE — 96376 TX/PRO/DX INJ SAME DRUG ADON: CPT

## 2024-09-13 PROCEDURE — 96367 TX/PROPH/DG ADDL SEQ IV INF: CPT

## 2024-09-13 PROCEDURE — 71046 X-RAY EXAM CHEST 2 VIEWS: CPT | Performed by: RADIOLOGY

## 2024-09-13 RX ORDER — ENOXAPARIN SODIUM 100 MG/ML
40 INJECTION SUBCUTANEOUS EVERY 24 HOURS
Status: DISCONTINUED | OUTPATIENT
Start: 2024-09-13 | End: 2024-09-15

## 2024-09-13 RX ORDER — FOLIC ACID 1 MG/1
1 TABLET ORAL DAILY
Status: DISCONTINUED | OUTPATIENT
Start: 2024-09-13 | End: 2024-09-25 | Stop reason: HOSPADM

## 2024-09-13 RX ORDER — KETOROLAC TROMETHAMINE 30 MG/ML
30 INJECTION, SOLUTION INTRAMUSCULAR; INTRAVENOUS EVERY 6 HOURS SCHEDULED
Status: DISCONTINUED | OUTPATIENT
Start: 2024-09-13 | End: 2024-09-15

## 2024-09-13 RX ORDER — POLYETHYLENE GLYCOL 3350 17 G/17G
17 POWDER, FOR SOLUTION ORAL AS NEEDED
COMMUNITY

## 2024-09-13 RX ORDER — HYDROMORPHONE HYDROCHLORIDE 1 MG/ML
1 INJECTION, SOLUTION INTRAMUSCULAR; INTRAVENOUS; SUBCUTANEOUS EVERY 2 HOUR PRN
Status: DISCONTINUED | OUTPATIENT
Start: 2024-09-13 | End: 2024-09-17

## 2024-09-13 RX ORDER — DIPHENHYDRAMINE HYDROCHLORIDE 50 MG/ML
50 INJECTION INTRAMUSCULAR; INTRAVENOUS ONCE
Status: COMPLETED | OUTPATIENT
Start: 2024-09-13 | End: 2024-09-13

## 2024-09-13 RX ORDER — ONDANSETRON HYDROCHLORIDE 2 MG/ML
8 INJECTION, SOLUTION INTRAVENOUS EVERY 8 HOURS PRN
Status: DISCONTINUED | OUTPATIENT
Start: 2024-09-13 | End: 2024-09-15

## 2024-09-13 RX ORDER — ONDANSETRON 8 MG/1
8 TABLET, ORALLY DISINTEGRATING ORAL EVERY 8 HOURS PRN
Status: DISCONTINUED | OUTPATIENT
Start: 2024-09-13 | End: 2024-09-16

## 2024-09-13 RX ORDER — HYDROMORPHONE HYDROCHLORIDE 1 MG/ML
1 INJECTION, SOLUTION INTRAMUSCULAR; INTRAVENOUS; SUBCUTANEOUS
Status: DISCONTINUED | OUTPATIENT
Start: 2024-09-13 | End: 2024-09-13

## 2024-09-13 RX ORDER — DIPHENHYDRAMINE HCL 25 MG
25 CAPSULE ORAL EVERY 6 HOURS PRN
Status: DISCONTINUED | OUTPATIENT
Start: 2024-09-13 | End: 2024-09-25 | Stop reason: HOSPADM

## 2024-09-13 RX ORDER — ONDANSETRON HYDROCHLORIDE 2 MG/ML
4 INJECTION, SOLUTION INTRAVENOUS ONCE
Status: COMPLETED | OUTPATIENT
Start: 2024-09-13 | End: 2024-09-13

## 2024-09-13 RX ORDER — ONDANSETRON 4 MG/1
4 TABLET, ORALLY DISINTEGRATING ORAL ONCE
Status: DISCONTINUED | OUTPATIENT
Start: 2024-09-13 | End: 2024-09-13

## 2024-09-13 RX ORDER — CEFTRIAXONE 1 G/50ML
1 INJECTION, SOLUTION INTRAVENOUS EVERY 24 HOURS
Status: DISCONTINUED | OUTPATIENT
Start: 2024-09-13 | End: 2024-09-14

## 2024-09-13 RX ORDER — HYDROMORPHONE HYDROCHLORIDE 1 MG/ML
1 INJECTION, SOLUTION INTRAMUSCULAR; INTRAVENOUS; SUBCUTANEOUS ONCE
Status: COMPLETED | OUTPATIENT
Start: 2024-09-13 | End: 2024-09-13

## 2024-09-13 RX ORDER — HYDROXYUREA 500 MG/1
500 CAPSULE ORAL EVERY 12 HOURS SCHEDULED
Status: DISCONTINUED | OUTPATIENT
Start: 2024-09-13 | End: 2024-09-25 | Stop reason: HOSPADM

## 2024-09-13 RX ORDER — DIPHENHYDRAMINE HYDROCHLORIDE 50 MG/ML
50 INJECTION INTRAMUSCULAR; INTRAVENOUS ONCE
Status: DISCONTINUED | OUTPATIENT
Start: 2024-09-13 | End: 2024-09-13

## 2024-09-13 RX ORDER — PANTOPRAZOLE SODIUM 40 MG/1
40 TABLET, DELAYED RELEASE ORAL
Status: DISCONTINUED | OUTPATIENT
Start: 2024-09-14 | End: 2024-09-25 | Stop reason: HOSPADM

## 2024-09-13 RX ORDER — ACETAMINOPHEN 325 MG/1
975 TABLET ORAL ONCE
Status: COMPLETED | OUTPATIENT
Start: 2024-09-13 | End: 2024-09-13

## 2024-09-13 RX ORDER — PANTOPRAZOLE SODIUM 20 MG/1
20 TABLET, DELAYED RELEASE ORAL
Status: DISCONTINUED | OUTPATIENT
Start: 2024-09-13 | End: 2024-09-13

## 2024-09-13 RX ORDER — VANCOMYCIN HYDROCHLORIDE 750 MG/150ML
750 INJECTION, SOLUTION INTRAVENOUS ONCE
Status: COMPLETED | OUTPATIENT
Start: 2024-09-13 | End: 2024-09-13

## 2024-09-13 RX ORDER — DOCUSATE SODIUM 100 MG/1
100 CAPSULE, LIQUID FILLED ORAL 2 TIMES DAILY PRN
Status: DISCONTINUED | OUTPATIENT
Start: 2024-09-13 | End: 2024-09-16

## 2024-09-13 RX ORDER — VANCOMYCIN HYDROCHLORIDE 1 G/20ML
INJECTION, POWDER, LYOPHILIZED, FOR SOLUTION INTRAVENOUS DAILY PRN
Status: DISCONTINUED | OUTPATIENT
Start: 2024-09-13 | End: 2024-09-13

## 2024-09-13 RX ORDER — OXYCODONE HYDROCHLORIDE 10 MG/1
10 TABLET ORAL EVERY 6 HOURS PRN
Status: ON HOLD | COMMUNITY
End: 2024-09-25

## 2024-09-13 RX ORDER — POLYETHYLENE GLYCOL 3350 17 G/17G
17 POWDER, FOR SOLUTION ORAL 2 TIMES DAILY PRN
Status: DISCONTINUED | OUTPATIENT
Start: 2024-09-13 | End: 2024-09-16

## 2024-09-13 RX ORDER — VANCOMYCIN HYDROCHLORIDE 1 G/200ML
1000 INJECTION, SOLUTION INTRAVENOUS ONCE
Status: COMPLETED | OUTPATIENT
Start: 2024-09-13 | End: 2024-09-13

## 2024-09-13 ASSESSMENT — ENCOUNTER SYMPTOMS
NEUROLOGICAL NEGATIVE: 1
FEVER: 1
ENDOCRINE NEGATIVE: 1
HEMATOLOGIC/LYMPHATIC NEGATIVE: 1
BACK PAIN: 1
CARDIOVASCULAR NEGATIVE: 1
COUGH: 1
APPETITE CHANGE: 1
NECK PAIN: 1
ALLERGIC/IMMUNOLOGIC NEGATIVE: 1
GASTROINTESTINAL NEGATIVE: 1
PSYCHIATRIC NEGATIVE: 1
EYES NEGATIVE: 1

## 2024-09-13 ASSESSMENT — PAIN DESCRIPTION - LOCATION
LOCATION: ABDOMEN

## 2024-09-13 ASSESSMENT — PAIN SCALES - GENERAL
PAINLEVEL_OUTOF10: 10 - WORST POSSIBLE PAIN
PAINLEVEL_OUTOF10: 10 - WORST POSSIBLE PAIN
PAINLEVEL_OUTOF10: 7
PAINLEVEL_OUTOF10: 10 - WORST POSSIBLE PAIN
PAINLEVEL_OUTOF10: 5 - MODERATE PAIN
PAINLEVEL_OUTOF10: 10 - WORST POSSIBLE PAIN
PAINLEVEL_OUTOF10: 1

## 2024-09-13 ASSESSMENT — PAIN - FUNCTIONAL ASSESSMENT
PAIN_FUNCTIONAL_ASSESSMENT: 0-10
PAIN_FUNCTIONAL_ASSESSMENT: 0-10

## 2024-09-13 NOTE — H&P
History Of Present Illness  Ruperto Pang is a 24 y.o. 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 Patient state that her pain was not controlled at home with her oxycodone 10mg dose. Patient then presented to ED and was fund to be febrile to 38.1 with tachycardia HR up to 111. BP stable. % on RA. Patient also found to have leukocytoses with WBC 33.2. Covid/influenza neg strep throat swab neg, CXR neg for acute findings, UA neg for leuks and nitrites. CTPE ordered (pending contrast allergy premeds). Lysis labs close to baseline. Patient started on IV abx d/t concern for infectious with unknown source. Patient admitted for further management of fevers and leukocytosis     ED course  - CXR neg for acute process   - CTPE ordered-pending (receiving Solu-cortef for contrast allergy)   - lactate 1.5    - Started on Pip-taso/Vancomycin  - Given Dilaudid 1mg x1 dose, Zofran 4mg IV x1   - Given NS bolus 5oomL, acetaminophen 975mg x1       Past Medical History  She has a past medical history of Encounter for contraceptive management, unspecified (10/14/2016), Encounter for screening for disorder due to exposure to contaminants, Hb-SS disease with acute chest syndrome (Multi) (12/14/2016), and Personal history of diseases of the blood and blood-forming organs and certain disorders involving the immune mechanism (03/02/2022).    Surgical History  She has no past surgical history on file.    Oncology History    No history exists.        Social History  She reports that she has never smoked. She has never used smokeless tobacco. She reports that she does not drink alcohol and does not use drugs.     Allergies  Iodinated contrast media    Review of Systems   Constitutional:  Positive for appetite change and fever.   HENT: Negative.     Eyes: Negative.    Respiratory:  Positive  for cough.    Cardiovascular: Negative.    Gastrointestinal: Negative.    Endocrine: Negative.    Genitourinary: Negative.    Musculoskeletal:  Positive for back pain and neck pain.   Skin: Negative.    Allergic/Immunologic: Negative.    Neurological: Negative.    Hematological: Negative.    Psychiatric/Behavioral: Negative.          Physical Exam  HENT:      Head: Normocephalic.      Nose: Nose normal.      Mouth/Throat:      Mouth: Mucous membranes are moist.   Eyes:      Pupils: Pupils are equal, round, and reactive to light.   Cardiovascular:      Rate and Rhythm: Normal rate.   Pulmonary:      Effort: Pulmonary effort is normal.   Abdominal:      Palpations: Abdomen is soft.   Musculoskeletal:         General: Normal range of motion.      Cervical back: Normal range of motion.   Skin:     General: Skin is warm.      Capillary Refill: Capillary refill takes less than 2 seconds.   Neurological:      General: No focal deficit present.      Mental Status: She is alert.   Psychiatric:         Mood and Affect: Mood normal.         Behavior: Behavior normal.        Last Recorded Vitals  Blood pressure 120/70, pulse 84, temperature 37 °C (98.6 °F), resp. rate 18, height 1.524 m (5'), weight 67.1 kg (148 lb), SpO2 95%, unknown if currently breastfeeding.    Relevant Results         Assessment/Plan   Assessment & Plan  Sickle cell crisis (Multi)    #Fever/leukocytosis   - Unknown source of infection possibly acute chest vs PNA    - Febrile x2 days up to 38.5 in ED  - leukocytosis 33.5 --> 38.1  - CXR neg for acute findings   - CTPE pending  - UA neg for leuks and nitrites   - Lactate 1.5  - Procal pending      #Hgb SS   - No active care path   - OARRS reviewed- no aberrancies   - Currently on Hydroxyurea-held on admit   - Hgb 6.7 on admit (BL 6-7), bili 13.2 (BL 7-9)  (baseline 300)  - Febrile to 38.5 in ED with tachycardia up to 111  - No indication to transfuse at this time since lysis labs close to baseline and  CXR neg no 02 requirement at this time   - Low threshold to exchange-exchange labs complete   - Currently Ceftriaxone 1g daily (9/13-current), Azithromycin 500mg daily (9/13-current)  - Started on Dilaudid 1mg q2 hours for severe pain and Toradol 30mg IV q6 hours scheduled x12 doses with protonix PPI-HCG neg   - Benadryl 25mg PO PRN for itching   - Zofran 8mg q8 hours PRN for nausea   - Miralax daily, colace BID PRN for opioid induced constipation   - Continue home folic acid     #Prophy  - Started on Lovenox 40mg daily  - Started on Protonix 40mg daily     Dispo   - Discharge after improvement of fever/leukocytosis   - FUV with sickle cell team 11/13  - Full code   - Emergency contact mother Kat Ibarra [phone]     I spent 60 minutes in the professional and overall care of this patient.      CARLOS Malloy-CNP  Patient discussed with Dr. Enriquez

## 2024-09-13 NOTE — PROGRESS NOTES
Vancomycin Dosing by Pharmacy- Cessation of Therapy    Consult to pharmacy for vancomycin dosing has been discontinued by the prescriber, pharmacy will sign off at this time.    Please call pharmacy if there are further questions or re-enter a consult if vancomycin is resumed.     Opal Ingram, [facility]

## 2024-09-13 NOTE — CONSULTS
Vancomycin Dosing by Pharmacy- INITIAL    Ruperto Pang is a 24 y.o. year old female who Pharmacy has been consulted for vancomycin dosing for pneumonia. Based on the patient's indication and renal status this patient will be dosed based on a goal AUC of 400-600.     Renal function is currently stable.    Visit Vitals  /82   Pulse (!) 102   Temp 36.7 °C (98.1 °F) (Oral)   Resp 18        Lab Results   Component Value Date    CREATININE 0.66 2024    CREATININE 0.47 (L) 2024    CREATININE 0.40 (L) 08/15/2024    CREATININE 0.44 (L) 2024        Patient weight is as follows:   Vitals:    24 2213   Weight: 67.1 kg (148 lb)       Cultures:  No results found for the encounter in last 14 days.        No intake/output data recorded.  I/O during current shift:  No intake/output data recorded.    Temp (24hrs), Av.6 °C (99.7 °F), Min:36.7 °C (98.1 °F), Max:38.5 °C (101.3 °F)         Assessment/Plan     Patient will be given a loading dose of 1750 mg.    Will initiate vancomycin maintenance,  1500 mg every 12 hours to start 12 hours after LD    This dosing regimen is predicted by [employer] to result in the following pharmacokinetic parameters:    Loading dose: 1750 mg at 06:00 2024.  Regimen: 1500 mg IV every 12 hours.  Start time: 18:00 on 2024  Exposure target: AUC24 (range)400-600 mg/L.hr   [id number]: 443 mg/L.hr  AUC24,ss: 468 mg/L.hr  Probability of AUC24 > 400: 67 %  Ctrough,ss: 9.4 mg/L  Probability of Ctrough,ss > 20: 8 %      Follow-up level will be ordered on  with AM labs, unless clinically indicated sooner.  Will continue to monitor renal function daily while on vancomycin and order serum creatinine at least every 48 hours if not already ordered.  Follow for continued vancomycin needs, clinical response, and signs/symptoms of toxicity.       Dolores Cartwright, PharmD

## 2024-09-13 NOTE — PROGRESS NOTES
"Pharmacy Medication History Review    Ruperto Pang is a 24 y.o. female admitted for Sickle cell crisis (Multi). Pharmacy reviewed the patient's [id number] medications and allergies for accuracy.    Medications ADDED:  Oxycodone 10mg  Miralax   Medications CHANGED:  none  Medications REMOVED:   none    The list below reflects the updated PTA list.   Prior to Admission Medications   Prescriptions Last Dose Informant   folic acid (Folvite) 1 mg tablet 9/12/2024 Self   Sig: Take 1 tablet (1 mg) by mouth once daily.   hydroxyurea (Hydrea) 500 mg capsule Not Taking Self   Sig: Take 1 capsule (500 mg total) by mouth 2 times a day.   Patient not taking: Reported on 9/13/2024   ibuprofen 200 mg tablet  Self   Sig: Take 2 tablets (400 mg) by mouth every 6 hours if needed for mild pain (1 - 3) or moderate pain (4 - 6).   oxyCODONE (Roxicodone) 10 mg immediate release tablet 9/11/2024 Self   Sig: Take 1 tablet (10 mg) by mouth every 6 hours if needed for severe pain (7 - 10).   polyethylene glycol (Glycolax, Miralax) 17 gram packet  Self   Sig: Take 17 g by mouth if needed.   sennosides-docusate sodium (Senna-S) 8.6-50 mg tablet  Self   Sig: Take 1 tablet by mouth once daily.      Facility-Administered Medications: None        The list below reflects the updated allergy list. Please review each documented allergy for additional clarification and justification.  Allergies  Reviewed by Lou Sanders on 9/13/2024        Severity Reactions Comments    Iodinated Contrast Media Not Specified Hives             Patient accepts M2B at discharge.     Sources:   Review of out patient fill history, OARRS, and patient interview  Historian was aware and confident in her medication history.       Additional Comments:   Patient claims not to be taking Hydroxyurea 500 mg       Anjum Quezada Needle 1    09/13/24     Secure Chat preferred   If no response call [id number] or [employer] \"Med Rec\"    Completed by Anjum Quezada verified with Courtney " Amy

## 2024-09-13 NOTE — PROGRESS NOTES
Pharmacy Admission Order Reconciliation Review    Ruperto Pang is a 24 y.o. female admitted for Sickle cell crisis (Multi). Pharmacy reviewed the patient's unreconciled admission medications.    Prior to admission medications that were reviewed and acted on by the pharmacist include:  Oxycodone  Miralax    These medications have been reconciled.     Any other unreconcilied medications have been addressed and will be ordered or held by the patient's medical team. Medications addressed by the pharmacist may be added or changed by the patient's medical team at any time.    Ernestina Rose, PharmD  Transitions of Care Pharmacist  [facility]  Please reach out via Secure Chat for questions

## 2024-09-13 NOTE — ED PROVIDER NOTES
History of Present Illness     History provided by: Patient  Limitations to History: None  External Records Reviewed with Brief Summary: None    HPI:  Ruperto Pang is a 24 y.o. female past medical history significant for sickle cell disease presenting with probable sickle cell crisis.  Patient notes she has been dizzy, nauseous, sweaty, having chills, headache.  She notes pain in the hips down to the level of her knees as well as isolated left calf tenderness.  Patient complains of shortness of breath as well has a fever.  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  Patient was recently seen on  for a sickle cell crisis as well.  Denies chest pain, abdominal pain, diarrhea.    Physical Exam   Triage vitals:  T (!) 38.5 °C (101.3 °F)  HR (!) 111  /64  RR 16  O2 94 % None (Room air)    Physical Exam  Vitals and nursing note reviewed.   Constitutional:       General: She is not in acute distress.     Appearance: She is well-developed.   HENT:      Head: Normocephalic and atraumatic.   Eyes:      Conjunctiva/sclera: Conjunctivae normal.   Cardiovascular:      Rate and Rhythm: Normal rate and regular rhythm.      Heart sounds: No murmur heard.  Pulmonary:      Effort: Pulmonary effort is normal. No respiratory distress.      Breath sounds: Normal breath sounds.   Abdominal:      Palpations: Abdomen is soft.      Tenderness: There is no abdominal tenderness.   Musculoskeletal:         General: No swelling.      Cervical back: Neck supple.   Skin:     General: Skin is warm and dry.      Capillary Refill: Capillary refill takes less than 2 seconds.   Neurological:      General: No focal deficit present.      Mental Status: She is alert.   Psychiatric:         Mood and Affect: Mood normal.        Medical Decision Making & ED Course   Medical Decision Makin y.o. female past medical history significant for sickle cell disease presenting with likely sickle cell crisis.  Patient  complains of dizziness, nausea, sweats, chills, headache, pain from her hips to her knees.  Patient is also short of breath however denies chest pain.  Denies abdominal pain.  Physical exam demonstrates normal heart and lungs, nontender abdomen.  Hips and thighs tender to palpation as well as left calf tenderness.  Workup includes CBC, CMP, VBG, LDH, reticulocytes, urine pregnancy, UA, COVID, strep, flu.  Chest x-ray for shortness of breath as well as EKG and troponin to rule out ACS.  Patient is currently febrile with a temperature of 101.3 and was given Tylenol.  1 mg Dilaudid as well as 4 mg Zofran for pain and nausea control.  Patient was seen on September 1 for sickle cell crisis, ACS was ruled out at that time, noted to have mild leukocytosis which may have been the beginning of an infection at that time.  Patient satting 94% on room air, ambulatory sats stable with mild increase in tachycardia. VBG showed a pH of 7.48, pCO2 of 28 indicative of respiratory alkalosis which could be from pneumonia vs PE.  Viral panel negative, urine negative, troponins negative.  CT PE ordered, patient is allergic to iodinated contrast and allergy prophylaxis was also ordered.  CT neck was also ordered to rule out Lemierre's syndrome.  Given that the patient came in with a fever, tachycardia, labile blood pressures, poor O2 sat we will also obtain blood cultures x 2.  Upon reexamination patient did appear a little volume down so we repleted with 500 mL of normal saline.  Patient also complains of pain so we gave another 1 mg Dilaudid.  She does not have a care plan on file however has received Dilaudid which works well in the past.  Patient also has home prescription of opioids for pain.  Given that the patient has a markedly elevated white blood cell count, fever, acute sickle cell crisis patient will be admitted to hematology service under Dr. Chester for further evaluation.  ----      Differential diagnoses considered include  but are not limited to: sickle cell crisis, URI, PE, ACS     Social Determinants of Health which Significantly Impact Care: None identified     EKG Independent Interpretation: EKG interpreted by myself. Please see ED Course for full interpretation.    Independent Result Review and Interpretation: Relevant laboratory and radiographic results were reviewed and independently interpreted by myself.  As necessary, they are commented on in the ED Course.    Chronic conditions affecting the patient's care: As documented above in [facility]    The patient was discussed with the following consultants/services: None    Care Considerations: As documented above in [facility]    ED Course:  ED Course as of 09/13/24 0535   Fri Sep 13, 2024   0143 Temperature(!): 38.5 °C (101.3 °F) [DP]   0143 Heart Rate(!): 111 [DP]   0143 Pulse Ox: 94 % [DP]   0143 POCT pH, Venous(!): 7.48 [DP]   0143 POCT pCO2, Venous(!): 28 [DP]   0143 POCT HCO3 Calculated, Venous(!): 20.9 [DP]      ED Course User Index  [DP] Aaron Loredo DO         Diagnoses as of 09/13/24 0535   Sickle cell crisis (Multi)     Disposition   As a result of their workup, the patient will require admission to the hospital.  The patient was informed of her diagnosis.  The patient was given the opportunity to ask questions and I answered them. The patient agreed to be admitted to the hospital.    Procedures   Procedures    Patient seen and discussed with ED attending physician.    Aaron Loredo DO  Emergency Medicine     Aaron Loredo DO  Resident  09/13/24 0604

## 2024-09-14 LAB
ALBUMIN SERPL BCP-MCNC: 3.8 G/DL (ref 3.4–5)
ALBUMIN SERPL BCP-MCNC: 4.2 G/DL (ref 3.4–5)
ALP SERPL-CCNC: 64 U/L (ref 33–110)
ALP SERPL-CCNC: 73 U/L (ref 33–110)
ALT SERPL W P-5'-P-CCNC: 23 U/L (ref 7–45)
ALT SERPL W P-5'-P-CCNC: 24 U/L (ref 7–45)
ANION GAP BLDV CALCULATED.4IONS-SCNC: 15 MMOL/L (ref 10–25)
ANION GAP SERPL CALC-SCNC: 16 MMOL/L (ref 10–20)
ANION GAP SERPL CALC-SCNC: 18 MMOL/L (ref 10–20)
AST SERPL W P-5'-P-CCNC: 20 U/L (ref 9–39)
AST SERPL W P-5'-P-CCNC: 24 U/L (ref 9–39)
BASE EXCESS BLDV CALC-SCNC: -5.1 MMOL/L (ref -2–3)
BASOPHILS # BLD AUTO: 0.06 X10*3/UL (ref 0–0.1)
BASOPHILS # BLD MANUAL: 0 X10*3/UL (ref 0–0.1)
BASOPHILS NFR BLD AUTO: 0.2 %
BASOPHILS NFR BLD MANUAL: 0 %
BILIRUB SERPL-MCNC: 6.9 MG/DL (ref 0–1.2)
BILIRUB SERPL-MCNC: 7.4 MG/DL (ref 0–1.2)
BLOOD EXPIRATION DATE: NORMAL
BLOOD EXPIRATION DATE: NORMAL
BODY TEMPERATURE: 37 DEGREES CELSIUS
BUN SERPL-MCNC: 44 MG/DL (ref 6–23)
BUN SERPL-MCNC: 47 MG/DL (ref 6–23)
BURR CELLS BLD QL SMEAR: ABNORMAL
CA-I BLDV-SCNC: 1.1 MMOL/L (ref 1.1–1.33)
CALCIUM SERPL-MCNC: 8.5 MG/DL (ref 8.6–10.6)
CALCIUM SERPL-MCNC: 8.5 MG/DL (ref 8.6–10.6)
CHLORIDE BLDV-SCNC: 102 MMOL/L (ref 98–107)
CHLORIDE SERPL-SCNC: 100 MMOL/L (ref 98–107)
CHLORIDE SERPL-SCNC: 101 MMOL/L (ref 98–107)
CHLORIDE UR-SCNC: 52 MMOL/L
CHLORIDE/CREATININE (MMOL/G) IN URINE: 68 MMOL/G CREAT (ref 38–318)
CO2 SERPL-SCNC: 21 MMOL/L (ref 21–32)
CO2 SERPL-SCNC: 21 MMOL/L (ref 21–32)
CREAT SERPL-MCNC: 2.03 MG/DL (ref 0.5–1.05)
CREAT SERPL-MCNC: 2.29 MG/DL (ref 0.5–1.05)
CREAT UR-MCNC: 76.4 MG/DL (ref 20–320)
DISPENSE STATUS: NORMAL
DISPENSE STATUS: NORMAL
EGFRCR SERPLBLD CKD-EPI 2021: 30 ML/MIN/1.73M*2
EGFRCR SERPLBLD CKD-EPI 2021: 35 ML/MIN/1.73M*2
EOSINOPHIL # BLD AUTO: 0.03 X10*3/UL (ref 0–0.7)
EOSINOPHIL # BLD MANUAL: 0 X10*3/UL (ref 0–0.7)
EOSINOPHIL NFR BLD AUTO: 0.1 %
EOSINOPHIL NFR BLD MANUAL: 0 %
ERYTHROCYTE [DISTWIDTH] IN BLOOD BY AUTOMATED COUNT: 17.6 % (ref 11.5–14.5)
ERYTHROCYTE [DISTWIDTH] IN BLOOD BY AUTOMATED COUNT: 19.3 % (ref 11.5–14.5)
GLUCOSE BLDV-MCNC: 131 MG/DL (ref 74–99)
GLUCOSE SERPL-MCNC: 120 MG/DL (ref 74–99)
GLUCOSE SERPL-MCNC: 77 MG/DL (ref 74–99)
HCO3 BLDV-SCNC: 19.5 MMOL/L (ref 22–26)
HCT VFR BLD AUTO: 15.5 % (ref 36–46)
HCT VFR BLD AUTO: 17.3 % (ref 36–46)
HCT VFR BLD EST: 19 % (ref 36–46)
HGB BLD-MCNC: 5.4 G/DL (ref 12–16)
HGB BLD-MCNC: 6.1 G/DL (ref 12–16)
HGB BLDV-MCNC: 6.4 G/DL (ref 12–16)
HGB RETIC QN: 29 PG (ref 28–38)
IMM GRANULOCYTES # BLD AUTO: 0.36 X10*3/UL (ref 0–0.7)
IMM GRANULOCYTES # BLD AUTO: 0.69 X10*3/UL (ref 0–0.7)
IMM GRANULOCYTES NFR BLD AUTO: 1 % (ref 0–0.9)
IMM GRANULOCYTES NFR BLD AUTO: 1.5 % (ref 0–0.9)
IMMATURE RETIC FRACTION: 17.6 %
INHALED O2 CONCENTRATION: 28 %
LACTATE BLDV-SCNC: 1.6 MMOL/L (ref 0.4–2)
LACTATE SERPL-SCNC: 1.1 MMOL/L (ref 0.4–2)
LDH SERPL L TO P-CCNC: 248 U/L (ref 84–246)
LYMPHOCYTES # BLD AUTO: 2.7 X10*3/UL (ref 1.2–4.8)
LYMPHOCYTES # BLD MANUAL: 0.76 X10*3/UL (ref 1.2–4.8)
LYMPHOCYTES NFR BLD AUTO: 7.6 %
LYMPHOCYTES NFR BLD MANUAL: 1.7 %
MCH RBC QN AUTO: 29.9 PG (ref 26–34)
MCH RBC QN AUTO: 31.2 PG (ref 26–34)
MCHC RBC AUTO-ENTMCNC: 34.8 G/DL (ref 32–36)
MCHC RBC AUTO-ENTMCNC: 35.3 G/DL (ref 32–36)
MCV RBC AUTO: 85 FL (ref 80–100)
MCV RBC AUTO: 90 FL (ref 80–100)
MONOCYTES # BLD AUTO: 0.91 X10*3/UL (ref 0.1–1)
MONOCYTES # BLD MANUAL: 0 X10*3/UL (ref 0.1–1)
MONOCYTES NFR BLD AUTO: 2.6 %
MONOCYTES NFR BLD MANUAL: 0 %
NEUTROPHILS # BLD AUTO: 31.39 X10*3/UL (ref 1.2–7.7)
NEUTROPHILS # BLD MANUAL: 43.94 X10*3/UL (ref 1.2–7.7)
NEUTROPHILS NFR BLD AUTO: 88.5 %
NEUTS BAND # BLD MANUAL: 3.49 X10*3/UL (ref 0–0.7)
NEUTS BAND NFR BLD MANUAL: 7.8 %
NEUTS SEG # BLD MANUAL: 40.45 X10*3/UL (ref 1.2–7)
NEUTS SEG NFR BLD MANUAL: 90.5 %
NRBC BLD-RTO: 0.2 /100 WBCS (ref 0–0)
NRBC BLD-RTO: 0.3 /100 WBCS (ref 0–0)
OVALOCYTES BLD QL SMEAR: ABNORMAL
OXYHGB MFR BLDV: 90.8 % (ref 45–75)
PCO2 BLDV: 33 MM HG (ref 41–51)
PH BLDV: 7.38 PH (ref 7.33–7.43)
PLATELET # BLD AUTO: 428 X10*3/UL (ref 150–450)
PLATELET # BLD AUTO: 450 X10*3/UL (ref 150–450)
PO2 BLDV: 77 MM HG (ref 35–45)
POLYCHROMASIA BLD QL SMEAR: ABNORMAL
POLYCHROMASIA BLD QL SMEAR: NORMAL
POTASSIUM BLDV-SCNC: 3 MMOL/L (ref 3.5–5.3)
POTASSIUM SERPL-SCNC: 3.1 MMOL/L (ref 3.5–5.3)
POTASSIUM SERPL-SCNC: 4 MMOL/L (ref 3.5–5.3)
POTASSIUM UR-SCNC: 33 MMOL/L
POTASSIUM/CREAT UR-RTO: 43 MMOL/G CREAT
PROCALCITONIN SERPL-MCNC: 12.74 NG/ML
PRODUCT BLOOD TYPE: 1700
PRODUCT BLOOD TYPE: 1700
PRODUCT CODE: NORMAL
PRODUCT CODE: NORMAL
PROT SERPL-MCNC: 6.9 G/DL (ref 6.4–8.2)
PROT SERPL-MCNC: 7.6 G/DL (ref 6.4–8.2)
RBC # BLD AUTO: 1.73 X10*6/UL (ref 4–5.2)
RBC # BLD AUTO: 2.04 X10*6/UL (ref 4–5.2)
RBC MORPH BLD: ABNORMAL
RBC MORPH BLD: NORMAL
RETICS #: 0.32 X10*6/UL (ref 0.02–0.08)
RETICS/RBC NFR AUTO: 18.6 % (ref 0.5–2)
SAO2 % BLDV: 94 % (ref 45–75)
SCHISTOCYTES BLD QL SMEAR: ABNORMAL
SCHISTOCYTES BLD QL SMEAR: NORMAL
SICKLE CELLS BLD QL SMEAR: ABNORMAL
SICKLE CELLS BLD QL SMEAR: NORMAL
SODIUM BLDV-SCNC: 133 MMOL/L (ref 136–145)
SODIUM SERPL-SCNC: 135 MMOL/L (ref 136–145)
SODIUM SERPL-SCNC: 135 MMOL/L (ref 136–145)
SODIUM UR-SCNC: 49 MMOL/L
SODIUM/CREAT UR-RTO: 64 MMOL/G CREAT
TARGETS BLD QL SMEAR: ABNORMAL
TARGETS BLD QL SMEAR: NORMAL
TOTAL CELLS COUNTED BLD: 116
UNIT ABO: NORMAL
UNIT ABO: NORMAL
UNIT NUMBER: NORMAL
UNIT NUMBER: NORMAL
UNIT RH: NORMAL
UNIT RH: NORMAL
UNIT VOLUME: 293
UNIT VOLUME: 293
WBC # BLD AUTO: 35.5 X10*3/UL (ref 4.4–11.3)
WBC # BLD AUTO: 44.7 X10*3/UL (ref 4.4–11.3)
XM INTEP: NORMAL
XM INTEP: NORMAL

## 2024-09-14 PROCEDURE — 83615 LACTATE (LD) (LDH) ENZYME: CPT

## 2024-09-14 PROCEDURE — 80053 COMPREHEN METABOLIC PANEL: CPT

## 2024-09-14 PROCEDURE — 85027 COMPLETE CBC AUTOMATED: CPT

## 2024-09-14 PROCEDURE — 2500000004 HC RX 250 GENERAL PHARMACY W/ HCPCS (ALT 636 FOR OP/ED)

## 2024-09-14 PROCEDURE — 99233 SBSQ HOSP IP/OBS HIGH 50: CPT

## 2024-09-14 PROCEDURE — 36415 COLL VENOUS BLD VENIPUNCTURE: CPT

## 2024-09-14 PROCEDURE — 85025 COMPLETE CBC W/AUTO DIFF WBC: CPT

## 2024-09-14 PROCEDURE — 85045 AUTOMATED RETICULOCYTE COUNT: CPT

## 2024-09-14 PROCEDURE — 2500000001 HC RX 250 WO HCPCS SELF ADMINISTERED DRUGS (ALT 637 FOR MEDICARE OP)

## 2024-09-14 PROCEDURE — 36430 TRANSFUSION BLD/BLD COMPNT: CPT

## 2024-09-14 PROCEDURE — 84132 ASSAY OF SERUM POTASSIUM: CPT

## 2024-09-14 PROCEDURE — 99233 SBSQ HOSP IP/OBS HIGH 50: CPT | Performed by: HOSPITALIST

## 2024-09-14 PROCEDURE — P9040 RBC LEUKOREDUCED IRRADIATED: HCPCS

## 2024-09-14 PROCEDURE — 1170000001 HC PRIVATE ONCOLOGY ROOM DAILY

## 2024-09-14 PROCEDURE — 83605 ASSAY OF LACTIC ACID: CPT

## 2024-09-14 PROCEDURE — 84145 PROCALCITONIN (PCT): CPT

## 2024-09-14 PROCEDURE — 85007 BL SMEAR W/DIFF WBC COUNT: CPT

## 2024-09-14 RX ORDER — POTASSIUM CHLORIDE 14.9 MG/ML
20 INJECTION INTRAVENOUS
Status: COMPLETED | OUTPATIENT
Start: 2024-09-15 | End: 2024-09-15

## 2024-09-14 RX ORDER — AMOXICILLIN AND CLAVULANATE POTASSIUM 875; 125 MG/1; MG/1
1 TABLET, FILM COATED ORAL EVERY 12 HOURS SCHEDULED
Status: DISCONTINUED | OUTPATIENT
Start: 2024-09-15 | End: 2024-09-14

## 2024-09-14 RX ORDER — DEXTROSE MONOHYDRATE AND SODIUM CHLORIDE 5; .45 G/100ML; G/100ML
75 INJECTION, SOLUTION INTRAVENOUS CONTINUOUS
Status: ACTIVE | OUTPATIENT
Start: 2024-09-14 | End: 2024-09-15

## 2024-09-14 RX ORDER — ACETAMINOPHEN 325 MG/1
975 TABLET ORAL ONCE
Status: COMPLETED | OUTPATIENT
Start: 2024-09-14 | End: 2024-09-14

## 2024-09-14 RX ORDER — POTASSIUM CHLORIDE 29.8 MG/ML
40 INJECTION INTRAVENOUS ONCE
Status: DISCONTINUED | OUTPATIENT
Start: 2024-09-14 | End: 2024-09-14

## 2024-09-14 SDOH — SOCIAL STABILITY: SOCIAL INSECURITY: WERE YOU ABLE TO COMPLETE ALL THE BEHAVIORAL HEALTH SCREENINGS?: YES

## 2024-09-14 SDOH — HEALTH STABILITY: MENTAL HEALTH: HOW MANY STANDARD DRINKS CONTAINING ALCOHOL DO YOU HAVE ON A TYPICAL DAY?: PATIENT DOES NOT DRINK

## 2024-09-14 SDOH — SOCIAL STABILITY: SOCIAL INSECURITY: HAVE YOU HAD ANY THOUGHTS OF HARMING ANYONE ELSE?: NO

## 2024-09-14 SDOH — HEALTH STABILITY: MENTAL HEALTH: HOW OFTEN DO YOU HAVE 6 OR MORE DRINKS ON ONE OCCASION?: NEVER

## 2024-09-14 SDOH — SOCIAL STABILITY: SOCIAL INSECURITY: ABUSE: ADULT

## 2024-09-14 SDOH — HEALTH STABILITY: MENTAL HEALTH: HOW OFTEN DO YOU HAVE A DRINK CONTAINING ALCOHOL?: NEVER

## 2024-09-14 SDOH — SOCIAL STABILITY: SOCIAL INSECURITY: DOES ANYONE TRY TO KEEP YOU FROM HAVING/CONTACTING OTHER FRIENDS OR DOING THINGS OUTSIDE YOUR HOME?: NO

## 2024-09-14 SDOH — SOCIAL STABILITY: SOCIAL INSECURITY: HAVE YOU HAD THOUGHTS OF HARMING ANYONE ELSE?: NO

## 2024-09-14 SDOH — SOCIAL STABILITY: SOCIAL INSECURITY: DO YOU FEEL UNSAFE GOING BACK TO THE PLACE WHERE YOU ARE LIVING?: NO

## 2024-09-14 SDOH — SOCIAL STABILITY: SOCIAL INSECURITY: HAS ANYONE EVER THREATENED TO HURT YOUR FAMILY OR YOUR PETS?: NO

## 2024-09-14 SDOH — SOCIAL STABILITY: SOCIAL INSECURITY: ARE YOU OR HAVE YOU BEEN THREATENED OR ABUSED PHYSICALLY, EMOTIONALLY, OR SEXUALLY BY ANYONE?: NO

## 2024-09-14 SDOH — SOCIAL STABILITY: SOCIAL INSECURITY: DO YOU FEEL ANYONE HAS EXPLOITED OR TAKEN ADVANTAGE OF YOU FINANCIALLY OR OF YOUR PERSONAL PROPERTY?: NO

## 2024-09-14 SDOH — SOCIAL STABILITY: SOCIAL INSECURITY: ARE THERE ANY APPARENT SIGNS OF INJURIES/BEHAVIORS THAT COULD BE RELATED TO ABUSE/NEGLECT?: NO

## 2024-09-14 ASSESSMENT — LIFESTYLE VARIABLES
AUDIT-C TOTAL SCORE: 0
SKIP TO QUESTIONS 9-10: 1
HOW MANY STANDARD DRINKS CONTAINING ALCOHOL DO YOU HAVE ON A TYPICAL DAY: PATIENT DOES NOT DRINK
AUDIT-C TOTAL SCORE: 0
SKIP TO QUESTIONS 9-10: 1
HOW OFTEN DO YOU HAVE 6 OR MORE DRINKS ON ONE OCCASION: NEVER
AUDIT-C TOTAL SCORE: 0
HOW OFTEN DO YOU HAVE A DRINK CONTAINING ALCOHOL: NEVER

## 2024-09-14 ASSESSMENT — PAIN SCALES - GENERAL
PAINLEVEL_OUTOF10: 5 - MODERATE PAIN
PAINLEVEL_OUTOF10: 6
PAINLEVEL_OUTOF10: 5 - MODERATE PAIN
PAINLEVEL_OUTOF10: 6
PAINLEVEL_OUTOF10: 8
PAINLEVEL_OUTOF10: 8
PAINLEVEL_OUTOF10: 7
PAINLEVEL_OUTOF10: 3
PAINLEVEL_OUTOF10: 7
PAINLEVEL_OUTOF10: 6
PAINLEVEL_OUTOF10: 6

## 2024-09-14 ASSESSMENT — COGNITIVE AND FUNCTIONAL STATUS - GENERAL
MOBILITY SCORE: 24
DAILY ACTIVITIY SCORE: 24
PATIENT BASELINE BEDBOUND: NO
MOBILITY SCORE: 24
MOBILITY SCORE: 24

## 2024-09-14 ASSESSMENT — PAIN - FUNCTIONAL ASSESSMENT
PAIN_FUNCTIONAL_ASSESSMENT: 0-10

## 2024-09-14 ASSESSMENT — PATIENT HEALTH QUESTIONNAIRE - PHQ9
2. FEELING DOWN, DEPRESSED OR HOPELESS: NOT AT ALL
1. LITTLE INTEREST OR PLEASURE IN DOING THINGS: NOT AT ALL
SUM OF ALL RESPONSES TO PHQ9 QUESTIONS 1 & 2: 0

## 2024-09-14 ASSESSMENT — ACTIVITIES OF DAILY LIVING (ADL)
BATHING: INDEPENDENT
ADEQUATE_TO_COMPLETE_ADL: YES
HEARING - RIGHT EAR: FUNCTIONAL
TOILETING: INDEPENDENT
DRESSING YOURSELF: INDEPENDENT
WALKS IN HOME: INDEPENDENT
HEARING - LEFT EAR: FUNCTIONAL
LACK_OF_TRANSPORTATION: NO
FEEDING YOURSELF: INDEPENDENT
JUDGMENT_ADEQUATE_SAFELY_COMPLETE_DAILY_ACTIVITIES: YES
PATIENT'S MEMORY ADEQUATE TO SAFELY COMPLETE DAILY ACTIVITIES?: YES
GROOMING: INDEPENDENT

## 2024-09-14 ASSESSMENT — PAIN DESCRIPTION - LOCATION
LOCATION: BACK
LOCATION: HEAD
LOCATION: HEAD

## 2024-09-14 NOTE — ASSESSMENT & PLAN NOTE
Ruperto Pang is a 24 y.o. 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 Patient afebrile x1 (Tmax 38.5) and significant leukocytosis noted on admission. CXR (9/12) negative for acute cardiopulmonary process. UA (9/12) neg for nitrites and leuks. COVID / Influenza A + B / MRSA negative (9/13). Procal pending. CT PE (9/13) negative for PE or acute cardiopulmomary process. CT neck (9/13) no evidence of thromboses, left retropharyngeal and cervical lymph nodes, likely sequale of UTI. On admit patient started on Vanc + Zosyn (9/12-9/13). On 9/13 patient started on azithromycin + ceftriaxone (9/13-9/14). 9/14 start Zosyn to given continued uptrend of WBC. Hgb 5.4 on 9/14, plan 1 unit PRBC.  DC pending infectious work up, improvement of WBC, and further pain control.

## 2024-09-14 NOTE — CARE PLAN
The patient's goals for the shift include Pain level of 3 or less    The clinical goals for the shift include Patient will be hemodynamically stable

## 2024-09-14 NOTE — PROGRESS NOTES
"Ruperto Pang is a 24 y.o. female on day 1 of admission presenting with Sickle cell crisis (Multi).    Subjective   Patient seen resting in bed. Reports continued 7/10 pain in b/l hips, lower legs and back, per patient pain is improved and managble with current pain regimen. Continues to feel \"achy\" but states that she feels that this is also improved. Reports that 1 week prior to admission she had a viral prodrome, on Wednesday 9/11 she started to feel that she was becoming worse and started to have fevers, and reports episodes of dizziness, a sore throat and one bout of emesis, all of these symptoms are now resolved. We discussed CT PE and CT neck imaging results. Denies any shortness of breath, chest pain, abdominal pain, N/V/D, F/C, H/a.     Returned to patient's bedside in the afternoon. Reviewed lab results and plan for escalation of antibiotics today given her white count and 1 unit PRBC. Patient consented for blood and agreeable to plan.        Objective     Physical Exam  Constitutional:       Appearance: Normal appearance.   HENT:      Mouth/Throat:      Mouth: Mucous membranes are moist.   Eyes:      Pupils: Pupils are equal, round, and reactive to light.   Cardiovascular:      Rate and Rhythm: Normal rate and regular rhythm.   Pulmonary:      Effort: Pulmonary effort is normal.   Abdominal:      General: Abdomen is flat. Bowel sounds are normal.      Palpations: Abdomen is soft.   Musculoskeletal:         General: Normal range of motion.      Cervical back: Normal range of motion and neck supple.   Skin:     General: Skin is warm.   Neurological:      General: No focal deficit present.      Mental Status: She is alert.   Psychiatric:         Mood and Affect: Mood normal.         Last Recorded Vitals  Blood pressure 91/50, pulse 65, temperature 36.2 °C (97.2 °F), temperature source Temporal, resp. rate 16, height 1.524 m (5'), weight 67.1 kg (148 lb), SpO2 98%, unknown if currently " breastfeeding.  Intake/Output last 3 Shifts:  I/O last 3 completed shifts:  In: 1199.5 (17.9 mL/kg) [IV Piggyback:1199.5]  Out: - (0 mL/kg)   Weight: 67.1 kg     Relevant Results                 Scheduled medications  azithromycin, 500 mg, intravenous, q24h  cefTRIAXone, 1 g, intravenous, q24h  enoxaparin, 40 mg, subcutaneous, q24h  folic acid, 1 mg, oral, Daily  [Held by provider] hydroxyurea, 500 mg, oral, q12h TABATHA  ketorolac, 30 mg, intravenous, q6h TABATHA  pantoprazole, 40 mg, oral, Daily before breakfast      Continuous medications     PRN medications  PRN medications: diphenhydrAMINE, docusate sodium **OR** polyethylene glycol, HYDROmorphone, ondansetron ODT **OR** ondansetron               Assessment/Plan   Assessment & Plan  Sickle cell crisis (Multi)    Ruperto Pang is a 24 y.o. 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 Patient afebrile x1 (Tmax 38.5) and significant leukocytosis noted on admission. CXR (9/12) negative for acute cardiopulmonary process. UA (9/12) neg for nitrites and leuks. COVID / Influenza A + B / MRSA negative (9/13). Procal pending. CT PE (9/13) negative for PE or acute cardiopulmomary process. CT neck (9/13) no evidence of thromboses, left retropharyngeal and cervical lymph nodes, likely sequale of UTI. On admit patient started on Vanc + Zosyn (9/12-9/13). On 9/13 patient started on azithromycin + ceftriaxone (9/13-9/14). 9/14 start Zosyn to given continued uptrend of WBC. Hgb 5.4 on 9/14, plan 1 unit PRBC.  DC pending infectious work up, improvement of WBC, and further pain control.     # Fever, resolved  # Leukocytosis   - Unknown source of infection possibly acute chest vs PNA    - Febrile x2 days before admission, Tmax (38.5) on 9/12, afebrile since   - leukocytosis 33.5 --> 38.1 --> WBC 44.7K (9/14)   - As of 9/14 WBC continues to  uptrend, however, s/sx are improved   - UA (9/13) negative WBC, leuks, nitrities   - Influenza A+B and COVD negative (9/12)   - MRSA - negative (9/13)   - Lactate - 1.5 (9/12)   - Procal pending    - CXR (9/13) negative for acute cardiopulmonary process   - CTPE (9/14) negative for PE, or acute cardiopulmonary process   - s/p vanc + zosyn (9/12)  - s/p azithromycin + ceftriaxone (9/13-9/14)   - 9/14 start zosyn given continued uptrend of WBC   - Plan ID consult v. Additional imaging 9/15 if WBC continues to uptrend      # Hgb SS with acute on chronic pain   - No active care path   - OARRS reviewed- no aberrancies, last refilled home oxycodone 10mg for 7 day supply (10T) on 8/15/2024   - Currently on Hydroxyurea-held on admit d/t concern for leukocytosis   - Hgb 5.4 on 9/14 (BL 6-7), plan 1 unit PRBC on 9/14   - Lysis labs elevated on admission bili 13.2 (BL 7-9), now downtrending as of 9/14   - Given no new O2 requirement, chest pain and no radiographic findings of opacities on CXR and CT PE, patient does not currently meet criteria for ACS but will continue to monitor closely and low threshold to repeat chest imaging   - RBCex labs of type and screen, hgb S, ionized calcium sent 9/13 should patient require RBCex   - s/p vanc + zosyn (9/12)  - s/p azithromycin + ceftriaxone (9/13-9/14)   - 9/14 start zosyn given continued uptrend of WBC   - For pain continue Dilaudid 1mg q2 hours for severe pain (9/13-current)   - Continue Toradol 30mg IV q6 hours scheduled x12 doses (9/13-planned stop 9/16) with protonix PPI, HCG neg on 9/13    - Benadryl 25mg PO PRN for itching, Zofran 8mg q8 hours PRN for nausea, Miralax daily, colace BID PRN for opioid induced constipation   - Continue home folic acid      #Prophy  - Started on Lovenox 40mg subcutaneous daily, encourage ambulation   - Started on Protonix 40mg daily with toradol      Dispo   - Full code   - Discharge after improvement of fever/leukocytosis   - FUV with sickle  cell team 11/13  - Emergency contact mother Kat Ibarra [phone]       I spent 60 minutes in the professional and overall care of this patient.    Assessment and plan as above discussed with attending physician, Dr. Mina Gill PA-C

## 2024-09-15 VITALS
WEIGHT: 148 LBS | RESPIRATION RATE: 15 BRPM | HEART RATE: 66 BPM | HEIGHT: 60 IN | SYSTOLIC BLOOD PRESSURE: 97 MMHG | BODY MASS INDEX: 29.06 KG/M2 | TEMPERATURE: 97.9 F | DIASTOLIC BLOOD PRESSURE: 60 MMHG | OXYGEN SATURATION: 97 %

## 2024-09-15 LAB
ALBUMIN SERPL BCP-MCNC: 3.6 G/DL (ref 3.4–5)
ALP SERPL-CCNC: 59 U/L (ref 33–110)
ALT SERPL W P-5'-P-CCNC: 26 U/L (ref 7–45)
ANION GAP SERPL CALC-SCNC: 15 MMOL/L (ref 10–20)
APPEARANCE UR: CLEAR
AST SERPL W P-5'-P-CCNC: 24 U/L (ref 9–39)
BACTERIA BLD CULT: NORMAL
BACTERIA BLD CULT: NORMAL
BASOPHILS # BLD AUTO: 0.07 X10*3/UL (ref 0–0.1)
BASOPHILS NFR BLD AUTO: 0.3 %
BILIRUB SERPL-MCNC: 6.3 MG/DL (ref 0–1.2)
BILIRUB UR STRIP.AUTO-MCNC: NEGATIVE MG/DL
BUN SERPL-MCNC: 46 MG/DL (ref 6–23)
CALCIUM SERPL-MCNC: 8.5 MG/DL (ref 8.6–10.6)
CHLORIDE SERPL-SCNC: 104 MMOL/L (ref 98–107)
CHLORIDE UR-SCNC: <15 MMOL/L
CHLORIDE/CREATININE (MMOL/G) IN URINE: NORMAL
CO2 SERPL-SCNC: 22 MMOL/L (ref 21–32)
COLOR UR: YELLOW
CREAT SERPL-MCNC: 2.46 MG/DL (ref 0.5–1.05)
CREAT UR-MCNC: 76.2 MG/DL (ref 20–320)
EGFRCR SERPLBLD CKD-EPI 2021: 27 ML/MIN/1.73M*2
EOSINOPHIL # BLD AUTO: 0.12 X10*3/UL (ref 0–0.7)
EOSINOPHIL NFR BLD AUTO: 0.5 %
ERYTHROCYTE [DISTWIDTH] IN BLOOD BY AUTOMATED COUNT: 18.3 % (ref 11.5–14.5)
GLUCOSE SERPL-MCNC: 70 MG/DL (ref 74–99)
GLUCOSE UR STRIP.AUTO-MCNC: NORMAL MG/DL
HCT VFR BLD AUTO: 14.9 % (ref 36–46)
HGB BLD-MCNC: 5.3 G/DL (ref 12–16)
HGB RETIC QN: 26 PG (ref 28–38)
HOLD SPECIMEN: NORMAL
IMM GRANULOCYTES # BLD AUTO: 0.16 X10*3/UL (ref 0–0.7)
IMM GRANULOCYTES NFR BLD AUTO: 0.7 % (ref 0–0.9)
IMMATURE RETIC FRACTION: 11.6 %
KETONES UR STRIP.AUTO-MCNC: NEGATIVE MG/DL
LDH SERPL L TO P-CCNC: 210 U/L (ref 84–246)
LEUKOCYTE ESTERASE UR QL STRIP.AUTO: NEGATIVE
LYMPHOCYTES # BLD AUTO: 3.32 X10*3/UL (ref 1.2–4.8)
LYMPHOCYTES NFR BLD AUTO: 14.6 %
MCH RBC QN AUTO: 29.9 PG (ref 26–34)
MCHC RBC AUTO-ENTMCNC: 35.6 G/DL (ref 32–36)
MCV RBC AUTO: 84 FL (ref 80–100)
MONOCYTES # BLD AUTO: 1.02 X10*3/UL (ref 0.1–1)
MONOCYTES NFR BLD AUTO: 4.5 %
NEUTROPHILS # BLD AUTO: 17.98 X10*3/UL (ref 1.2–7.7)
NEUTROPHILS NFR BLD AUTO: 79.4 %
NITRITE UR QL STRIP.AUTO: NEGATIVE
NRBC BLD-RTO: 0.7 /100 WBCS (ref 0–0)
PH UR STRIP.AUTO: 6 [PH]
PLATELET # BLD AUTO: 393 X10*3/UL (ref 150–450)
POTASSIUM SERPL-SCNC: 3.8 MMOL/L (ref 3.5–5.3)
POTASSIUM UR-SCNC: 24 MMOL/L
POTASSIUM/CREAT UR-RTO: 31 MMOL/G CREAT
PROT SERPL-MCNC: 6.7 G/DL (ref 6.4–8.2)
PROT UR STRIP.AUTO-MCNC: ABNORMAL MG/DL
RBC # BLD AUTO: 1.77 X10*6/UL (ref 4–5.2)
RBC # UR STRIP.AUTO: NEGATIVE /UL
RBC #/AREA URNS AUTO: NORMAL /HPF
RETICS #: 0.32 X10*6/UL (ref 0.02–0.08)
RETICS/RBC NFR AUTO: 18.2 % (ref 0.5–2)
SODIUM SERPL-SCNC: 137 MMOL/L (ref 136–145)
SODIUM UR-SCNC: 20 MMOL/L
SODIUM/CREAT UR-RTO: 26 MMOL/G CREAT
SP GR UR STRIP.AUTO: 1.02
UROBILINOGEN UR STRIP.AUTO-MCNC: ABNORMAL MG/DL
WBC # BLD AUTO: 22.7 X10*3/UL (ref 4.4–11.3)
WBC #/AREA URNS AUTO: NORMAL /HPF

## 2024-09-15 PROCEDURE — 80053 COMPREHEN METABOLIC PANEL: CPT

## 2024-09-15 PROCEDURE — 1170000001 HC PRIVATE ONCOLOGY ROOM DAILY

## 2024-09-15 PROCEDURE — 2500000001 HC RX 250 WO HCPCS SELF ADMINISTERED DRUGS (ALT 637 FOR MEDICARE OP)

## 2024-09-15 PROCEDURE — 2500000004 HC RX 250 GENERAL PHARMACY W/ HCPCS (ALT 636 FOR OP/ED)

## 2024-09-15 PROCEDURE — 99223 1ST HOSP IP/OBS HIGH 75: CPT | Performed by: INTERNAL MEDICINE

## 2024-09-15 PROCEDURE — 85045 AUTOMATED RETICULOCYTE COUNT: CPT

## 2024-09-15 PROCEDURE — 36415 COLL VENOUS BLD VENIPUNCTURE: CPT

## 2024-09-15 PROCEDURE — 2500000005 HC RX 250 GENERAL PHARMACY W/O HCPCS

## 2024-09-15 PROCEDURE — 85025 COMPLETE CBC W/AUTO DIFF WBC: CPT

## 2024-09-15 PROCEDURE — 83615 LACTATE (LD) (LDH) ENZYME: CPT

## 2024-09-15 PROCEDURE — 99233 SBSQ HOSP IP/OBS HIGH 50: CPT

## 2024-09-15 PROCEDURE — 81001 URINALYSIS AUTO W/SCOPE: CPT

## 2024-09-15 PROCEDURE — 86880 COOMBS TEST DIRECT: CPT

## 2024-09-15 PROCEDURE — 82436 ASSAY OF URINE CHLORIDE: CPT

## 2024-09-15 RX ORDER — ACETAMINOPHEN 325 MG/1
975 TABLET ORAL ONCE
Status: COMPLETED | OUTPATIENT
Start: 2024-09-15 | End: 2024-09-15

## 2024-09-15 RX ORDER — DEXTROSE MONOHYDRATE AND SODIUM CHLORIDE 5; .45 G/100ML; G/100ML
100 INJECTION, SOLUTION INTRAVENOUS CONTINUOUS
Status: ACTIVE | OUTPATIENT
Start: 2024-09-15 | End: 2024-09-16

## 2024-09-15 RX ORDER — HEPARIN SODIUM 5000 [USP'U]/ML
5000 INJECTION, SOLUTION INTRAVENOUS; SUBCUTANEOUS EVERY 8 HOURS SCHEDULED
Status: DISCONTINUED | OUTPATIENT
Start: 2024-09-15 | End: 2024-09-25 | Stop reason: HOSPADM

## 2024-09-15 RX ORDER — DEXTROSE MONOHYDRATE AND SODIUM CHLORIDE 5; .45 G/100ML; G/100ML
500 INJECTION, SOLUTION INTRAVENOUS ONCE
Status: COMPLETED | OUTPATIENT
Start: 2024-09-15 | End: 2024-09-15

## 2024-09-15 ASSESSMENT — COGNITIVE AND FUNCTIONAL STATUS - GENERAL
DAILY ACTIVITIY SCORE: 24
MOBILITY SCORE: 24
MOBILITY SCORE: 24
DAILY ACTIVITIY SCORE: 24

## 2024-09-15 ASSESSMENT — PAIN - FUNCTIONAL ASSESSMENT
PAIN_FUNCTIONAL_ASSESSMENT: 0-10

## 2024-09-15 ASSESSMENT — PAIN SCALES - GENERAL
PAINLEVEL_OUTOF10: 8
PAINLEVEL_OUTOF10: 5 - MODERATE PAIN
PAINLEVEL_OUTOF10: 7
PAINLEVEL_OUTOF10: 8
PAINLEVEL_OUTOF10: 6
PAINLEVEL_OUTOF10: 4
PAINLEVEL_OUTOF10: 0 - NO PAIN

## 2024-09-15 ASSESSMENT — PAIN DESCRIPTION - LOCATION
LOCATION: HEAD
LOCATION: HEAD
LOCATION: ANKLE

## 2024-09-15 ASSESSMENT — PAIN SCALES - PAIN ASSESSMENT IN ADVANCED DEMENTIA (PAINAD): TOTALSCORE: MEDICATION (SEE MAR)

## 2024-09-15 NOTE — NURSING NOTE
Nursing Note  Patient remains afebrile, continue to receive IV antibiotics. Vital signs stable , except blood decreased at times. Patient continue to have pain related to sickle cell crisis and is managed with  Hydromorphone IV and Toradol IV. IV fluid D5W.45% NaCl at 75 ML/HR initiated as per NP order. Patient transfused with one unit of PRBCs. Tolerated PRBCs transfusion well. No signs and symptoms of allergic reaction present. Will continue to monitor patient and will notify MD of acute changes.

## 2024-09-15 NOTE — ASSESSMENT & PLAN NOTE
Ruperto Pang is a 24 y.o. 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 Patient afebrile x1 (Tmax 38.5) and significant leukocytosis noted on admission. CXR (9/12) negative for acute cardiopulmonary process. UA (9/12) neg for nitrites and leuks. COVID / Influenza A + B / MRSA negative (9/13). Procal - 12.74 (9/14). CT PE (9/13) negative for PE or acute cardiopulmomary process. CT neck (9/13) no evidence of thromboses, left retropharyngeal and cervical lymph nodes, likely sequale of UTI. On admit patient started on Vanc + Zosyn (9/12-9/13). On 9/13 patient started on azithromycin + ceftriaxone (9/13-9/14). 9/14 start Zosyn to given continued uptrend of WBC. ID consulted 9/15 given persistent leukocytosis, recs pending. Additionally, patient with worsening SYED on 9/15. Increased rate of D5 1/2 to 100mL/hour on 9/15, repeat UA, urine electrolytes pending. Hgb 5.4 on 9/14, s/p 1 unit PRBC. Hgb with inappropriate incrementation on 9/15, MALISSA and extended MALISSA sent on 9/15.  DC pending infectious work up, improvement of WBC, and further pain control.

## 2024-09-15 NOTE — PROGRESS NOTES
Ruperto Pang is a 24 y.o. female on day 2 of admission presenting with Sickle cell crisis (Multi).    Subjective   Patient seen resting in bed. Reports a headache this morning localized to the temple region, pain radiates down her neck but denies any nuchal rigidity, dizziness or photophobia. Also reports some nausea this morning without any episodes of emesis. Patient also endorses numbness when wiping after voiding, denies any dysuria, foul smell urine, or incontinence. Pain is well controlled today, localized to her head and b/l knees. We discussed plan for ID to see her today. She is eating and drinking well and having bowel movements.        Objective     Physical Exam  Constitutional:       Appearance: Normal appearance.   HENT:      Head: Normocephalic.      Mouth/Throat:      Mouth: Mucous membranes are moist.   Eyes:      General: Scleral icterus present.      Extraocular Movements: Extraocular movements intact.      Pupils: Pupils are equal, round, and reactive to light.   Cardiovascular:      Rate and Rhythm: Normal rate and regular rhythm.   Pulmonary:      Effort: Pulmonary effort is normal.      Breath sounds: Normal breath sounds.   Abdominal:      General: Abdomen is flat. Bowel sounds are normal. There is no distension.      Palpations: Abdomen is soft.      Tenderness: There is no abdominal tenderness.   Musculoskeletal:         General: Normal range of motion.      Cervical back: Normal range of motion and neck supple. Tenderness present.   Neurological:      General: No focal deficit present.      Mental Status: She is alert and oriented to person, place, and time.      Cranial Nerves: No cranial nerve deficit.      Motor: No weakness.      Comments: Negative brudzinski and kernigs sign    Psychiatric:         Mood and Affect: Mood normal.         Last Recorded Vitals  Blood pressure 98/61, pulse 64, temperature 37.1 °C (98.8 °F), temperature source Temporal, resp. rate 18, height 1.524 m (5'),  weight 67.1 kg (148 lb), SpO2 98%, unknown if currently breastfeeding.  Intake/Output last 3 Shifts:  I/O last 3 completed shifts:  In: 1631.8 (24.3 mL/kg) [P.O.:640; I.V.:253.8 (3.8 mL/kg); Blood:288; IV Piggyback:450]  Out: 5 (0.1 mL/kg) [Urine:5 (0 mL/kg/hr)]  Weight: 67.1 kg     Relevant Results                   Scheduled medications  folic acid, 1 mg, oral, Daily  heparin (porcine), 5,000 Units, subcutaneous, q8h TABATHA  [Held by provider] hydroxyurea, 500 mg, oral, q12h TABATHA  pantoprazole, 40 mg, oral, Daily before breakfast  piperacillin-tazobactam, 3.375 g, intravenous, q6h      Continuous medications  dextrose 5%-0.45 % sodium chloride, 75 mL/hr, Last Rate: 75 mL/hr (09/14/24 1317)  dextrose 5%-0.45 % sodium chloride, 100 mL/hr, Last Rate: 75 mL/hr (09/15/24 0833)      PRN medications  PRN medications: diphenhydrAMINE, docusate sodium **OR** polyethylene glycol, HYDROmorphone, ondansetron ODT **OR** [DISCONTINUED] ondansetron             Assessment/Plan   Assessment & Plan  Sickle cell crisis (Multi)    Ruperto Pang is a 24 y.o. 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 Patient afebrile x1 (Tmax 38.5) and significant leukocytosis noted on admission. CXR (9/12) negative for acute cardiopulmonary process. UA (9/12) neg for nitrites and leuks. COVID / Influenza A + B / MRSA negative (9/13). Procal - 12.74 (9/14). CT PE (9/13) negative for PE or acute cardiopulmomary process. CT neck (9/13) no evidence of thromboses, left retropharyngeal and cervical lymph nodes, likely sequale of UTI. On admit patient started on Vanc + Zosyn (9/12-9/13). On 9/13 patient started on azithromycin + ceftriaxone (9/13-9/14). 9/14 start Zosyn to given continued uptrend of WBC. ID consulted 9/15 given persistent leukocytosis, recs pending. Additionally, patient with worsening SYED on  9/15. Increased rate of D5 1/2 to 100mL/hour on 9/15, repeat UA, urine electrolytes pending. Hgb 5.4 on 9/14, s/p 1 unit PRBC. Hgb with inappropriate incrementation on 9/15, MALISSA and extended MALISSA sent on 9/15.  DC pending infectious work up, improvement of WBC, and further pain control.     # Fever, resolved  # Leukocytosis, improved   - Unknown source of infection possibly acute chest vs PNA    - Febrile x2 days before admission, Tmax (38.5) on 9/12, afebrile since   - leukocytosis 33.5 --> 38.1 --> 44.7K (9/14)  --> 22.7K (9/15)   - UA (9/13) negative WBC, leuks, nitrities   - Influenza A+B and COVD negative (9/12)   - MRSA - negative (9/13)   - Lactate - 1.5 (9/12), repeat 1.1 (9/15)   - Procal 12.74 (9/14)  - Blood cultures x2 - NGTD x2 days (9/14)  - CXR (9/13) negative for acute cardiopulmonary process   - CTPE (9/14) negative for PE, or acute cardiopulmonary process   - s/p vanc + zosyn (9/12)  - s/p azithromycin + ceftriaxone (9/13-9/14)   - continue zosyn (9/14--) given continued uptrend of WBC   - ID consulted 9/15, recs pending     # SYED   - sCr 2.03 (9/14) --> 2.46 (9/15)  - s/p D5 1/2 @75mL/hour (9/14-9/15), increased rate to @100mL/hour on 9/15 per attending   - urine electrolytes pending 9/15  - repeat UA pending 9/15  - consider renal US if SYED continues to worsen   - renally dose medications  - avoid nephrotoxic medications     # C/f MALISSA   - Hgb 5.4 --> Hgb incremented briefly to 6.1 on evening labs 9/14 --> Hgb 5.3 of 9/15 (baseline ~6-7)   - MALISSA and extended MALISSA sent and pending 9/15   - hold off on further blood transfusion for now   - consider heme consult if anemia continues to worsen      # Hgb SS with acute on chronic pain   - No active care path   - OARRS reviewed- no aberrancies, last refilled home oxycodone 10mg for 7 day supply (10T) on 8/15/2024   - Currently on Hydroxyurea-held on admit d/t concern for infection   - Hgb 5.4 on 9/14 (BL 6-7) --> s/p 1 unit PRBC on 9/14 with inappropriate  incrementation of hgb, Hgb 5.3 on 9/15  - MALISSA sent and pending 9/15 (please see MALISSA section above)   - Lysis labs elevated on admission bili 13.2 (BL 7-9), now downtrending as of 9/15  - Given no new O2 requirement, chest pain and no radiographic findings of opacities on CXR and CT PE, patient does not currently meet criteria for ACS but will continue to monitor closely and low threshold to repeat chest imaging   - RBCex labs of type and screen, hgb S, ionized calcium sent 9/13 should patient require RBCex   - s/p vanc + zosyn (9/12)  - s/p azithromycin + ceftriaxone (9/13-9/14)   - continue zosyn (9/14--) given continued uptrend of WBC   - For pain continue Dilaudid 1mg q2 hours for severe pain (9/13-current)   - s/p Toradol 30mg IV q6 hours scheduled x12 doses (9/13-9/15), stopped on 9/15 given SYED with protonix PPI, HCG neg on 9/13    - Benadryl 25mg PO PRN for itching, Zofran 8mg q8 hours PRN for nausea, Miralax daily, colace BID PRN for opioid induced constipation   - Continue home folic acid      # Prophy  - S/p Lovenox 40mg subcutaneous daily, 9/15 start heparin subcutaneous given SYED, encourage ambulation   - Started on Protonix 40mg daily with toradol      Dispo   - Full code, confirmed on admission    - Discharge after improvement of fever/leukocytosis, SYED, anemia    - FUV with sickle cell team 11/13  - Emergency contact mother Kat Ibarra [phone]       I spent 60 minutes in the professional and overall care of this patient.    Assessment and plan as above discussed with attending physician, Dr. Mina Gill PA-C

## 2024-09-15 NOTE — CARE PLAN
Problem: Skin  Goal: Decreased wound size/increased tissue granulation at next dressing change  Outcome: Progressing  Goal: Participates in plan/prevention/treatment measures  Outcome: Progressing  Goal: Prevent/manage excess moisture  Outcome: Progressing  Goal: Prevent/minimize sheer/friction injuries  Outcome: Progressing  Goal: Promote/optimize nutrition  Outcome: Progressing  Goal: Promote skin healing  Outcome: Progressing     Problem: Fall/Injury  Goal: Not fall by end of shift  Outcome: Progressing  Goal: Be free from injury by end of the shift  Outcome: Progressing  Goal: Verbalize understanding of personal risk factors for fall in the hospital  Outcome: Progressing  Goal: Verbalize understanding of risk factor reduction measures to prevent injury from fall in the home  Outcome: Progressing  Goal: Use assistive devices by end of the shift  Outcome: Progressing  Goal: Pace activities to prevent fatigue by end of the shift  Outcome: Progressing     Problem: Fall/Injury  Goal: Not fall by end of shift  Outcome: Progressing  Goal: Be free from injury by end of the shift  Outcome: Progressing  Goal: Verbalize understanding of personal risk factors for fall in the hospital  Outcome: Progressing  Goal: Verbalize understanding of risk factor reduction measures to prevent injury from fall in the home  Outcome: Progressing  Goal: Use assistive devices by end of the shift  Outcome: Progressing  Goal: Pace activities to prevent fatigue by end of the shift  Outcome: Progressing   The patient's goals for the shift include Pain level of 3 or less    The clinical goals for the shift include Patient will hemodynamically stable

## 2024-09-15 NOTE — CARE PLAN
The clinical goals for the shift include patient will remain safe and pain will be controlled for the remainder of the shift    Over the shift, the patient did make progress toward the following goals.     Problem: Skin  Goal: Decreased wound size/increased tissue granulation at next dressing change  Outcome: Progressing  Goal: Participates in plan/prevention/treatment measures  Outcome: Progressing  Goal: Prevent/manage excess moisture  Outcome: Progressing  Goal: Prevent/minimize sheer/friction injuries  Outcome: Progressing  Goal: Promote/optimize nutrition  Outcome: Progressing  Goal: Promote skin healing  Outcome: Progressing     Problem: Fall/Injury  Goal: Not fall by end of shift  Outcome: Progressing  Goal: Be free from injury by end of the shift  Outcome: Progressing  Goal: Verbalize understanding of personal risk factors for fall in the hospital  Outcome: Progressing  Goal: Verbalize understanding of risk factor reduction measures to prevent injury from fall in the home  Outcome: Progressing  Goal: Use assistive devices by end of the shift  Outcome: Progressing  Goal: Pace activities to prevent fatigue by end of the shift  Outcome: Progressing     Problem: Pain  Goal: Takes deep breaths with improved pain control throughout the shift  Outcome: Progressing  Goal: Turns in bed with improved pain control throughout the shift  Outcome: Progressing  Goal: Walks with improved pain control throughout the shift  Outcome: Progressing  Goal: Performs ADL's with improved pain control throughout shift  Outcome: Progressing  Goal: Participates in PT with improved pain control throughout the shift  Outcome: Progressing  Goal: Free from opioid side effects throughout the shift  Outcome: Progressing  Goal: Free from acute confusion related to pain meds throughout the shift  Outcome: Progressing

## 2024-09-16 ENCOUNTER — APPOINTMENT (OUTPATIENT)
Dept: RADIOLOGY | Facility: HOSPITAL | Age: 24
DRG: 812 | End: 2024-09-16
Payer: COMMERCIAL

## 2024-09-16 LAB
ABO GROUP (TYPE) IN BLOOD: NORMAL
ALBUMIN SERPL BCP-MCNC: 3.3 G/DL (ref 3.4–5)
ALP SERPL-CCNC: 60 U/L (ref 33–110)
ALT SERPL W P-5'-P-CCNC: 24 U/L (ref 7–45)
ANION GAP SERPL CALC-SCNC: 14 MMOL/L (ref 10–20)
ANTIBODY SCREEN: NORMAL
AST SERPL W P-5'-P-CCNC: 21 U/L (ref 9–39)
BASOPHILS # BLD MANUAL: 0 X10*3/UL (ref 0–0.1)
BASOPHILS NFR BLD MANUAL: 0 %
BILIRUB SERPL-MCNC: 4.8 MG/DL (ref 0–1.2)
BUN SERPL-MCNC: 29 MG/DL (ref 6–23)
CALCIUM SERPL-MCNC: 8.3 MG/DL (ref 8.6–10.6)
CARBOXYTHC UR-MCNC: 95 NG/ML
CHLORIDE SERPL-SCNC: 107 MMOL/L (ref 98–107)
CO2 SERPL-SCNC: 19 MMOL/L (ref 21–32)
CREAT SERPL-MCNC: 2.28 MG/DL (ref 0.5–1.05)
DAT-POLYSPECIFIC: NORMAL
EGFRCR SERPLBLD CKD-EPI 2021: 30 ML/MIN/1.73M*2
EOSINOPHIL # BLD MANUAL: 0 X10*3/UL (ref 0–0.7)
EOSINOPHIL NFR BLD MANUAL: 0 %
ERYTHROCYTE [DISTWIDTH] IN BLOOD BY AUTOMATED COUNT: 20.6 % (ref 11.5–14.5)
GLUCOSE SERPL-MCNC: 84 MG/DL (ref 74–99)
HCT VFR BLD AUTO: 12.1 % (ref 36–46)
HEMOGLOBIN A2: 4.1 % (ref 2–3.5)
HEMOGLOBIN F: 1.7 % (ref 0–2)
HEMOGLOBIN IDENTIFICATION INTERPRETATION: ABNORMAL
HEMOGLOBIN S: 94.2 %
HGB BLD-MCNC: 4.3 G/DL (ref 12–16)
HGB RETIC QN: 25 PG (ref 28–38)
HOWELL-JOLLY BOD BLD QL SMEAR: PRESENT
IMM GRANULOCYTES # BLD AUTO: 0.26 X10*3/UL (ref 0–0.7)
IMM GRANULOCYTES NFR BLD AUTO: 2 % (ref 0–0.9)
IMMATURE RETIC FRACTION: 2.1 %
LDH SERPL L TO P-CCNC: 222 U/L (ref 84–246)
LYMPHOCYTES # BLD MANUAL: 2.97 X10*3/UL (ref 1.2–4.8)
LYMPHOCYTES NFR BLD MANUAL: 23 %
MAGNESIUM SERPL-MCNC: 2.15 MG/DL (ref 1.6–2.4)
MCH RBC QN AUTO: 27.9 PG (ref 26–34)
MCHC RBC AUTO-ENTMCNC: 35.5 G/DL (ref 32–36)
MCV RBC AUTO: 79 FL (ref 80–100)
MONOCYTES # BLD MANUAL: 0.39 X10*3/UL (ref 0.1–1)
MONOCYTES NFR BLD MANUAL: 3 %
NEUTROPHILS # BLD MANUAL: 8.77 X10*3/UL (ref 1.2–7.7)
NEUTS BAND # BLD MANUAL: 1.16 X10*3/UL (ref 0–0.7)
NEUTS BAND NFR BLD MANUAL: 9 %
NEUTS SEG # BLD MANUAL: 7.61 X10*3/UL (ref 1.2–7)
NEUTS SEG NFR BLD MANUAL: 59 %
NRBC BLD-RTO: 0.9 /100 WBCS (ref 0–0)
PAPPENHEIMER BOD BLD QL SMEAR: PRESENT
PATH REVIEW-HGB IDENTIFICATION: ABNORMAL
PLATELET # BLD AUTO: 394 X10*3/UL (ref 150–450)
POTASSIUM SERPL-SCNC: 3.6 MMOL/L (ref 3.5–5.3)
PROCALCITONIN SERPL-MCNC: 1.6 NG/ML
PROT SERPL-MCNC: 6.4 G/DL (ref 6.4–8.2)
RBC # BLD AUTO: 1.54 X10*6/UL (ref 4–5.2)
RBC MORPH BLD: ABNORMAL
RETICS #: 0.35 X10*6/UL (ref 0.02–0.08)
RETICS/RBC NFR AUTO: 23 % (ref 0.5–2)
RH FACTOR (ANTIGEN D): NORMAL
SICKLE CELLS BLD QL SMEAR: ABNORMAL
SODIUM SERPL-SCNC: 136 MMOL/L (ref 136–145)
TARGETS BLD QL SMEAR: ABNORMAL
TOTAL CELLS COUNTED BLD: 100
VARIANT LYMPHS # BLD MANUAL: 0.77 X10*3/UL (ref 0–0.5)
VARIANT LYMPHS NFR BLD: 6 %
WBC # BLD AUTO: 12.9 X10*3/UL (ref 4.4–11.3)

## 2024-09-16 PROCEDURE — 99233 SBSQ HOSP IP/OBS HIGH 50: CPT | Performed by: NURSE PRACTITIONER

## 2024-09-16 PROCEDURE — 1170000001 HC PRIVATE ONCOLOGY ROOM DAILY

## 2024-09-16 PROCEDURE — 80053 COMPREHEN METABOLIC PANEL: CPT

## 2024-09-16 PROCEDURE — 36415 COLL VENOUS BLD VENIPUNCTURE: CPT

## 2024-09-16 PROCEDURE — 86901 BLOOD TYPING SEROLOGIC RH(D): CPT | Performed by: NURSE PRACTITIONER

## 2024-09-16 PROCEDURE — 85027 COMPLETE CBC AUTOMATED: CPT

## 2024-09-16 PROCEDURE — 86922 COMPATIBILITY TEST ANTIGLOB: CPT

## 2024-09-16 PROCEDURE — 85007 BL SMEAR W/DIFF WBC COUNT: CPT

## 2024-09-16 PROCEDURE — 99221 1ST HOSP IP/OBS SF/LOW 40: CPT | Performed by: STUDENT IN AN ORGANIZED HEALTH CARE EDUCATION/TRAINING PROGRAM

## 2024-09-16 PROCEDURE — 85045 AUTOMATED RETICULOCYTE COUNT: CPT

## 2024-09-16 PROCEDURE — 36415 COLL VENOUS BLD VENIPUNCTURE: CPT | Performed by: NURSE PRACTITIONER

## 2024-09-16 PROCEDURE — 2500000004 HC RX 250 GENERAL PHARMACY W/ HCPCS (ALT 636 FOR OP/ED)

## 2024-09-16 PROCEDURE — 2500000001 HC RX 250 WO HCPCS SELF ADMINISTERED DRUGS (ALT 637 FOR MEDICARE OP): Performed by: NURSE PRACTITIONER

## 2024-09-16 PROCEDURE — P9040 RBC LEUKOREDUCED IRRADIATED: HCPCS

## 2024-09-16 PROCEDURE — 2500000001 HC RX 250 WO HCPCS SELF ADMINISTERED DRUGS (ALT 637 FOR MEDICARE OP)

## 2024-09-16 PROCEDURE — 84145 PROCALCITONIN (PCT): CPT | Performed by: NURSE PRACTITIONER

## 2024-09-16 PROCEDURE — 83735 ASSAY OF MAGNESIUM: CPT | Performed by: NURSE PRACTITIONER

## 2024-09-16 PROCEDURE — 2500000005 HC RX 250 GENERAL PHARMACY W/O HCPCS: Performed by: NURSE PRACTITIONER

## 2024-09-16 PROCEDURE — 2500000004 HC RX 250 GENERAL PHARMACY W/ HCPCS (ALT 636 FOR OP/ED): Performed by: NURSE PRACTITIONER

## 2024-09-16 PROCEDURE — 83615 LACTATE (LD) (LDH) ENZYME: CPT

## 2024-09-16 PROCEDURE — 36430 TRANSFUSION BLD/BLD COMPNT: CPT

## 2024-09-16 RX ORDER — DEXTROSE MONOHYDRATE AND SODIUM CHLORIDE 5; .45 G/100ML; G/100ML
100 INJECTION, SOLUTION INTRAVENOUS CONTINUOUS
Status: ACTIVE | OUTPATIENT
Start: 2024-09-16 | End: 2024-09-17

## 2024-09-16 RX ORDER — ONDANSETRON HYDROCHLORIDE 8 MG/1
8 TABLET, FILM COATED ORAL EVERY 8 HOURS PRN
Status: DISCONTINUED | OUTPATIENT
Start: 2024-09-16 | End: 2024-09-25 | Stop reason: HOSPADM

## 2024-09-16 RX ORDER — AMOXICILLIN 250 MG
2 CAPSULE ORAL 2 TIMES DAILY
Status: DISCONTINUED | OUTPATIENT
Start: 2024-09-16 | End: 2024-09-25 | Stop reason: HOSPADM

## 2024-09-16 RX ORDER — PREDNISONE 20 MG/1
40 TABLET ORAL ONCE
Status: COMPLETED | OUTPATIENT
Start: 2024-09-16 | End: 2024-09-16

## 2024-09-16 RX ORDER — DIPHENHYDRAMINE HCL 50 MG
50 CAPSULE ORAL ONCE
Status: COMPLETED | OUTPATIENT
Start: 2024-09-16 | End: 2024-09-16

## 2024-09-16 RX ORDER — DEXTROSE MONOHYDRATE AND SODIUM CHLORIDE 5; .45 G/100ML; G/100ML
500 INJECTION, SOLUTION INTRAVENOUS ONCE
Status: COMPLETED | OUTPATIENT
Start: 2024-09-16 | End: 2024-09-16

## 2024-09-16 RX ORDER — POLYETHYLENE GLYCOL 3350 17 G/17G
17 POWDER, FOR SOLUTION ORAL DAILY
Status: DISCONTINUED | OUTPATIENT
Start: 2024-09-16 | End: 2024-09-25 | Stop reason: HOSPADM

## 2024-09-16 RX ORDER — ONDANSETRON HYDROCHLORIDE 2 MG/ML
4 INJECTION, SOLUTION INTRAVENOUS ONCE
Status: COMPLETED | OUTPATIENT
Start: 2024-09-16 | End: 2024-09-16

## 2024-09-16 RX ORDER — ACETAMINOPHEN 325 MG/1
975 TABLET ORAL ONCE
Status: COMPLETED | OUTPATIENT
Start: 2024-09-16 | End: 2024-09-16

## 2024-09-16 ASSESSMENT — PAIN SCALES - GENERAL
PAINLEVEL_OUTOF10: 8
PAINLEVEL_OUTOF10: 6
PAINLEVEL_OUTOF10: 8
PAINLEVEL_OUTOF10: 5 - MODERATE PAIN

## 2024-09-16 ASSESSMENT — COGNITIVE AND FUNCTIONAL STATUS - GENERAL
DAILY ACTIVITIY SCORE: 24
MOBILITY SCORE: 24
DAILY ACTIVITIY SCORE: 24
MOBILITY SCORE: 24

## 2024-09-16 ASSESSMENT — ACTIVITIES OF DAILY LIVING (ADL): LACK_OF_TRANSPORTATION: NO

## 2024-09-16 ASSESSMENT — PAIN DESCRIPTION - LOCATION: LOCATION: BACK

## 2024-09-16 NOTE — PROGRESS NOTES
09/16/24 1800   Discharge Planning   Living Arrangements Alone   Support Systems Family members;Parent   Assistance Needed none   Type of Residence Private residence   Home or Post Acute Services None   Expected Discharge Disposition Home   Does the patient need discharge transport arranged? Yes   RoundTrip coordination needed? Yes   Financial Resource Strain   How hard is it for you to pay for the very basics like food, housing, medical care, and heating? Not very   Housing Stability   In the last 12 months, was there a time when you were not able to pay the mortgage or rent on time? N   In the past 12 months, how many times have you moved where you were living? 0   At any time in the past 12 months, were you homeless or living in a shelter (including now)? N   Transportation Needs   In the past 12 months, has lack of transportation kept you from medical appointments or from getting medications? no   In the past 12 months, has lack of transportation kept you from meetings, work, or from getting things needed for daily living? No     LSW and TCC met with pt at bedside to complete assessment. Pt admitted for pain mgmt r/t sickle cell disease. Pt plans to return home alone with support from family. Pt reports she has nocturnal O2 at home, but decline any additional HHC/DME use/ needs at this time. Pt may need an uber/lyft home at discharge. LSW to cont to follow.

## 2024-09-16 NOTE — ASSESSMENT & PLAN NOTE
Ruperto Pang is a 24 y.o. 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 Patient afebrile x1 (Tmax 38.5) and significant leukocytosis noted on admission. CXR (9/12) negative for acute cardiopulmonary process. UA (9/12) neg for nitrites and leuks. COVID / Influenza A + B / MRSA negative (9/13). Procal - 12.74 (9/14). CT PE (9/13) negative for PE or acute cardiopulmomary process. CT neck (9/13) no evidence of thromboses, left retropharyngeal and cervical lymph nodes, likely sequale of UTI. On admit patient started on Vanc + Zosyn (9/12-9/13). On 9/13 patient started on azithromycin + ceftriaxone (9/13-9/14). 9/14 start Zosyn to given continued uptrend of WBC. ID consulted 9/15 given persistent leukocytosis, however pt received IV steroids in prep for CT (d/t allergy) and leukocytosis likely 2/2 steroids. ID rec continuing atbs for now. Additionally, patient with worsening SYED on 9/15. Increased rate of D5 1/2 to 100mL/hour on 9/15, repeat UA neg, urine lytes indicating pre renal. Hgb 5.4 on 9/14, s/p 1 unit PRBC. Hgb with inappropriate incrementation on 9/15, MALISSA and extended MALISSA sent on 9/15. Hgb again decreased to 4.3 (9/16) c/f DHTR. Heme consulted 9/16, recs pending. Pt additionally with reported numbness from rectum to urethra, per attending will consider investigating further after anemia addressed. No bowel/bladder incontinence. DC pending improvement in anemia and pain.

## 2024-09-16 NOTE — CONSULTS
Name: Ruperto Pang  MRN: [id number]  Encounter Date: 9/16/2024  PCP: Brook Harris, APRN-CNP  Heme-Onc: Brook Andrade NP    Reason for consult: sickle cell c/f DHTR  Attending Provider: Nemo NEGRETE    Hematology/ Oncology Consult Note      History of Present Illness   Ruperto Pang is a 24 y.o. 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 Patient presented to ED and was found to be febrile to 38.1, tachcardic to 111, and had a leukocytosis to 33.2. Infectious workup including flu, covid, strep throat, UA were negative. CXR negative for acute processes, and CTPE negative for PE.  Lysis labs close to baseline. Patient started on vanc/zosyn for concern for infection, now just on zosyn.    Today, patient endorses dizziness and fatigue. She also endorses perineal numbness but states this occurred before transfusion.    Heme/Onc History    Hgb SS disease currently taking hydroxyurea. Pain control with ibuprofen and oxycodone. Nasal cannula O2 supplementation at night. History of acute chest syndrome.    Past Medical history     Past Medical History:   Diagnosis Date    Encounter for contraceptive management, unspecified 10/14/2016    Contraception management    Encounter for screening for disorder due to exposure to contaminants     Screening for lead exposure    Hb-SS disease with acute chest syndrome (Multi) 12/14/2016    Acute chest syndrome due to Hgb-S disease    Personal history of diseases of the blood and blood-forming organs and certain disorders involving the immune mechanism 03/02/2022    History of sickle cell anemia         Past Surgical History   History reviewed. No pertinent surgical history.      Family History      Family History   Problem Relation Name Age of Onset    Sickle cell anemia Sister           Social History     Social History     Socioeconomic History     Marital status: Single   Tobacco Use    Smoking status: Never    Smokeless tobacco: Never   Substance and Sexual Activity    Alcohol use: Never    Drug use: Never    Sexual activity: Defer     Social Determinants of Health     Financial Resource Strain: Low Risk  (2024)    Overall Financial Resource Strain (CARDIA)     Difficulty of Paying Living Expenses: Not hard at all   Transportation Needs: No Transportation Needs (2024)    PRAPARE - Transportation     Lack of Transportation (Medical): No     Lack of Transportation (Non-Medical): No   Housing Stability: Low Risk  (2024)    Housing Stability Vital Sign     Unable to Pay for Housing in the Last Year: No     Number of Times Moved in the Last Year: 0     Homeless in the Last Year: No         Allergies     Allergies   Allergen Reactions    Iodinated Contrast Media Hives       Medications   folic acid, 1 mg, Daily  heparin (porcine), 5,000 Units, q8h TABATHA  [Held by provider] hydroxyurea, 500 mg, q12h TABATHA  pantoprazole, 40 mg, Daily before breakfast  piperacillin-tazobactam, 3.375 g, q6h  polyethylene glycol, 17 g, Daily  sennosides-docusate sodium, 2 tablet, BID      dextrose 5%-0.45 % sodium chloride      diphenhydrAMINE, 25 mg, q6h PRN  HYDROmorphone, 1 mg, q2h PRN  ondansetron, 8 mg, q8h PRN        Review of Systems   Review of Systems   10 pt ROS reviewed and negative aside from above    Physical Exam   Blood pressure 98/61, pulse 65, temperature 36.5 °C (97.7 °F), temperature source Temporal, resp. rate 18, height 1.524 m (5'), weight 69.8 kg (153 lb 14.1 oz), SpO2 94%, unknown if currently breastfeeding.    ECO    Gen: awake, alert, in no acute distress  HEENT: AT/NC, PEERL, EOMI, no LAD, scleral icterus  CV: RRR, no m/r/g  Pulm: CTAB, without w/r/r  Abd: soft, mild abdominal tenderness, no organomegaly  Ext: no LE edema  Skin: warm and dry  Neuro: A&Ox4, moves all 4 extremities spontaneously     Labs     Lab Results   Component Value Date     GLUCOSE 84 09/16/2024    CALCIUM 8.3 (L) 09/16/2024     09/16/2024    K 3.6 09/16/2024    CO2 19 (L) 09/16/2024     09/16/2024    BUN 29 (H) 09/16/2024    CREATININE 2.28 (H) 09/16/2024       Lab Results   Component Value Date    WBC 12.9 (H) 09/16/2024    HGB 4.3 (LL) 09/16/2024    HCT 12.1 (L) 09/16/2024    MCV 79 (L) 09/16/2024     09/16/2024       Lab Results   Component Value Date    ALT 24 09/16/2024    AST 21 09/16/2024    GGT 68 (H) 07/31/2019    ALKPHOS 60 09/16/2024    BILITOT 4.8 (H) 09/16/2024      Most Recent   MALISSA-POLYSPECIFIC NEG  9/15/24 20:36      Latest Reference Range & Units 09/15/24 10:19   Color, Urine Light-Yellow, Yellow, Dark-Yellow  Yellow   Appearance, Urine Clear  Clear   Specific Gravity, Urine 1.005 - 1.035  1.022   pH, Urine 5.0, 5.5, 6.0, 6.5, 7.0, 7.5, 8.0  6.0   Protein, Urine NEGATIVE, 10 (TRACE), 20 (TRACE) mg/dL 30 (1+) !   Glucose, Urine Normal mg/dL Normal   Blood, Urine NEGATIVE  NEGATIVE   Ketones, Urine NEGATIVE mg/dL NEGATIVE   Bilirubin, Urine NEGATIVE  NEGATIVE   Urobilinogen, Urine Normal mg/dL 2 (1+) !   Nitrite, Urine NEGATIVE  NEGATIVE   Leukocyte Esterase, Urine NEGATIVE  NEGATIVE   Creatinine, Urine Random 20.0 - 320.0 mg/dL 76.2   !: Data is abnormal  Imaging   CT angio chest for pulmonary embolism 9/14/24  IMPRESSION:  1. No evidence of acute pulmonary embolism or other acute  cardiopulmonary process.    CT soft tissue neck w IV contrast 9/13/24  IMPRESSION:  1. No evidence of thrombus of the internal jugular veins.  2. Diffuse prominence of Waldeyer's ring. 1.3 x 0.8 cm left  retropharyngeal lymph node. Additional prominent cervical lymph nodes bilaterally which do not meet size criteria for lymphadenopathy. Findings may represent sequela of previous URI. Correlation with history recommended. No focal fluid collection appreciated.  3. No soft tissue mass or adenopathy within the visualized neck.        Assessment/Plan     Ruperto Pang is a 24  y.o. female w/ a past medical history of Hgb SS disease on hydroxyurea who presented to the ED with flu like symptoms, fever, leukocytosis and acute pain crisis. Patient still on Zosyn due to concern for infection.     Patient is unlikely to be in delayed hemolytic transfusion reaction as she is not having chills, fevers, or rigors. MALISSA is also negative. There is no hemosiderin in urine which also is reassuring. Slide request ordered, will review tomorrow.    Primary team discussed with Dr. Dumas and recommend transfusion of 1u pRBCs and premedication with tylenol and benadryl.    Thank you for this consult, we will continue to follow. Patient seen and discussed with attending physician, Dr. Villavicencio, who agrees with the above.     Tory Frey, MS4  Hematology Consult Pager: [phone]  Oncology Consult Pager: [phone]

## 2024-09-16 NOTE — PROGRESS NOTES
Ruperto Pang is a 24 y.o. female on day 3 of admission presenting with Sickle cell crisis (Multi).    Subjective   Seen this AM at the bedside. She reports feeling fatigued, ongoing congestion. Sickle cell pain is controlled today. She additionally reports perineal numbness. Denies any tingling unless she is touching the area. Says the area involved is her rectum to her urethra. She is able to tell when she has to urinate or have a BM. Denies any incontinence. She otherwise denies any fevers/chills, SOB, or CP.    Objective     Physical Exam  Constitutional:       Appearance: Normal appearance.   HENT:      Head: Normocephalic.      Mouth/Throat:      Mouth: Mucous membranes are moist.   Eyes:      General: Scleral icterus present.      Extraocular Movements: Extraocular movements intact.      Pupils: Pupils are equal, round, and reactive to light.   Cardiovascular:      Rate and Rhythm: Normal rate and regular rhythm.   Pulmonary:      Effort: Pulmonary effort is normal.      Breath sounds: Normal breath sounds.   Abdominal:      General: Abdomen is flat. Bowel sounds are normal. There is no distension.      Palpations: Abdomen is soft.      Tenderness: There is no abdominal tenderness.   Musculoskeletal:         General: Normal range of motion.      Cervical back: Normal range of motion and neck supple. Tenderness present.   Neurological:      General: No focal deficit present.      Mental Status: She is alert and oriented to person, place, and time.      Cranial Nerves: No cranial nerve deficit.      Motor: No weakness.      Comments: Negative brudzinski and kernigs sign    Psychiatric:         Mood and Affect: Mood normal.       Last Recorded Vitals  Blood pressure 98/61, pulse 65, temperature 36.5 °C (97.7 °F), temperature source Temporal, resp. rate 18, height 1.524 m (5'), weight 69.8 kg (153 lb 14.1 oz), SpO2 94%, unknown if currently breastfeeding.  Intake/Output last 3 Shifts:  I/O last 3 completed  shifts:  In: 1954.2 (29.1 mL/kg) [I.V.:1154.2 (17.2 mL/kg); IV Piggyback:800]  Out: 102 (1.5 mL/kg) [Urine:102 (0 mL/kg/hr)]  Weight: 67.1 kg     Results for orders placed or performed during the hospital encounter of 09/13/24 (from the past 24 hour(s))   Direct Antiglobulin Test   Result Value Ref Range    MALISSA-POLYSPECIFIC NEG    CBC and Auto Differential   Result Value Ref Range    WBC 12.9 (H) 4.4 - 11.3 x10*3/uL    nRBC 0.9 (H) 0.0 - 0.0 /100 WBCs    RBC 1.54 (L) 4.00 - 5.20 x10*6/uL    Hemoglobin 4.3 (LL) 12.0 - 16.0 g/dL    Hematocrit 12.1 (L) 36.0 - 46.0 %    MCV 79 (L) 80 - 100 fL    MCH 27.9 26.0 - 34.0 pg    MCHC 35.5 32.0 - 36.0 g/dL    RDW 20.6 (H) 11.5 - 14.5 %    Platelets 394 150 - 450 x10*3/uL    Immature Granulocytes %, Automated 2.0 (H) 0.0 - 0.9 %    Immature Granulocytes Absolute, Automated 0.26 0.00 - 0.70 x10*3/uL   Comprehensive Metabolic Panel   Result Value Ref Range    Glucose 84 74 - 99 mg/dL    Sodium 136 136 - 145 mmol/L    Potassium 3.6 3.5 - 5.3 mmol/L    Chloride 107 98 - 107 mmol/L    Bicarbonate 19 (L) 21 - 32 mmol/L    Anion Gap 14 10 - 20 mmol/L    Urea Nitrogen 29 (H) 6 - 23 mg/dL    Creatinine 2.28 (H) 0.50 - 1.05 mg/dL    eGFR 30 (L) >60 mL/min/1.73m*2    Calcium 8.3 (L) 8.6 - 10.6 mg/dL    Albumin 3.3 (L) 3.4 - 5.0 g/dL    Alkaline Phosphatase 60 33 - 110 U/L    Total Protein 6.4 6.4 - 8.2 g/dL    AST 21 9 - 39 U/L    Bilirubin, Total 4.8 (H) 0.0 - 1.2 mg/dL    ALT 24 7 - 45 U/L   Lactate Dehydrogenase   Result Value Ref Range     84 - 246 U/L   Reticulocytes   Result Value Ref Range    Retic % 23.0 (H) 0.5 - 2.0 %    Retic Absolute 0.354 (H) 0.018 - 0.083 x10*6/uL    Reticulocyte Hemoglobin 25 (L) 28 - 38 pg    Immature Retic fraction 2.1 <=16.0 %   Manual Differential   Result Value Ref Range    Neutrophils %, Manual 59.0 40.0 - 80.0 %    Bands %, Manual 9.0 0.0 - 5.0 %    Lymphocytes %, Manual 23.0 13.0 - 44.0 %    Monocytes %, Manual 3.0 2.0 - 10.0 %    Eosinophils  %, Manual 0.0 0.0 - 6.0 %    Basophils %, Manual 0.0 0.0 - 2.0 %    Atypical Lymphocytes %, Manual 6.0 0.0 - 2.0 %    Seg Neutrophils Absolute, Manual 7.61 (H) 1.20 - 7.00 x10*3/uL    Bands Absolute, Manual 1.16 (H) 0.00 - 0.70 x10*3/uL    Lymphocytes Absolute, Manual 2.97 1.20 - 4.80 x10*3/uL    Monocytes Absolute, Manual 0.39 0.10 - 1.00 x10*3/uL    Eosinophils Absolute, Manual 0.00 0.00 - 0.70 x10*3/uL    Basophils Absolute, Manual 0.00 0.00 - 0.10 x10*3/uL    Atypical Lymphs Absolute, Manual 0.77 (H) 0.00 - 0.50 x10*3/uL    Total Cells Counted 100     Neutrophils Absolute, Manual 8.77 (H) 1.20 - 7.70 x10*3/uL    RBC Morphology See Below     Sickle Cells Many     Target Cells Many     Yang-[place] Bodies Present     Pappenheimer Bodies Present                  Scheduled medications  folic acid, 1 mg, oral, Daily  heparin (porcine), 5,000 Units, subcutaneous, q8h TABATHA  [Held by provider] hydroxyurea, 500 mg, oral, q12h TABATHA  pantoprazole, 40 mg, oral, Daily before breakfast  piperacillin-tazobactam, 3.375 g, intravenous, q6h  polyethylene glycol, 17 g, oral, Daily  sennosides-docusate sodium, 2 tablet, oral, BID      Continuous medications       PRN medications  PRN medications: diphenhydrAMINE, HYDROmorphone, ondansetron             Assessment/Plan   Assessment & Plan  Sickle cell crisis (Multi)    Ruperto Pang is a 24 y.o. 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 Patient afebrile x1 (Tmax 38.5) and significant leukocytosis noted on admission. CXR (9/12) negative for acute cardiopulmonary process. UA (9/12) neg for nitrites and leuks. COVID / Influenza A + B / MRSA negative (9/13). Procal - 12.74 (9/14). CT PE (9/13) negative for PE or acute cardiopulmomary process. CT neck (9/13) no evidence of thromboses, left retropharyngeal and cervical lymph nodes, likely  sequale of UTI. On admit patient started on Vanc + Zosyn (9/12-9/13). On 9/13 patient started on azithromycin + ceftriaxone (9/13-9/14). 9/14 start Zosyn to given continued uptrend of WBC. ID consulted 9/15 given persistent leukocytosis, however pt received IV steroids in prep for CT (d/t allergy) and leukocytosis likely 2/2 steroids. ID rec continuing atbs for now. Additionally, patient with worsening SYED on 9/15. Increased rate of D5 1/2 to 100mL/hour on 9/15, repeat UA neg, urine lytes indicating pre renal. Hgb 5.4 on 9/14, s/p 1 unit PRBC. Hgb with inappropriate incrementation on 9/15, MALISSA and extended MALISSA sent on 9/15. Hgb again decreased to 4.3 (9/16) c/f DHTR. Heme consulted 9/16, recs pending. Pt additionally with reported numbness from rectum to urethra, per attending will consider investigating further after anemia addressed. No bowel/bladder incontinence. DC pending improvement in anemia and pain.    # Fever, resolved  # Leukocytosis, improved   - Unknown source of infection possibly acute chest vs PNA    - Febrile x2 days before admission, Tmax (38.5) on 9/12, afebrile since   - leukocytosis 33.5 --> 38.1 --> 44.7K (9/14)  --> 22.7K (9/15)--> 12.9 (9/16)  - UA (9/13) negative WBC, leuks, nitrities   - Influenza A+B and COVD negative (9/12)   - MRSA - negative (9/13)   - Lactate - 1.5 (9/12), repeat 1.1 (9/15)   - Procal 12.74 (9/14), repeat procal 9/16 pending  - Blood cultures x2 - NGTD (9/14)  - CXR (9/13) negative for acute cardiopulmonary process   - CTPE (9/14) negative for PE, or acute cardiopulmonary process   - s/p vanc + zosyn (9/12)  - s/p azithromycin + ceftriaxone (9/13-9/14)   - Continue zosyn (9/14--) given continued uptrend of WBC   - ID consulted 9/15 given persistent leukocytosis, however pt received IV steroids in prep for CT (d/t allergy) and leukocytosis likely 2/2 steroids. ID rec continuing atbs for now    # SYED   - sCr 2.03 (9/14) --> 2.46 (9/15)  - s/p D5 1/2 @75mL/hour  (9/14-9/15), increased rate to @100mL/hour on 9/15 per attending-- continuing for another 24hrs (plan stop 9/17- ordered to fall off)  - Urine lytes (9/15) indicating pre-renal  - Repeat UA (9/15): negative  - Consider renal US if SYED up-trends again  - Renally dose medications  - Avoid nephrotoxic medications     # c/f DHTR  - Hgb (baseline ~6-7), 5.4 --> Hgb incremented briefly to 6.1 on evening labs 9/14 --> Hgb 5.3 of 9/15--> decreased to 4.3 (9/16) increasing suspicion for DHTR  - MALISSA (9/15) negative, extended MALISSA pending  - Hold off on further blood transfusion for now pending heme's input  - Heme consulted for c/f DHTR (9/16), slide request sent, formal recs pending     # Hgb SS with acute on chronic pain   - No active care path   - OARRS reviewed- no aberrancies, last refilled home oxycodone 10mg for 7 day supply (10T) on 8/15/2024   - Currently on Hydroxyurea-held on admit d/t concern for infection   - See Hgb as above  - Hgb S (9/13): 94.2%  - Lysis labs elevated on admission bili 13.2 (BL 7-9), now downtrending as of 9/15  - Given no new O2 requirement, chest pain and no radiographic findings of opacities on CXR and CT PE, patient does not currently meet criteria for ACS but will continue to monitor closely and low threshold to repeat chest imaging   - RBCex labs of type and screen, hgb S, ionized calcium sent 9/13 should patient require RBCex   - For pain continue Dilaudid 1mg q2 hours for severe pain (9/13-current)   - s/p Toradol 30mg IV q6 hours scheduled x12 doses (9/13-9/15), stopped on 9/15 given SYED with protonix PPI, HCG neg on 9/13    - Benadryl 25mg PO PRN for itching, Zofran 8mg q8 hours PRN for nausea, Miralax daily, DocuSenna 2tabs BID for opioid-induced constipation   - Continue home folic acid 1mg daily    # Numbness in Pudenal nerve distribution  - Possibly 2/2 decreased tissue oxygenation I/s/o significant anemia  - Pt reports numbness from rectum to urethra  - Denies any bowel/bladder  incontinence  - Per pt, can feel when she needs to urinate or have BM  - Per attending, will consider investigating further after anemia addressed     # Prophy  - S/p Lovenox 40mg subcutaneous daily, 9/15 start heparin subcutaneous given SYED, encourage ambulation   - Started on Protonix 40mg daily with toradol      Dispo   - Full code, confirmed on admission    - DC pending improvement in anemia and pain  - FUV with sickle cell team 11/13  - Emergency contact mother Kat Ibarra [phone]    I spent >60 minutes in the professional and overall care of this patient.    Assessment and plan as above discussed with attending physician, Dr. Yenni Matthews, APRN-CNP

## 2024-09-16 NOTE — CARE PLAN
The patient's goals for the shift include Pain level of 3 or less    The clinical goals for the shift include Patient will remain HDS through end of shift    Over the shift, the patient did make progress toward the following goals. Barriers to progression include low hemoglobin, anxiety, blood product administration. Recommendations to address these barriers include continue to monitor labs, vitals, and monitor closely during blood administration.

## 2024-09-16 NOTE — NURSING NOTE
Nursing Note  Patient remains afebrile, continue to receive IV antibiotic. Vitals signs stable except blood pressure decreased. Hemoglobin -5.3 , Carolina MENDEZ notified about it. No order given for PRBCs  to transfuse. Patient complains of dizziness and nausea , Carolina MENDEZ notified about it. Patient received Ondansetron 8 MG PO. Patient continue to receive   IV fluid DW.45% NaCl at 100 ML/HR. At 1703 blood pressure -88/47, Carolina MENDEZ   notified about it. Patient received  500 ML DW.45% NaCl over one hour as per PA order. At 1828 blood pressure- 87/51 , Carolina MENDEZ notified about it. Patient received 500 ML 0.9 % NaCl over one hour.  PO intake poor except PO fluid .Voids clear trixie urine. Patient remains on bed rest.Continue to have pain related to sickle cells crisis.Pain is managed with Hydromorphone IV. Will continue to monitor patient and will notify MD/PA  of acute changes.

## 2024-09-16 NOTE — CONSULTS
Inpatient consult to Infectious Diseases  Consult performed by: Migdalia TRAN MD  Consult ordered by: Carolina Gill PA-C      Referred by Carolina Gill PA-C    Primary MD: JANICE Castillo    Reason For Consult  Marked leukocytosis    History Of Present Illness  Ruperto Pang is a 24 y.o. female with Sickle cell disease who presented in acute pain crisis.  Her typical crises involve low back pain traveling down both legs to her knees.  She is on Hydrea for disease modification and uses 2 L of oxygen by nasal cannula in the evenings at home for sickle cell as well as headache prophylaxis.  She presented to the emergency department on 9/1/2024 for acute sickle cell crisis.  Her presenting symptoms at that time her back pain and bilateral lower extremity pain.  At the time she was afebrile but her white count was 16.9.  She was 85% oxygenation on room air that augmented to 93% on 2 L nasal cannula.  During that evaluation, she had a negative urinalysis and culture.  Her white blood cell count was 16.9 with a hemoglobin of 6 and a platelet count of 532.  She was hydrated given pain management and discharged to her home at her request.    On 9/12/2024, she was at work at  [employer].  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  She began feeling unwell so left work to come to the emergency department.  In the ER she had a fever and complained of shortness of breath with cough and her usual back pain with radiation to her legs.  Her white count was 33.2 with a hemoglobin of 7 and a platelets of 587.  Workup included flu, COVID, and group A strep testing all of which were negative.  Her nares swab was negative for Staph aureus.  She received a single dose of vancomycin and piperacillin/tazobactam.  Her fevers resolved and have not returned.    A CT scan of her chest abdomen and pelvis was ordered however given a contrast allergy, she was administered methylprednisolone followed by  hydrocortisone for total dose of 600 mg on 9/13/2024.   after this administration, her WBC count idalia abruptly to 44.7 but has since declined now to 22.7.    Her infectious evaluation includes 2 sets of blood cultures on 9/13/2024 which are negative.  Nares swab which was negative for Staph aureus as well as a negative group A strep.  A CT angiogram of her chest to exclude pulmonary embolism revealed no pulmonary infiltrates.  Her antibiotic history included    A single dose of vancomycin and piperacillin/tazobactam on 9/12/2024.  A single dose of azithromycin and ceftriaxone on 9/13/2024.  9/14/2024, azithromycin was maintained and piperacillin tazobactam was initiated at normal dosing.  These are both at day 3 and 2 respectively.     Currently, on full review of systems, she denies headache or vision changes.  She endorses sinus congestion and runny nose  But denies postnasal drip.  She has no cough.  Her shortness of breath is at baseline.  Her back pain and leg pain consistent with her sickle cell crises in the past is improved.  Her most significant positive review of systems is the onset of    Perineal numbness since last evening.  She describes that she cannot feel when she urinates or when she wipes both her rectal area or after urination.  She denies dysuria.  She also denies any loss of sphincter control or constipation.  She is able to urinate without difficulty or straining.  She has no foot drop or lower extremity weakness.  She is able to ambulate without difficulty.    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    Surgical History  She has no past surgical  history on file.     Social History     Occupational History    Not on file   Tobacco Use    Smoking status: Never    Smokeless tobacco: Never   Substance and Sexual Activity    Alcohol use: Never    Drug use: Never    Sexual activity: Defer     Travel History   Travel since 08/15/24    No documented travel since 08/15/24            Family History  Family History   Problem Relation Name Age of Onset    Sickle cell anemia Sister       Allergies  Iodinated contrast media     Immunization History   Administered Date(s) Administered    Pfizer Gray Cap SARS-CoV-2 04/14/2022     Medications  Home medications:  Medications Prior to Admission   Medication Sig Dispense Refill Last Dose    folic acid (Folvite) 1 mg tablet Take 1 tablet (1 mg) by mouth once daily. 60 tablet 1 9/12/2024    hydroxyurea (Hydrea) 500 mg capsule Take 1 capsule (500 mg total) by mouth 2 times a day. (Patient not taking: Reported on 9/13/2024) 60 capsule 2 Not Taking    ibuprofen 200 mg tablet Take 2 tablets (400 mg) by mouth every 6 hours if needed for mild pain (1 - 3) or moderate pain (4 - 6). 60 tablet 2     oxyCODONE (Roxicodone) 10 mg immediate release tablet Take 1 tablet (10 mg) by mouth every 6 hours if needed for severe pain (7 - 10).   9/11/2024    polyethylene glycol (Glycolax, Miralax) 17 gram packet Take 17 g by mouth if needed.       sennosides-docusate sodium (Senna-S) 8.6-50 mg tablet Take 1 tablet by mouth once daily. 30 tablet 2      Current medications:  Scheduled medications  folic acid, 1 mg, oral, Daily  heparin (porcine), 5,000 Units, subcutaneous, q8h TABATHA  [Held by provider] hydroxyurea, 500 mg, oral, q12h TABATHA  pantoprazole, 40 mg, oral, Daily before breakfast  piperacillin-tazobactam, 3.375 g, intravenous, q6h      Continuous medications  dextrose 5%-0.45 % sodium chloride, 100 mL/hr, Last Rate: 100 mL/hr (09/15/24 1211)      PRN medications  PRN medications: diphenhydrAMINE, docusate sodium **OR** polyethylene glycol,  HYDROmorphone, ondansetron ODT **OR** [DISCONTINUED] ondansetron    Review of Systems  Full 14 point ROS performed.  All pertinent positives and negatives as documented in HPI.      Objective  Range of Vitals (last 24 hours)  Heart Rate:  [57-78]   Temp:  [36.6 °C (97.9 °F)-37.1 °C (98.8 °F)]   Resp:  [15-18]   BP: ()/(47-62)   SpO2:  [95 %-98 %]   Daily Weight  09/12/24 : 67.1 kg (148 lb)    Body mass index is 28.9 kg/m².     Physical Exam  Vitals reviewed.   Constitutional:       General: She is not in acute distress.     Appearance: Normal appearance. She is not ill-appearing, toxic-appearing or diaphoretic.   HENT:      Head: Normocephalic and atraumatic.      Nose: Nose normal.      Mouth/Throat:      Mouth: Mucous membranes are moist.      Pharynx: No oropharyngeal exudate or posterior oropharyngeal erythema.   Eyes:      General: Scleral icterus present.         Right eye: No discharge.         Left eye: No discharge.      Conjunctiva/sclera: Conjunctivae normal.      Pupils: Pupils are equal, round, and reactive to light.   Cardiovascular:      Rate and Rhythm: Regular rhythm. Bradycardia present.      Pulses: Normal pulses.      Heart sounds: Normal heart sounds.   Pulmonary:      Effort: Pulmonary effort is normal. No respiratory distress.      Breath sounds: Normal breath sounds. No wheezing or rales.   Abdominal:      General: Bowel sounds are normal. There is no distension.      Palpations: Abdomen is soft. There is no mass.      Tenderness: There is abdominal tenderness (RUQ and Mid epigastric pain). There is no right CVA tenderness, left CVA tenderness, guarding or rebound.      Hernia: No hernia is present.   Musculoskeletal:         General: Normal range of motion.      Cervical back: Normal range of motion and neck supple. No rigidity.   Lymphadenopathy:      Cervical: No cervical adenopathy.   Skin:     General: Skin is warm.   Neurological:      General: No focal deficit present.       Mental Status: She is alert and oriented to person, place, and time.      Sensory: Sensory deficit present.   Psychiatric:         Mood and Affect: Mood normal.         Behavior: Behavior normal.         Thought Content: Thought content normal.         Judgment: Judgment normal.          Relevant Results    Labs  Results from last 72 hours   Lab Units 09/15/24  0657 09/14/24  2052 09/14/24  1029 09/13/24  0635   WBC AUTO x10*3/uL 22.7* 35.5* 44.7* 38.1*   HEMOGLOBIN g/dL 5.3* 6.1* 5.4* 6.7*   HEMATOCRIT % 14.9* 17.3* 15.5* 19.4*   PLATELETS AUTO x10*3/uL 393 428 450 527*   NEUTROS PCT AUTO % 79.4 88.5  --  90.8   LYMPHO PCT MAN %  --   --  1.7  --    LYMPHS PCT AUTO % 14.6 7.6  --  4.1   MONO PCT MAN %  --   --  0.0  --    MONOS PCT AUTO % 4.5 2.6  --  3.1   EOSINO PCT MAN %  --   --  0.0  --    EOS PCT AUTO % 0.5 0.1  --  0.0     Results from last 72 hours   Lab Units 09/15/24  0657 09/14/24  2052 09/14/24  1029   SODIUM mmol/L 137 135* 135*   POTASSIUM mmol/L 3.8 3.1* 4.0   CHLORIDE mmol/L 104 101 100   CO2 mmol/L 22 21 21   BUN mg/dL 46* 47* 44*   CREATININE mg/dL 2.46* 2.29* 2.03*   GLUCOSE mg/dL 70* 120* 77   CALCIUM mg/dL 8.5* 8.5* 8.5*   ANION GAP mmol/L 15 16 18   EGFR mL/min/1.73m*2 27* 30* 35*     Results from last 72 hours   Lab Units 09/15/24  0657 09/14/24  2052 09/14/24  1029   ALK PHOS U/L 59 64 73   BILIRUBIN TOTAL mg/dL 6.3* 7.4* 6.9*   PROTEIN TOTAL g/dL 6.7 6.9 7.6   ALT U/L 26 23 24   AST U/L 24 20 24   ALBUMIN g/dL 3.6 3.8 4.2     Estimated Creatinine Clearance: 30.1 mL/min (A) (by C-G formula based on SCr of 2.46 mg/dL (H)).  CRP   Date Value Ref Range Status   08/09/2021 5.11 (A) mg/dL Final     Comment:     REF VALUE  < 1.00     12/28/2020 0.93 mg/dL Final     Comment:     REF VALUE  < 1.00       HIV 1/2 Antigen/Antibody Screen with Reflex to Confirmation   Date Value Ref Range Status   03/07/2024 Nonreactive Nonreactive Final     Hepatitis C AB   Date Value Ref Range Status   03/07/2024  Nonreactive Nonreactive Final     Comment:     Results from patients taking biotin supplements or receiving high-dose biotin therapy should be interpreted with caution due to possible interference with this test. Providers may contact their local laboratory for further information.     Microbiology  No positive results found for the last 90 days.        Imaging         Assessment/Plan   24-year-old woman with known sickle cell disease admitted with acute pain crisis.  She had fevers and chills in association with her usual back and leg pain symptoms.  She also had marked leukocytosis with a negative urine, blood cultures and negative chest CT for pneumonia.  We were consulted for a marked elevation in her leukocyte count however this temporally  was associated with the administration of high-dose steroids in preparation for receiving iodinated contrast dye.  Since stopping steroids, her white blood cell count has declined to 22,000 which is lower than her admission white count.  I am not concerned of an infectious etiology for this leukocytosis.  I suspect that it may be related to her sickle cell flare with augmentation due to systemic steroids.  More concern is this acute onset of    Numbness in the pudendal nerve distribution.  She does not seem to have loss of control of sphincter muscles it is merely a sensory deficit.  The etiology of this is unknown though it may be related to tissue hypoxia  due to her profound anemia.  If symptoms persist after improvement with transfusion and oxygen, I would reach out to neurology in the morning to determine the best test to assess this function.    RECOMMENDATIONS:     Continue current antibiotics.  I advised patient to start wearing her nasal oxygen to see if this improves her numbness symptoms  Would transfuse PRBC to increase tissue oxygenation  Consult neurology for assessment and guidance on appropriate testing to assess for pudendal nerve dysfunction given her  contrast allergy.    Will follow    I spent 90 minutes in the professional and overall care of this patient.  Migdalia Mitchell MD.  (please reach through EPIC Chat)  Infectious Diseases, Senior Attending Physician  Team B (non-fellow service), EP[facility] Chat

## 2024-09-17 ENCOUNTER — APPOINTMENT (OUTPATIENT)
Dept: RADIOLOGY | Facility: HOSPITAL | Age: 24
DRG: 812 | End: 2024-09-17
Payer: COMMERCIAL

## 2024-09-17 LAB
ALBUMIN SERPL BCP-MCNC: 3.4 G/DL (ref 3.4–5)
ALP SERPL-CCNC: 69 U/L (ref 33–110)
ALT SERPL W P-5'-P-CCNC: 22 U/L (ref 7–45)
ANION GAP SERPL CALC-SCNC: 12 MMOL/L (ref 10–20)
APTT PPP: 29 SECONDS (ref 27–38)
AST SERPL W P-5'-P-CCNC: 19 U/L (ref 9–39)
BACTERIA BLD CULT: NORMAL
BACTERIA BLD CULT: NORMAL
BASOPHILS # BLD AUTO: 0.08 X10*3/UL (ref 0–0.1)
BASOPHILS NFR BLD AUTO: 0.6 %
BILIRUB SERPL-MCNC: 4 MG/DL (ref 0–1.2)
BLOOD EXPIRATION DATE: NORMAL
BUN SERPL-MCNC: 18 MG/DL (ref 6–23)
CA-I BLD-SCNC: 1.13 MMOL/L (ref 1.1–1.33)
CALCIUM SERPL-MCNC: 8.4 MG/DL (ref 8.6–10.6)
CHLORIDE SERPL-SCNC: 108 MMOL/L (ref 98–107)
CO2 SERPL-SCNC: 20 MMOL/L (ref 21–32)
CREAT SERPL-MCNC: 1.92 MG/DL (ref 0.5–1.05)
DISPENSE STATUS: NORMAL
EGFRCR SERPLBLD CKD-EPI 2021: 37 ML/MIN/1.73M*2
EOSINOPHIL # BLD AUTO: 0.3 X10*3/UL (ref 0–0.7)
EOSINOPHIL NFR BLD AUTO: 2.2 %
ERYTHROCYTE [DISTWIDTH] IN BLOOD BY AUTOMATED COUNT: 22.1 % (ref 11.5–14.5)
GLUCOSE SERPL-MCNC: 88 MG/DL (ref 74–99)
HCT VFR BLD AUTO: 13 % (ref 36–46)
HEMOGLOBIN A2: 2.9 % (ref 2–3.5)
HEMOGLOBIN A: 36.7 % (ref 95.8–98)
HEMOGLOBIN F: 1.7 % (ref 0–2)
HEMOGLOBIN IDENTIFICATION INTERPRETATION: ABNORMAL
HEMOGLOBIN S: 58.7 %
HGB BLD-MCNC: 4.8 G/DL (ref 12–16)
HGB RETIC QN: 21 PG (ref 28–38)
HOWELL-JOLLY BOD BLD QL SMEAR: PRESENT
IMM GRANULOCYTES # BLD AUTO: 0.35 X10*3/UL (ref 0–0.7)
IMM GRANULOCYTES NFR BLD AUTO: 2.6 % (ref 0–0.9)
IMMATURE RETIC FRACTION: 4.1 %
INR PPP: 1.1 (ref 0.9–1.1)
LDH SERPL L TO P-CCNC: 208 U/L (ref 84–246)
LYMPHOCYTES # BLD AUTO: 3.63 X10*3/UL (ref 1.2–4.8)
LYMPHOCYTES NFR BLD AUTO: 26.8 %
MAGNESIUM SERPL-MCNC: 2.02 MG/DL (ref 1.6–2.4)
MCH RBC QN AUTO: 28.7 PG (ref 26–34)
MCHC RBC AUTO-ENTMCNC: 36.9 G/DL (ref 32–36)
MCV RBC AUTO: 78 FL (ref 80–100)
MONOCYTES # BLD AUTO: 1.22 X10*3/UL (ref 0.1–1)
MONOCYTES NFR BLD AUTO: 9 %
NEUTROPHILS # BLD AUTO: 7.98 X10*3/UL (ref 1.2–7.7)
NEUTROPHILS NFR BLD AUTO: 58.8 %
NRBC BLD-RTO: 0.4 /100 WBCS (ref 0–0)
OVALOCYTES BLD QL SMEAR: NORMAL
PAPPENHEIMER BOD BLD QL SMEAR: PRESENT
PATH REVIEW-HGB IDENTIFICATION: ABNORMAL
PLATELET # BLD AUTO: 404 X10*3/UL (ref 150–450)
POLYCHROMASIA BLD QL SMEAR: NORMAL
POTASSIUM SERPL-SCNC: 3.5 MMOL/L (ref 3.5–5.3)
PRODUCT BLOOD TYPE: 5100
PRODUCT CODE: NORMAL
PROT SERPL-MCNC: 6.3 G/DL (ref 6.4–8.2)
PROTHROMBIN TIME: 12.2 SECONDS (ref 9.8–12.8)
RBC # BLD AUTO: 1.67 X10*6/UL (ref 4–5.2)
RBC MORPH BLD: NORMAL
RETICS #: 0.34 X10*6/UL (ref 0.02–0.08)
RETICS/RBC NFR AUTO: 20.3 % (ref 0.5–2)
SICKLE CELLS BLD QL SMEAR: NORMAL
SODIUM SERPL-SCNC: 136 MMOL/L (ref 136–145)
TARGETS BLD QL SMEAR: NORMAL
UNIT ABO: NORMAL
UNIT NUMBER: NORMAL
UNIT RH: NORMAL
UNIT VOLUME: 350
WBC # BLD AUTO: 13.6 X10*3/UL (ref 4.4–11.3)
XM INTEP: NORMAL

## 2024-09-17 PROCEDURE — 80053 COMPREHEN METABOLIC PANEL: CPT

## 2024-09-17 PROCEDURE — 83735 ASSAY OF MAGNESIUM: CPT | Performed by: NURSE PRACTITIONER

## 2024-09-17 PROCEDURE — 2500000004 HC RX 250 GENERAL PHARMACY W/ HCPCS (ALT 636 FOR OP/ED): Performed by: NURSE PRACTITIONER

## 2024-09-17 PROCEDURE — 71045 X-RAY EXAM CHEST 1 VIEW: CPT

## 2024-09-17 PROCEDURE — 85610 PROTHROMBIN TIME: CPT

## 2024-09-17 PROCEDURE — 2500000001 HC RX 250 WO HCPCS SELF ADMINISTERED DRUGS (ALT 637 FOR MEDICARE OP): Performed by: NURSE PRACTITIONER

## 2024-09-17 PROCEDURE — 2500000001 HC RX 250 WO HCPCS SELF ADMINISTERED DRUGS (ALT 637 FOR MEDICARE OP)

## 2024-09-17 PROCEDURE — 83020 HEMOGLOBIN ELECTROPHORESIS: CPT | Performed by: PATHOLOGY

## 2024-09-17 PROCEDURE — 2500000005 HC RX 250 GENERAL PHARMACY W/O HCPCS: Performed by: NURSE PRACTITIONER

## 2024-09-17 PROCEDURE — 85025 COMPLETE CBC W/AUTO DIFF WBC: CPT

## 2024-09-17 PROCEDURE — 2500000004 HC RX 250 GENERAL PHARMACY W/ HCPCS (ALT 636 FOR OP/ED)

## 2024-09-17 PROCEDURE — 2500000005 HC RX 250 GENERAL PHARMACY W/O HCPCS

## 2024-09-17 PROCEDURE — 36430 TRANSFUSION BLD/BLD COMPNT: CPT

## 2024-09-17 PROCEDURE — 71045 X-RAY EXAM CHEST 1 VIEW: CPT | Performed by: RADIOLOGY

## 2024-09-17 PROCEDURE — 85045 AUTOMATED RETICULOCYTE COUNT: CPT

## 2024-09-17 PROCEDURE — 1170000001 HC PRIVATE ONCOLOGY ROOM DAILY

## 2024-09-17 PROCEDURE — 83021 HEMOGLOBIN CHROMOTOGRAPHY: CPT

## 2024-09-17 PROCEDURE — 82330 ASSAY OF CALCIUM: CPT

## 2024-09-17 PROCEDURE — 83615 LACTATE (LD) (LDH) ENZYME: CPT

## 2024-09-17 PROCEDURE — 36415 COLL VENOUS BLD VENIPUNCTURE: CPT

## 2024-09-17 PROCEDURE — 99233 SBSQ HOSP IP/OBS HIGH 50: CPT | Performed by: NURSE PRACTITIONER

## 2024-09-17 PROCEDURE — 99232 SBSQ HOSP IP/OBS MODERATE 35: CPT | Performed by: INTERNAL MEDICINE

## 2024-09-17 RX ORDER — HYDROMORPHONE HYDROCHLORIDE 1 MG/ML
1 INJECTION, SOLUTION INTRAMUSCULAR; INTRAVENOUS; SUBCUTANEOUS EVERY 6 HOURS PRN
Status: DISCONTINUED | OUTPATIENT
Start: 2024-09-17 | End: 2024-09-20

## 2024-09-17 RX ORDER — OXYCODONE HYDROCHLORIDE 5 MG/1
15 TABLET ORAL
Status: DISCONTINUED | OUTPATIENT
Start: 2024-09-17 | End: 2024-09-20

## 2024-09-17 RX ORDER — AMOXICILLIN AND CLAVULANATE POTASSIUM 875; 125 MG/1; MG/1
1 TABLET, FILM COATED ORAL EVERY 12 HOURS SCHEDULED
Status: DISCONTINUED | OUTPATIENT
Start: 2024-09-17 | End: 2024-09-18

## 2024-09-17 ASSESSMENT — PAIN DESCRIPTION - LOCATION
LOCATION: BACK

## 2024-09-17 ASSESSMENT — PAIN SCALES - GENERAL
PAINLEVEL_OUTOF10: 7
PAINLEVEL_OUTOF10: 8
PAINLEVEL_OUTOF10: 8
PAINLEVEL_OUTOF10: 7
PAINLEVEL_OUTOF10: 4
PAINLEVEL_OUTOF10: 3

## 2024-09-17 ASSESSMENT — COGNITIVE AND FUNCTIONAL STATUS - GENERAL
MOBILITY SCORE: 24
DAILY ACTIVITIY SCORE: 24

## 2024-09-17 ASSESSMENT — PAIN SCALES - PAIN ASSESSMENT IN ADVANCED DEMENTIA (PAINAD)
TOTALSCORE: MEDICATION (SEE MAR)
BREATHING: NORMAL
TOTALSCORE: MEDICATION (SEE MAR)
CONSOLABILITY: UNABLE TO CONSOLE, DISTRACT OR REASSURE
FACIALEXPRESSION: FACIAL GRIMACING
TOTALSCORE: 5
BODYLANGUAGE: TENSE, DISTRESSED PACING, FIDGETING
TOTALSCORE: MEDICATION (SEE MAR)
BODYLANGUAGE: TENSE, DISTRESSED PACING, FIDGETING
TOTALSCORE: 5
CONSOLABILITY: UNABLE TO CONSOLE, DISTRACT OR REASSURE
FACIALEXPRESSION: FACIAL GRIMACING
BREATHING: NORMAL

## 2024-09-17 ASSESSMENT — PAIN DESCRIPTION - ORIENTATION
ORIENTATION: UPPER;LOWER

## 2024-09-17 ASSESSMENT — PAIN - FUNCTIONAL ASSESSMENT: PAIN_FUNCTIONAL_ASSESSMENT: 0-10

## 2024-09-17 NOTE — PROGRESS NOTES
Ruperto Pang is a 24 y.o. female on day 4 of admission presenting with Sickle cell crisis (Multi).    Subjective   Feeling well this morning. She asked to switch to oxycodone today, we discussed 3 additional IVP dilaudid doses to use for breakthrough. Pain is improved. Her numbness is stable, we discussed plan to evaluate pending improvement in hgb. She has supplemental O2 at home and wears 2L at night.     Denies any HA, dizziness/lightheadedness, fevers/chills, cough, congestion, rhinorrhea, sore throat, SOB, CP, palpitations, abdominal pain, n/v/d/c, melena, dysuria, urgency/frequency, hematuria, numbness/tingling, bruising/bleeding or rashes. Denies any sick contacts. ROS otherwise negative.       Objective     Physical Exam  Vitals reviewed.   Constitutional:       Appearance: Normal appearance.   HENT:      Head: Normocephalic and atraumatic.      Nose: Nose normal.      Mouth/Throat:      Mouth: Mucous membranes are moist.      Pharynx: Oropharynx is clear.   Eyes:      Extraocular Movements: Extraocular movements intact.      Pupils: Pupils are equal, round, and reactive to light.   Cardiovascular:      Rate and Rhythm: Normal rate and regular rhythm.      Pulses: Normal pulses.      Heart sounds: Normal heart sounds.   Pulmonary:      Effort: Pulmonary effort is normal.      Breath sounds: Normal breath sounds.   Abdominal:      General: Bowel sounds are normal.      Palpations: Abdomen is soft.   Musculoskeletal:         General: Normal range of motion.   Skin:     General: Skin is warm.   Neurological:      General: No focal deficit present.      Mental Status: She is alert and oriented to person, place, and time. Mental status is at baseline.   Psychiatric:         Mood and Affect: Mood normal.         Behavior: Behavior normal.     Last Recorded Vitals  Blood pressure 108/55, pulse 61, temperature 37.2 °C (99 °F), temperature source Temporal, resp. rate 16, height 1.524 m (5'), weight 69.8 kg (153 lb  14.1 oz), SpO2 94%, unknown if currently breastfeeding.  Intake/Output last 3 Shifts:  I/O last 3 completed shifts:  In: 2303.8 (33 mL/kg) [I.V.:1397.5 (20 mL/kg); Blood:356.3; IV Piggyback:550]  Out: 700 (10 mL/kg) [Urine:700 (0.3 mL/kg/hr)]  Weight: 69.8 kg              Assessment/Plan   Assessment & Plan  Sickle cell crisis (Multi)    Ruperto Pang is a 24 y.o. 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 Patient febrile x1 (Tmax 38.5) and significant leukocytosis noted on admission. CXR (9/12) negative for acute cardiopulmonary process. UA (9/12) neg for nitrites and leuks. COVID / Influenza A + B / MRSA negative (9/13). Procal - 12.74 (9/14). CT PE (9/13) negative for PE or acute cardiopulmomary process. CT neck (9/13) no evidence of thromboses, left retropharyngeal and cervical lymph nodes, likely sequale of URI. On admit patient started on Vanc + Zosyn (9/12-9/13). On 9/13 patient started on azithromycin + ceftriaxone (9/13-9/14). 9/14- 9/17 s.p Zosyn to given continued uptrend of WBC, 9/17 start augmentin x 1 day. ID consulted 9/15 given persistent leukocytosis, however pt received IV steroids in prep for CT (d/t allergy) and believe leukocytosis likely 2/2 steroids, however ID rec continuing atbs for now. Additionally, patient with worsening SYED on 9/15. Increased rate of D5 1/2 to 100mL/hour on 9/15, repeat UA neg, urine lytes indicating pre renal, possibly in setting of profound anemia. Hgb 5.4 on 9/14, s/p 1 unit PRBC. Hgb with inappropriate incrementation on 9/15, MALISSA and extended MALISSA sent on 9/15 (still pending). Hgb again decreased to 4.3 (9/16) and initial c/f DHTR. Heme consulted 9/16, believe likely not in DHTR and rec'd additional 1u prbc (9/16 PM). Hgb incremented from 4.3 --> 4.8 (9/17). Pt additionally with reported numbness from rectum to urethra,  per attending will consider investigating further after profound anemia improved. No bowel/bladder incontinence at this time. DC pending improvement in anemia and pain.     # Fever, resolved  # Leukocytosis, improved   - Unknown source of infection possibly acute chest vs PNA vs viral or bacterial sinusitis   - Febrile x2 days before admission, Tmax (38.5) on 9/12, afebrile since   - leukocytosis 33.5 --> 38.1 --> 44.7K (9/14)  --> 22.7K (9/15)--> 12.9K (9/16) --> 13.6K (9/17)   - UA (9/13) negative WBC, leuks, nitrities   - Influenza A+B and COVD negative (9/12)   - MRSA - negative (9/13)   - Lactate - 1.5 (9/12), repeat 1.1 (9/15)   - Procal 12.74 (9/14), repeat procal 9/16 1.6  - Blood cultures x2 - NGTD (9/14)  - CXR (9/13) negative for acute cardiopulmonary process   - CTPE (9/14) negative for PE, or acute cardiopulmonary process   - s/p vanc + zosyn (9/12)  - s/p azithromycin + ceftriaxone (9/13-9/14)   - s/p zosyn (9/14- 9/17) given continued uptrend of WBC  - 9/17 start augmentin BID x 1 day    - ID consulted 9/15 given persistent leukocytosis, however pt received IV steroids in prep for CT (d/t allergy) and leukocytosis likely 2/2 steroids. ID rec continuing atbs for now     # SYED   - sCr 2.03 (9/14) --> 2.46 (9/15) --> 1.92 (9.17)   - s/p D5 1/2 @75mL/hour (9/14-9/15), increased rate to @100mL/hour on 9/15 per attending-- continuing for another 24hrs (plan stop 9/17- ordered to fall off)  - Urine lytes (9/15) indicating pre-renal, possibly in setting of profound anemia   - Repeat UA (9/15): negative  - can consider renal US if SYED up-trends again  - Renally dose medications, avoid nephrotoxic medications      # c/f DHTR  - Hgb (baseline ~6-7), 5.4 --> Hgb incremented briefly to 6.1 on evening labs 9/14 --> Hgb 5.3 of 9/15--> decreased to 4.3 (9/16) increasing suspicion for DHTR  - MALISSA (9/15) negative, extended MALISSA pending  - Heme consulted for c/f DHTR (9/16)- believe likely not in DHTR, peripheral smear  read pending. Rec'd 1u prbc 9/16 PM. Pt incremented 4.3 --> 4.8 (9/17)   - plan to discuss with Hematology goal hgb and need for otpt lab FU closer to discharge      # Hgb SS with acute on chronic pain   - No active care path   - OARRS reviewed- no aberrancies, last refilled home oxycodone 10mg for 7 day supply (10T) on 8/15/2024   - Currently on Hydroxyurea-held on admit d/t concern for infection   - See Hgb as above  - Hgb S (9/13): 94.2%  - Lysis labs elevated on admission bili 13.2 (BL 7-9), now downtrending as of 9/15  - Given no new O2 requirement on admission, chest pain and no radiographic findings of opacities on CXR and CT PE, patient does not currently meet criteria for ACS but will continue to monitor closely and low threshold to repeat chest imaging   - 9/16 overnight with Spo2 of 90% while sleeping - CXR without acute process   - RBCex labs of type and screen, hgb S, ionized calcium sent 9/13 should patient require RBCex   - s/p (9/13-9/17)  Dilaudid 1mg q2 hours for severe pain  - 9/17 start oxcodone 15mg PO q3h PRN + IVP dilaudid 1mg x 3 doses for breakthrough pain   - s/p Toradol 30mg IV q6 hours scheduled x12 doses (9/13-9/15), stopped on 9/15 given SYED with protonix PPI, HCG neg on 9/13    - Benadryl 25mg PO PRN for itching, Zofran 8mg q8 hours PRN for nausea, Miralax daily, DocuSenna 2tabs BID for opioid-induced constipation   - Continue home folic acid 1mg daily     # Numbness in Pudenal nerve distribution  - Possibly 2/2 decreased tissue oxygenation I/s/o significant anemia  - Pt reports numbness from rectum to urethra  - Denies any bowel/bladder incontinence  - Per pt, can feel when she needs to urinate or have BM  - Per attending, will consider investigating further after anemia addressed     # Prophy  - S/p Lovenox 40mg subcutaneous daily, 9/15 start heparin subcutaneous given SYED, encourage ambulation   - Started on Protonix 40mg daily with toradol      Dispo   - Full code, confirmed on  admission    - DC pending improvement in anemia and pain  - FUV with sickle cell team 11/13  - Emergency contact mother Kat Ibarra [phone]       I spent 75 minutes in the professional and overall care of this patient.    CARLOS Scott-CNP

## 2024-09-17 NOTE — PROGRESS NOTES
JANICE Adams notified regarding critical lab results for a Hgb of 4.8. No new orders received at this time.

## 2024-09-17 NOTE — CARE PLAN
Problem: Skin  Goal: Decreased wound size/increased tissue granulation at next dressing change  Outcome: Progressing  Flowsheets (Taken 9/16/2024 2219)  Decreased wound size/increased tissue granulation at next dressing change: Protective dressings over bony prominences  Goal: Participates in plan/prevention/treatment measures  Outcome: Progressing  Flowsheets (Taken 9/16/2024 2219)  Participates in plan/prevention/treatment measures: Increase activity/out of bed for meals  Goal: Prevent/manage excess moisture  Outcome: Progressing  Flowsheets (Taken 9/16/2024 2219)  Prevent/manage excess moisture: Monitor for/manage infection if present  Goal: Prevent/minimize sheer/friction injuries  Outcome: Progressing  Flowsheets (Taken 9/16/2024 2219)  Prevent/minimize sheer/friction injuries: Increase activity/out of bed for meals  Goal: Promote/optimize nutrition  Outcome: Progressing  Flowsheets (Taken 9/16/2024 2219)  Promote/optimize nutrition: Monitor/record intake including meals  Goal: Promote skin healing  Outcome: Progressing  Flowsheets (Taken 9/16/2024 2219)  Promote skin healing: Rotate device position/do not position patient on device     Problem: Fall/Injury  Goal: Not fall by end of shift  Outcome: Progressing  Goal: Be free from injury by end of the shift  Outcome: Progressing  Goal: Verbalize understanding of personal risk factors for fall in the hospital  Outcome: Progressing  Goal: Verbalize understanding of risk factor reduction measures to prevent injury from fall in the home  Outcome: Progressing  Goal: Use assistive devices by end of the shift  Outcome: Progressing  Goal: Pace activities to prevent fatigue by end of the shift  Outcome: Progressing     Problem: Pain  Goal: Takes deep breaths with improved pain control throughout the shift  Outcome: Progressing  Goal: Turns in bed with improved pain control throughout the shift  Outcome: Progressing  Goal: Walks with improved pain control throughout the  shift  Outcome: Progressing  Goal: Performs ADL's with improved pain control throughout shift  Outcome: Progressing  Goal: Participates in PT with improved pain control throughout the shift  Outcome: Progressing  Goal: Free from opioid side effects throughout the shift  Outcome: Progressing  Goal: Free from acute confusion related to pain meds throughout the shift  Outcome: Progressing       The patient's goals for the shift include Pain Management    The clinical goals for the shift include Pt to remain HDS this shift     Over the shift, the patient did make progress toward the following goals.

## 2024-09-17 NOTE — ASSESSMENT & PLAN NOTE
Ruperto Pang is a 24 y.o. 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 Patient febrile x1 (Tmax 38.5) and significant leukocytosis noted on admission. CXR (9/12) negative for acute cardiopulmonary process. UA (9/12) neg for nitrites and leuks. COVID / Influenza A + B / MRSA negative (9/13). Procal - 12.74 (9/14). CT PE (9/13) negative for PE or acute cardiopulmomary process. CT neck (9/13) no evidence of thromboses, left retropharyngeal and cervical lymph nodes, likely sequale of URI. On admit patient started on Vanc + Zosyn (9/12-9/13). On 9/13 patient started on azithromycin + ceftriaxone (9/13-9/14). 9/14- 9/17 s.p Zosyn to given continued uptrend of WBC, 9/17 start augmentin x 1 day. ID consulted 9/15 given persistent leukocytosis, however pt received IV steroids in prep for CT (d/t allergy) and believe leukocytosis likely 2/2 steroids, however ID rec continuing atbs for now. Additionally, patient with worsening SYED on 9/15. Increased rate of D5 1/2 to 100mL/hour on 9/15, repeat UA neg, urine lytes indicating pre renal, possibly in setting of profound anemia. Hgb 5.4 on 9/14, s/p 1 unit PRBC. Hgb with inappropriate incrementation on 9/15, MALISSA and extended MALISSA sent on 9/15 (still pending). Hgb again decreased to 4.3 (9/16) and initial c/f DHTR. Heme consulted 9/16, believe likely not in DHTR and rec'd additional 1u prbc (9/16 PM). Hgb incremented from 4.3 --> 4.8 (9/17). Pt additionally with reported numbness from rectum to urethra, per attending will consider investigating further after profound anemia improved. No bowel/bladder incontinence at this time. DC pending improvement in anemia and pain.     # Fever, resolved  # Leukocytosis, improved   - Unknown source of infection possibly acute chest vs PNA vs viral or bacterial sinusitis   - Febrile x2 days before  admission, Tmax (38.5) on 9/12, afebrile since   - leukocytosis 33.5 --> 38.1 --> 44.7K (9/14)  --> 22.7K (9/15)--> 12.9K (9/16) --> 13.6K (9/17)   - UA (9/13) negative WBC, leuks, nitrities   - Influenza A+B and COVD negative (9/12)   - MRSA - negative (9/13)   - Lactate - 1.5 (9/12), repeat 1.1 (9/15)   - Procal 12.74 (9/14), repeat procal 9/16 1.6  - Blood cultures x2 - NGTD (9/14)  - CXR (9/13) negative for acute cardiopulmonary process   - CTPE (9/14) negative for PE, or acute cardiopulmonary process   - s/p vanc + zosyn (9/12)  - s/p azithromycin + ceftriaxone (9/13-9/14)   - s/p zosyn (9/14- 9/17) given continued uptrend of WBC  - 9/17 start augmentin BID x 1 day    - ID consulted 9/15 given persistent leukocytosis, however pt received IV steroids in prep for CT (d/t allergy) and leukocytosis likely 2/2 steroids. ID rec continuing atbs for now     # SYED   - sCr 2.03 (9/14) --> 2.46 (9/15) --> 1.92 (9.17)   - s/p D5 1/2 @75mL/hour (9/14-9/15), increased rate to @100mL/hour on 9/15 per attending-- continuing for another 24hrs (plan stop 9/17- ordered to fall off)  - Urine lytes (9/15) indicating pre-renal, possibly in setting of profound anemia   - Repeat UA (9/15): negative  - can consider renal US if SYED up-trends again  - Renally dose medications, avoid nephrotoxic medications      # c/f DHTR  - Hgb (baseline ~6-7), 5.4 --> Hgb incremented briefly to 6.1 on evening labs 9/14 --> Hgb 5.3 of 9/15--> decreased to 4.3 (9/16) increasing suspicion for DHTR  - MALISSA (9/15) negative, extended MALISSA pending  - Heme consulted for c/f DHTR (9/16)- believe likely not in DHTR, peripheral smear read pending. Rec'd 1u prbc 9/16 PM. Pt incremented 4.3 --> 4.8 (9/17)   - plan to discuss with Hematology goal hgb and need for otpt lab FU closer to discharge      # Hgb SS with acute on chronic pain   - No active care path   - OARRS reviewed- no aberrancies, last refilled home oxycodone 10mg for 7 day supply (10T) on 8/15/2024   -  Currently on Hydroxyurea-held on admit d/t concern for infection   - See Hgb as above  - Hgb S (9/13): 94.2%  - Lysis labs elevated on admission bili 13.2 (BL 7-9), now downtrending as of 9/15  - Given no new O2 requirement on admission, chest pain and no radiographic findings of opacities on CXR and CT PE, patient does not currently meet criteria for ACS but will continue to monitor closely and low threshold to repeat chest imaging   - 9/16 overnight with Spo2 of 90% while sleeping - CXR without acute process   - RBCex labs of type and screen, hgb S, ionized calcium sent 9/13 should patient require RBCex   - s/p (9/13-9/17)  Dilaudid 1mg q2 hours for severe pain  - 9/17 start oxcodone 15mg PO q3h PRN + IVP dilaudid 1mg x 3 doses for breakthrough pain   - s/p Toradol 30mg IV q6 hours scheduled x12 doses (9/13-9/15), stopped on 9/15 given SYED with protonix PPI, HCG neg on 9/13    - Benadryl 25mg PO PRN for itching, Zofran 8mg q8 hours PRN for nausea, Miralax daily, DocuSenna 2tabs BID for opioid-induced constipation   - Continue home folic acid 1mg daily     # Numbness in Pudenal nerve distribution  - Possibly 2/2 decreased tissue oxygenation I/s/o significant anemia  - Pt reports numbness from rectum to urethra  - Denies any bowel/bladder incontinence  - Per pt, can feel when she needs to urinate or have BM  - Per attending, will consider investigating further after anemia addressed     # Prophy  - S/p Lovenox 40mg subcutaneous daily, 9/15 start heparin subcutaneous given SYED, encourage ambulation   - Started on Protonix 40mg daily with toradol      Dispo   - Full code, confirmed on admission    - DC pending improvement in anemia and pain  - FUV with sickle cell team 11/13  - Emergency contact mother Kat Ibarra [phone]

## 2024-09-18 LAB
1OH-MIDAZOLAM UR CFM-MCNC: <25 NG/ML
6MAM UR CFM-MCNC: <25 NG/ML
7AMINOCLONAZEPAM UR CFM-MCNC: <25 NG/ML
A-OH ALPRAZ UR CFM-MCNC: <25 NG/ML
ALBUMIN SERPL BCP-MCNC: 3.7 G/DL (ref 3.4–5)
ALP SERPL-CCNC: 75 U/L (ref 33–110)
ALPRAZ UR CFM-MCNC: <25 NG/ML
ALT SERPL W P-5'-P-CCNC: 23 U/L (ref 7–45)
ANION GAP SERPL CALC-SCNC: 12 MMOL/L (ref 10–20)
AST SERPL W P-5'-P-CCNC: 19 U/L (ref 9–39)
BASOPHILS # BLD AUTO: 0.08 X10*3/UL (ref 0–0.1)
BASOPHILS NFR BLD AUTO: 0.5 %
BILIRUB SERPL-MCNC: 4.4 MG/DL (ref 0–1.2)
BUN SERPL-MCNC: 14 MG/DL (ref 6–23)
CALCIUM SERPL-MCNC: 8.9 MG/DL (ref 8.6–10.6)
CHLORDIAZEP UR CFM-MCNC: <25 NG/ML
CHLORIDE SERPL-SCNC: 110 MMOL/L (ref 98–107)
CLONAZEPAM UR CFM-MCNC: <25 NG/ML
CO2 SERPL-SCNC: 23 MMOL/L (ref 21–32)
CODEINE UR CFM-MCNC: <50 NG/ML
CREAT SERPL-MCNC: 1.66 MG/DL (ref 0.5–1.05)
DIAZEPAM UR CFM-MCNC: <25 NG/ML
EDDP UR CFM-MCNC: <25 NG/ML
EGFRCR SERPLBLD CKD-EPI 2021: 44 ML/MIN/1.73M*2
EOSINOPHIL # BLD AUTO: 0.45 X10*3/UL (ref 0–0.7)
EOSINOPHIL NFR BLD AUTO: 2.9 %
ERYTHROCYTE [DISTWIDTH] IN BLOOD BY AUTOMATED COUNT: 21.3 % (ref 11.5–14.5)
FENTANYL UR CFM-MCNC: <2.5 NG/ML
GLUCOSE SERPL-MCNC: 75 MG/DL (ref 74–99)
HCT VFR BLD AUTO: 13.8 % (ref 36–46)
HGB BLD-MCNC: 4.9 G/DL (ref 12–16)
HGB RETIC QN: 25 PG (ref 28–38)
HOWELL-JOLLY BOD BLD QL SMEAR: PRESENT
HYDROCODONE CTO UR CFM-MCNC: <25 NG/ML
HYDROMORPHONE UR CFM-MCNC: 528 NG/ML
IMM GRANULOCYTES # BLD AUTO: 0.48 X10*3/UL (ref 0–0.7)
IMM GRANULOCYTES NFR BLD AUTO: 3.1 % (ref 0–0.9)
IMMATURE RETIC FRACTION: 11.5 %
LDH SERPL L TO P-CCNC: 242 U/L (ref 84–246)
LORAZEPAM UR CFM-MCNC: <25 NG/ML
LYMPHOCYTES # BLD AUTO: 3.94 X10*3/UL (ref 1.2–4.8)
LYMPHOCYTES NFR BLD AUTO: 25.8 %
MAGNESIUM SERPL-MCNC: 2.04 MG/DL (ref 1.6–2.4)
MCH RBC QN AUTO: 28.8 PG (ref 26–34)
MCHC RBC AUTO-ENTMCNC: 35.5 G/DL (ref 32–36)
MCV RBC AUTO: 81 FL (ref 80–100)
METHADONE UR CFM-MCNC: <25 NG/ML
MIDAZOLAM UR CFM-MCNC: <25 NG/ML
MONOCYTES # BLD AUTO: 1.24 X10*3/UL (ref 0.1–1)
MONOCYTES NFR BLD AUTO: 8.1 %
MORPHINE UR CFM-MCNC: <50 NG/ML
NEUTROPHILS # BLD AUTO: 9.11 X10*3/UL (ref 1.2–7.7)
NEUTROPHILS NFR BLD AUTO: 59.6 %
NORDIAZEPAM UR CFM-MCNC: <25 NG/ML
NORFENTANYL UR CFM-MCNC: <2.5 NG/ML
NORHYDROCODONE UR CFM-MCNC: <25 NG/ML
NOROXYCODONE UR CFM-MCNC: <25 NG/ML
NORTRAMADOL UR-MCNC: <50 NG/ML
NRBC BLD-RTO: 0.5 /100 WBCS (ref 0–0)
OXAZEPAM UR CFM-MCNC: <25 NG/ML
OXYCODONE UR CFM-MCNC: <25 NG/ML
OXYMORPHONE UR CFM-MCNC: <25 NG/ML
PAPPENHEIMER BOD BLD QL SMEAR: PRESENT
PLATELET # BLD AUTO: 482 X10*3/UL (ref 150–450)
POLYCHROMASIA BLD QL SMEAR: NORMAL
POTASSIUM SERPL-SCNC: 3.5 MMOL/L (ref 3.5–5.3)
PROT SERPL-MCNC: 7.1 G/DL (ref 6.4–8.2)
RBC # BLD AUTO: 1.7 X10*6/UL (ref 4–5.2)
RBC MORPH BLD: NORMAL
RETICS #: 0.32 X10*6/UL (ref 0.02–0.08)
RETICS/RBC NFR AUTO: 18.5 % (ref 0.5–2)
SICKLE CELLS BLD QL SMEAR: NORMAL
SODIUM SERPL-SCNC: 141 MMOL/L (ref 136–145)
TARGETS BLD QL SMEAR: NORMAL
TEMAZEPAM UR CFM-MCNC: <25 NG/ML
TRAMADOL UR CFM-MCNC: <50 NG/ML
WBC # BLD AUTO: 15.3 X10*3/UL (ref 4.4–11.3)
ZOLPIDEM UR CFM-MCNC: <25 NG/ML
ZOLPIDEM UR-MCNC: <25 NG/ML

## 2024-09-18 PROCEDURE — 1170000001 HC PRIVATE ONCOLOGY ROOM DAILY

## 2024-09-18 PROCEDURE — 2500000001 HC RX 250 WO HCPCS SELF ADMINISTERED DRUGS (ALT 637 FOR MEDICARE OP)

## 2024-09-18 PROCEDURE — 36415 COLL VENOUS BLD VENIPUNCTURE: CPT

## 2024-09-18 PROCEDURE — 83735 ASSAY OF MAGNESIUM: CPT

## 2024-09-18 PROCEDURE — 85025 COMPLETE CBC W/AUTO DIFF WBC: CPT

## 2024-09-18 PROCEDURE — 85045 AUTOMATED RETICULOCYTE COUNT: CPT

## 2024-09-18 PROCEDURE — 84075 ASSAY ALKALINE PHOSPHATASE: CPT

## 2024-09-18 PROCEDURE — 99233 SBSQ HOSP IP/OBS HIGH 50: CPT | Performed by: NURSE PRACTITIONER

## 2024-09-18 PROCEDURE — 2500000001 HC RX 250 WO HCPCS SELF ADMINISTERED DRUGS (ALT 637 FOR MEDICARE OP): Performed by: NURSE PRACTITIONER

## 2024-09-18 PROCEDURE — 83615 LACTATE (LD) (LDH) ENZYME: CPT

## 2024-09-18 PROCEDURE — 2500000004 HC RX 250 GENERAL PHARMACY W/ HCPCS (ALT 636 FOR OP/ED): Performed by: NURSE PRACTITIONER

## 2024-09-18 ASSESSMENT — COGNITIVE AND FUNCTIONAL STATUS - GENERAL
MOBILITY SCORE: 24
DAILY ACTIVITIY SCORE: 24

## 2024-09-18 ASSESSMENT — PAIN SCALES - GENERAL
PAINLEVEL_OUTOF10: 5 - MODERATE PAIN
PAINLEVEL_OUTOF10: 7
PAINLEVEL_OUTOF10: 7
PAINLEVEL_OUTOF10: 3
PAINLEVEL_OUTOF10: 7
PAINLEVEL_OUTOF10: 7
PAINLEVEL_OUTOF10: 2
PAINLEVEL_OUTOF10: 6

## 2024-09-18 ASSESSMENT — PAIN - FUNCTIONAL ASSESSMENT
PAIN_FUNCTIONAL_ASSESSMENT: 0-10

## 2024-09-18 ASSESSMENT — PAIN SCALES - PAIN ASSESSMENT IN ADVANCED DEMENTIA (PAINAD)
CONSOLABILITY: UNABLE TO CONSOLE, DISTRACT OR REASSURE
FACIALEXPRESSION: FACIAL GRIMACING
TOTALSCORE: 5
BODYLANGUAGE: TENSE, DISTRESSED PACING, FIDGETING
TOTALSCORE: MEDICATION (SEE MAR)
BREATHING: NORMAL

## 2024-09-18 ASSESSMENT — PAIN DESCRIPTION - LOCATION
LOCATION: GENERALIZED
LOCATION: BACK

## 2024-09-18 ASSESSMENT — PAIN DESCRIPTION - ORIENTATION: ORIENTATION: UPPER

## 2024-09-18 ASSESSMENT — PAIN DESCRIPTION - DESCRIPTORS: DESCRIPTORS: ACHING

## 2024-09-18 NOTE — PROGRESS NOTES
"Ruperto Pang is a 24 y.o. female on day 5 of admission presenting with Sickle cell crisis (Multi).    Subjective   Interval History:   Feels \"about the same\".  Has noticed no change in her perineal numbness.  She has received PRBCs but they hemolyze rapidly so she has noticed no improvement.  Back/leg pain is about the same.  She is having loose stools when she moves her bowels, she attributes this to the IV antibiotics.  There is no associated abdominal cramping or association with food intake.  No GI/Resp complaints.  No issue with IVs          Objective   Range of Vitals (last 24 hours)  Heart Rate:  [51-75]   Temp:  [36.1 °C (97 °F)-37.2 °C (99 °F)]   Resp:  [16-18]   BP: ()/(48-71)   SpO2:  [92 %-100 %]   Daily Weight  09/16/24 : 69.8 kg (153 lb 14.1 oz)    Body mass index is 30.05 kg/m².    Physical Exam  Unchanged from last exam    Antibiotics  amoxicillin-pot clavulanate - 875-125 mg    Relevant Results  Labs  Results from last 72 hours   Lab Units 09/17/24  0801 09/16/24  0826 09/15/24  0657   WBC AUTO x10*3/uL 13.6* 12.9* 22.7*   HEMOGLOBIN g/dL 4.8* 4.3* 5.3*   HEMATOCRIT % 13.0* 12.1* 14.9*   PLATELETS AUTO x10*3/uL 404 394 393   NEUTROS PCT AUTO % 58.8  --  79.4   LYMPHO PCT MAN %  --  23.0  --    LYMPHS PCT AUTO % 26.8  --  14.6   MONO PCT MAN %  --  3.0  --    MONOS PCT AUTO % 9.0  --  4.5   EOSINO PCT MAN %  --  0.0  --    EOS PCT AUTO % 2.2  --  0.5     Results from last 72 hours   Lab Units 09/17/24  0801 09/16/24  0826 09/15/24  0657   SODIUM mmol/L 136 136 137   POTASSIUM mmol/L 3.5 3.6 3.8   CHLORIDE mmol/L 108* 107 104   CO2 mmol/L 20* 19* 22   BUN mg/dL 18 29* 46*   CREATININE mg/dL 1.92* 2.28* 2.46*   GLUCOSE mg/dL 88 84 70*   CALCIUM mg/dL 8.4* 8.3* 8.5*   ANION GAP mmol/L 12 14 15   EGFR mL/min/1.73m*2 37* 30* 27*     Results from last 72 hours   Lab Units 09/17/24  0801 09/16/24  0826 09/15/24  0657   ALK PHOS U/L 69 60 59   BILIRUBIN TOTAL mg/dL 4.0* 4.8* 6.3*   PROTEIN TOTAL g/dL " 6.3* 6.4 6.7   ALT U/L 22 24 26   AST U/L 19 21 24   ALBUMIN g/dL 3.4 3.3* 3.6     Estimated Creatinine Clearance: 39.4 mL/min (A) (by C-G formula based on SCr of 1.92 mg/dL (H)).  CRP   Date Value Ref Range Status   08/09/2021 5.11 (A) mg/dL Final     Comment:     REF VALUE  < 1.00     12/28/2020 0.93 mg/dL Final     Comment:     REF VALUE  < 1.00       Microbiology  No positive results found for the last 14 days.    Imaging    CXR  9/17/24:  No acute cardiopulmonary process    Assessment/Plan   25 yo woman admitted in Sickle cell crisis with leukocytosis.  She had abrupt rise in WBC count after the administration of dexamethasone and hydrocortisone to prophylactic for iodinated contrast for CT scan.  Since removing steroids, her leukocyte count has declined.  All infectious workup to this point has remained negative.  She is also remained afebrile.  I have a low suspicion of infection as a source of her leukocytosis, I suspect it is due to bone marrow stimulation in the setting of profound anemia as platelets are also rising.  Her symptoms of predental nerve distribution numbness are unchanged.      RECOMMENDATIONS:   Stop all IV antibiotics   Ppx as per team protocol.  Thank-you for allowing us to assist in your patient's management.  We are signing off.  Call if any further issues should arise or you should have any questions.      Discussed recommendations directly with primary service.    I spent 35 minutes in the professional and overall care of this patient.      Migdalia Mitchell MD.  (please reach through [employer])  Infectious Diseases, Senior Attending Physician  Team B (non-fellow service), [employer] Chat

## 2024-09-18 NOTE — PROGRESS NOTES
Ruperto Pang is a 24 y.o. female on day 5 of admission presenting with Sickle cell crisis (Multi).    Subjective   Seen this AM at the bedside. Pt reports feeling tired. We discussed plan is pending Dr. Dumas's input regarding Hgb this AM for IVIG vs watching and waiting. She says her sickle cell pain is improving, oxy making her nauseous. Also reports that her perineal numbness is improving. She denies any fevers/chills, SOB, or CP.    Objective     Physical Exam  Vitals reviewed.   Constitutional:       Appearance: Normal appearance.   HENT:      Head: Normocephalic and atraumatic.      Nose: Nose normal.      Mouth/Throat:      Mouth: Mucous membranes are moist.      Pharynx: Oropharynx is clear.   Eyes:      Extraocular Movements: Extraocular movements intact.      Pupils: Pupils are equal, round, and reactive to light.   Cardiovascular:      Rate and Rhythm: Normal rate and regular rhythm.      Pulses: Normal pulses.      Heart sounds: Normal heart sounds.   Pulmonary:      Effort: Pulmonary effort is normal.      Breath sounds: Normal breath sounds.   Abdominal:      General: Bowel sounds are normal.      Palpations: Abdomen is soft.   Musculoskeletal:         General: Normal range of motion.   Skin:     General: Skin is warm.   Neurological:      General: No focal deficit present.      Mental Status: She is alert and oriented to person, place, and time. Mental status is at baseline.   Psychiatric:         Mood and Affect: Mood normal.         Behavior: Behavior normal.   Last Recorded Vitals  Blood pressure 103/65, pulse 61, temperature 36.4 °C (97.5 °F), temperature source Temporal, resp. rate 18, height 1.524 m (5'), weight 71.4 kg (157 lb 6.5 oz), SpO2 100%, unknown if currently breastfeeding.  Intake/Output last 3 Shifts:  I/O last 3 completed shifts:  In: 2287.9 (32.8 mL/kg) [I.V.:1931.7 (27.7 mL/kg); Blood:356.3]  Out: - (0 mL/kg)   Weight: 69.8 kg      Results for orders placed or performed during the  hospital encounter of 09/13/24 (from the past 24 hour(s))   Comprehensive Metabolic Panel   Result Value Ref Range    Glucose 75 74 - 99 mg/dL    Sodium 141 136 - 145 mmol/L    Potassium 3.5 3.5 - 5.3 mmol/L    Chloride 110 (H) 98 - 107 mmol/L    Bicarbonate 23 21 - 32 mmol/L    Anion Gap 12 10 - 20 mmol/L    Urea Nitrogen 14 6 - 23 mg/dL    Creatinine 1.66 (H) 0.50 - 1.05 mg/dL    eGFR 44 (L) >60 mL/min/1.73m*2    Calcium 8.9 8.6 - 10.6 mg/dL    Albumin 3.7 3.4 - 5.0 g/dL    Alkaline Phosphatase 75 33 - 110 U/L    Total Protein 7.1 6.4 - 8.2 g/dL    AST 19 9 - 39 U/L    Bilirubin, Total 4.4 (H) 0.0 - 1.2 mg/dL    ALT 23 7 - 45 U/L   Lactate Dehydrogenase   Result Value Ref Range     84 - 246 U/L   Magnesium   Result Value Ref Range    Magnesium 2.04 1.60 - 2.40 mg/dL   CBC and Auto Differential   Result Value Ref Range    WBC 15.3 (H) 4.4 - 11.3 x10*3/uL    nRBC 0.5 (H) 0.0 - 0.0 /100 WBCs    RBC 1.70 (L) 4.00 - 5.20 x10*6/uL    Hemoglobin 4.9 (LL) 12.0 - 16.0 g/dL    Hematocrit 13.8 (L) 36.0 - 46.0 %    MCV 81 80 - 100 fL    MCH 28.8 26.0 - 34.0 pg    MCHC 35.5 32.0 - 36.0 g/dL    RDW 21.3 (H) 11.5 - 14.5 %    Platelets 482 (H) 150 - 450 x10*3/uL    Neutrophils %      Immature Granulocytes %, Automated      Lymphocytes %      Monocytes %      Eosinophils %      Basophils %      Neutrophils Absolute      Lymphocytes Absolute      Monocytes Absolute      Eosinophils Absolute      Basophils Absolute     Reticulocytes   Result Value Ref Range    Retic % 18.5 (H) 0.5 - 2.0 %    Retic Absolute 0.315 (H) 0.018 - 0.083 x10*6/uL    Reticulocyte Hemoglobin 25 (L) 28 - 38 pg    Immature Retic fraction 11.5 <=16.0 %       Scheduled medications  folic acid, 1 mg, oral, Daily  heparin (porcine), 5,000 Units, subcutaneous, q8h TABATHA  [Held by provider] hydroxyurea, 500 mg, oral, q12h TABATHA  pantoprazole, 40 mg, oral, Daily before breakfast  polyethylene glycol, 17 g, oral, Daily  sennosides-docusate sodium, 2 tablet, oral,  BID      Continuous medications     PRN medications  PRN medications: diphenhydrAMINE, HYDROmorphone, ondansetron, oxyCODONE, oxygen            Assessment/Plan   Assessment & Plan  Sickle cell crisis (Multi)        Ruperto Pang is a 24 y.o. 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 Patient febrile x1 (Tmax 38.5) and significant leukocytosis noted on admission. CXR (9/12) negative for acute cardiopulmonary process. UA (9/12) neg for nitrites and leuks. COVID / Influenza A + B / MRSA negative (9/13). Procal 12.74 (9/14). CT PE (9/13) negative for PE or acute cardiopulmomary process. CT neck (9/13) no evidence of thromboses, left retropharyngeal and cervical lymph nodes, likely sequale of URI. On admit patient started on Vanc + Zosyn (9/12-9/13). On 9/13 patient started on azithromycin + ceftriaxone (9/13-9/14). 9/14- 9/17 s/p Zosyn to given continued uptrend of WBC. ID consulted 9/15 given persistent leukocytosis, however pt received IV steroids in prep for CT (d/t allergy) and believe leukocytosis likely 2/2 steroids, however ID rec continuing atbs at the time. Additionally, patient with worsening SYED on 9/15. Increased rate of D5 1/2 to 100mL/hour on 9/15, repeat UA neg, urine lytes indicating pre renal, possibly in setting of profound anemia. Hgb 5.4 on 9/14, s/p 1 unit PRBC. Hgb with inappropriate incrementation on 9/15, MALISSA and extended MALISSA sent on 9/15 (still pending). Hgb again decreased to 4.3 (9/16) and initial c/f DHTR. Heme consulted 9/16, believe likely not in DHTR and rec'd additional 1u prbc (9/16 PM). Hgb incremented from 4.3 --> 4.8 (9/17). Pt additionally with reported numbness from rectum to urethra, per attending will consider investigating further after profound anemia improved. No bowel or bladder dysfunction. ID rec stopping atbs 9/17 and s/o.  9/17 s/p augmentin x1 day to complete atb couse. 9/18 reports improving perineal numbness. Hgb 4.9 (9/18)- plan for possible IVIG vs watch/wait pending Dr. Dumas's input. DC pending improvement in anemia and pain.     # Fever, resolved  # Leukocytosis, improved   - Unknown source of infection possibly acute chest vs PNA vs viral or bacterial sinusitis   - Febrile x2 days before admission, Tmax (38.5) on 9/12, afebrile since   - leukocytosis 33.5 --> 38.1 --> 44.7K (9/14)  --> 22.7K (9/15)--> 12.9K (9/16) --> 13.6K (9/17)--> 15.3k (9/18)  - UA (9/13) negative WBC, leuks, nitrities   - Influenza A+B and COVD negative (9/12)   - MRSA - negative (9/13)   - Lactate - 1.5 (9/12), repeat 1.1 (9/15)   - Procal 12.74 (9/14), repeat procal 9/16 1.6  - Blood cultures x2 - NGTD (9/14)  - CXR (9/13) negative for acute cardiopulmonary process   - CTPE (9/14) negative for PE, or acute cardiopulmonary process   - s/p vanc + zosyn (9/12)  - s/p azithromycin + ceftriaxone (9/13-9/14)   - s/p zosyn (9/14- 9/17) given continued uptrend of WBC  - 9/17 s/p augmentin BID  x1 day    - ID consulted 9/15 given persistent leukocytosis, however pt received IV steroids in prep for CT (d/t allergy) and leukocytosis likely 2/2 steroids. ID rec stopping atbs 9/17 and s/o     # SYED   - sCr 2.03 (9/14) --> 2.46 (9/15) --> 1.92 (9/17)--> 1.66 (9/18)  - s/p D5 1/2 @75mL/hour (9/14-9/15), increased rate to @100mL/hour on 9/15 per attending (stopped 9/17)  - Urine lytes (9/15) indicating pre-renal, possibly in setting of profound anemia   - Repeat UA (9/15): negative  - Can consider renal US if SYED up-trends again  - Renally dose medications, avoid nephrotoxic medications      # c/f DHTR  - Hgb (baseline ~6-7), 5.4 --> Hgb incremented briefly to 6.1 on evening labs 9/14 --> Hgb 5.3 of 9/15--> decreased to 4.3 (9/16) increasing suspicion for DHTR  - MALISSA (9/15) negative, extended MALISSA pending  - Heme consulted for c/f DHTR (9/16)- believe likely not in  DHTR, peripheral smear read pending. Rec'd 1u prbc 9/16 PM. Pt incremented 4.3 --> 4.8 (9/17)   - 9/18 Hgb 4.9--> plan for possible IVIG vs watch/wait pending Dr. Dumas's input  - Plan to discuss with Hematology goal hgb and need for otpt lab FU closer to discharge      # Hgb SS with acute on chronic pain   - No active care path   - OARRS reviewed- no aberrancies, last refilled home oxycodone 10mg for 7 day supply (10T) on 8/15/2024   - Currently on Hydroxyurea-held on admit d/t concern for infection   - See Hgb as above  - Hgb S (9/13): 94.2%; Hgb S (9/17): 58.7%  - Lysis labs elevated on admission bili 13.2 (BL 7-9), now downtrending as of 9/15  - Given no new O2 requirement on admission, chest pain and no radiographic findings of opacities on CXR and CT PE, patient does not currently meet criteria for ACS but will continue to monitor closely and low threshold to repeat chest imaging   - 9/16 overnight with Spo2 of 90% while sleeping - CXR without acute process   - RBCex labs of type and screen, hgb S, ionized calcium sent 9/13 should patient require RBCex   - s/p (9/13-9/17)  Dilaudid 1mg q2 hours for severe pain  - 9/17 start oxcodone 15mg PO q3h PRN + IVP dilaudid 1mg q6hrs PRN breakthrough pain   - s/p Toradol 30mg IV q6 hours scheduled x12 doses (9/13-9/15), stopped on 9/15 given SYED with protonix PPI, HCG neg on 9/13    - Benadryl 25mg PO PRN for itching, Zofran 8mg q8 hours PRN for nausea, Miralax daily, DocuSenna 2tabs BID for opioid-induced constipation   - Continue home folic acid 1mg daily     # Numbness in Pudenal nerve distribution  - Possibly 2/2 decreased tissue oxygenation I/s/o significant anemia  - Pt reports numbness from rectum to urethra  - Denies any bowel/bladder incontinence, can feel when she needs to urinate or have BM  - Per attending, will consider investigating further after anemia addressed  - 9/18 pt reports perineal numbness is improving, will continue to monitor     # Prophy  - S/p  Lovenox 40mg subcutaneous daily, 9/15 start heparin subcutaneous given SYED, encourage ambulation   - Started on Protonix 40mg daily with toradol      Dispo   - Full code, confirmed on admission    - DC pending improvement in anemia and pain  - Sickle Cell FUV 11/13  - Emergency contact mother Kat Ibarra [phone]    I spent >60 minutes on the professional and overall care of this patient    Assessment and plan as above discussed with attending physician Dr. Yenni Matthews, APRN-CNP

## 2024-09-18 NOTE — CARE PLAN
Problem: Pain  Goal: Takes deep breaths with improved pain control throughout the shift  Outcome: Progressing  Goal: Turns in bed with improved pain control throughout the shift  Outcome: Progressing  Goal: Walks with improved pain control throughout the shift  Outcome: Progressing  Goal: Performs ADL's with improved pain control throughout shift  Outcome: Progressing  Goal: Participates in PT with improved pain control throughout the shift  Outcome: Progressing  Goal: Free from opioid side effects throughout the shift  Outcome: Progressing  Goal: Free from acute confusion related to pain meds throughout the shift  Outcome: Progressing     The clinical goals for the shift include Pt to remain afebrile thru shift.

## 2024-09-18 NOTE — CARE PLAN
Problem: Skin  Goal: Decreased wound size/increased tissue granulation at next dressing change  Outcome: Progressing  Goal: Participates in plan/prevention/treatment measures  Outcome: Progressing  Goal: Prevent/manage excess moisture  Outcome: Progressing  Goal: Prevent/minimize sheer/friction injuries  Outcome: Progressing  Goal: Promote/optimize nutrition  Outcome: Progressing  Goal: Promote skin healing  Outcome: Progressing     Problem: Fall/Injury  Goal: Not fall by end of shift  Outcome: Progressing  Goal: Be free from injury by end of the shift  Outcome: Progressing  Goal: Verbalize understanding of personal risk factors for fall in the hospital  Outcome: Progressing  Goal: Verbalize understanding of risk factor reduction measures to prevent injury from fall in the home  Outcome: Progressing  Goal: Use assistive devices by end of the shift  Outcome: Progressing  Goal: Pace activities to prevent fatigue by end of the shift  Outcome: Progressing     Problem: Pain  Goal: Takes deep breaths with improved pain control throughout the shift  Outcome: Progressing  Goal: Turns in bed with improved pain control throughout the shift  Outcome: Progressing  Goal: Walks with improved pain control throughout the shift  Outcome: Progressing  Goal: Performs ADL's with improved pain control throughout shift  Outcome: Progressing  Goal: Participates in PT with improved pain control throughout the shift  Outcome: Progressing  Goal: Free from opioid side effects throughout the shift  Outcome: Progressing  Goal: Free from acute confusion related to pain meds throughout the shift  Outcome: Progressing   The patient's goals for the shift include Pain level of 3 or less    The clinical goals for the shift include remain HDS

## 2024-09-18 NOTE — PROGRESS NOTES
Spiritual Care Visit    Clinical Encounter Type  Visited With: Patient  Routine Visit: Introduction  Referral From: Nurse  Referral To:     Taxonomy  Intended Effects: Aligning care plan with patient's values, Demonstrate caring and concern, Establish rapport and connectedness  Methods: Offer support  Interventions: Acknowledge current situation, Active listening, Explain  role     introduced self and Spiritual Care services to patient Ruperto Pang. Patient shared that she is still experiencing pain, and hoping she would be able to go home soon. Patient was appreciative of care and did not have any needs at this time. Spiritual Care remains available as needed/requested.    Rev. Anne Lawrence MDiv, BCC

## 2024-09-18 NOTE — CARE PLAN
Problem: Fall/Injury  Goal: Not fall by end of shift  Outcome: Progressing  Goal: Be free from injury by end of the shift  Outcome: Progressing  Goal: Verbalize understanding of personal risk factors for fall in the hospital  Outcome: Progressing  Goal: Verbalize understanding of risk factor reduction measures to prevent injury from fall in the home  Outcome: Progressing  Goal: Use assistive devices by end of the shift  Outcome: Progressing  Goal: Pace activities to prevent fatigue by end of the shift  Outcome: Progressing     Problem: Pain  Goal: Takes deep breaths with improved pain control throughout the shift  Outcome: Progressing  Goal: Turns in bed with improved pain control throughout the shift  Outcome: Progressing  Goal: Walks with improved pain control throughout the shift  Outcome: Progressing  Goal: Performs ADL's with improved pain control throughout shift  Outcome: Progressing  Goal: Participates in PT with improved pain control throughout the shift  Outcome: Progressing  Goal: Free from opioid side effects throughout the shift  Outcome: Progressing  Goal: Free from acute confusion related to pain meds throughout the shift  Outcome: Progressing   The patient's goals for the shift include Pain level of 3 or less    The clinical goals for the shift include remain HDS

## 2024-09-19 LAB
ABO GROUP (TYPE) IN BLOOD: NORMAL
ALBUMIN SERPL BCP-MCNC: 3.5 G/DL (ref 3.4–5)
ALP SERPL-CCNC: 70 U/L (ref 33–110)
ALT SERPL W P-5'-P-CCNC: 15 U/L (ref 7–45)
ANION GAP SERPL CALC-SCNC: 11 MMOL/L (ref 10–20)
ANTIBODY SCREEN: NORMAL
AST SERPL W P-5'-P-CCNC: 19 U/L (ref 9–39)
BASOPHILS # BLD AUTO: 0.13 X10*3/UL (ref 0–0.1)
BASOPHILS NFR BLD AUTO: 0.6 %
BILIRUB SERPL-MCNC: 3.5 MG/DL (ref 0–1.2)
BUN SERPL-MCNC: 11 MG/DL (ref 6–23)
CALCIUM SERPL-MCNC: 8.8 MG/DL (ref 8.6–10.6)
CHLORIDE SERPL-SCNC: 110 MMOL/L (ref 98–107)
CO2 SERPL-SCNC: 22 MMOL/L (ref 21–32)
CREAT SERPL-MCNC: 1.23 MG/DL (ref 0.5–1.05)
EGFRCR SERPLBLD CKD-EPI 2021: 63 ML/MIN/1.73M*2
EOSINOPHIL # BLD AUTO: 0.5 X10*3/UL (ref 0–0.7)
EOSINOPHIL NFR BLD AUTO: 2.3 %
ERYTHROCYTE [DISTWIDTH] IN BLOOD BY AUTOMATED COUNT: 21.4 % (ref 11.5–14.5)
GLUCOSE SERPL-MCNC: 73 MG/DL (ref 74–99)
HCT VFR BLD AUTO: 12.3 % (ref 36–46)
HGB BLD-MCNC: 4.5 G/DL (ref 12–16)
HGB RETIC QN: 25 PG (ref 28–38)
HOWELL-JOLLY BOD BLD QL SMEAR: PRESENT
IMM GRANULOCYTES # BLD AUTO: 0.55 X10*3/UL (ref 0–0.7)
IMM GRANULOCYTES NFR BLD AUTO: 2.6 % (ref 0–0.9)
IMMATURE RETIC FRACTION: 12.3 %
LDH SERPL L TO P-CCNC: 279 U/L (ref 84–246)
LYMPHOCYTES # BLD AUTO: 4.17 X10*3/UL (ref 1.2–4.8)
LYMPHOCYTES NFR BLD AUTO: 19.5 %
MAGNESIUM SERPL-MCNC: 1.86 MG/DL (ref 1.6–2.4)
MCH RBC QN AUTO: 29.4 PG (ref 26–34)
MCHC RBC AUTO-ENTMCNC: 36.6 G/DL (ref 32–36)
MCV RBC AUTO: 80 FL (ref 80–100)
MONOCYTES # BLD AUTO: 1.76 X10*3/UL (ref 0.1–1)
MONOCYTES NFR BLD AUTO: 8.2 %
NEUTROPHILS # BLD AUTO: 14.28 X10*3/UL (ref 1.2–7.7)
NEUTROPHILS NFR BLD AUTO: 66.8 %
NRBC BLD-RTO: 0.6 /100 WBCS (ref 0–0)
PAPPENHEIMER BOD BLD QL SMEAR: PRESENT
PLATELET # BLD AUTO: 507 X10*3/UL (ref 150–450)
POLYCHROMASIA BLD QL SMEAR: NORMAL
POTASSIUM SERPL-SCNC: 3.3 MMOL/L (ref 3.5–5.3)
PROT SERPL-MCNC: 6.7 G/DL (ref 6.4–8.2)
RBC # BLD AUTO: 1.53 X10*6/UL (ref 4–5.2)
RBC MORPH BLD: NORMAL
RETICS #: 0.31 X10*6/UL (ref 0.02–0.08)
RETICS/RBC NFR AUTO: 20.1 % (ref 0.5–2)
RH FACTOR (ANTIGEN D): NORMAL
SICKLE CELLS BLD QL SMEAR: NORMAL
SODIUM SERPL-SCNC: 140 MMOL/L (ref 136–145)
TARGETS BLD QL SMEAR: NORMAL
WBC # BLD AUTO: 21.4 X10*3/UL (ref 4.4–11.3)

## 2024-09-19 PROCEDURE — 99232 SBSQ HOSP IP/OBS MODERATE 35: CPT | Performed by: STUDENT IN AN ORGANIZED HEALTH CARE EDUCATION/TRAINING PROGRAM

## 2024-09-19 PROCEDURE — 86901 BLOOD TYPING SEROLOGIC RH(D): CPT

## 2024-09-19 PROCEDURE — 85045 AUTOMATED RETICULOCYTE COUNT: CPT

## 2024-09-19 PROCEDURE — 2500000002 HC RX 250 W HCPCS SELF ADMINISTERED DRUGS (ALT 637 FOR MEDICARE OP, ALT 636 FOR OP/ED)

## 2024-09-19 PROCEDURE — 86922 COMPATIBILITY TEST ANTIGLOB: CPT

## 2024-09-19 PROCEDURE — 30233S1 TRANSFUSION OF NONAUTOLOGOUS GLOBULIN INTO PERIPHERAL VEIN, PERCUTANEOUS APPROACH: ICD-10-PCS

## 2024-09-19 PROCEDURE — 2500000004 HC RX 250 GENERAL PHARMACY W/ HCPCS (ALT 636 FOR OP/ED): Mod: JZ

## 2024-09-19 PROCEDURE — 83615 LACTATE (LD) (LDH) ENZYME: CPT

## 2024-09-19 PROCEDURE — 83735 ASSAY OF MAGNESIUM: CPT

## 2024-09-19 PROCEDURE — 80053 COMPREHEN METABOLIC PANEL: CPT

## 2024-09-19 PROCEDURE — 36415 COLL VENOUS BLD VENIPUNCTURE: CPT

## 2024-09-19 PROCEDURE — 2500000001 HC RX 250 WO HCPCS SELF ADMINISTERED DRUGS (ALT 637 FOR MEDICARE OP)

## 2024-09-19 PROCEDURE — 2500000004 HC RX 250 GENERAL PHARMACY W/ HCPCS (ALT 636 FOR OP/ED): Performed by: NURSE PRACTITIONER

## 2024-09-19 PROCEDURE — 2500000001 HC RX 250 WO HCPCS SELF ADMINISTERED DRUGS (ALT 637 FOR MEDICARE OP): Performed by: NURSE PRACTITIONER

## 2024-09-19 PROCEDURE — 85025 COMPLETE CBC W/AUTO DIFF WBC: CPT

## 2024-09-19 PROCEDURE — 99233 SBSQ HOSP IP/OBS HIGH 50: CPT

## 2024-09-19 PROCEDURE — 1170000001 HC PRIVATE ONCOLOGY ROOM DAILY

## 2024-09-19 PROCEDURE — 2500000005 HC RX 250 GENERAL PHARMACY W/O HCPCS: Performed by: NURSE PRACTITIONER

## 2024-09-19 RX ORDER — POTASSIUM CHLORIDE 20 MEQ/1
40 TABLET, EXTENDED RELEASE ORAL ONCE
Status: COMPLETED | OUTPATIENT
Start: 2024-09-19 | End: 2024-09-19

## 2024-09-19 RX ORDER — ACETAMINOPHEN 325 MG/1
650 TABLET ORAL DAILY
Status: COMPLETED | OUTPATIENT
Start: 2024-09-19 | End: 2024-09-21

## 2024-09-19 RX ORDER — DIPHENHYDRAMINE HCL 50 MG
50 CAPSULE ORAL DAILY
Status: COMPLETED | OUTPATIENT
Start: 2024-09-19 | End: 2024-09-21

## 2024-09-19 ASSESSMENT — PAIN SCALES - PAIN ASSESSMENT IN ADVANCED DEMENTIA (PAINAD)
FACIALEXPRESSION: FACIAL GRIMACING
TOTALSCORE: 5
BREATHING: NORMAL
CONSOLABILITY: UNABLE TO CONSOLE, DISTRACT OR REASSURE
TOTALSCORE: 5
TOTALSCORE: MEDICATION (SEE MAR)
BODYLANGUAGE: TENSE, DISTRESSED PACING, FIDGETING
BODYLANGUAGE: TENSE, DISTRESSED PACING, FIDGETING
TOTALSCORE: MEDICATION (SEE MAR)
BREATHING: NORMAL
CONSOLABILITY: UNABLE TO CONSOLE, DISTRACT OR REASSURE
FACIALEXPRESSION: FACIAL GRIMACING

## 2024-09-19 ASSESSMENT — PAIN SCALES - GENERAL
PAINLEVEL_OUTOF10: 8
PAINLEVEL_OUTOF10: 4
PAINLEVEL_OUTOF10: 4
PAINLEVEL_OUTOF10: 7
PAINLEVEL_OUTOF10: 3

## 2024-09-19 ASSESSMENT — COGNITIVE AND FUNCTIONAL STATUS - GENERAL
DAILY ACTIVITIY SCORE: 24
MOBILITY SCORE: 24

## 2024-09-19 ASSESSMENT — PAIN DESCRIPTION - LOCATION
LOCATION: BACK

## 2024-09-19 ASSESSMENT — PAIN - FUNCTIONAL ASSESSMENT
PAIN_FUNCTIONAL_ASSESSMENT: 0-10
PAIN_FUNCTIONAL_ASSESSMENT: 0-10

## 2024-09-19 ASSESSMENT — PAIN DESCRIPTION - DESCRIPTORS
DESCRIPTORS: ACHING
DESCRIPTORS: ACHING

## 2024-09-19 ASSESSMENT — PAIN DESCRIPTION - ORIENTATION: ORIENTATION: UPPER

## 2024-09-19 NOTE — ASSESSMENT & PLAN NOTE
Ruperto Pang is a 24 y.o. 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 Patient febrile x1 (Tmax 38.5) and significant leukocytosis noted on admission. CXR (9/12) negative for acute cardiopulmonary process. UA (9/12) neg for nitrites and leuks. COVID / Influenza A + B / MRSA negative (9/13). Procal - 12.74 (9/14). CT PE (9/13) negative for PE or acute cardiopulmomary process. CT neck (9/13) no evidence of thromboses, left retropharyngeal and cervical lymph nodes, likely sequale of URI. On admit patient started on Vanc + Zosyn (9/12-9/13). On 9/13 patient started on azithromycin + ceftriaxone (9/13-9/14). 9/14- 9/17 s.p Zosyn to given continued uptrend of WBC, 9/17 start augmentin x 1 day. ID consulted 9/15 given persistent leukocytosis, however pt received IV steroids in prep for CT (d/t allergy) and believe leukocytosis likely 2/2 steroids, however ID rec continuing atbs for now. Additionally, patient with worsening SYED on 9/15. Increased rate of D5 1/2 to 100mL/hour on 9/15, repeat UA neg, urine lytes indicating pre renal, possibly in setting of profound anemia. Hgb 5.4 on 9/14, s/p 1 unit PRBC. Hgb with inappropriate incrementation on 9/15, MALISSA (negative) and extended MALISSA sent on 9/15 (still pending). Hgb again decreased to 4.3 (9/16) and initial c/f DHTR. Heme consulted 9/16, believe likely not in DHTR and rec'd additional 1u prbc (9/16 PM). Hgb incremented from 4.3 --> 4.8 (9/17). Pt additionally with reported numbness from rectum to urethra, per attending will consider investigating further after profound anemia improved. No bowel or bladder dysfunction. ID rec stopping atbs 9/17 and s/o. 9/17 s/p augmentin x1 day to complete atb couse. 9/18 reports improving perineal numbness. Hgb 4.9 (9/18) --> 4.5 (9/19). Discussed with Dr. Dumas and hematology 9/19, plan for  IVIG x3 doses (end date 9/21) and monitor hgb response. DC pending improvement in anemia and pain.      # c/f Delayed hemolytic transfusion reaction   - Hgb (baseline ~6-7), 5.4 --> Hgb incremented briefly to 6.1 on evening labs 9/14 --> Hgb 5.3 of 9/15--> decreased to 4.3 (9/16) increasing suspicion for DHTR  - MALISSA (9/15) negative, extended MALISSA pending  - Heme consulted for c/f DHTR (9/16)- believe likely not in DHTR, peripheral smear read pending. Rec'd 1u prbc 9/16 PM. Pt incremented 4.3 --> 4.8 (9/17)   - 9/18 Hgb 4.9--> plan for possible IVIG vs watch/wait pending Dr. Dumas's input   - 9/19 hgb 4.5. Discussed with Dr. Dumas and hematology, plan for IVIG x3 doses (end date 9/21) and monitor hgb response  - Per Dr. Dumas, ordered 0.5g/kg IVIG x3 days with premedications (50mg benadryl, 650mg tylenol, 125mg IV methylprednisone before IVIG dose) (9/19-plan end date 9/21)   - plan to discuss with Hematology goal hgb and need for otpt lab FU closer to discharge     # Fever, resolved  # Leukocytosis  - Unknown source of infection possibly acute chest vs PNA vs viral or bacterial sinusitis   - Febrile x2 days before admission, Tmax (38.5) on 9/12, afebrile since   - leukocytosis 33.5 --> 38.1 --> 44.7K (9/14)  --> 22.7K (9/15)--> 13.6K (9/17) --> 21.4K (9/19)   - UA (9/13) negative WBC, leuks, nitrities   - Influenza A+B and COVD negative (9/12)   - MRSA - negative (9/13)   - Lactate - 1.5 (9/12), repeat 1.1 (9/15)   - Procal 12.74 (9/14), repeat procal 9/16 1.6  - Blood cultures x2 - NGTD (9/14)  - CXR (9/13) negative for acute cardiopulmonary process   - CTPE (9/14) negative for PE, or acute cardiopulmonary process   - s/p vanc + zosyn (9/12)  - s/p azithromycin + ceftriaxone (9/13-9/14)   - s/p zosyn (9/14- 9/17) given continued uptrend of WBC  - 9/17 start augmentin BID x 1 day    - ID consulted 9/15 given persistent leukocytosis, however pt received IV steroids in prep for CT (d/t allergy) and leukocytosis likely 2/2  steroids. ID rec stopping atbs 9/17 and s/o   - WBC 21.4K (9/19) denies infectious symptoms (denies cough, fever, congestion, SOB, chills), reported 1 loose BM 9/18, low threshold to repeat infectious workup and restart Zosyn if decompensates      # SYED -improving   - sCr 2.03 (9/14) --> 2.46 (9/15) --> 1.92 (9.17) --> 1.66 (9/18) --> 1.23 (9/19)   - s/p D5 1/2 @75mL/hour (9/14-9/15), increased rate to @100mL/hour on 9/15 per attending-- continuing for another 24hrs (plan stop 9/17- ordered to fall off)  - Urine lytes (9/15) indicating pre-renal, possibly in setting of profound anemia   - Repeat UA (9/15): negative  - can consider renal US if SYED up-trends again  - Renally dose medications, avoid nephrotoxic medications       # Hgb SS with acute on chronic pain   - No active care path   - OARRS reviewed- no aberrancies, last refilled home oxycodone 10mg for 7 day supply (10T) on 8/15/2024   - Currently on Hydroxyurea-held on admit d/t concern for infection   - See Hgb as above  - Hgb S (9/13): 94.2%  - Lysis labs elevated on admission bili 13.2 (BL 7-9), now downtrending as of 9/15  - Given no new O2 requirement on admission, chest pain and no radiographic findings of opacities on CXR and CT PE, patient does not currently meet criteria for ACS but will continue to monitor closely and low threshold to repeat chest imaging   - 9/16 overnight with Spo2 of 90% while sleeping - CXR without acute process   - RBCex labs of type and screen, hgb S, ionized calcium sent 9/13 should patient require RBCex   - s/p (9/13-9/17)  Dilaudid 1mg q2 hours for severe pain  - 9/17 start oxcodone 15mg PO q3h PRN + IVP dilaudid 1mg x 3 doses for breakthrough pain   - s/p Toradol 30mg IV q6 hours scheduled x12 doses (9/13-9/15), stopped on 9/15 given SYED with protonix PPI, HCG neg on 9/13    - Benadryl 25mg PO PRN for itching, Zofran 8mg q8 hours PRN for nausea, Miralax daily, DocuSenna 2tabs BID for opioid-induced constipation   - Continue  home folic acid 1mg daily     # Numbness in Pudenal nerve distribution  - Possibly 2/2 decreased tissue oxygenation I/s/o significant anemia  - Pt reports numbness from rectum to urethra  - Denies any bowel/bladder incontinence  - Per pt, can feel when she needs to urinate or have BM  - Per attending, will consider investigating further after anemia addressed  - 9/19 pt reports perineal numbness is improving, will continue to monitor      # Prophy  - S/p Lovenox 40mg subcutaneous daily, 9/15 start heparin subcutaneous given SYED, encourage ambulation   - Started on Protonix 40mg daily with toradol      Dispo   - Full code, confirmed on admission    - DC pending improvement in anemia and pain  - FUV with sickle cell team 11/13  - Emergency contact mother Kat Ibarra [phone]

## 2024-09-19 NOTE — NURSING NOTE
1336: Pt received dose #1 of IVIG, no acute symptoms noted throughout. Given via order (see eMar). Vitals stable, pt afebrile.   Pre meds given beforehand- PO tylenol, PO benadryl and IV solu-medrol.

## 2024-09-19 NOTE — CARE PLAN
Problem: Skin  Goal: Decreased wound size/increased tissue granulation at next dressing change  Outcome: Progressing  Goal: Participates in plan/prevention/treatment measures  Outcome: Progressing  Goal: Prevent/manage excess moisture  Outcome: Progressing  Goal: Prevent/minimize sheer/friction injuries  Outcome: Progressing  Goal: Promote/optimize nutrition  Outcome: Progressing  Goal: Promote skin healing  Outcome: Progressing     Problem: Fall/Injury  Goal: Not fall by end of shift  Outcome: Progressing  Goal: Be free from injury by end of the shift  Outcome: Progressing  Goal: Verbalize understanding of personal risk factors for fall in the hospital  Outcome: Progressing  Goal: Verbalize understanding of risk factor reduction measures to prevent injury from fall in the home  Outcome: Progressing  Goal: Use assistive devices by end of the shift  Outcome: Progressing  Goal: Pace activities to prevent fatigue by end of the shift  Outcome: Progressing       Problem: Pain  Goal: Takes deep breaths with improved pain control throughout the shift  Outcome: Progressing  Goal: Turns in bed with improved pain control throughout the shift  Outcome: Progressing  Goal: Walks with improved pain control throughout the shift  Outcome: Progressing  Goal: Performs ADL's with improved pain control throughout shift  Outcome: Progressing  Goal: Participates in PT with improved pain control throughout the shift  Outcome: Progressing  Goal: Free from opioid side effects throughout the shift  Outcome: Progressing  Goal: Free from acute confusion related to pain meds throughout the shift  Outcome: Progressing   The clinical goals for the shift include Pt pain to remain less than 7/10 thru shift.

## 2024-09-19 NOTE — PROGRESS NOTES
Ruperto Pang is a 24 y.o. female on day 6 of admission presenting with Sickle cell crisis (Multi).    Subjective   Seen at bedside this morning. Patient reports pain is manageable on rotation, would like to stay on regimen today. Denies any infectious like symptoms (cough, fever, congestion, SOB, chills). 1 episode of diarrhea yesterday. States initial nasal mucus that she presented with continues but has significantly improved since admission. She is anxious about plan and would like to know when she can go home. Discussed waiting on AM labs and talking to Dr. Dumas/ hematology for further plan. Denies any HA, dizziness/lightheadedness, fevers/chills, cough, congestion, rhinorrhea, sore throat, SOB, CP, palpitations, abdominal pain, n/v/c, melena, dysuria, urgency/frequency, hematuria, numbness/tingling, bruising/bleeding or rashes.      Seen again at bedside later in morning after hgb was resulted and after speaking to Dr. Dumas. She is aware that hgb dropped to 4.5 and we are planning on giving her 3 doses of IVIG with tyl, benadryl and methylpred as premedications. She is aware of infusion and was consented at bedside. Also asked that her mother be updated over the phone. All questions were answered and she is agreeable to plan.     Objective     Physical Exam  Vitals reviewed.   Constitutional:       Appearance: Normal appearance.   HENT:      Head: Normocephalic and atraumatic.      Nose: Nose normal.      Mouth/Throat:      Mouth: Mucous membranes are moist.      Pharynx: Oropharynx is clear.   Eyes:      Extraocular Movements: Extraocular movements intact.      Pupils: Pupils are equal, round, and reactive to light.   Cardiovascular:      Rate and Rhythm: Normal rate and regular rhythm.      Pulses: Normal pulses.      Heart sounds: Normal heart sounds.   Pulmonary:      Effort: Pulmonary effort is normal.      Breath sounds: Normal breath sounds.   Abdominal:      General: Bowel sounds are normal.       Palpations: Abdomen is soft.   Musculoskeletal:         General: Normal range of motion.   Skin:     General: Skin is warm.   Neurological:      General: No focal deficit present.      Mental Status: She is alert and oriented to person, place, and time. Mental status is at baseline.   Psychiatric:         Mood and Affect: Mood normal.         Behavior: Behavior normal.       Last Recorded Vitals  Blood pressure 122/77, pulse 80, temperature 36.9 °C (98.4 °F), temperature source Temporal, resp. rate 18, height 1.524 m (5'), weight 71.2 kg (157 lb), SpO2 94%, unknown if currently breastfeeding.  Intake/Output last 3 Shifts:  No intake/output data recorded.    Relevant Results  Scheduled medications  acetaminophen, 650 mg, oral, Daily  diphenhydrAMINE, 50 mg, oral, Daily  folic acid, 1 mg, oral, Daily  heparin (porcine), 5,000 Units, subcutaneous, q8h TABATHA  [Held by provider] hydroxyurea, 500 mg, oral, q12h TABATHA  immune globulin (human), 0.5 g/kg, intravenous, Daily  methylPREDNISolone sodium succinate (PF), 125 mg, intravenous, q24h  pantoprazole, 40 mg, oral, Daily before breakfast  polyethylene glycol, 17 g, oral, Daily  sennosides-docusate sodium, 2 tablet, oral, BID    Continuous medications     PRN medications  PRN medications: diphenhydrAMINE, HYDROmorphone, ondansetron, oxyCODONE, oxygen     Assessment/Plan   Assessment & Plan  Sickle cell crisis (Multi)    Ruperto Pang is a 24 y.o. 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 Patient febrile x1 (Tmax 38.5) and significant leukocytosis noted on admission. CXR (9/12) negative for acute cardiopulmonary process. UA (9/12) neg for nitrites and leuks. COVID / Influenza A + B / MRSA negative (9/13). Procal - 12.74 (9/14). CT PE (9/13) negative for PE or acute cardiopulmomary process. CT neck (9/13) no evidence of  thromboses, left retropharyngeal and cervical lymph nodes, likely sequale of URI. On admit patient started on Vanc + Zosyn (9/12-9/13). On 9/13 patient started on azithromycin + ceftriaxone (9/13-9/14). 9/14- 9/17 s.p Zosyn to given continued uptrend of WBC, 9/17 start augmentin x 1 day. ID consulted 9/15 given persistent leukocytosis, however pt received IV steroids in prep for CT (d/t allergy) and believe leukocytosis likely 2/2 steroids, however ID rec continuing atbs for now. Additionally, patient with worsening SYED on 9/15. Increased rate of D5 1/2 to 100mL/hour on 9/15, repeat UA neg, urine lytes indicating pre renal, possibly in setting of profound anemia. Hgb 5.4 on 9/14, s/p 1 unit PRBC. Hgb with inappropriate incrementation on 9/15, MALISSA (negative) and extended MALISSA sent on 9/15 (still pending). Hgb again decreased to 4.3 (9/16) and initial c/f DHTR. Heme consulted 9/16, believe likely not in DHTR and rec'd additional 1u prbc (9/16 PM). Hgb incremented from 4.3 --> 4.8 (9/17). Pt additionally with reported numbness from rectum to urethra, per attending will consider investigating further after profound anemia improved. No bowel or bladder dysfunction. ID rec stopping atbs 9/17 and s/o. 9/17 s/p augmentin x1 day to complete atb couse. 9/18 reports improving perineal numbness. Hgb 4.9 (9/18) --> 4.5 (9/19). Discussed with Dr. Dumas and hematology 9/19, plan for IVIG x3 doses (end date 9/21) and monitor hgb response. DC pending improvement in anemia and pain.      # c/f Delayed hemolytic transfusion reaction   - Hgb (baseline ~6-7), 5.4 --> Hgb incremented briefly to 6.1 on evening labs 9/14 --> Hgb 5.3 of 9/15--> decreased to 4.3 (9/16) increasing suspicion for DHTR  - MALISSA (9/15) negative, extended MALISSA pending  - Heme consulted for c/f DHTR (9/16)- believe likely not in DHTR, peripheral smear read pending. Rec'd 1u prbc 9/16 PM. Pt incremented 4.3 --> 4.8 (9/17)   - 9/18 Hgb 4.9--> plan for possible IVIG vs  watch/wait pending Dr. Dumas's input   - 9/19 hgb 4.5. Discussed with Dr. Dumas and hematology, plan for IVIG x3 doses (end date 9/21) and monitor hgb response  - Per Dr. Dumas, ordered 0.5g/kg IVIG x3 days with premedications (50mg benadryl, 650mg tylenol, 125mg IV methylprednisone before IVIG dose) (9/19-plan end date 9/21)   - plan to discuss with Hematology goal hgb and need for otpt lab FU closer to discharge     # Fever, resolved  # Leukocytosis  - Unknown source of infection possibly acute chest vs PNA vs viral or bacterial sinusitis   - Febrile x2 days before admission, Tmax (38.5) on 9/12, afebrile since   - leukocytosis 33.5 --> 38.1 --> 44.7K (9/14)  --> 22.7K (9/15)--> 13.6K (9/17) --> 21.4K (9/19)   - UA (9/13) negative WBC, leuks, nitrities   - Influenza A+B and COVD negative (9/12)   - MRSA - negative (9/13)   - Lactate - 1.5 (9/12), repeat 1.1 (9/15)   - Procal 12.74 (9/14), repeat procal 9/16 1.6  - Blood cultures x2 - NGTD (9/14)  - CXR (9/13) negative for acute cardiopulmonary process   - CTPE (9/14) negative for PE, or acute cardiopulmonary process   - s/p vanc + zosyn (9/12)  - s/p azithromycin + ceftriaxone (9/13-9/14)   - s/p zosyn (9/14- 9/17) given continued uptrend of WBC  - 9/17 start augmentin BID x 1 day    - ID consulted 9/15 given persistent leukocytosis, however pt received IV steroids in prep for CT (d/t allergy) and leukocytosis likely 2/2 steroids. ID rec stopping atbs 9/17 and s/o   - WBC 21.4K (9/19) denies infectious symptoms (denies cough, fever, congestion, SOB, chills), reported 1 loose BM 9/18, low threshold to repeat infectious workup and restart Zosyn if decompensates      # SYED -improving   - sCr 2.03 (9/14) --> 2.46 (9/15) --> 1.92 (9.17) --> 1.66 (9/18) --> 1.23 (9/19)   - s/p D5 1/2 @75mL/hour (9/14-9/15), increased rate to @100mL/hour on 9/15 per attending-- continuing for another 24hrs (plan stop 9/17- ordered to fall off)  - Urine lytes (9/15) indicating pre-renal,  possibly in setting of profound anemia   - Repeat UA (9/15): negative  - can consider renal US if SYED up-trends again  - Renally dose medications, avoid nephrotoxic medications       # Hgb SS with acute on chronic pain   - No active care path   - OARRS reviewed- no aberrancies, last refilled home oxycodone 10mg for 7 day supply (10T) on 8/15/2024   - Currently on Hydroxyurea-held on admit d/t concern for infection   - See Hgb as above  - Hgb S (9/13): 94.2%  - Lysis labs elevated on admission bili 13.2 (BL 7-9), now downtrending as of 9/15  - Given no new O2 requirement on admission, chest pain and no radiographic findings of opacities on CXR and CT PE, patient does not currently meet criteria for ACS but will continue to monitor closely and low threshold to repeat chest imaging   - 9/16 overnight with Spo2 of 90% while sleeping - CXR without acute process   - RBCex labs of type and screen, hgb S, ionized calcium sent 9/13 should patient require RBCex   - s/p (9/13-9/17)  Dilaudid 1mg q2 hours for severe pain  - 9/17 start oxcodone 15mg PO q3h PRN + IVP dilaudid 1mg x 3 doses for breakthrough pain   - s/p Toradol 30mg IV q6 hours scheduled x12 doses (9/13-9/15), stopped on 9/15 given SYED with protonix PPI, HCG neg on 9/13    - Benadryl 25mg PO PRN for itching, Zofran 8mg q8 hours PRN for nausea, Miralax daily, DocuSenna 2tabs BID for opioid-induced constipation   - Continue home folic acid 1mg daily     # Numbness in Pudenal nerve distribution  - Possibly 2/2 decreased tissue oxygenation I/s/o significant anemia  - Pt reports numbness from rectum to urethra  - Denies any bowel/bladder incontinence  - Per pt, can feel when she needs to urinate or have BM  - Per attending, will consider investigating further after anemia addressed  - 9/19 pt reports perineal numbness is improving, will continue to monitor      # Prophy  - S/p Lovenox 40mg subcutaneous daily, 9/15 start heparin subcutaneous given SYED, encourage  ambulation   - Started on Protonix 40mg daily with toradol      Dispo   - Full code, confirmed on admission    - DC pending improvement in anemia and pain  - FUV with sickle cell team 11/13  - Emergency contact mother Kat Ibarra [phone]    I spent >60 minutes in the professional and overall care of this patient.    Assessment and plan discussed with attending physician Dr. Mina Lima, APRN-CNP

## 2024-09-19 NOTE — PROGRESS NOTES
Name: Ruperto Pang  MRN: [id number]  Encounter Date: 2024  PCP: Brook Harris, APRN-CNP  Heme-Onc: Dr. Dumas    Reason for consult: Sickle cell crisis  Attending Provider Nemo NEGRETE    Hematology/ Oncology Consult Daily Progress Note    Overnight Events   Patient reports pain is unchanged on pain routine. NAEON.    Oncologic History   Hgb SS disease currently taking hydroxyurea. Pain control with ibuprofen and oxycodone. Nasal cannula O2 supplementation at night. History of acute chest syndrome.     Review of Systems   12 Point ROS negative except HPI     Allergies     Allergies   Allergen Reactions    Iodinated Contrast Media Hives       Medications     acetaminophen, 650 mg, Daily  diphenhydrAMINE, 50 mg, Daily  folic acid, 1 mg, Daily  heparin (porcine), 5,000 Units, q8h TABATHA  [Held by provider] hydroxyurea, 500 mg, q12h TABATHA  immune globulin (human), 0.5 g/kg, Daily  methylPREDNISolone sodium succinate (PF), 125 mg, q24h  pantoprazole, 40 mg, Daily before breakfast  polyethylene glycol, 17 g, Daily  sennosides-docusate sodium, 2 tablet, BID         diphenhydrAMINE, 25 mg, q6h PRN  HYDROmorphone, 1 mg, q6h PRN  ondansetron, 8 mg, q8h PRN  oxyCODONE, 15 mg, q3h PRN  oxygen, , Continuous PRN - O2/gases        Physical Exam   Blood pressure 101/60, pulse 80, temperature 36.1 °C (97 °F), temperature source Temporal, resp. rate 18, height 1.524 m (5'), weight 71.2 kg (157 lb), SpO2 92%, unknown if currently breastfeeding.    ECO    Gen: awake, alert, in no acute distress  HEENT: AT/NC, PEERL, EOMI, no LAD  CV: RRR, no m/r/g  Pulm: CTAB, without w/r/r  Abd: soft, NT/ND, no organomegaly  Ext: no LE edema  Skin: warm and dry  Neuro: A&Ox4, moves all 4 extremities spontaneously     Labs     Lab Results   Component Value Date    GLUCOSE 73 (L) 2024    CALCIUM 8.8 2024     2024    K 3.3 (L) 2024    CO2 22 2024     (H) 2024    BUN 11 2024    CREATININE 1.23 (H)  09/19/2024       Lab Results   Component Value Date    WBC 21.4 (H) 09/19/2024    HGB 4.5 (LL) 09/19/2024    HCT 12.3 (L) 09/19/2024    MCV 80 09/19/2024     (H) 09/19/2024       Lab Results   Component Value Date    ALT 15 09/19/2024    AST 19 09/19/2024    GGT 68 (H) 07/31/2019    ALKPHOS 70 09/19/2024    BILITOT 3.5 (H) 09/19/2024         Imaging       Assessment/Plan     Ruperto Pang is a 24 y.o. female w/ a past medical history of Hgb SS disease on hydroxyurea who presented to the ED with flu like symptoms, fever, leukocytosis and acute pain crisis. Patient still on Zosyn due to concern for infection.      Patient is unlikely to be in delayed hemolytic transfusion reaction as she is not having chills, fevers, or rigors. MALISSA is also negative. There is no hemosiderin in urine which also is reassuring.    9/19 Update: Anemia stable and well below patient baseline. No symptomatic changes. Per Dr. Dumas, patient to receive IVIG daily x3d, pre-medicating with tylenol, benadryl, methylpred, for acute vaso-occlusive crisis.     Thank you for this consult, we will continue to follow. Patient seen and discussed with attending physician, Dr. Villavicencio, who agrees with the above.      Tory Frey, MS4  Hematology Consult Pager: [phone]  Oncology Consult Pager: [phone]

## 2024-09-19 NOTE — CARE PLAN
Problem: Skin  Goal: Decreased wound size/increased tissue granulation at next dressing change  Outcome: Progressing  Goal: Participates in plan/prevention/treatment measures  Outcome: Progressing  Goal: Prevent/manage excess moisture  Outcome: Progressing  Goal: Prevent/minimize sheer/friction injuries  Outcome: Progressing  Goal: Promote/optimize nutrition  Outcome: Progressing  Goal: Promote skin healing  Outcome: Progressing     Problem: Fall/Injury  Goal: Not fall by end of shift  Outcome: Progressing  Goal: Be free from injury by end of the shift  Outcome: Progressing  Goal: Verbalize understanding of personal risk factors for fall in the hospital  Outcome: Progressing  Goal: Verbalize understanding of risk factor reduction measures to prevent injury from fall in the home  Outcome: Progressing  Goal: Use assistive devices by end of the shift  Outcome: Progressing  Goal: Pace activities to prevent fatigue by end of the shift  Outcome: Progressing     Problem: Fall/Injury  Goal: Not fall by end of shift  Outcome: Progressing  Goal: Be free from injury by end of the shift  Outcome: Progressing  Goal: Verbalize understanding of personal risk factors for fall in the hospital  Outcome: Progressing  Goal: Verbalize understanding of risk factor reduction measures to prevent injury from fall in the home  Outcome: Progressing  Goal: Use assistive devices by end of the shift  Outcome: Progressing  Goal: Pace activities to prevent fatigue by end of the shift  Outcome: Progressing   The patient's goals for the shift include Pain level of 3 or less    The clinical goals for the shift include remain free from pain

## 2024-09-20 ENCOUNTER — APPOINTMENT (OUTPATIENT)
Dept: RADIOLOGY | Facility: HOSPITAL | Age: 24
DRG: 812 | End: 2024-09-20
Payer: COMMERCIAL

## 2024-09-20 LAB
ALBUMIN SERPL BCP-MCNC: 3.7 G/DL (ref 3.4–5)
ALP SERPL-CCNC: 73 U/L (ref 33–110)
ALT SERPL W P-5'-P-CCNC: 22 U/L (ref 7–45)
ANION GAP SERPL CALC-SCNC: 14 MMOL/L (ref 10–20)
APPEARANCE UR: CLEAR
AST SERPL W P-5'-P-CCNC: 22 U/L (ref 9–39)
BASOPHILS # BLD MANUAL: 0.37 X10*3/UL (ref 0–0.1)
BASOPHILS NFR BLD MANUAL: 1.7 %
BILIRUB SERPL-MCNC: 4.3 MG/DL (ref 0–1.2)
BILIRUB UR STRIP.AUTO-MCNC: NEGATIVE MG/DL
BLOOD EXPIRATION DATE: NORMAL
BUN SERPL-MCNC: 13 MG/DL (ref 6–23)
CALCIUM SERPL-MCNC: 8.9 MG/DL (ref 8.6–10.6)
CHLORIDE SERPL-SCNC: 107 MMOL/L (ref 98–107)
CO2 SERPL-SCNC: 20 MMOL/L (ref 21–32)
COLOR UR: YELLOW
CREAT SERPL-MCNC: 1.22 MG/DL (ref 0.5–1.05)
DISPENSE STATUS: NORMAL
EGFRCR SERPLBLD CKD-EPI 2021: 64 ML/MIN/1.73M*2
EOSINOPHIL # BLD MANUAL: 0.19 X10*3/UL (ref 0–0.7)
EOSINOPHIL NFR BLD MANUAL: 0.9 %
ERYTHROCYTE [DISTWIDTH] IN BLOOD BY AUTOMATED COUNT: 21.2 % (ref 11.5–14.5)
GLUCOSE SERPL-MCNC: 101 MG/DL (ref 74–99)
GLUCOSE UR STRIP.AUTO-MCNC: NORMAL MG/DL
HCT VFR BLD AUTO: 11.9 % (ref 36–46)
HEMOCCULT SP1 STL QL: NEGATIVE
HGB BLD-MCNC: 4.3 G/DL (ref 12–16)
HGB RETIC QN: 26 PG (ref 28–38)
IMM GRANULOCYTES # BLD AUTO: 2.24 X10*3/UL (ref 0–0.7)
IMM GRANULOCYTES NFR BLD AUTO: 10.4 % (ref 0–0.9)
IMMATURE RETIC FRACTION: 17.1 %
KETONES UR STRIP.AUTO-MCNC: NEGATIVE MG/DL
LDH SERPL L TO P-CCNC: 299 U/L (ref 84–246)
LEUKOCYTE ESTERASE UR QL STRIP.AUTO: NEGATIVE
LYMPHOCYTES # BLD MANUAL: 3.39 X10*3/UL (ref 1.2–4.8)
LYMPHOCYTES NFR BLD MANUAL: 15.7 %
MAGNESIUM SERPL-MCNC: 1.76 MG/DL (ref 1.6–2.4)
MCH RBC QN AUTO: 28.7 PG (ref 26–34)
MCHC RBC AUTO-ENTMCNC: 36.1 G/DL (ref 32–36)
MCV RBC AUTO: 79 FL (ref 80–100)
MONOCYTES # BLD MANUAL: 0.93 X10*3/UL (ref 0.1–1)
MONOCYTES NFR BLD MANUAL: 4.3 %
NEUTROPHILS # BLD MANUAL: 16.71 X10*3/UL (ref 1.2–7.7)
NEUTS BAND # BLD MANUAL: 0.19 X10*3/UL (ref 0–0.7)
NEUTS BAND NFR BLD MANUAL: 0.9 %
NEUTS SEG # BLD MANUAL: 16.52 X10*3/UL (ref 1.2–7)
NEUTS SEG NFR BLD MANUAL: 76.5 %
NITRITE UR QL STRIP.AUTO: NEGATIVE
NRBC BLD-RTO: 1 /100 WBCS (ref 0–0)
OVALOCYTES BLD QL SMEAR: ABNORMAL
PH UR STRIP.AUTO: 6 [PH]
PLATELET # BLD AUTO: 534 X10*3/UL (ref 150–450)
POLYCHROMASIA BLD QL SMEAR: ABNORMAL
POTASSIUM SERPL-SCNC: 3.8 MMOL/L (ref 3.5–5.3)
PRODUCT BLOOD TYPE: 1700
PRODUCT CODE: NORMAL
PROT SERPL-MCNC: 7.5 G/DL (ref 6.4–8.2)
PROT UR STRIP.AUTO-MCNC: ABNORMAL MG/DL
RBC # BLD AUTO: 1.5 X10*6/UL (ref 4–5.2)
RBC # UR STRIP.AUTO: ABNORMAL /UL
RBC #/AREA URNS AUTO: NORMAL /HPF
RBC MORPH BLD: ABNORMAL
RETICS #: 0.29 X10*6/UL (ref 0.02–0.08)
RETICS/RBC NFR AUTO: 19.5 % (ref 0.5–2)
SCHISTOCYTES BLD QL SMEAR: ABNORMAL
SICKLE CELLS BLD QL SMEAR: ABNORMAL
SODIUM SERPL-SCNC: 137 MMOL/L (ref 136–145)
SP GR UR STRIP.AUTO: 1.01
SQUAMOUS #/AREA URNS AUTO: NORMAL /HPF
TARGETS BLD QL SMEAR: ABNORMAL
TOTAL CELLS COUNTED BLD: 115
UNIT ABO: NORMAL
UNIT NUMBER: NORMAL
UNIT RH: NORMAL
UNIT VOLUME: 295
UROBILINOGEN UR STRIP.AUTO-MCNC: NORMAL MG/DL
WBC # BLD AUTO: 21.6 X10*3/UL (ref 4.4–11.3)
WBC #/AREA URNS AUTO: NORMAL /HPF
XM INTEP: NORMAL

## 2024-09-20 PROCEDURE — 76700 US EXAM ABDOM COMPLETE: CPT

## 2024-09-20 PROCEDURE — 85007 BL SMEAR W/DIFF WBC COUNT: CPT

## 2024-09-20 PROCEDURE — 36430 TRANSFUSION BLD/BLD COMPNT: CPT

## 2024-09-20 PROCEDURE — 76700 US EXAM ABDOM COMPLETE: CPT | Performed by: RADIOLOGY

## 2024-09-20 PROCEDURE — 82270 OCCULT BLOOD FECES: CPT

## 2024-09-20 PROCEDURE — 2500000004 HC RX 250 GENERAL PHARMACY W/ HCPCS (ALT 636 FOR OP/ED): Performed by: NURSE PRACTITIONER

## 2024-09-20 PROCEDURE — 85027 COMPLETE CBC AUTOMATED: CPT

## 2024-09-20 PROCEDURE — 99233 SBSQ HOSP IP/OBS HIGH 50: CPT

## 2024-09-20 PROCEDURE — 86902 BLOOD TYPE ANTIGEN DONOR EA: CPT

## 2024-09-20 PROCEDURE — 2500000001 HC RX 250 WO HCPCS SELF ADMINISTERED DRUGS (ALT 637 FOR MEDICARE OP)

## 2024-09-20 PROCEDURE — 1170000001 HC PRIVATE ONCOLOGY ROOM DAILY

## 2024-09-20 PROCEDURE — 81001 URINALYSIS AUTO W/SCOPE: CPT

## 2024-09-20 PROCEDURE — 83615 LACTATE (LD) (LDH) ENZYME: CPT

## 2024-09-20 PROCEDURE — 85045 AUTOMATED RETICULOCYTE COUNT: CPT

## 2024-09-20 PROCEDURE — 83735 ASSAY OF MAGNESIUM: CPT

## 2024-09-20 PROCEDURE — 36415 COLL VENOUS BLD VENIPUNCTURE: CPT

## 2024-09-20 PROCEDURE — 84075 ASSAY ALKALINE PHOSPHATASE: CPT

## 2024-09-20 PROCEDURE — 2500000001 HC RX 250 WO HCPCS SELF ADMINISTERED DRUGS (ALT 637 FOR MEDICARE OP): Performed by: NURSE PRACTITIONER

## 2024-09-20 PROCEDURE — P9040 RBC LEUKOREDUCED IRRADIATED: HCPCS

## 2024-09-20 PROCEDURE — 2500000004 HC RX 250 GENERAL PHARMACY W/ HCPCS (ALT 636 FOR OP/ED)

## 2024-09-20 RX ORDER — OXYCODONE HYDROCHLORIDE 5 MG/1
15 TABLET ORAL
Status: DISCONTINUED | OUTPATIENT
Start: 2024-09-20 | End: 2024-09-21

## 2024-09-20 RX ORDER — HYDROMORPHONE HYDROCHLORIDE 1 MG/ML
1 INJECTION, SOLUTION INTRAMUSCULAR; INTRAVENOUS; SUBCUTANEOUS EVERY 4 HOURS PRN
Status: DISCONTINUED | OUTPATIENT
Start: 2024-09-20 | End: 2024-09-21

## 2024-09-20 ASSESSMENT — PAIN SCALES - PAIN ASSESSMENT IN ADVANCED DEMENTIA (PAINAD)
TOTALSCORE: 5
TOTALSCORE: MEDICATION (SEE MAR)
TOTALSCORE: 5
CONSOLABILITY: UNABLE TO CONSOLE, DISTRACT OR REASSURE
BODYLANGUAGE: TENSE, DISTRESSED PACING, FIDGETING
FACIALEXPRESSION: FACIAL GRIMACING
FACIALEXPRESSION: FACIAL GRIMACING
CONSOLABILITY: UNABLE TO CONSOLE, DISTRACT OR REASSURE
TOTALSCORE: MEDICATION (SEE MAR)
BREATHING: NORMAL
BODYLANGUAGE: TENSE, DISTRESSED PACING, FIDGETING
BREATHING: NORMAL

## 2024-09-20 ASSESSMENT — PAIN SCALES - GENERAL
PAINLEVEL_OUTOF10: 5 - MODERATE PAIN
PAINLEVEL_OUTOF10: 7
PAINLEVEL_OUTOF10: 8
PAINLEVEL_OUTOF10: 9
PAINLEVEL_OUTOF10: 9
PAINLEVEL_OUTOF10: 8
PAINLEVEL_OUTOF10: 5 - MODERATE PAIN
PAINLEVEL_OUTOF10: 4

## 2024-09-20 ASSESSMENT — PAIN DESCRIPTION - LOCATION
LOCATION: BACK
LOCATION: ARM
LOCATION: LEG

## 2024-09-20 ASSESSMENT — PAIN DESCRIPTION - DESCRIPTORS: DESCRIPTORS: ACHING

## 2024-09-20 ASSESSMENT — PAIN - FUNCTIONAL ASSESSMENT
PAIN_FUNCTIONAL_ASSESSMENT: 0-10
PAIN_FUNCTIONAL_ASSESSMENT: 0-10

## 2024-09-20 ASSESSMENT — COGNITIVE AND FUNCTIONAL STATUS - GENERAL
DAILY ACTIVITIY SCORE: 24
MOBILITY SCORE: 24

## 2024-09-20 ASSESSMENT — PAIN DESCRIPTION - ORIENTATION
ORIENTATION: RIGHT;LEFT
ORIENTATION: LEFT;RIGHT;LOWER;OUTER
ORIENTATION: RIGHT;LEFT

## 2024-09-20 NOTE — PROGRESS NOTES
Ruperto Pang is a 24 y.o. female on day 7 of admission presenting with Sickle cell crisis (Multi).    Subjective   Seen at bedside this morning. IVIG went okay yesterday. Reported improvement in fatigue and overall feels like she has more energy. She states that her pain worsened overnight, states that pain is in lower back and bilateral ankles. Describes pain as throbbing and burning. States that numbness in rectal area has improved. No infectious symptoms identified, denies cough, fever, congestion, SOB, chills.  No further episodes of diarrhea. Discussed that we are still waiting on lab results for a further plan. Denies any HA, dizziness/lightheadedness, fevers/chills, cough, congestion, rhinorrhea, sore throat, SOB, CP, palpitations, abdominal pain, n/v/c, melena, dysuria, urgency/frequency, hematuria, numbness/tingling, bruising/bleeding or rashes.     Objective     Physical Exam  Vitals reviewed.   Constitutional:       Appearance: Normal appearance.   HENT:      Head: Normocephalic and atraumatic.      Nose: Nose normal.      Mouth/Throat:      Mouth: Mucous membranes are moist.      Pharynx: Oropharynx is clear.   Eyes:      Extraocular Movements: Extraocular movements intact.      Pupils: Pupils are equal, round, and reactive to light.   Cardiovascular:      Rate and Rhythm: Normal rate and regular rhythm.      Pulses: Normal pulses.      Heart sounds: Normal heart sounds.   Pulmonary:      Effort: Pulmonary effort is normal.      Breath sounds: Normal breath sounds.      Comments: 2L O2 @ night   Abdominal:      General: Bowel sounds are normal.      Palpations: Abdomen is soft.   Musculoskeletal:         General: Normal range of motion.   Skin:     General: Skin is warm.   Neurological:      General: No focal deficit present.      Mental Status: She is alert and oriented to person, place, and time. Mental status is at baseline.   Psychiatric:         Mood and Affect: Mood normal.         Behavior:  Behavior normal.       Last Recorded Vitals  Blood pressure 125/75, pulse 73, temperature 36.7 °C (98.1 °F), temperature source Temporal, resp. rate 18, height 1.524 m (5'), weight 71.2 kg (157 lb), SpO2 92%, unknown if currently breastfeeding.  Intake/Output last 3 Shifts:  I/O last 3 completed shifts:  In: 120 (1.7 mL/kg) [P.O.:120]  Out: - (0 mL/kg)   Weight: 71.2 kg     Relevant Results  Scheduled medications  acetaminophen, 650 mg, oral, Daily  diphenhydrAMINE, 50 mg, oral, Daily  folic acid, 1 mg, oral, Daily  heparin (porcine), 5,000 Units, subcutaneous, q8h TABATHA  [Held by provider] hydroxyurea, 500 mg, oral, q12h TABATHA  immune globulin (human), 0.5 g/kg, intravenous, Daily  methylPREDNISolone sodium succinate (PF), 125 mg, intravenous, q24h  pantoprazole, 40 mg, oral, Daily before breakfast  polyethylene glycol, 17 g, oral, Daily  sennosides-docusate sodium, 2 tablet, oral, BID    Continuous medications     PRN medications  PRN medications: diphenhydrAMINE, HYDROmorphone, ondansetron, oxyCODONE, oxygen     Assessment/Plan   Assessment & Plan  Sickle cell crisis (Multi)    Ruperto Pang is a 24 y.o. 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 Patient febrile x1 (Tmax 38.5) and significant leukocytosis noted on admission. CXR (9/12) negative for acute cardiopulmonary process. UA (9/12) neg for nitrites and leuks. COVID / Influenza A + B / MRSA negative (9/13). Procal - 12.74 (9/14). CT PE (9/13) negative for PE or acute cardiopulmomary process. CT neck (9/13) no evidence of thromboses, left retropharyngeal and cervical lymph nodes, likely sequale of URI. On admit patient started on Vanc + Zosyn (9/12-9/13). On 9/13 patient started on azithromycin + ceftriaxone (9/13-9/14). 9/14- 9/17 s.p Zosyn to given continued uptrend of WBC, 9/17 start augmentin x 1 day. ID  consulted 9/15 given persistent leukocytosis, however pt received IV steroids in prep for CT (d/t allergy) and believe leukocytosis likely 2/2 steroids, however ID rec continuing atbs for now. Additionally, patient with worsening SYED on 9/15. Increased rate of D5 1/2 to 100mL/hour on 9/15, repeat UA neg, urine lytes indicating pre renal, possibly in setting of profound anemia. Hgb 5.4 on 9/14, s/p 1 unit PRBC. Hgb with inappropriate incrementation on 9/15, MALISSA (negative) and extended MALISSA sent on 9/15 (still pending). Hgb again decreased to 4.3 (9/16) and initial c/f DHTR. Heme consulted 9/16, believe likely not in DHTR and rec'd additional 1u prbc (9/16 PM). Hgb incremented from 4.3 --> 4.8 (9/17). Pt additionally with reported numbness from rectum to urethra, per attending will consider investigating further after profound anemia improved. No bowel or bladder dysfunction. ID rec stopping atbs 9/17 and s/o. 9/17 s/p augmentin x1 day to complete atb couse. 9/18 reports improving perineal numbness. Hgb 4.9 (9/18) --> 4.5 (9/19). Discussed with Dr. Dumas and hematology 9/19, plan for IVIG x3 doses (end date 9/21). Hemoglobin 4.3 (9/20), per Dr. Dumas continue IVIG, order 1 unit pRBC, and obtain US liver and spleen, pending. UA and fecal occult pending to rule out areas of potential bleed. DC pending improvement in anemia and pain.      # c/f Delayed hemolytic transfusion reaction   # Anemia   - Hgb (baseline ~6-7), 5.4 --> Hgb incremented briefly to 6.1 on evening labs 9/14 --> Hgb 5.3 of 9/15--> decreased to 4.3 (9/16) increasing suspicion for DHTR  - MALISSA (9/15) negative, extended MALISSA pending  - Heme consulted for c/f DHTR (9/16)- believe likely not in DHTR, peripheral smear read pending. Rec'd 1u prbc 9/16 PM. Pt incremented 4.3 --> 4.8 (9/17)   - 9/18 Hgb 4.9--> plan for possible IVIG vs watch/wait pending Dr. Dumas's input   - 9/19 hgb 4.5. Discussed with Dr. Dumas and hematology, plan for IVIG x3 doses (end date 9/21)  and monitor hgb response  - Per Dr. Dumas, ordered 0.5g/kg IVIG x3 days with premedications (50mg benadryl, 650mg tylenol, 125mg IV methylprednisone before IVIG dose) (9/19-plan end date 9/21)   - 9/20, hgb 4.3; per discussion with Dr. Dumas, order 1u pRBCs, continue IVIG today/tomorrow, order UA, fecal occult, US liver and US spleen, all ordered and pending   - US liver pending   - US spleen pending   - UA pending given worsening anemia to rule out bleed   - Fecal occult pending given worsening anemia to rule out bleed   - plan to discuss with Hematology goal hgb and need for otpt lab FU closer to discharge     # Fever, resolved  # Leukocytosis  - Unknown source of infection possibly acute chest vs PNA vs viral or bacterial sinusitis   - Febrile x2 days before admission, Tmax (38.5) on 9/12, afebrile since   - leukocytosis 33.5 --> 38.1 --> 44.7K (9/14)  --> 22.7K (9/15)--> 13.6K (9/17) --> 21.4K (9/19)   - UA (9/13) negative WBC, leuks, nitrities   - Influenza A+B and COVD negative (9/12)   - MRSA - negative (9/13)   - Lactate - 1.5 (9/12), repeat 1.1 (9/15)   - Procal 12.74 (9/14), repeat procal 9/16 1.6  - Blood cultures x2 - NGTD (9/14)  - CXR (9/13) negative for acute cardiopulmonary process   - CTPE (9/14) negative for PE, or acute cardiopulmonary process   - s/p vanc + zosyn (9/12)  - s/p azithromycin + ceftriaxone (9/13-9/14)   - s/p zosyn (9/14- 9/17) given continued uptrend of WBC  - 9/17 start augmentin BID x 1 day    - ID consulted 9/15 given persistent leukocytosis, however pt received IV steroids in prep for CT (d/t allergy) and leukocytosis likely 2/2 steroids. ID rec stopping atbs 9/17 and s/o   - WBC 21.4K (9/19) denies infectious symptoms (denies cough, fever, congestion, SOB, chills), reported 1 loose BM 9/18, low threshold to repeat infectious workup and restart Zosyn if decompensates   - Started methylprednisolone with IVIG daily as premedication before transfusion (will get dose 9/19-9/21);  monitor WBC response and clinically correlate if any reported new infectious symptoms      # SYED -improving   - sCr 2.03 (9/14) --> 2.46 (9/15) --> 1.92 (9.17) --> 1.66 (9/18) --> 1.23 (9/19) --> 1.22 (9/20)   - s/p D5 1/2 @75mL/hour (9/14-9/15), increased rate to @100mL/hour on 9/15 per attending-- continuing for another 24hrs (plan stop 9/17- ordered to fall off)  - Urine lytes (9/15) indicating pre-renal, possibly in setting of profound anemia   - Repeat UA (9/15): negative  - can consider renal US if SYED up-trends again  - Renally dose medications, avoid nephrotoxic medications       # Hgb SS with acute on chronic pain   - No active care path   - OARRS reviewed- no aberrancies, last refilled home oxycodone 10mg for 7 day supply (10T) on 8/15/2024   - Currently on Hydroxyurea-held on admit d/t concern for infection   - See Hgb as above  - Hgb S (9/13): 94.2%  - Lysis labs elevated on admission bili 13.2 (BL 7-9), now downtrending as of 9/15  - Given no new O2 requirement on admission, chest pain and no radiographic findings of opacities on CXR and CT PE, patient does not currently meet criteria for ACS but will continue to monitor closely and low threshold to repeat chest imaging   - 9/16 overnight with Spo2 of 90% while sleeping - CXR without acute process   - RBCex labs of type and screen, hgb S, ionized calcium sent 9/13 should patient require RBCex   - s/p (9/13-9/17)  Dilaudid 1mg q2 hours for severe pain  - 9/17 start oxcodone 15mg PO q3h PRN + IVP dilaudid 1mg x 3 doses for breakthrough pain   - s/p Toradol 30mg IV q6 hours scheduled x12 doses (9/13-9/15), stopped on 9/15 given SYED with protonix PPI, HCG neg on 9/13    - s/p oxcodone 15mg PO q3h PRN + IVP dilaudid 1mg Q6 (9/17-9/20)  - c/o increased pain in lower back and bilateral lower legs, oxcodone 15mg PO q3h PRN + IVP dilaudid 1mg Q4hrs; educated patient on rotation and calling for pain medications more frequently    - Benadryl 25mg PO PRN for  itching, Zofran 8mg q8 hours PRN for nausea, Miralax daily, DocuSenna 2tabs BID for opioid-induced constipation   - Continue home folic acid 1mg daily     # Numbness in Pudenal nerve distribution  - Possibly 2/2 decreased tissue oxygenation I/s/o significant anemia  - Pt reports numbness from rectum to urethra  - Denies any bowel/bladder incontinence  - Per pt, can feel when she needs to urinate or have BM  - Per attending, will consider investigating further after anemia addressed  - 9/19 pt reports perineal numbness is improving, will continue to monitor      # Prophy  - S/p Lovenox 40mg subcutaneous daily, 9/15 start heparin subcutaneous given SYED, encourage ambulation   - Started on Protonix 40mg daily with toradol      Dispo   - Full code, confirmed on admission    - DC pending improvement in anemia and pain  - FUV with sickle cell team 11/13  - Emergency contact mother Kat Ibarra [phone]    I spent >60 minutes in the professional and overall care of this patient.    Assessment and plan discussed with attending physician Dr. Mina Lima, APRN-CNP

## 2024-09-20 NOTE — CARE PLAN
The patient's goals for the shift include Pain level of 3 or less    The clinical goals for the shift include Patient will rate kaba as <6/110 through end of shift    Over the shift, the patient did make progress toward the following goals. Barriers to progression include anemia.  Recommendations to address these barriers include continue monitoring and administration of IVIG and blood.

## 2024-09-20 NOTE — ASSESSMENT & PLAN NOTE
Ruperto Pang is a 24 y.o. 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 Patient febrile x1 (Tmax 38.5) and significant leukocytosis noted on admission. CXR (9/12) negative for acute cardiopulmonary process. UA (9/12) neg for nitrites and leuks. COVID / Influenza A + B / MRSA negative (9/13). Procal - 12.74 (9/14). CT PE (9/13) negative for PE or acute cardiopulmomary process. CT neck (9/13) no evidence of thromboses, left retropharyngeal and cervical lymph nodes, likely sequale of URI. On admit patient started on Vanc + Zosyn (9/12-9/13). On 9/13 patient started on azithromycin + ceftriaxone (9/13-9/14). 9/14- 9/17 s.p Zosyn to given continued uptrend of WBC, 9/17 start augmentin x 1 day. ID consulted 9/15 given persistent leukocytosis, however pt received IV steroids in prep for CT (d/t allergy) and believe leukocytosis likely 2/2 steroids, however ID rec continuing atbs for now. Additionally, patient with worsening SYED on 9/15. Increased rate of D5 1/2 to 100mL/hour on 9/15, repeat UA neg, urine lytes indicating pre renal, possibly in setting of profound anemia. Hgb 5.4 on 9/14, s/p 1 unit PRBC. Hgb with inappropriate incrementation on 9/15, MALISSA (negative) and extended MALISSA sent on 9/15 (still pending). Hgb again decreased to 4.3 (9/16) and initial c/f DHTR. Heme consulted 9/16, believe likely not in DHTR and rec'd additional 1u prbc (9/16 PM). Hgb incremented from 4.3 --> 4.8 (9/17). Pt additionally with reported numbness from rectum to urethra, per attending will consider investigating further after profound anemia improved. No bowel or bladder dysfunction. ID rec stopping atbs 9/17 and s/o. 9/17 s/p augmentin x1 day to complete atb couse. 9/18 reports improving perineal numbness. Hgb 4.9 (9/18) --> 4.5 (9/19). Discussed with Dr. Dumas and hematology 9/19, plan for  IVIG x3 doses (end date 9/21). Hemoglobin 4.3 (9/20), per Dr. Dumas continue IVIG, order 1 unit pRBC, and obtain US liver and spleen, pending. UA and fecal occult pending to rule out areas of potential bleed. DC pending improvement in anemia and pain.      # c/f Delayed hemolytic transfusion reaction   # Anemia   - Hgb (baseline ~6-7), 5.4 --> Hgb incremented briefly to 6.1 on evening labs 9/14 --> Hgb 5.3 of 9/15--> decreased to 4.3 (9/16) increasing suspicion for DHTR  - MALISSA (9/15) negative, extended MALISSA pending  - Heme consulted for c/f DHTR (9/16)- believe likely not in DHTR, peripheral smear read pending. Rec'd 1u prbc 9/16 PM. Pt incremented 4.3 --> 4.8 (9/17)   - 9/18 Hgb 4.9--> plan for possible IVIG vs watch/wait pending Dr. Dumas's input   - 9/19 hgb 4.5. Discussed with Dr. Dumas and hematology, plan for IVIG x3 doses (end date 9/21) and monitor hgb response  - Per Dr. Dumas, ordered 0.5g/kg IVIG x3 days with premedications (50mg benadryl, 650mg tylenol, 125mg IV methylprednisone before IVIG dose) (9/19-plan end date 9/21)   - 9/20, hgb 4.3; per discussion with Dr. Dumas, order 1u pRBCs, continue IVIG today/tomorrow, order UA, fecal occult, US liver and US spleen, all ordered and pending   - US liver pending   - US spleen pending   - UA pending given worsening anemia to rule out bleed   - Fecal occult pending given worsening anemia to rule out bleed   - plan to discuss with Hematology goal hgb and need for otpt lab FU closer to discharge     # Fever, resolved  # Leukocytosis  - Unknown source of infection possibly acute chest vs PNA vs viral or bacterial sinusitis   - Febrile x2 days before admission, Tmax (38.5) on 9/12, afebrile since   - leukocytosis 33.5 --> 38.1 --> 44.7K (9/14)  --> 22.7K (9/15)--> 13.6K (9/17) --> 21.4K (9/19)   - UA (9/13) negative WBC, leuks, nitrities   - Influenza A+B and COVD negative (9/12)   - MRSA - negative (9/13)   - Lactate - 1.5 (9/12), repeat 1.1 (9/15)   - Procal 12.74  (9/14), repeat procal 9/16 1.6  - Blood cultures x2 - NGTD (9/14)  - CXR (9/13) negative for acute cardiopulmonary process   - CTPE (9/14) negative for PE, or acute cardiopulmonary process   - s/p vanc + zosyn (9/12)  - s/p azithromycin + ceftriaxone (9/13-9/14)   - s/p zosyn (9/14- 9/17) given continued uptrend of WBC  - 9/17 start augmentin BID x 1 day    - ID consulted 9/15 given persistent leukocytosis, however pt received IV steroids in prep for CT (d/t allergy) and leukocytosis likely 2/2 steroids. ID rec stopping atbs 9/17 and s/o   - WBC 21.4K (9/19) denies infectious symptoms (denies cough, fever, congestion, SOB, chills), reported 1 loose BM 9/18, low threshold to repeat infectious workup and restart Zosyn if decompensates   - Started methylprednisolone with IVIG daily as premedication before transfusion (will get dose 9/19-9/21); monitor WBC response and clinically correlate if any reported new infectious symptoms      # SYED -improving   - sCr 2.03 (9/14) --> 2.46 (9/15) --> 1.92 (9.17) --> 1.66 (9/18) --> 1.23 (9/19) --> 1.22 (9/20)   - s/p D5 1/2 @75mL/hour (9/14-9/15), increased rate to @100mL/hour on 9/15 per attending-- continuing for another 24hrs (plan stop 9/17- ordered to fall off)  - Urine lytes (9/15) indicating pre-renal, possibly in setting of profound anemia   - Repeat UA (9/15): negative  - can consider renal US if SYED up-trends again  - Renally dose medications, avoid nephrotoxic medications       # Hgb SS with acute on chronic pain   - No active care path   - OARRS reviewed- no aberrancies, last refilled home oxycodone 10mg for 7 day supply (10T) on 8/15/2024   - Currently on Hydroxyurea-held on admit d/t concern for infection   - See Hgb as above  - Hgb S (9/13): 94.2%  - Lysis labs elevated on admission bili 13.2 (BL 7-9), now downtrending as of 9/15  - Given no new O2 requirement on admission, chest pain and no radiographic findings of opacities on CXR and CT PE, patient does not  currently meet criteria for ACS but will continue to monitor closely and low threshold to repeat chest imaging   - 9/16 overnight with Spo2 of 90% while sleeping - CXR without acute process   - RBCex labs of type and screen, hgb S, ionized calcium sent 9/13 should patient require RBCex   - s/p (9/13-9/17)  Dilaudid 1mg q2 hours for severe pain  - 9/17 start oxcodone 15mg PO q3h PRN + IVP dilaudid 1mg x 3 doses for breakthrough pain   - s/p Toradol 30mg IV q6 hours scheduled x12 doses (9/13-9/15), stopped on 9/15 given SYED with protonix PPI, HCG neg on 9/13    - s/p oxcodone 15mg PO q3h PRN + IVP dilaudid 1mg Q6 (9/17-9/20)  - c/o increased pain in lower back and bilateral lower legs, oxcodone 15mg PO q3h PRN + IVP dilaudid 1mg Q4hrs; educated patient on rotation and calling for pain medications more frequently    - Benadryl 25mg PO PRN for itching, Zofran 8mg q8 hours PRN for nausea, Miralax daily, DocuSenna 2tabs BID for opioid-induced constipation   - Continue home folic acid 1mg daily     # Numbness in Pudenal nerve distribution  - Possibly 2/2 decreased tissue oxygenation I/s/o significant anemia  - Pt reports numbness from rectum to urethra  - Denies any bowel/bladder incontinence  - Per pt, can feel when she needs to urinate or have BM  - Per attending, will consider investigating further after anemia addressed  - 9/19 pt reports perineal numbness is improving, will continue to monitor      # Prophy  - S/p Lovenox 40mg subcutaneous daily, 9/15 start heparin subcutaneous given SYED, encourage ambulation   - Started on Protonix 40mg daily with toradol      Dispo   - Full code, confirmed on admission    - DC pending improvement in anemia and pain  - FUV with sickle cell team 11/13  - Emergency contact mother Kat Ibarra [phone]

## 2024-09-21 ENCOUNTER — APPOINTMENT (OUTPATIENT)
Dept: RADIOLOGY | Facility: HOSPITAL | Age: 24
DRG: 812 | End: 2024-09-21
Payer: COMMERCIAL

## 2024-09-21 LAB
ALBUMIN SERPL BCP-MCNC: 3.7 G/DL (ref 3.4–5)
ALP SERPL-CCNC: 68 U/L (ref 33–110)
ALT SERPL W P-5'-P-CCNC: 22 U/L (ref 7–45)
ANION GAP SERPL CALC-SCNC: 14 MMOL/L (ref 10–20)
APPEARANCE UR: CLEAR
AST SERPL W P-5'-P-CCNC: 37 U/L (ref 9–39)
BASOPHILS # BLD MANUAL: 0 X10*3/UL (ref 0–0.1)
BASOPHILS NFR BLD MANUAL: 0 %
BILIRUB SERPL-MCNC: 4.5 MG/DL (ref 0–1.2)
BILIRUB UR STRIP.AUTO-MCNC: NEGATIVE MG/DL
BUN SERPL-MCNC: 18 MG/DL (ref 6–23)
CALCIUM SERPL-MCNC: 8.8 MG/DL (ref 8.6–10.6)
CHLORIDE SERPL-SCNC: 107 MMOL/L (ref 98–107)
CO2 SERPL-SCNC: 19 MMOL/L (ref 21–32)
COLOR UR: YELLOW
CREAT SERPL-MCNC: 1.11 MG/DL (ref 0.5–1.05)
EGFRCR SERPLBLD CKD-EPI 2021: 71 ML/MIN/1.73M*2
EOSINOPHIL # BLD MANUAL: 0 X10*3/UL (ref 0–0.7)
EOSINOPHIL NFR BLD MANUAL: 0 %
ERYTHROCYTE [DISTWIDTH] IN BLOOD BY AUTOMATED COUNT: 20.5 % (ref 11.5–14.5)
GLUCOSE BLD MANUAL STRIP-MCNC: 102 MG/DL (ref 74–99)
GLUCOSE SERPL-MCNC: 91 MG/DL (ref 74–99)
GLUCOSE UR STRIP.AUTO-MCNC: NORMAL MG/DL
HCT VFR BLD AUTO: 13.6 % (ref 36–46)
HGB BLD-MCNC: 4.8 G/DL (ref 12–16)
HGB RETIC QN: 25 PG (ref 28–38)
HYPOCHROMIA BLD QL SMEAR: ABNORMAL
IMM GRANULOCYTES # BLD AUTO: 0.69 X10*3/UL (ref 0–0.7)
IMM GRANULOCYTES NFR BLD AUTO: 3.7 % (ref 0–0.9)
IMMATURE RETIC FRACTION: 23 %
KETONES UR STRIP.AUTO-MCNC: NEGATIVE MG/DL
LDH SERPL L TO P-CCNC: 461 U/L (ref 84–246)
LEUKOCYTE ESTERASE UR QL STRIP.AUTO: NEGATIVE
LYMPHOCYTES # BLD MANUAL: 2.85 X10*3/UL (ref 1.2–4.8)
LYMPHOCYTES NFR BLD MANUAL: 15.5 %
MAGNESIUM SERPL-MCNC: 1.94 MG/DL (ref 1.6–2.4)
MCH RBC QN AUTO: 28.6 PG (ref 26–34)
MCHC RBC AUTO-ENTMCNC: 35.3 G/DL (ref 32–36)
MCV RBC AUTO: 81 FL (ref 80–100)
MONOCYTES # BLD MANUAL: 0.17 X10*3/UL (ref 0.1–1)
MONOCYTES NFR BLD MANUAL: 0.9 %
MYELOCYTES # BLD MANUAL: 0.17 X10*3/UL
MYELOCYTES NFR BLD MANUAL: 0.9 %
NEUTROPHILS # BLD MANUAL: 14.9 X10*3/UL (ref 1.2–7.7)
NEUTS BAND # BLD MANUAL: 0.79 X10*3/UL (ref 0–0.7)
NEUTS BAND NFR BLD MANUAL: 4.3 %
NEUTS SEG # BLD MANUAL: 14.11 X10*3/UL (ref 1.2–7)
NEUTS SEG NFR BLD MANUAL: 76.7 %
NITRITE UR QL STRIP.AUTO: NEGATIVE
NRBC BLD-RTO: 3.6 /100 WBCS (ref 0–0)
PH UR STRIP.AUTO: 6 [PH]
PLATELET # BLD AUTO: 494 X10*3/UL (ref 150–450)
POTASSIUM SERPL-SCNC: 4.4 MMOL/L (ref 3.5–5.3)
PROT SERPL-MCNC: 7.9 G/DL (ref 6.4–8.2)
PROT UR STRIP.AUTO-MCNC: ABNORMAL MG/DL
RBC # BLD AUTO: 1.68 X10*6/UL (ref 4–5.2)
RBC # UR STRIP.AUTO: ABNORMAL /UL
RBC #/AREA URNS AUTO: ABNORMAL /HPF
RBC MORPH BLD: ABNORMAL
RETICS #: 0.3 X10*6/UL (ref 0.02–0.08)
RETICS/RBC NFR AUTO: 17.7 % (ref 0.5–2)
SICKLE CELLS BLD QL SMEAR: ABNORMAL
SODIUM SERPL-SCNC: 136 MMOL/L (ref 136–145)
SP GR UR STRIP.AUTO: 1.01
SQUAMOUS #/AREA URNS AUTO: ABNORMAL /HPF
TOTAL CELLS COUNTED BLD: 116
TSH SERPL-ACNC: 1.67 MIU/L (ref 0.44–3.98)
UROBILINOGEN UR STRIP.AUTO-MCNC: NORMAL MG/DL
VARIANT LYMPHS # BLD MANUAL: 0.31 X10*3/UL (ref 0–0.5)
VARIANT LYMPHS NFR BLD: 1.7 %
WBC # BLD AUTO: 18.4 X10*3/UL (ref 4.4–11.3)
WBC #/AREA URNS AUTO: ABNORMAL /HPF

## 2024-09-21 PROCEDURE — 2500000001 HC RX 250 WO HCPCS SELF ADMINISTERED DRUGS (ALT 637 FOR MEDICARE OP)

## 2024-09-21 PROCEDURE — 82947 ASSAY GLUCOSE BLOOD QUANT: CPT

## 2024-09-21 PROCEDURE — 84075 ASSAY ALKALINE PHOSPHATASE: CPT

## 2024-09-21 PROCEDURE — 99233 SBSQ HOSP IP/OBS HIGH 50: CPT

## 2024-09-21 PROCEDURE — 87040 BLOOD CULTURE FOR BACTERIA: CPT

## 2024-09-21 PROCEDURE — 2500000004 HC RX 250 GENERAL PHARMACY W/ HCPCS (ALT 636 FOR OP/ED)

## 2024-09-21 PROCEDURE — 2500000004 HC RX 250 GENERAL PHARMACY W/ HCPCS (ALT 636 FOR OP/ED): Mod: JZ

## 2024-09-21 PROCEDURE — 36415 COLL VENOUS BLD VENIPUNCTURE: CPT

## 2024-09-21 PROCEDURE — 2500000001 HC RX 250 WO HCPCS SELF ADMINISTERED DRUGS (ALT 637 FOR MEDICARE OP): Performed by: NURSE PRACTITIONER

## 2024-09-21 PROCEDURE — 85027 COMPLETE CBC AUTOMATED: CPT

## 2024-09-21 PROCEDURE — 85045 AUTOMATED RETICULOCYTE COUNT: CPT

## 2024-09-21 PROCEDURE — 81001 URINALYSIS AUTO W/SCOPE: CPT

## 2024-09-21 PROCEDURE — 2500000004 HC RX 250 GENERAL PHARMACY W/ HCPCS (ALT 636 FOR OP/ED): Performed by: NURSE PRACTITIONER

## 2024-09-21 PROCEDURE — 83735 ASSAY OF MAGNESIUM: CPT

## 2024-09-21 PROCEDURE — 1200000003 HC ONCOLOGY  ROOM WITH TELEMETRY DAILY

## 2024-09-21 PROCEDURE — 83615 LACTATE (LD) (LDH) ENZYME: CPT

## 2024-09-21 PROCEDURE — 85007 BL SMEAR W/DIFF WBC COUNT: CPT

## 2024-09-21 PROCEDURE — 2500000005 HC RX 250 GENERAL PHARMACY W/O HCPCS

## 2024-09-21 PROCEDURE — 93010 ELECTROCARDIOGRAM REPORT: CPT | Performed by: INTERNAL MEDICINE

## 2024-09-21 PROCEDURE — 84443 ASSAY THYROID STIM HORMONE: CPT

## 2024-09-21 RX ORDER — HYDROMORPHONE HYDROCHLORIDE 1 MG/ML
1 INJECTION, SOLUTION INTRAMUSCULAR; INTRAVENOUS; SUBCUTANEOUS
Status: DISCONTINUED | OUTPATIENT
Start: 2024-09-21 | End: 2024-09-22

## 2024-09-21 ASSESSMENT — PAIN DESCRIPTION - DESCRIPTORS: DESCRIPTORS: ACHING

## 2024-09-21 ASSESSMENT — COGNITIVE AND FUNCTIONAL STATUS - GENERAL
MOBILITY SCORE: 24
DAILY ACTIVITIY SCORE: 24

## 2024-09-21 ASSESSMENT — PAIN SCALES - GENERAL
PAINLEVEL_OUTOF10: 8
PAINLEVEL_OUTOF10: 9
PAINLEVEL_OUTOF10: 7
PAINLEVEL_OUTOF10: 4
PAINLEVEL_OUTOF10: 6
PAINLEVEL_OUTOF10: 8
PAINLEVEL_OUTOF10: 7
PAINLEVEL_OUTOF10: 8

## 2024-09-21 ASSESSMENT — PAIN SCALES - PAIN ASSESSMENT IN ADVANCED DEMENTIA (PAINAD)
TOTALSCORE: 5
FACIALEXPRESSION: FACIAL GRIMACING
CONSOLABILITY: UNABLE TO CONSOLE, DISTRACT OR REASSURE
BODYLANGUAGE: TENSE, DISTRESSED PACING, FIDGETING
BREATHING: NORMAL
TOTALSCORE: MEDICATION (SEE MAR)

## 2024-09-21 ASSESSMENT — PAIN - FUNCTIONAL ASSESSMENT
PAIN_FUNCTIONAL_ASSESSMENT: 0-10

## 2024-09-21 ASSESSMENT — PAIN DESCRIPTION - LOCATION: LOCATION: BACK

## 2024-09-21 NOTE — SIGNIFICANT EVENT
CALVIN    Patient bradycardic; IVIG stopped.  KHAI Calvo at bedside.  EKG completed.  Labs ordered.  Patient on telemetry.    Plan to monitor on tele at this time.  Patient asymptomatic.  Nurse encouraged to call with changes.

## 2024-09-21 NOTE — PROGRESS NOTES
Ruperto Pang is a 24 y.o. female on day 8 of admission presenting with Sickle cell crisis (Multi).      Subjective   NAEON, pt is c/o back pain this morning but she denied chest pain, SOB, fever, chills, nausea, vomiting, hematuria, hematochezia or melena.     Objective     Last Recorded Vitals  /85   Pulse 70   Temp 36.3 °C (97.3 °F) (Temporal)   Resp 18   Wt 72 kg (158 lb 11.7 oz)   SpO2 96%   Intake/Output last 3 Shifts:    Intake/Output Summary (Last 24 hours) at 9/21/2024 0707  Last data filed at 9/20/2024 1835  Gross per 24 hour   Intake 350 ml   Output --   Net 350 ml       Admission Weight  Weight: 67.1 kg (148 lb) (09/12/24 2213)    Daily Weight  09/21/24 : 72 kg (158 lb 11.7 oz)    Image Results  US abdomen complete  Narrative: Interpreted By:  Yoni Mcdonald  and Mary Ramirez   STUDY:  US ABDOMEN COMPLETE;  9/20/2024 10:22 pm      INDICATION:  Signs/Symptoms:hx of sickle cell, increased bilirubin.      COMPARISON:  Right upper quadrant ultrasound on 07/13/2023. CT chest abdomen  pelvis on 08/13/2018.      ACCESSION NUMBER(S):  [id number]      ORDERING CLINICIAN:  KEVIN CORDON      TECHNIQUE:  Multiple images of the abdomen were obtained.      FINDINGS:  LIVER:  The liver measures 22.4 cm and is grossly unremarkable and free of  any focal lesions.          GALLBLADDER:  The gallbladder is decompressed, and demonstrates no evidence  surrounding fluid. There is mild circumferential wall thickening,  likely secondary to decompression multiple small echogenic structures  demonstrating posterior acoustic shadowing visualized within the  gallbladder. The gallbladder wall thickness is 1.0 cm. Sonographic  Cristina's sign is negative.          BILE DUCTS:  No evidence of intra or extrahepatic biliary dilatation is  identified; the common bile duct measures 0.2 cm.      PANCREAS:  The visualized pancreas is unremarkable in appearance.      RIGHT KIDNEY:  The right kidney measures 12.4 cm in  length. The renal cortical  echogenicity and thickness are within normal limit.  No  hydronephrosis or renal calculi are seen.      LEFT KIDNEY:  The left kidney measures 12.6 cm in length. The renal cortical  echogenicity and thickness are within normal limits. No  hydronephrosis or renal calculi are seen.      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      URINARY BLADDER:  The urinary bladder is within normal limits.      PERITONEUM:  There is no free or loculated fluid seen in the abdomen.      ABDOMINAL AORTA AND IVC:  The visualized portions of the aorta and IVC are unremarkable.      Impression: 1. Cholelithiasis with circumferential wall thickening of the  gallbladder, likely secondary to decompression. No definite  sonographic evidence of acute cholecystitis.  2. Hepatomegaly, similar to prior exam, with no focal liver lesions  identified.      I personally reviewed the images/study and I agree with the findings  as stated by Dr. Jaems Hernandez M.D. This study was interpreted at  [place], [place].      MACRO:  None      Signed by: Yoni Mcdonald 9/21/2024 6:12 AM  Dictation workstation:   [id number]    Physical Exam  Vitals reviewed.   Constitutional:       Appearance: Normal appearance.   HENT:      Head: Normocephalic and atraumatic.      Nose: Nose normal.      Mouth/Throat:      Mouth: Mucous membranes are moist.      Pharynx: Oropharynx is clear.   Eyes:      Extraocular Movements: Extraocular movements intact.      Pupils: Pupils are equal, round, and reactive to light.   Cardiovascular:      Rate and Rhythm: Normal rate and regular rhythm.      Pulses: Normal pulses.      Heart sounds: Normal heart sounds.   Pulmonary:      Effort: Pulmonary effort is normal.      Breath sounds: Normal breath sounds.      Comments: 2L O2 @ night   Abdominal:      General: Bowel sounds are  normal.      Palpations: Abdomen is soft.   Musculoskeletal:         General: Normal range of motion.   Skin:     General: Skin is warm.   Neurological:      General: No focal deficit present.      Mental Status: She is alert and oriented to person, place, and time. Mental status is at baseline.   Psychiatric:         Mood and Affect: Mood normal.         Behavior: Behavior normal.       Relevant Results  No current facility-administered medications on file prior to encounter.     Current Outpatient Medications on File Prior to Encounter   Medication Sig Dispense Refill    folic acid (Folvite) 1 mg tablet Take 1 tablet (1 mg) by mouth once daily. 60 tablet 1    hydroxyurea (Hydrea) 500 mg capsule Take 1 capsule (500 mg total) by mouth 2 times a day. (Patient not taking: Reported on 9/13/2024) 60 capsule 2    ibuprofen 200 mg tablet Take 2 tablets (400 mg) by mouth every 6 hours if needed for mild pain (1 - 3) or moderate pain (4 - 6). 60 tablet 2    oxyCODONE (Roxicodone) 10 mg immediate release tablet Take 1 tablet (10 mg) by mouth every 6 hours if needed for severe pain (7 - 10).      polyethylene glycol (Glycolax, Miralax) 17 gram packet Take 17 g by mouth if needed.      sennosides-docusate sodium (Senna-S) 8.6-50 mg tablet Take 1 tablet by mouth once daily. 30 tablet 2     Results from last 7 days   Lab Units 09/21/24  0724 09/20/24  0758 09/19/24  0819   WBC AUTO x10*3/uL 18.4* 21.6* 21.4*   HEMOGLOBIN g/dL 4.8* 4.3* 4.5*   HEMATOCRIT % 13.6* 11.9* 12.3*   PLATELETS AUTO x10*3/uL 494* 534* 507*        Results from last 7 days   Lab Units 09/21/24  0724 09/20/24  0758 09/19/24  0819   SODIUM mmol/L 136 137 140   POTASSIUM mmol/L 4.4 3.8 3.3*   CHLORIDE mmol/L 107 107 110*   CO2 mmol/L 19* 20* 22   BUN mg/dL 18 13 11   CREATININE mg/dL 1.11* 1.22* 1.23*   EGFR mL/min/1.73m*2 71 64 63   GLUCOSE mg/dL 91 101* 73*   CALCIUM mg/dL 8.8 8.9 8.8            Assessment & Plan  Sickle cell crisis (Multi)    Ruperto Pang  is a 24 y.o. 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 Patient febrile x1 (Tmax 38.5) and significant leukocytosis noted on admission. CXR (9/12) negative for acute cardiopulmonary process. UA (9/12) neg for nitrites and leuks. COVID / Influenza A + B / MRSA negative (9/13). Procal - 12.74 (9/14). CT PE (9/13) negative for PE or acute cardiopulmomary process. CT neck (9/13) no evidence of thromboses, left retropharyngeal and cervical lymph nodes, likely sequale of URI. On admit patient started on Vanc + Zosyn (9/12-9/13). On 9/13 patient started on azithromycin + ceftriaxone (9/13-9/14). 9/14- 9/17 s.p Zosyn to given continued uptrend of WBC, 9/17 start augmentin x 1 day. ID consulted 9/15 given persistent leukocytosis, however pt received IV steroids in prep for CT (d/t allergy) and believe leukocytosis likely 2/2 steroids, however ID rec continuing atbs for now. Additionally, patient with worsening SYED on 9/15. Increased rate of D5 1/2 to 100mL/hour on 9/15, repeat UA neg, urine lytes indicating pre renal, possibly in setting of profound anemia. Hgb 5.4 on 9/14, s/p 1 unit PRBC. Hgb with inappropriate incrementation on 9/15, MALISSA (negative) and extended MALISSA sent on 9/15 (still pending). Hgb again decreased to 4.3 (9/16) and initial c/f DHTR. Heme consulted 9/16, believe likely not in DHTR and rec'd additional 1u prbc (9/16 PM). Hgb incremented from 4.3 --> 4.8 (9/17). Pt additionally with reported numbness from rectum to urethra, per attending will consider investigating further after profound anemia improved. No bowel or bladder dysfunction. ID rec stopping atbs 9/17 and s/o. 9/17 s/p augmentin x1 day to complete atb couse. 9/18 reports improving perineal numbness. Hgb 4.9 (9/18) --> 4.5 (9/19). Discussed with Dr. Dumas and hematology 9/19, plan for IVIG x3 doses  (end date 9/21). Hemoglobin 4.3 (9/20), per Dr. Dumas started  IVIG x3 doses (09/19-09/21), Hg this AM  s/p 1 unit pRBC 09/20,  US liver and spleen uw/o hepatosplenomegaly,  UA w/o hematuria and fecal occult was negative. DC pending improvement in anemia and pain.      Updates 09/21:   -Hg 4.8 from 4.3 (s/p unit yesterday), Retic% 17% from 19.5%,, T.Bili up trednding to 4.5 from 4.3,  on  IVIG today, plan as er heme :IVIG for total 5 days, no more transfusions, and labs daily.   -Continue with current pain control but consider deescalating tmw       # c/f Delayed hemolytic transfusion reaction   # Anemia   - Hgb (baseline ~6-7), 5.4 --> Hgb incremented briefly to 6.1 on evening labs 9/14 --> Hgb 5.3 of 9/15--> decreased to 4.3 (9/16) increasing suspicion for DHTR  - MALISSA (9/15) negative, extended MALISSA pending  - Heme consulted for c/f DHTR (9/16)- believe likely not in DHTR, peripheral smear read pending. Rec'd 1u prbc 9/16 PM. Pt incremented 4.3 --> 4.8 (9/17)   - 9/18 Hgb 4.9--> plan for possible IVIG vs watch/wait pending Dr. Dumas's input   - 9/19 hgb 4.5. Discussed with Dr. Dumas and hematology, plan for IVIG x3 doses (end date 9/21) and monitor hgb response  - Per Dr. Dumas, ordered 0.5g/kg IVIG x3 days with premedications (50mg benadryl, 650mg tylenol, 125mg IV methylprednisone before IVIG dose) (9/19-plan end date 9/21)   - 9/20, hgb 4.3; per discussion with Dr. Dumas, order 1u pRBCs, continue IVIG tlast dose today.    - US abdomen :No hepato-splenomegaly   - UA : no hematuria   - Fecal occult negative   -Hg 09/21: 4.8 this AM (did not increment well after the unit yesterday)   - plan to discuss with Hematology goal hgb and need for otpt lab FU closer to discharge     # Fever, resolved  # Leukocytosis likely reactive I/s/o steroids   - Unknown source of infection possibly acute chest vs PNA vs viral or bacterial sinusitis   - Febrile x2 days before admission, Tmax (38.5) on 9/12, afebrile since   - leukocytosis  33.5 --> 38.1 --> 44.7K (9/14)  --> 22.7K (9/15)--> 13.6K (9/17) --> 21.4K (9/19)   - UA (9/13) negative WBC, leuks, nitrities   - Influenza A+B and COVD negative (9/12)   - MRSA - negative (9/13)   - Lactate - 1.5 (9/12), repeat 1.1 (9/15)   - Procal 12.74 (9/14), repeat procal 9/16 1.6  - Blood cultures x2 - NGTD (9/14)  - CXR (9/13) negative for acute cardiopulmonary process   - CTPE (9/14) negative for PE, or acute cardiopulmonary process   - s/p vanc + zosyn (9/12)  - s/p azithromycin + ceftriaxone (9/13-9/14)   - s/p zosyn (9/14- 9/17) given continued uptrend of WBC  - 9/17 start augmentin BID x 1 day    - ID consulted 9/15 given persistent leukocytosis, however pt received IV steroids in prep for CT (d/t allergy) and leukocytosis likely 2/2 steroids. ID rec stopping atbs 9/17 and s/o   - WBC 21.4K (9/19) now trending down to 18.4, denies infectious symptoms (denies cough, fever, congestion, SOB, chills), reported 1 loose BM 9/18, low threshold to repeat infectious workup and restart Zosyn if decompensates   - Started methylprednisolone with IVIG daily as premedication before transfusion (will get dose 9/19-9/21); monitor WBC response and clinically correlate if any reported new infectious symptoms      # SYED -improving   - sCr 2.03 (9/14) --> 2.46 (9/15) --> 1.92 (9.17) --> 1.66 (9/18) --> 1.23 (9/19) --> 1.22 (9/20)--> 1.11 (09/21)   - s/p D5 1/2 @75mL/hour (9/14-9/15), increased rate to @100mL/hour on 9/15 per attending-- continuing for another 24hrs (plan stop 9/17- ordered to fall off)  - Urine lytes (9/15) indicating pre-renal, possibly in setting of profound anemia   - Repeat UA (9/15): negative  - can consider renal US if SYED up-trends again  - Renally dose medications, avoid nephrotoxic medications       # Hgb SS with acute on chronic pain   - No active care path   - OARRS reviewed- no aberrancies, last refilled home oxycodone 10mg for 7 day supply (10T) on 8/15/2024   - Currently on Hydroxyurea-held  on admit d/t concern for infection   - See Hgb as above  - Hgb S (9/13): 94.2%  - Lysis labs elevated on admission bili 13.2 (BL 7-9), now downtrending as of 9/15  - Given no new O2 requirement on admission, chest pain and no radiographic findings of opacities on CXR and CT PE, patient does not currently meet criteria for ACS but will continue to monitor closely and low threshold to repeat chest imaging   - 9/16 overnight with Spo2 of 90% while sleeping - CXR without acute process   - RBCex labs of type and screen, hgb S, ionized calcium sent 9/13 should patient require RBCex   - s/p (9/13-9/17)  Dilaudid 1mg q2 hours for severe pain  - 9/17 start oxcodone 15mg PO q3h PRN + IVP dilaudid 1mg x 3 doses for breakthrough pain   - s/p Toradol 30mg IV q6 hours scheduled x12 doses (9/13-9/15), stopped on 9/15 given SYED with protonix PPI, HCG neg on 9/13    - s/p oxcodone 15mg PO q3h PRN + IVP dilaudid 1mg Q6 (9/17-9/20)  - c/o increased pain in lower back and bilateral lower legs, oxcodone 15mg PO q3h PRN + IVP dilaudid 1mg Q4hrs; educated patient on rotation and calling for pain medications more frequently    - Benadryl 25mg PO PRN for itching, Zofran 8mg q8 hours PRN for nausea, Miralax daily, DocuSenna 2tabs BID for opioid-induced constipation   - Continue home folic acid 1mg daily     # Numbness in Pudenal nerve distribution  - Possibly 2/2 decreased tissue oxygenation I/s/o significant anemia  - Pt reports numbness from rectum to urethra  - Denies any bowel/bladder incontinence  - Per pt, can feel when she needs to urinate or have BM  - Per attending, will consider investigating further after anemia addressed  - 9/19 pt reports perineal numbness is improving, will continue to monitor      # Prophy  - S/p Lovenox 40mg subcutaneous daily, 9/15 start heparin subcutaneous given SYED, encourage ambulation   - Started on Protonix 40mg daily with toradol      Dispo   - Full code, confirmed on admission    - DC pending  improvement in anemia and pain  - FUV with sickle cell team 11/13  - Emergency contact mother Kat Ibarra [phone]      Nelda Juárez MD  [id number] IM Resident

## 2024-09-21 NOTE — ASSESSMENT & PLAN NOTE
Ruperto Pang is a 24 y.o. 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 Patient febrile x1 (Tmax 38.5) and significant leukocytosis noted on admission. CXR (9/12) negative for acute cardiopulmonary process. UA (9/12) neg for nitrites and leuks. COVID / Influenza A + B / MRSA negative (9/13). Procal - 12.74 (9/14). CT PE (9/13) negative for PE or acute cardiopulmomary process. CT neck (9/13) no evidence of thromboses, left retropharyngeal and cervical lymph nodes, likely sequale of URI. On admit patient started on Vanc + Zosyn (9/12-9/13). On 9/13 patient started on azithromycin + ceftriaxone (9/13-9/14). 9/14- 9/17 s.p Zosyn to given continued uptrend of WBC, 9/17 start augmentin x 1 day. ID consulted 9/15 given persistent leukocytosis, however pt received IV steroids in prep for CT (d/t allergy) and believe leukocytosis likely 2/2 steroids, however ID rec continuing atbs for now. Additionally, patient with worsening SYED on 9/15. Increased rate of D5 1/2 to 100mL/hour on 9/15, repeat UA neg, urine lytes indicating pre renal, possibly in setting of profound anemia. Hgb 5.4 on 9/14, s/p 1 unit PRBC. Hgb with inappropriate incrementation on 9/15, MALISSA (negative) and extended MALISSA sent on 9/15 (still pending). Hgb again decreased to 4.3 (9/16) and initial c/f DHTR. Heme consulted 9/16, believe likely not in DHTR and rec'd additional 1u prbc (9/16 PM). Hgb incremented from 4.3 --> 4.8 (9/17). Pt additionally with reported numbness from rectum to urethra, per attending will consider investigating further after profound anemia improved. No bowel or bladder dysfunction. ID rec stopping atbs 9/17 and s/o. 9/17 s/p augmentin x1 day to complete atb couse. 9/18 reports improving perineal numbness. Hgb 4.9 (9/18) --> 4.5 (9/19). Discussed with Dr. Dumas and hematology 9/19, plan for  IVIG x3 doses (end date 9/21). Hemoglobin 4.3 (9/20), per Dr. Dumas started  IVIG x3 doses (09/19-09/21), Hg this AM  s/p 1 unit pRBC 09/20,  US liver and spleen uw/o hepatosplenomegaly,  UA w/o hematuria and fecal occult was negative. DC pending improvement in anemia and pain.      Updates 09/21:   -Hg 4.8 from 4.3 (s/p unit yesterday), Retic% 17% from 19.5%,, T.Bili up trednding to 4.5 from 4.3,  on  IVIG today, plan as er heme :IVIG for total 5 days, no more transfusions, and labs daily.   -Continue with current pain control but consider deescalating tmw       # c/f Delayed hemolytic transfusion reaction   # Anemia   - Hgb (baseline ~6-7), 5.4 --> Hgb incremented briefly to 6.1 on evening labs 9/14 --> Hgb 5.3 of 9/15--> decreased to 4.3 (9/16) increasing suspicion for DHTR  - MALISSA (9/15) negative, extended MALISSA pending  - Heme consulted for c/f DHTR (9/16)- believe likely not in DHTR, peripheral smear read pending. Rec'd 1u prbc 9/16 PM. Pt incremented 4.3 --> 4.8 (9/17)   - 9/18 Hgb 4.9--> plan for possible IVIG vs watch/wait pending Dr. Dumas's input   - 9/19 hgb 4.5. Discussed with Dr. Dumas and hematology, plan for IVIG x3 doses (end date 9/21) and monitor hgb response  - Per Dr. Dumas, ordered 0.5g/kg IVIG x3 days with premedications (50mg benadryl, 650mg tylenol, 125mg IV methylprednisone before IVIG dose) (9/19-plan end date 9/21)   - 9/20, hgb 4.3; per discussion with Dr. Dumas, order 1u pRBCs, continue IVIG tlast dose today.    - US abdomen :No hepato-splenomegaly   - UA : no hematuria   - Fecal occult negative   -Hg 09/21: 4.8 this AM (did not increment well after the unit yesterday)   - plan to discuss with Hematology goal hgb and need for otpt lab FU closer to discharge     # Fever, resolved  # Leukocytosis likely reactive I/s/o steroids   - Unknown source of infection possibly acute chest vs PNA vs viral or bacterial sinusitis   - Febrile x2 days before admission, Tmax (38.5) on 9/12, afebrile since   -  leukocytosis 33.5 --> 38.1 --> 44.7K (9/14)  --> 22.7K (9/15)--> 13.6K (9/17) --> 21.4K (9/19)   - UA (9/13) negative WBC, leuks, nitrities   - Influenza A+B and COVD negative (9/12)   - MRSA - negative (9/13)   - Lactate - 1.5 (9/12), repeat 1.1 (9/15)   - Procal 12.74 (9/14), repeat procal 9/16 1.6  - Blood cultures x2 - NGTD (9/14)  - CXR (9/13) negative for acute cardiopulmonary process   - CTPE (9/14) negative for PE, or acute cardiopulmonary process   - s/p vanc + zosyn (9/12)  - s/p azithromycin + ceftriaxone (9/13-9/14)   - s/p zosyn (9/14- 9/17) given continued uptrend of WBC  - 9/17 start augmentin BID x 1 day    - ID consulted 9/15 given persistent leukocytosis, however pt received IV steroids in prep for CT (d/t allergy) and leukocytosis likely 2/2 steroids. ID rec stopping atbs 9/17 and s/o   - WBC 21.4K (9/19) now trending down to 18.4, denies infectious symptoms (denies cough, fever, congestion, SOB, chills), reported 1 loose BM 9/18, low threshold to repeat infectious workup and restart Zosyn if decompensates   - Started methylprednisolone with IVIG daily as premedication before transfusion (will get dose 9/19-9/21); monitor WBC response and clinically correlate if any reported new infectious symptoms      # SYED -improving   - sCr 2.03 (9/14) --> 2.46 (9/15) --> 1.92 (9.17) --> 1.66 (9/18) --> 1.23 (9/19) --> 1.22 (9/20)--> 1.11 (09/21)   - s/p D5 1/2 @75mL/hour (9/14-9/15), increased rate to @100mL/hour on 9/15 per attending-- continuing for another 24hrs (plan stop 9/17- ordered to fall off)  - Urine lytes (9/15) indicating pre-renal, possibly in setting of profound anemia   - Repeat UA (9/15): negative  - can consider renal US if SYED up-trends again  - Renally dose medications, avoid nephrotoxic medications       # Hgb SS with acute on chronic pain   - No active care path   - OARRS reviewed- no aberrancies, last refilled home oxycodone 10mg for 7 day supply (10T) on 8/15/2024   - Currently on  Hydroxyurea-held on admit d/t concern for infection   - See Hgb as above  - Hgb S (9/13): 94.2%  - Lysis labs elevated on admission bili 13.2 (BL 7-9), now downtrending as of 9/15  - Given no new O2 requirement on admission, chest pain and no radiographic findings of opacities on CXR and CT PE, patient does not currently meet criteria for ACS but will continue to monitor closely and low threshold to repeat chest imaging   - 9/16 overnight with Spo2 of 90% while sleeping - CXR without acute process   - RBCex labs of type and screen, hgb S, ionized calcium sent 9/13 should patient require RBCex   - s/p (9/13-9/17)  Dilaudid 1mg q2 hours for severe pain  - 9/17 start oxcodone 15mg PO q3h PRN + IVP dilaudid 1mg x 3 doses for breakthrough pain   - s/p Toradol 30mg IV q6 hours scheduled x12 doses (9/13-9/15), stopped on 9/15 given SYED with protonix PPI, HCG neg on 9/13    - s/p oxcodone 15mg PO q3h PRN + IVP dilaudid 1mg Q6 (9/17-9/20)  - c/o increased pain in lower back and bilateral lower legs, oxcodone 15mg PO q3h PRN + IVP dilaudid 1mg Q4hrs; educated patient on rotation and calling for pain medications more frequently    - Benadryl 25mg PO PRN for itching, Zofran 8mg q8 hours PRN for nausea, Miralax daily, DocuSenna 2tabs BID for opioid-induced constipation   - Continue home folic acid 1mg daily     # Numbness in Pudenal nerve distribution  - Possibly 2/2 decreased tissue oxygenation I/s/o significant anemia  - Pt reports numbness from rectum to urethra  - Denies any bowel/bladder incontinence  - Per pt, can feel when she needs to urinate or have BM  - Per attending, will consider investigating further after anemia addressed  - 9/19 pt reports perineal numbness is improving, will continue to monitor      # Prophy  - S/p Lovenox 40mg subcutaneous daily, 9/15 start heparin subcutaneous given SYED, encourage ambulation   - Started on Protonix 40mg daily with toradol      Dispo   - Full code, confirmed on admission    -  DC pending improvement in anemia and pain  - FUV with sickle cell team 11/13  - Emergency contact mother Kat Ibarra [phone]

## 2024-09-21 NOTE — CARE PLAN
The patient's goals for the shift include Pain level of 3 or less    The clinical goals for the shift include Patient will rate pain as <5/10 through end of shift    Over the shift, the patient did make progress toward the following goals. Barriers to progression include anemia.  Recommendations to address these barriers include continued monitoring.

## 2024-09-22 ENCOUNTER — OFFICE VISIT (OUTPATIENT)
Dept: HEMATOLOGY/ONCOLOGY | Facility: HOSPITAL | Age: 24
DRG: 812 | End: 2024-09-22
Payer: COMMERCIAL

## 2024-09-22 LAB
ALBUMIN SERPL BCP-MCNC: 3.6 G/DL (ref 3.4–5)
ALP SERPL-CCNC: 75 U/L (ref 33–110)
ALT SERPL W P-5'-P-CCNC: 34 U/L (ref 7–45)
ANION GAP SERPL CALC-SCNC: 17 MMOL/L (ref 10–20)
AST SERPL W P-5'-P-CCNC: 26 U/L (ref 9–39)
BACTERIA BLD CULT: NORMAL
BACTERIA BLD CULT: NORMAL
BASOPHILS # BLD AUTO: 0.02 X10*3/UL (ref 0–0.1)
BASOPHILS NFR BLD AUTO: 0.1 %
BILIRUB SERPL-MCNC: 4.4 MG/DL (ref 0–1.2)
BUN SERPL-MCNC: 22 MG/DL (ref 6–23)
CALCIUM SERPL-MCNC: 9.1 MG/DL (ref 8.6–10.6)
CHLORIDE SERPL-SCNC: 107 MMOL/L (ref 98–107)
CO2 SERPL-SCNC: 18 MMOL/L (ref 21–32)
CREAT SERPL-MCNC: 1.01 MG/DL (ref 0.5–1.05)
EGFRCR SERPLBLD CKD-EPI 2021: 80 ML/MIN/1.73M*2
EOSINOPHIL # BLD AUTO: 0 X10*3/UL (ref 0–0.7)
EOSINOPHIL NFR BLD AUTO: 0 %
ERYTHROCYTE [DISTWIDTH] IN BLOOD BY AUTOMATED COUNT: 21.5 % (ref 11.5–14.5)
GLUCOSE SERPL-MCNC: 83 MG/DL (ref 74–99)
HCT VFR BLD AUTO: 13.7 % (ref 36–46)
HGB BLD-MCNC: 4.8 G/DL (ref 12–16)
HGB RETIC QN: 27 PG (ref 28–38)
HOLD SPECIMEN: NORMAL
IMM GRANULOCYTES # BLD AUTO: 0.51 X10*3/UL (ref 0–0.7)
IMM GRANULOCYTES NFR BLD AUTO: 2.2 % (ref 0–0.9)
IMMATURE RETIC FRACTION: 33.7 %
LDH SERPL L TO P-CCNC: 284 U/L (ref 84–246)
LIPASE SERPL-CCNC: 78 U/L (ref 9–82)
LYMPHOCYTES # BLD AUTO: 4.21 X10*3/UL (ref 1.2–4.8)
LYMPHOCYTES NFR BLD AUTO: 18 %
MCH RBC QN AUTO: 28.9 PG (ref 26–34)
MCHC RBC AUTO-ENTMCNC: 35 G/DL (ref 32–36)
MCV RBC AUTO: 83 FL (ref 80–100)
MONOCYTES # BLD AUTO: 1.54 X10*3/UL (ref 0.1–1)
MONOCYTES NFR BLD AUTO: 6.6 %
NEUTROPHILS # BLD AUTO: 17.08 X10*3/UL (ref 1.2–7.7)
NEUTROPHILS NFR BLD AUTO: 73.1 %
NRBC BLD-RTO: 9.6 /100 WBCS (ref 0–0)
PLATELET # BLD AUTO: 514 X10*3/UL (ref 150–450)
POLYCHROMASIA BLD QL SMEAR: NORMAL
POTASSIUM SERPL-SCNC: 3.7 MMOL/L (ref 3.5–5.3)
PROT SERPL-MCNC: 7.4 G/DL (ref 6.4–8.2)
RBC # BLD AUTO: 1.66 X10*6/UL (ref 4–5.2)
RBC MORPH BLD: NORMAL
RETICS #: 0.31 X10*6/UL (ref 0.02–0.08)
RETICS/RBC NFR AUTO: 18.8 % (ref 0.5–2)
SCHISTOCYTES BLD QL SMEAR: NORMAL
SICKLE CELLS BLD QL SMEAR: NORMAL
SODIUM SERPL-SCNC: 138 MMOL/L (ref 136–145)
WBC # BLD AUTO: 23.4 X10*3/UL (ref 4.4–11.3)

## 2024-09-22 PROCEDURE — 36415 COLL VENOUS BLD VENIPUNCTURE: CPT

## 2024-09-22 PROCEDURE — 83690 ASSAY OF LIPASE: CPT

## 2024-09-22 PROCEDURE — 80053 COMPREHEN METABOLIC PANEL: CPT

## 2024-09-22 PROCEDURE — 99233 SBSQ HOSP IP/OBS HIGH 50: CPT

## 2024-09-22 PROCEDURE — 2500000001 HC RX 250 WO HCPCS SELF ADMINISTERED DRUGS (ALT 637 FOR MEDICARE OP)

## 2024-09-22 PROCEDURE — 2500000002 HC RX 250 W HCPCS SELF ADMINISTERED DRUGS (ALT 637 FOR MEDICARE OP, ALT 636 FOR OP/ED)

## 2024-09-22 PROCEDURE — 85045 AUTOMATED RETICULOCYTE COUNT: CPT

## 2024-09-22 PROCEDURE — 2500000001 HC RX 250 WO HCPCS SELF ADMINISTERED DRUGS (ALT 637 FOR MEDICARE OP): Performed by: NURSE PRACTITIONER

## 2024-09-22 PROCEDURE — 1200000003 HC ONCOLOGY  ROOM WITH TELEMETRY DAILY

## 2024-09-22 PROCEDURE — 93005 ELECTROCARDIOGRAM TRACING: CPT

## 2024-09-22 PROCEDURE — 93010 ELECTROCARDIOGRAM REPORT: CPT | Performed by: INTERNAL MEDICINE

## 2024-09-22 PROCEDURE — 2500000004 HC RX 250 GENERAL PHARMACY W/ HCPCS (ALT 636 FOR OP/ED)

## 2024-09-22 PROCEDURE — 85025 COMPLETE CBC W/AUTO DIFF WBC: CPT

## 2024-09-22 PROCEDURE — 83615 LACTATE (LD) (LDH) ENZYME: CPT

## 2024-09-22 RX ORDER — OXYCODONE HYDROCHLORIDE 5 MG/1
15 TABLET ORAL
Status: DISCONTINUED | OUTPATIENT
Start: 2024-09-22 | End: 2024-09-22

## 2024-09-22 RX ORDER — POTASSIUM CHLORIDE 20 MEQ/1
40 TABLET, EXTENDED RELEASE ORAL ONCE
Status: COMPLETED | OUTPATIENT
Start: 2024-09-22 | End: 2024-09-22

## 2024-09-22 RX ORDER — HYDROMORPHONE HYDROCHLORIDE 1 MG/ML
1 INJECTION, SOLUTION INTRAMUSCULAR; INTRAVENOUS; SUBCUTANEOUS
Status: DISCONTINUED | OUTPATIENT
Start: 2024-09-22 | End: 2024-09-22

## 2024-09-22 RX ORDER — HYDROMORPHONE HYDROCHLORIDE 1 MG/ML
1 INJECTION, SOLUTION INTRAMUSCULAR; INTRAVENOUS; SUBCUTANEOUS
Status: DISCONTINUED | OUTPATIENT
Start: 2024-09-22 | End: 2024-09-24

## 2024-09-22 RX ORDER — POLYETHYLENE GLYCOL 3350 17 G/17G
17 POWDER, FOR SOLUTION ORAL ONCE
Status: COMPLETED | OUTPATIENT
Start: 2024-09-22 | End: 2024-09-22

## 2024-09-22 ASSESSMENT — PAIN - FUNCTIONAL ASSESSMENT
PAIN_FUNCTIONAL_ASSESSMENT: 0-10

## 2024-09-22 ASSESSMENT — PAIN SCALES - GENERAL
PAINLEVEL_OUTOF10: 7
PAINLEVEL_OUTOF10: 8
PAINLEVEL_OUTOF10: 7
PAINLEVEL_OUTOF10: 6
PAINLEVEL_OUTOF10: 8

## 2024-09-22 ASSESSMENT — PAIN DESCRIPTION - LOCATION
LOCATION: GENERALIZED
LOCATION: GENERALIZED

## 2024-09-22 NOTE — PROGRESS NOTES
Ruperto Pang is a 24 y.o. female on day 9 of admission presenting with Sickle cell crisis (Multi).      Subjective   Pt had asymptomatic bradycardic (lowest  down to 47 overnight) which started during IVIG infusion yesterday, she is c/o 8/10 bilateral flank pain, she denied chest pain, dizziness, SOB, headache, blurred vision, nausea, vomiting, fever or chills. She received 3 doses of the IVIG till now, labs have not been drawn yet this morning.       Objective     Last Recorded Vitals  /78   Pulse 55   Temp 36.3 °C (97.3 °F) (Temporal)   Resp 16   Wt 72 kg (158 lb 11.7 oz)   SpO2 99%   Intake/Output last 3 Shifts:  No intake or output data in the 24 hours ending 09/22/24 0705    Admission Weight  Weight: 67.1 kg (148 lb) (09/12/24 2213)    Daily Weight  09/21/24 : 72 kg (158 lb 11.7 oz)    Image Results  US abdomen complete  Narrative: Interpreted By:  Yoni Mcdonald,  and Mary Ramirez   STUDY:  US ABDOMEN COMPLETE;  9/20/2024 10:22 pm      INDICATION:  Signs/Symptoms:hx of sickle cell, increased bilirubin.      COMPARISON:  Right upper quadrant ultrasound on 07/13/2023. CT chest abdomen  pelvis on 08/13/2018.      ACCESSION NUMBER(S):  [id number]      ORDERING CLINICIAN:  KEVIN CORDON      TECHNIQUE:  Multiple images of the abdomen were obtained.      FINDINGS:  LIVER:  The liver measures 22.4 cm and is grossly unremarkable and free of  any focal lesions.          GALLBLADDER:  The gallbladder is decompressed, and demonstrates no evidence  surrounding fluid. There is mild circumferential wall thickening,  likely secondary to decompression multiple small echogenic structures  demonstrating posterior acoustic shadowing visualized within the  gallbladder. The gallbladder wall thickness is 1.0 cm. Sonographic  Cristina's sign is negative.          BILE DUCTS:  No evidence of intra or extrahepatic biliary dilatation is  identified; the common bile duct measures 0.2 cm.      PANCREAS:  The visualized  pancreas is unremarkable in appearance.      RIGHT KIDNEY:  The right kidney measures 12.4 cm in length. The renal cortical  echogenicity and thickness are within normal limit.  No  hydronephrosis or renal calculi are seen.      LEFT KIDNEY:  The left kidney measures 12.6 cm in length. The renal cortical  echogenicity and thickness are within normal limits. No  hydronephrosis or renal calculi are seen.      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      URINARY BLADDER:  The urinary bladder is within normal limits.      PERITONEUM:  There is no free or loculated fluid seen in the abdomen.      ABDOMINAL AORTA AND IVC:  The visualized portions of the aorta and IVC are unremarkable.      Impression: 1. Cholelithiasis with circumferential wall thickening of the  gallbladder, likely secondary to decompression. No definite  sonographic evidence of acute cholecystitis.  2. Hepatomegaly, similar to prior exam, with no focal liver lesions  identified.      I personally reviewed the images/study and I agree with the findings  as stated by Dr. James Hernandez M.D. This study was interpreted at  [place], [place].      MACRO:  None      Signed by: oYni Mcdonald 9/21/2024 6:12 AM  Dictation workstation:   [id number]      Physical Exam  Vitals reviewed.   Constitutional:       Appearance: Normal appearance.   HENT:      Head: Normocephalic and atraumatic.      Nose: Nose normal.      Mouth/Throat:      Mouth: Mucous membranes are moist.      Pharynx: Oropharynx is clear.   Eyes:      Extraocular Movements: Extraocular movements intact.      Pupils: Pupils are equal, round, and reactive to light.   Cardiovascular:      Rate and Rhythm: Normal rate and regular rhythm.      Pulses: Normal pulses.      Heart sounds: Normal heart sounds.   Pulmonary:      Effort: Pulmonary effort is normal.      Breath  sounds: Normal breath sounds.      Comments: 2L O2 @ night   Abdominal:      General: Bowel sounds are normal.      Palpations: Abdomen is soft.   Musculoskeletal:         General: Normal range of motion.   Skin:     General: Skin is warm.   Neurological:      General: No focal deficit present.      Mental Status: She is alert and oriented to person, place, and time. Mental status is at baseline.   Psychiatric:         Mood and Affect: Mood normal.         Behavior: Behavior normal.           Relevant Results  Scheduled medications  folic acid, 1 mg, oral, Daily  heparin (porcine), 5,000 Units, subcutaneous, q8h TABATHA  [Held by provider] hydroxyurea, 500 mg, oral, q12h TABATHA  immune globulin (human), 0.5 g/kg, intravenous, Daily  methylPREDNISolone sodium succinate (PF), 125 mg, intravenous, q24h  pantoprazole, 40 mg, oral, Daily before breakfast  polyethylene glycol, 17 g, oral, Daily  sennosides-docusate sodium, 2 tablet, oral, BID      Continuous medications     PRN medications  PRN medications: diphenhydrAMINE, HYDROmorphone, ondansetron, oxygen             Assessment & Plan  Sickle cell crisis (Multi)    Ruperto Pang is a 24 y.o. 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 Patient febrile x1 (Tmax 38.5) and significant leukocytosis noted on admission. CXR (9/12) negative for acute cardiopulmonary process. UA (9/12) neg for nitrites and leuks. COVID / Influenza A + B / MRSA negative (9/13). Procal - 12.74 (9/14). CT PE (9/13) negative for PE or acute cardiopulmomary process. CT neck (9/13) no evidence of thromboses, left retropharyngeal and cervical lymph nodes, likely sequale of URI. On admit patient started on Vanc + Zosyn (9/12-9/13). On 9/13 patient started on azithromycin + ceftriaxone (9/13-9/14). 9/14- 9/17 s.p Zosyn to given continued uptrend of WBC, 9/17  start augmentin x 1 day. ID consulted 9/15 given persistent leukocytosis, however pt received IV steroids in prep for CT (d/t allergy) and believe leukocytosis likely 2/2 steroids, however ID rec continuing atbs for now. Additionally, patient with worsening SYED on 9/15. Increased rate of D5 1/2 to 100mL/hour on 9/15, repeat UA neg, urine lytes indicating pre renal, possibly in setting of profound anemia. Hgb 5.4 on 9/14, s/p 1 unit PRBC. Hgb with inappropriate incrementation on 9/15, MALISSA (negative) and extended MALISSA sent on 9/15 (still pending). Hgb again decreased to 4.3 (9/16) and initial c/f DHTR. Heme consulted 9/16, believe likely not in DHTR and rec'd additional 1u prbc (9/16 PM). Hgb incremented from 4.3 --> 4.8 (9/17). Pt additionally with reported numbness from rectum to urethra, per attending will consider investigating further after profound anemia improved. No bowel or bladder dysfunction. ID rec stopping atbs 9/17 and s/o. 9/17 s/p augmentin x1 day to complete atb couse. 9/18 reports improving perineal numbness. Hgb 4.9 (9/18) --> 4.5 (9/19). Re-discussed with Dr. Dumas and hematology 9/21, the patient anemia is likely delayed transfusion reaction hemolysis with plan for IVIG x5 doses (end date 9/23) with pre-medication with steroids. No more transfusions. DC pending improvement in anemia and pain.      Updates 09/22:   -Labs still pending, will need to follow up on Hg and hemolysis labs   -get an EKG this AM for bradycardia, continue with tele, keep electrolytes with goal K >4.0 and Mg >2.0   -as per Dr Dumas ok to hold remaining doses of IVIG (as pt refusing), will consider Eculizumab tmw (pending today's labs)  -No more transfusions.     #Asymptomatic, sinus bradycardia   :Pt with sinus bradycardia down to 47, started during IVIG session 09/21, asymptomatic with stable BP.   -Serial EKGs   -Continue to monitor on tele   -Electrolytes gola K >4.0 and Mg >2.0   -Continue with current pain control.     #  c/f Delayed hemolytic transfusion reaction   # Anemia   - Hgb (baseline ~6-7), 5.4 --> Hgb incremented briefly to 6.1 on evening labs 9/14 --> Hgb 5.3 of 9/15--> decreased to 4.3 (9/16) increasing suspicion for DHTR  - MALISSA (9/15) negative, extended MALISSA pending  - Heme consulted for c/f DHTR (9/16)- believe likely not in DHTR, peripheral smear read pending. Rec'd 1u prbc 9/16 PM. Pt incremented 4.3 --> 4.8 (9/17)   - 9/18 Hgb 4.9--> plan for possible IVIG vs watch/wait pending Dr. Dumas's input   - 9/19 hgb 4.5. re-discussed with Dr. Dumas and hematology, plan for IVIG x5 doses (end date 9/23) and monitor hgb response  - Per Dr. Dumas, ordered 0.5g/kg IVIG x5 days with premedications (50mg benadryl, 650mg tylenol, 125mg IV methylprednisone before IVIG dose) (9/19-plan end date 9/23)   - 9/20, hgb 4.3; per discussion with Dr. Dumas, order 1u pRBCs, continue IVIG dose today.    -09/21, hg 4.8 (not prior increment ation), extend IVIG from 3 to 5 doses (end-date 09/23), no more transfusions, daily labs.   - US abdomen :No hepato-splenomegaly   - UA : no hematuria   - Fecal occult negative   -Hg 09/22: pending   Plan:   -Ok to hold remaining doses of IVIG x5 doses (previous plan end-date 09/23) since pt refusing.   -No transfusions, consider Eculizumab tmw   -Continue to monitor hemolysis labs daily   -Heme on board, follow up with further recs     # Fever, resolved  # Leukocytosis likely reactive I/s/o steroids (resolving)  - Unknown source of infection possibly acute chest vs PNA vs viral or bacterial sinusitis   - Febrile x2 days before admission, Tmax (38.5) on 9/12, afebrile since   - leukocytosis 33.5 --> 38.1 --> 44.7K (9/14)  --> 22.7K (9/15)--> 13.6K (9/17) --> 21.4K (9/19)   - UA (9/13) negative WBC, leuks, nitrities   - Influenza A+B and COVD negative (9/12)   - MRSA - negative (9/13)   - Lactate - 1.5 (9/12), repeat 1.1 (9/15)   - Procal 12.74 (9/14), repeat procal 9/16 1.6  - Blood cultures x2 - NGTD (9/14)  -  CXR (9/13) negative for acute cardiopulmonary process   - CTPE (9/14) negative for PE, or acute cardiopulmonary process   - s/p vanc + zosyn (9/12)  - s/p azithromycin + ceftriaxone (9/13-9/14)   - s/p zosyn (9/14- 9/17) given continued uptrend of WBC  - 9/17 start augmentin BID x 1 day    - ID consulted 9/15 given persistent leukocytosis, however pt received IV steroids in prep for CT (d/t allergy) and leukocytosis likely 2/2 steroids. ID rec stopping atbs 9/17 and s/o   - WBC 21.4K (9/19) now trending down to 18.4 09/21, denies infectious symptoms (denies cough, fever, congestion, SOB, chills), reported 1 loose BM 9/18, low threshold to repeat infectious workup and restart Zosyn if decompensates   - Started methylprednisolone with IVIG daily as premedication before transfusion (will get dose 9/19-9/23); monitor WBC response and clinically correlate if any reported new infectious symptoms      # SYED -improving   - sCr 2.03 (9/14) --> 2.46 (9/15) --> 1.92 (9.17) --> 1.66 (9/18) --> 1.23 (9/19) --> 1.22 (9/20)--> 1.11 (09/21)   - s/p D5 1/2 @75mL/hour (9/14-9/15), increased rate to @100mL/hour on 9/15 per attending-- continuing for another 24hrs (plan stop 9/17- ordered to fall off)  - Urine lytes (9/15) indicating pre-renal, possibly in setting of profound anemia   - Repeat UA (9/15): negative  - can consider renal US if SYED up-trends again  - Renally dose medications, avoid nephrotoxic medications       # Hgb SS with acute on chronic pain   - No active care path   - OARRS reviewed- no aberrancies, last refilled home oxycodone 10mg for 7 day supply (10T) on 8/15/2024   - Currently on Hydroxyurea-held on admit d/t concern for infection   - See Hgb as above  - Hgb S (9/13): 94.2%  - Lysis labs elevated on admission bili 13.2 (BL 7-9), now downtrending as of 9/15  - Given no new O2 requirement on admission, chest pain and no radiographic findings of opacities on CXR and CT PE, patient does not currently meet criteria  for ACS but will continue to monitor closely and low threshold to repeat chest imaging   - 9/16 overnight with Spo2 of 90% while sleeping - CXR without acute process   - RBCex labs of type and screen, hgb S, ionized calcium sent 9/13 should patient require RBCex   - s/p (9/13-9/17)  Dilaudid 1mg q2 hours for severe pain  - 9/17 start oxcodone 15mg PO q3h PRN + IVP dilaudid 1mg x 3 doses for breakthrough pain   - s/p Toradol 30mg IV q6 hours scheduled x12 doses (9/13-9/15), stopped on 9/15 given SYED with protonix PPI, HCG neg on 9/13    - s/p oxcodone 15mg PO q3h PRN + IVP dilaudid 1mg Q6 (9/17-9/20)  - c/o increased pain in lower back and bilateral lower legs, oxcodone 15mg PO q3h PRN + IVP dilaudid 1mg Q4hrs; educated patient on rotation and calling for pain medications more frequently    - Benadryl 25mg PO PRN for itching, Zofran 8mg q8 hours PRN for nausea, Miralax daily, DocuSenna 2tabs BID for opioid-induced constipation   - Continue home folic acid 1mg daily     # Numbness in Pudenal nerve distribution  - Possibly 2/2 decreased tissue oxygenation I/s/o significant anemia  - Pt reports numbness from rectum to urethra  - Denies any bowel/bladder incontinence  - Per pt, can feel when she needs to urinate or have BM  - Per attending, will consider investigating further after anemia addressed  - 9/19 pt reports perineal numbness is improving, will continue to monitor      # Prophy  - S/p Lovenox 40mg subcutaneous daily, 9/15 start heparin subcutaneous given SYED, encourage ambulation   - Started on Protonix 40mg daily with toradol      Dispo   - Full code, confirmed on admission    - DC pending improvement in anemia and pain  - FUV with sickle cell team 11/13  - Emergency contact mother Kat Ibarra [phone]      Nelda Juárez MD

## 2024-09-22 NOTE — CARE PLAN
Problem: Skin  Goal: Decreased wound size/increased tissue granulation at next dressing change  Outcome: Progressing  Goal: Participates in plan/prevention/treatment measures  Outcome: Progressing  Goal: Prevent/manage excess moisture  Outcome: Progressing  Goal: Prevent/minimize sheer/friction injuries  Outcome: Progressing  Goal: Promote/optimize nutrition  Outcome: Progressing  Goal: Promote skin healing  Outcome: Progressing     Problem: Fall/Injury  Goal: Not fall by end of shift  Outcome: Progressing  Goal: Be free from injury by end of the shift  Outcome: Progressing  Goal: Verbalize understanding of personal risk factors for fall in the hospital  Outcome: Progressing  Goal: Verbalize understanding of risk factor reduction measures to prevent injury from fall in the home  Outcome: Progressing  Goal: Use assistive devices by end of the shift  Outcome: Progressing  Goal: Pace activities to prevent fatigue by end of the shift  Outcome: Progressing     Problem: Pain  Goal: Takes deep breaths with improved pain control throughout the shift  Outcome: Progressing  Goal: Turns in bed with improved pain control throughout the shift  Outcome: Progressing  Goal: Walks with improved pain control throughout the shift  Outcome: Progressing  Goal: Performs ADL's with improved pain control throughout shift  Outcome: Progressing  Goal: Participates in PT with improved pain control throughout the shift  Outcome: Progressing  Goal: Free from opioid side effects throughout the shift  Outcome: Progressing  Goal: Free from acute confusion related to pain meds throughout the shift  Outcome: Progressing       The clinical goals for the shift include patient will rate pain <6/10 by 9/22/24 1500    Patient with elevated BP, when upset. Team contacted multiple times to come talk to patient due to her questions and  getting pain meds adjusted.Refused IVIG. Labs sent. Patient refused oxy this AM, but took dilaudid this afternoon.  Patient asking for call back, he has questions about lab results from visit in march at [facility], he says he called [facility] to obtain results and was told he need to contact his pcp    Patient remained safe this shift.

## 2024-09-22 NOTE — ASSESSMENT & PLAN NOTE
Ruperto Pang is a 24 y.o. 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 Patient febrile x1 (Tmax 38.5) and significant leukocytosis noted on admission. CXR (9/12) negative for acute cardiopulmonary process. UA (9/12) neg for nitrites and leuks. COVID / Influenza A + B / MRSA negative (9/13). Procal - 12.74 (9/14). CT PE (9/13) negative for PE or acute cardiopulmomary process. CT neck (9/13) no evidence of thromboses, left retropharyngeal and cervical lymph nodes, likely sequale of URI. On admit patient started on Vanc + Zosyn (9/12-9/13). On 9/13 patient started on azithromycin + ceftriaxone (9/13-9/14). 9/14- 9/17 s.p Zosyn to given continued uptrend of WBC, 9/17 start augmentin x 1 day. ID consulted 9/15 given persistent leukocytosis, however pt received IV steroids in prep for CT (d/t allergy) and believe leukocytosis likely 2/2 steroids, however ID rec continuing atbs for now. Additionally, patient with worsening SYED on 9/15. Increased rate of D5 1/2 to 100mL/hour on 9/15, repeat UA neg, urine lytes indicating pre renal, possibly in setting of profound anemia. Hgb 5.4 on 9/14, s/p 1 unit PRBC. Hgb with inappropriate incrementation on 9/15, MALISSA (negative) and extended MALISSA sent on 9/15 (still pending). Hgb again decreased to 4.3 (9/16) and initial c/f DHTR. Heme consulted 9/16, believe likely not in DHTR and rec'd additional 1u prbc (9/16 PM). Hgb incremented from 4.3 --> 4.8 (9/17). Pt additionally with reported numbness from rectum to urethra, per attending will consider investigating further after profound anemia improved. No bowel or bladder dysfunction. ID rec stopping atbs 9/17 and s/o. 9/17 s/p augmentin x1 day to complete atb couse. 9/18 reports improving perineal numbness. Hgb 4.9 (9/18) --> 4.5 (9/19). Re-discussed with Dr. 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 No more transfusions. DC pending improvement in anemia and pain.      Updates 09/22:   -Labs still pending, will need to follow up on Hg and hemolysis labs   -get an EKG this AM for bradycardia, continue with tele, keep electrolytes with goal K >4.0 and Mg >2.0   -as per Dr Dumas ok to hold remaining doses of IVIG (as pt refusing), will consider Eculizumab tmw (pending today's labs)  -No more transfusions.     #Asymptomatic, sinus bradycardia   :Pt with sinus bradycardia down to 47, started during IVIG session 09/21, asymptomatic with stable BP.   -Serial EKGs   -Continue to monitor on tele   -Electrolytes gola K >4.0 and Mg >2.0   -Continue with current pain control.     # c/f Delayed hemolytic transfusion reaction   # Anemia   - Hgb (baseline ~6-7), 5.4 --> Hgb incremented briefly to 6.1 on evening labs 9/14 --> Hgb 5.3 of 9/15--> decreased to 4.3 (9/16) increasing suspicion for DHTR  - MALISSA (9/15) negative, extended MALISSA pending  - Heme consulted for c/f DHTR (9/16)- believe likely not in DHTR, peripheral smear read pending. Rec'd 1u prbc 9/16 PM. Pt incremented 4.3 --> 4.8 (9/17)   - 9/18 Hgb 4.9--> plan for possible IVIG vs watch/wait pending Dr. Dumas's input   - 9/19 hgb 4.5. re-discussed with Dr. Dumas and hematology, plan for IVIG x5 doses (end date 9/23) and monitor hgb response  - Per Dr. Dumas, ordered 0.5g/kg IVIG x5 days with premedications (50mg benadryl, 650mg tylenol, 125mg IV methylprednisone before IVIG dose) (9/19-plan end date 9/23)   - 9/20, hgb 4.3; per discussion with Dr. uDmas, order 1u pRBCs, continue IVIG dose today.    -09/21, hg 4.8 (not prior increment ation), extend IVIG from 3 to 5 doses (end-date 09/23), no more transfusions, daily labs.   - US abdomen :No hepato-splenomegaly   - UA : no hematuria   - Fecal occult negative   -Hg 09/22: pending    Plan:   -Ok to hold remaining doses of IVIG x5 doses (previous plan end-date 09/23) since pt refusing.   -No transfusions, consider Eculizumab tmw   -Continue to monitor hemolysis labs daily   -Heme on board, follow up with further recs     # Fever, resolved  # Leukocytosis likely reactive I/s/o steroids (resolving)  - Unknown source of infection possibly acute chest vs PNA vs viral or bacterial sinusitis   - Febrile x2 days before admission, Tmax (38.5) on 9/12, afebrile since   - leukocytosis 33.5 --> 38.1 --> 44.7K (9/14)  --> 22.7K (9/15)--> 13.6K (9/17) --> 21.4K (9/19)   - UA (9/13) negative WBC, leuks, nitrities   - Influenza A+B and COVD negative (9/12)   - MRSA - negative (9/13)   - Lactate - 1.5 (9/12), repeat 1.1 (9/15)   - Procal 12.74 (9/14), repeat procal 9/16 1.6  - Blood cultures x2 - NGTD (9/14)  - CXR (9/13) negative for acute cardiopulmonary process   - CTPE (9/14) negative for PE, or acute cardiopulmonary process   - s/p vanc + zosyn (9/12)  - s/p azithromycin + ceftriaxone (9/13-9/14)   - s/p zosyn (9/14- 9/17) given continued uptrend of WBC  - 9/17 start augmentin BID x 1 day    - ID consulted 9/15 given persistent leukocytosis, however pt received IV steroids in prep for CT (d/t allergy) and leukocytosis likely 2/2 steroids. ID rec stopping atbs 9/17 and s/o   - WBC 21.4K (9/19) now trending down to 18.4 09/21, denies infectious symptoms (denies cough, fever, congestion, SOB, chills), reported 1 loose BM 9/18, low threshold to repeat infectious workup and restart Zosyn if decompensates   - Started methylprednisolone with IVIG daily as premedication before transfusion (will get dose 9/19-9/23); monitor WBC response and clinically correlate if any reported new infectious symptoms      # SYED -improving   - sCr 2.03 (9/14) --> 2.46 (9/15) --> 1.92 (9.17) --> 1.66 (9/18) --> 1.23 (9/19) --> 1.22 (9/20)--> 1.11 (09/21)   - s/p D5 1/2 @75mL/hour (9/14-9/15), increased rate to @100mL/hour on 9/15  per attending-- continuing for another 24hrs (plan stop 9/17- ordered to fall off)  - Urine lytes (9/15) indicating pre-renal, possibly in setting of profound anemia   - Repeat UA (9/15): negative  - can consider renal US if SYED up-trends again  - Renally dose medications, avoid nephrotoxic medications       # Hgb SS with acute on chronic pain   - No active care path   - OARRS reviewed- no aberrancies, last refilled home oxycodone 10mg for 7 day supply (10T) on 8/15/2024   - Currently on Hydroxyurea-held on admit d/t concern for infection   - See Hgb as above  - Hgb S (9/13): 94.2%  - Lysis labs elevated on admission bili 13.2 (BL 7-9), now downtrending as of 9/15  - Given no new O2 requirement on admission, chest pain and no radiographic findings of opacities on CXR and CT PE, patient does not currently meet criteria for ACS but will continue to monitor closely and low threshold to repeat chest imaging   - 9/16 overnight with Spo2 of 90% while sleeping - CXR without acute process   - RBCex labs of type and screen, hgb S, ionized calcium sent 9/13 should patient require RBCex   - s/p (9/13-9/17)  Dilaudid 1mg q2 hours for severe pain  - 9/17 start oxcodone 15mg PO q3h PRN + IVP dilaudid 1mg x 3 doses for breakthrough pain   - s/p Toradol 30mg IV q6 hours scheduled x12 doses (9/13-9/15), stopped on 9/15 given SYED with protonix PPI, HCG neg on 9/13    - s/p oxcodone 15mg PO q3h PRN + IVP dilaudid 1mg Q6 (9/17-9/20)  - c/o increased pain in lower back and bilateral lower legs, oxcodone 15mg PO q3h PRN + IVP dilaudid 1mg Q4hrs; educated patient on rotation and calling for pain medications more frequently    - Benadryl 25mg PO PRN for itching, Zofran 8mg q8 hours PRN for nausea, Miralax daily, DocuSenna 2tabs BID for opioid-induced constipation   - Continue home folic acid 1mg daily     # Numbness in Pudenal nerve distribution  - Possibly 2/2 decreased tissue oxygenation I/s/o significant anemia  - Pt reports numbness  from rectum to urethra  - Denies any bowel/bladder incontinence  - Per pt, can feel when she needs to urinate or have BM  - Per attending, will consider investigating further after anemia addressed  - 9/19 pt reports perineal numbness is improving, will continue to monitor      # Prophy  - S/p Lovenox 40mg subcutaneous daily, 9/15 start heparin subcutaneous given SYED, encourage ambulation   - Started on Protonix 40mg daily with toradol      Dispo   - Full code, confirmed on admission    - DC pending improvement in anemia and pain  - FUV with sickle cell team 11/13  - Emergency contact mother Kat Ibarra [phone]

## 2024-09-23 VITALS
SYSTOLIC BLOOD PRESSURE: 130 MMHG | OXYGEN SATURATION: 96 % | BODY MASS INDEX: 31.16 KG/M2 | TEMPERATURE: 97 F | WEIGHT: 158.73 LBS | RESPIRATION RATE: 16 BRPM | HEART RATE: 50 BPM | HEIGHT: 60 IN | DIASTOLIC BLOOD PRESSURE: 71 MMHG

## 2024-09-23 LAB
ALBUMIN SERPL BCP-MCNC: 3.6 G/DL (ref 3.4–5)
ALP SERPL-CCNC: 80 U/L (ref 33–110)
ALT SERPL W P-5'-P-CCNC: 34 U/L (ref 7–45)
ANION GAP SERPL CALC-SCNC: 17 MMOL/L (ref 10–20)
AST SERPL W P-5'-P-CCNC: 21 U/L (ref 9–39)
BASOPHILS # BLD MANUAL: 0 X10*3/UL (ref 0–0.1)
BASOPHILS NFR BLD MANUAL: 0 %
BILIRUB SERPL-MCNC: 4.8 MG/DL (ref 0–1.2)
BUN SERPL-MCNC: 21 MG/DL (ref 6–23)
CALCIUM SERPL-MCNC: 8.9 MG/DL (ref 8.6–10.6)
CHLORIDE SERPL-SCNC: 106 MMOL/L (ref 98–107)
CO2 SERPL-SCNC: 20 MMOL/L (ref 21–32)
CREAT SERPL-MCNC: 0.88 MG/DL (ref 0.5–1.05)
DACRYOCYTES BLD QL SMEAR: ABNORMAL
EGFRCR SERPLBLD CKD-EPI 2021: >90 ML/MIN/1.73M*2
EOSINOPHIL # BLD MANUAL: 0 X10*3/UL (ref 0–0.7)
EOSINOPHIL NFR BLD MANUAL: 0 %
ERYTHROCYTE [DISTWIDTH] IN BLOOD BY AUTOMATED COUNT: 22.4 % (ref 11.5–14.5)
GLUCOSE SERPL-MCNC: 67 MG/DL (ref 74–99)
HCT VFR BLD AUTO: 13.8 % (ref 36–46)
HGB BLD-MCNC: 4.8 G/DL (ref 12–16)
HGB RETIC QN: 27 PG (ref 28–38)
HOWELL-JOLLY BOD BLD QL SMEAR: PRESENT
IMM GRANULOCYTES # BLD AUTO: 0.65 X10*3/UL (ref 0–0.7)
IMM GRANULOCYTES NFR BLD AUTO: 2.7 % (ref 0–0.9)
IMMATURE RETIC FRACTION: 35.1 %
LDH SERPL L TO P-CCNC: 258 U/L (ref 84–246)
LYMPHOCYTES # BLD MANUAL: 3.91 X10*3/UL (ref 1.2–4.8)
LYMPHOCYTES NFR BLD MANUAL: 16.5 %
MCH RBC QN AUTO: 29.3 PG (ref 26–34)
MCHC RBC AUTO-ENTMCNC: 34.8 G/DL (ref 32–36)
MCV RBC AUTO: 84 FL (ref 80–100)
MONOCYTES # BLD MANUAL: 0.62 X10*3/UL (ref 0.1–1)
MONOCYTES NFR BLD MANUAL: 2.6 %
NEUTS SEG # BLD MANUAL: 19.17 X10*3/UL (ref 1.2–7)
NEUTS SEG NFR BLD MANUAL: 80.9 %
NRBC BLD-RTO: 15.3 /100 WBCS (ref 0–0)
PAPPENHEIMER BOD BLD QL SMEAR: PRESENT
PLATELET # BLD AUTO: 493 X10*3/UL (ref 150–450)
POLYCHROMASIA BLD QL SMEAR: ABNORMAL
POTASSIUM SERPL-SCNC: 3.9 MMOL/L (ref 3.5–5.3)
PROT SERPL-MCNC: 7.5 G/DL (ref 6.4–8.2)
RBC # BLD AUTO: 1.64 X10*6/UL (ref 4–5.2)
RBC MORPH BLD: ABNORMAL
RETICS #: 0.35 X10*6/UL (ref 0.02–0.08)
RETICS/RBC NFR AUTO: 21.3 % (ref 0.5–2)
SICKLE CELLS BLD QL SMEAR: ABNORMAL
SODIUM SERPL-SCNC: 139 MMOL/L (ref 136–145)
TOTAL CELLS COUNTED BLD: 115
WBC # BLD AUTO: 23.7 X10*3/UL (ref 4.4–11.3)

## 2024-09-23 PROCEDURE — 85007 BL SMEAR W/DIFF WBC COUNT: CPT

## 2024-09-23 PROCEDURE — 2500000004 HC RX 250 GENERAL PHARMACY W/ HCPCS (ALT 636 FOR OP/ED)

## 2024-09-23 PROCEDURE — 2500000005 HC RX 250 GENERAL PHARMACY W/O HCPCS: Performed by: NURSE PRACTITIONER

## 2024-09-23 PROCEDURE — 2500000001 HC RX 250 WO HCPCS SELF ADMINISTERED DRUGS (ALT 637 FOR MEDICARE OP)

## 2024-09-23 PROCEDURE — 85045 AUTOMATED RETICULOCYTE COUNT: CPT

## 2024-09-23 PROCEDURE — 83615 LACTATE (LD) (LDH) ENZYME: CPT

## 2024-09-23 PROCEDURE — 99233 SBSQ HOSP IP/OBS HIGH 50: CPT

## 2024-09-23 PROCEDURE — 80053 COMPREHEN METABOLIC PANEL: CPT

## 2024-09-23 PROCEDURE — 2500000004 HC RX 250 GENERAL PHARMACY W/ HCPCS (ALT 636 FOR OP/ED): Performed by: NURSE PRACTITIONER

## 2024-09-23 PROCEDURE — 36415 COLL VENOUS BLD VENIPUNCTURE: CPT

## 2024-09-23 PROCEDURE — 1200000003 HC ONCOLOGY  ROOM WITH TELEMETRY DAILY

## 2024-09-23 PROCEDURE — 85027 COMPLETE CBC AUTOMATED: CPT

## 2024-09-23 PROCEDURE — 2500000001 HC RX 250 WO HCPCS SELF ADMINISTERED DRUGS (ALT 637 FOR MEDICARE OP): Performed by: NURSE PRACTITIONER

## 2024-09-23 ASSESSMENT — COGNITIVE AND FUNCTIONAL STATUS - GENERAL
DAILY ACTIVITIY SCORE: 24
DAILY ACTIVITIY SCORE: 24
MOBILITY SCORE: 24
MOBILITY SCORE: 24

## 2024-09-23 ASSESSMENT — PAIN - FUNCTIONAL ASSESSMENT
PAIN_FUNCTIONAL_ASSESSMENT: 0-10

## 2024-09-23 ASSESSMENT — PAIN SCALES - GENERAL
PAINLEVEL_OUTOF10: 8
PAINLEVEL_OUTOF10: 7
PAINLEVEL_OUTOF10: 8
PAINLEVEL_OUTOF10: 0 - NO PAIN
PAINLEVEL_OUTOF10: 8
PAINLEVEL_OUTOF10: 0 - NO PAIN
PAINLEVEL_OUTOF10: 6
PAINLEVEL_OUTOF10: 0 - NO PAIN
PAINLEVEL_OUTOF10: 8
PAINLEVEL_OUTOF10: 0 - NO PAIN
PAINLEVEL_OUTOF10: 3

## 2024-09-23 ASSESSMENT — PAIN DESCRIPTION - ORIENTATION
ORIENTATION: RIGHT;LEFT

## 2024-09-23 ASSESSMENT — PAIN DESCRIPTION - LOCATION
LOCATION: ARM
LOCATION: GENERALIZED
LOCATION: ARM

## 2024-09-23 NOTE — PROGRESS NOTES
Ruperto Pang is a 24 y.o. female on day 10 of admission presenting with Sickle cell crisis (Multi).    Subjective   Patient seen at bedside. States that she continues to have generalized pain. States that pain is located in her back and limbs. Discussed hgb results and plan to hold off on any further blood products at this time. Denies SOB, CP, heart palpitations, N/V/D/C, headache dizziness or vision changes. Discussed possible discharge today but patient states that she does not feel comfortable discharging with her hgb so low.       Objective     Physical Exam  HENT:      Head: Normocephalic.      Nose: Nose normal.      Mouth/Throat:      Mouth: Mucous membranes are moist.   Eyes:      Pupils: Pupils are equal, round, and reactive to light.   Cardiovascular:      Rate and Rhythm: Normal rate.   Pulmonary:      Effort: Pulmonary effort is normal.   Abdominal:      Palpations: Abdomen is soft.   Musculoskeletal:         General: Normal range of motion.      Cervical back: Normal range of motion.   Skin:     General: Skin is warm.      Capillary Refill: Capillary refill takes less than 2 seconds.   Neurological:      General: No focal deficit present.      Mental Status: She is alert.   Psychiatric:         Mood and Affect: Mood normal.         Behavior: Behavior normal.         Last Recorded Vitals  Blood pressure 139/63, pulse 50, temperature 36.8 °C (98.2 °F), resp. rate 16, height 1.524 m (5'), weight 72 kg (158 lb 11.7 oz), SpO2 98%, unknown if currently breastfeeding.  Intake/Output last 3 Shifts:  I/O last 3 completed shifts:  In: 480 (6.7 mL/kg) [P.O.:480]  Out: 1000 (13.9 mL/kg) [Urine:1000 (0.4 mL/kg/hr)]  Weight: 72 kg     Relevant Results                              Assessment/Plan   Assessment & Plan  Sickle cell crisis (Multi)  Ruperto Pang is a 24 y.o. 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 Patient febrile x1 (Tmax 38.5) and significant leukocytosis noted on admission. CXR (9/12) negative for acute cardiopulmonary process. UA (9/12) neg for nitrites and leuks. COVID / Influenza A + B / MRSA negative (9/13). Procal - 12.74 (9/14). CT PE (9/13) negative for PE or acute cardiopulmomary process. CT neck (9/13) no evidence of thromboses, left retropharyngeal and cervical lymph nodes, likely sequale of URI. On admit patient started on Vanc + Zosyn (9/12-9/13). On 9/13 patient started on azithromycin + ceftriaxone (9/13-9/14). 9/14- 9/17 s.p Zosyn to given continued uptrend of WBC, 9/17 start augmentin x 1 day. ID consulted 9/15 given persistent leukocytosis, however pt received IV steroids in prep for CT (d/t allergy) and believe leukocytosis likely 2/2 steroids, however ID rec continuing atbs for now. Additionally, patient with worsening SYED on 9/15. Increased rate of D5 1/2 to 100mL/hour on 9/15, repeat UA neg, urine lytes indicating pre renal, possibly in setting of profound anemia. Hgb 5.4 on 9/14, s/p 1 unit PRBC. Hgb with inappropriate incrementation on 9/15, MALISSA (negative) and extended MALISSA sent on 9/15 (still pending). Hgb again decreased to 4.3 (9/16) and initial c/f DHTR. Heme consulted 9/16, believe likely not in DHTR and rec'd additional 1u prbc (9/16 PM). Hgb incremented from 4.3 --> 4.8 (9/17). Pt additionally with reported numbness from rectum to urethra, per attending will consider investigating further after profound anemia improved. No bowel or bladder dysfunction. ID rec stopping atbs 9/17 and s/o. 9/17 s/p augmentin x1 day to complete atb couse. 9/18 reports improving perineal numbness. Hgb 4.9 (9/18) --> 4.5 (9/19). Hemoglobin 4.3 (9/20), per Dr. Dumas started  IVIG x3 doses (09/19-09/21). Planned for 2 additional days for a total of 5 days IVIG but patient refused because she didn't like the way it made her feel. 09/20 received  additional unit PRBC for hgb 4.3.  US liver and spleen w/o hepatosplenomegaly,  UA w/o hematuria and fecal occult was negative. Dr. Dumas initially discussing possible Eculizumab but will hold off since hgb stable. Will continue to monitor hgb and pain and discharge after improvement. 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 Rec'd 1u prbc 9/16 PM. Pt incremented 4.3 --> 4.8 (9/17)   - 9/18 Hgb 4.9--> plan for possible IVIG vs watch/wait pending Dr. Dumas's input   - 9/19 hgb 4.5. Discussed with Dr. Dumas and hematology, plan for IVIG x3 doses (end date 9/21) and monitor hgb response  - Per Dr. Dumas, ordered 0.5g/kg IVIG x3 days with premedications (50mg benadryl, 650mg tylenol, 125mg IV methylprednisone before IVIG dose) (9/19-plan end date 9/21)   - 9/20, hgb 4.3; per discussion with Dr. Dumas, order 1u pRBCs, continue IVIG tlast dose today.    - US abdomen :No hepato-splenomegaly   - UA : no hematuria   - Fecal occult negative   -Hg 09/21: 4.8 AM (did not increment well after the unit yesterday)  - Discussed with Dr. Dumas 9/23 and no plans for additional interventions at this time. Will continue to monitor hgb and discharge after improvement      # Fever, resolved  # Leukocytosis likely reactive I/s/o steroids   - Unknown source of infection possibly acute chest vs PNA vs viral or bacterial sinusitis   - Febrile x2 days before admission, Tmax (38.5) on 9/12, afebrile since   - leukocytosis 33.5 --> 38.1 --> 44.7K (9/14)  --> 22.7K (9/15)--> 13.6K (9/17) --> 21.4K (9/19)   - UA (9/13) negative  WBC, leuks, nitrities   - Influenza A+B and COVD negative (9/12)   - MRSA - negative (9/13)   - Lactate - 1.5 (9/12), repeat 1.1 (9/15)   - Procal 12.74 (9/14), repeat procal 9/16 1.6  - Blood cultures x2 - NGTD (9/14)  - CXR (9/13) negative for acute cardiopulmonary process   - CTPE (9/14) negative for PE, or acute cardiopulmonary process   - s/p vanc + zosyn (9/12)  - s/p azithromycin + ceftriaxone (9/13-9/14)   - s/p zosyn (9/14- 9/17) given continued uptrend of WBC  - 9/17 start augmentin BID x 1 day    - ID consulted 9/15 given persistent leukocytosis, however pt received IV steroids in prep for CT (d/t allergy) and leukocytosis likely 2/2 steroids. ID rec stopping atbs 9/17 and s/o   - WBC 21.4K (9/19) now trending down to 18.4, denies infectious symptoms (denies cough, fever, congestion, SOB, chills), reported 1 loose BM 9/18, low threshold to repeat infectious workup and restart Zosyn if decompensates   - Started methylprednisolone with IVIG daily as premedication before transfusion (will get dose 9/19-9/21); monitor WBC response and clinically correlate if any reported new infectious symptoms      # SYED -improving   - sCr 2.03 (9/14) --> 2.46 (9/15) --> 1.92 (9.17) --> 1.66 (9/18) --> 1.23 (9/19) --> 1.22 (9/20)--> 1.11 (09/21)   - s/p D5 1/2 @75mL/hour (9/14-9/15), increased rate to @100mL/hour on 9/15 per attending-- continuing for another 24hrs (plan stop 9/17- ordered to fall off)  - Urine lytes (9/15) indicating pre-renal, possibly in setting of profound anemia   - Repeat UA (9/15): negative  - can consider renal US if SYED up-trends again  - Renally dose medications, avoid nephrotoxic medications       # Hgb SS with acute on chronic pain   - No active care path   - OARRS reviewed- no aberrancies, last refilled home oxycodone 10mg for 7 day supply (10T) on 8/15/2024   - Currently on Hydroxyurea-held on admit d/t concern for infection   - See Hgb as above  - Hgb S (9/13): 94.2%  - Lysis labs elevated on  admission bili 13.2 (BL 7-9), now downtrending as of 9/15  - Given no new O2 requirement on admission, chest pain and no radiographic findings of opacities on CXR and CT PE, patient does not currently meet criteria for ACS but will continue to monitor closely and low threshold to repeat chest imaging   - 9/16 overnight with Spo2 of 90% while sleeping - CXR without acute process   - RBCex labs of type and screen, hgb S, ionized calcium sent 9/13 should patient require RBCex   - s/p (9/13-9/17)  Dilaudid 1mg q2 hours for severe pain  - 9/17 start oxcodone 15mg PO q3h PRN + IVP dilaudid 1mg x 3 doses for breakthrough pain   - s/p Toradol 30mg IV q6 hours scheduled x12 doses (9/13-9/15), stopped on 9/15 given SYED with protonix PPI, HCG neg on 9/13    - s/p oxcodone 15mg PO q3h PRN + IVP dilaudid 1mg Q6 (9/17-9/20)  - c/o increased pain in lower back and bilateral lower legs, oxcodone 15mg PO q3h PRN + IVP dilaudid 1mg Q3hrs  - Benadryl 25mg PO PRN for itching, Zofran 8mg q8 hours PRN for nausea, Miralax daily, DocuSenna 2tabs BID for opioid-induced constipation   - Continue home folic acid 1mg daily     # Numbness in Pudenal nerve distribution  - Possibly 2/2 decreased tissue oxygenation I/s/o significant anemia  - Pt reports numbness from rectum to urethra  - Denies any bowel/bladder incontinence  - Per pt, can feel when she needs to urinate or have BM  - Per attending, will consider investigating further after anemia addressed  - 9/19 pt reports perineal numbness is improving, will continue to monitor      # Prophy  - S/p Lovenox 40mg subcutaneous daily, 9/15 start heparin subcutaneous given SYED, encourage ambulation   - Started on Protonix 40mg daily with toradol      Dispo   - Full code, confirmed on admission    - DC pending improvement in anemia and pain  - FUV with sickle cell team 11/13  - Emergency contact mother Kat Ibarra [phone]           I spent 60 minutes in the professional and overall care of  this patient.      CARLOS Malloy-CNP  Patient discussed with Dr. Chester

## 2024-09-23 NOTE — ASSESSMENT & PLAN NOTE
Ruperto Pang is a 24 y.o. 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 Patient febrile x1 (Tmax 38.5) and significant leukocytosis noted on admission. CXR (9/12) negative for acute cardiopulmonary process. UA (9/12) neg for nitrites and leuks. COVID / Influenza A + B / MRSA negative (9/13). Procal - 12.74 (9/14). CT PE (9/13) negative for PE or acute cardiopulmomary process. CT neck (9/13) no evidence of thromboses, left retropharyngeal and cervical lymph nodes, likely sequale of URI. On admit patient started on Vanc + Zosyn (9/12-9/13). On 9/13 patient started on azithromycin + ceftriaxone (9/13-9/14). 9/14- 9/17 s.p Zosyn to given continued uptrend of WBC, 9/17 start augmentin x 1 day. ID consulted 9/15 given persistent leukocytosis, however pt received IV steroids in prep for CT (d/t allergy) and believe leukocytosis likely 2/2 steroids, however ID rec continuing atbs for now. Additionally, patient with worsening SYED on 9/15. Increased rate of D5 1/2 to 100mL/hour on 9/15, repeat UA neg, urine lytes indicating pre renal, possibly in setting of profound anemia. Hgb 5.4 on 9/14, s/p 1 unit PRBC. Hgb with inappropriate incrementation on 9/15, MALISSA (negative) and extended MALISSA sent on 9/15 (still pending). Hgb again decreased to 4.3 (9/16) and initial c/f DHTR. Heme consulted 9/16, believe likely not in DHTR and rec'd additional 1u prbc (9/16 PM). Hgb incremented from 4.3 --> 4.8 (9/17). Pt additionally with reported numbness from rectum to urethra, per attending will consider investigating further after profound anemia improved. No bowel or bladder dysfunction. ID rec stopping atbs 9/17 and s/o. 9/17 s/p augmentin x1 day to complete atb couse. 9/18 reports improving perineal numbness. Hgb 4.9 (9/18) --> 4.5 (9/19). Hemoglobin 4.3 (9/20), per Dr. Dumas started  IVIG x3  doses (09/19-09/21). Planned for 2 additional days for a total of 5 days IVIG but patient refused because she didn't like the way it made her feel. 09/20 received additional unit PRBC for hgb 4.3.  US liver and spleen w/o hepatosplenomegaly,  UA w/o hematuria and fecal occult was negative. Dr. Dumas initially discussing possible Eculizumab but will hold off since hgb stable. Will continue to monitor hgb and pain and discharge after improvement. 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 Rec'd 1u prbc 9/16 PM. Pt incremented 4.3 --> 4.8 (9/17)   - 9/18 Hgb 4.9--> plan for possible IVIG vs watch/wait pending Dr. Dumas's input   - 9/19 hgb 4.5. Discussed with Dr. Dumas and hematology, plan for IVIG x3 doses (end date 9/21) and monitor hgb response  - Per Dr. Dumas, ordered 0.5g/kg IVIG x3 days with premedications (50mg benadryl, 650mg tylenol, 125mg IV methylprednisone before IVIG dose) (9/19-plan end date 9/21)   - 9/20, hgb 4.3; per discussion with Dr. Dumas, order 1u pRBCs, continue IVIG tlast dose today.    - US abdomen :No hepato-splenomegaly   - UA : no hematuria   - Fecal occult negative   -Hg 09/21: 4.8 AM (did not increment well after the unit yesterday)  - Discussed with Dr. Dumas 9/23 and no plans for additional interventions at this time. Will continue to monitor hgb and discharge after improvement      # Fever, resolved  # Leukocytosis likely reactive I/s/o steroids   - Unknown source of infection possibly acute chest vs PNA vs viral or bacterial sinusitis   - Febrile x2 days before  admission, Tmax (38.5) on 9/12, afebrile since   - leukocytosis 33.5 --> 38.1 --> 44.7K (9/14)  --> 22.7K (9/15)--> 13.6K (9/17) --> 21.4K (9/19)   - UA (9/13) negative WBC, leuks, nitrities   - Influenza A+B and COVD negative (9/12)   - MRSA - negative (9/13)   - Lactate - 1.5 (9/12), repeat 1.1 (9/15)   - Procal 12.74 (9/14), repeat procal 9/16 1.6  - Blood cultures x2 - NGTD (9/14)  - CXR (9/13) negative for acute cardiopulmonary process   - CTPE (9/14) negative for PE, or acute cardiopulmonary process   - s/p vanc + zosyn (9/12)  - s/p azithromycin + ceftriaxone (9/13-9/14)   - s/p zosyn (9/14- 9/17) given continued uptrend of WBC  - 9/17 start augmentin BID x 1 day    - ID consulted 9/15 given persistent leukocytosis, however pt received IV steroids in prep for CT (d/t allergy) and leukocytosis likely 2/2 steroids. ID rec stopping atbs 9/17 and s/o   - WBC 21.4K (9/19) now trending down to 18.4, denies infectious symptoms (denies cough, fever, congestion, SOB, chills), reported 1 loose BM 9/18, low threshold to repeat infectious workup and restart Zosyn if decompensates   - Started methylprednisolone with IVIG daily as premedication before transfusion (will get dose 9/19-9/21); monitor WBC response and clinically correlate if any reported new infectious symptoms      # SYED -improving   - sCr 2.03 (9/14) --> 2.46 (9/15) --> 1.92 (9.17) --> 1.66 (9/18) --> 1.23 (9/19) --> 1.22 (9/20)--> 1.11 (09/21)   - s/p D5 1/2 @75mL/hour (9/14-9/15), increased rate to @100mL/hour on 9/15 per attending-- continuing for another 24hrs (plan stop 9/17- ordered to fall off)  - Urine lytes (9/15) indicating pre-renal, possibly in setting of profound anemia   - Repeat UA (9/15): negative  - can consider renal US if SYED up-trends again  - Renally dose medications, avoid nephrotoxic medications       # Hgb SS with acute on chronic pain   - No active care path   - OARRS reviewed- no aberrancies, last refilled home oxycodone 10mg for 7  day supply (10T) on 8/15/2024   - Currently on Hydroxyurea-held on admit d/t concern for infection   - See Hgb as above  - Hgb S (9/13): 94.2%  - Lysis labs elevated on admission bili 13.2 (BL 7-9), now downtrending as of 9/15  - Given no new O2 requirement on admission, chest pain and no radiographic findings of opacities on CXR and CT PE, patient does not currently meet criteria for ACS but will continue to monitor closely and low threshold to repeat chest imaging   - 9/16 overnight with Spo2 of 90% while sleeping - CXR without acute process   - RBCex labs of type and screen, hgb S, ionized calcium sent 9/13 should patient require RBCex   - s/p (9/13-9/17)  Dilaudid 1mg q2 hours for severe pain  - 9/17 start oxcodone 15mg PO q3h PRN + IVP dilaudid 1mg x 3 doses for breakthrough pain   - s/p Toradol 30mg IV q6 hours scheduled x12 doses (9/13-9/15), stopped on 9/15 given SYED with protonix PPI, HCG neg on 9/13    - s/p oxcodone 15mg PO q3h PRN + IVP dilaudid 1mg Q6 (9/17-9/20)  - c/o increased pain in lower back and bilateral lower legs, oxcodone 15mg PO q3h PRN + IVP dilaudid 1mg Q3hrs  - Benadryl 25mg PO PRN for itching, Zofran 8mg q8 hours PRN for nausea, Miralax daily, DocuSenna 2tabs BID for opioid-induced constipation   - Continue home folic acid 1mg daily     # Numbness in Pudenal nerve distribution  - Possibly 2/2 decreased tissue oxygenation I/s/o significant anemia  - Pt reports numbness from rectum to urethra  - Denies any bowel/bladder incontinence  - Per pt, can feel when she needs to urinate or have BM  - Per attending, will consider investigating further after anemia addressed  - 9/19 pt reports perineal numbness is improving, will continue to monitor      # Prophy  - S/p Lovenox 40mg subcutaneous daily, 9/15 start heparin subcutaneous given SYED, encourage ambulation   - Started on Protonix 40mg daily with toradol      Dispo   - Full code, confirmed on admission    - DC pending improvement in anemia  and pain  - FUV with sickle cell team 11/13  - Emergency contact mother Kat Ibarra [phone]

## 2024-09-23 NOTE — CARE PLAN
The patient's goals for the shift include Pain level of 3 or less    The clinical goals for the shift include Pt will state decrease pain with rating less than 6/10      Problem: Skin  Goal: Decreased wound size/increased tissue granulation at next dressing change  Outcome: Progressing  Goal: Participates in plan/prevention/treatment measures  Outcome: Progressing  Goal: Prevent/manage excess moisture  Outcome: Progressing  Goal: Prevent/minimize sheer/friction injuries  Outcome: Progressing  Goal: Promote/optimize nutrition  Outcome: Progressing  Goal: Promote skin healing  Outcome: Progressing     Problem: Fall/Injury  Goal: Not fall by end of shift  Outcome: Progressing  Goal: Be free from injury by end of the shift  Outcome: Progressing  Goal: Verbalize understanding of personal risk factors for fall in the hospital  Outcome: Progressing  Goal: Verbalize understanding of risk factor reduction measures to prevent injury from fall in the home  Outcome: Progressing  Goal: Use assistive devices by end of the shift  Outcome: Progressing  Goal: Pace activities to prevent fatigue by end of the shift  Outcome: Progressing     Problem: Pain  Goal: Takes deep breaths with improved pain control throughout the shift  Outcome: Progressing  Goal: Turns in bed with improved pain control throughout the shift  Outcome: Progressing  Goal: Walks with improved pain control throughout the shift  Outcome: Progressing  Goal: Performs ADL's with improved pain control throughout shift  Outcome: Progressing  Goal: Participates in PT with improved pain control throughout the shift  Outcome: Progressing  Goal: Free from opioid side effects throughout the shift  Outcome: Progressing  Goal: Free from acute confusion related to pain meds throughout the shift  Outcome: Progressing

## 2024-09-24 ENCOUNTER — SOCIAL WORK (OUTPATIENT)
Dept: HEMATOLOGY/ONCOLOGY | Facility: HOSPITAL | Age: 24
End: 2024-09-24
Payer: COMMERCIAL

## 2024-09-24 LAB
ALBUMIN SERPL BCP-MCNC: 3.5 G/DL (ref 3.4–5)
ALP SERPL-CCNC: 77 U/L (ref 33–110)
ALT SERPL W P-5'-P-CCNC: 50 U/L (ref 7–45)
ANION GAP SERPL CALC-SCNC: 12 MMOL/L (ref 10–20)
AST SERPL W P-5'-P-CCNC: 36 U/L (ref 9–39)
BASOPHILS # BLD AUTO: 0.04 X10*3/UL (ref 0–0.1)
BASOPHILS NFR BLD AUTO: 0.2 %
BILIRUB SERPL-MCNC: 3.8 MG/DL (ref 0–1.2)
BUN SERPL-MCNC: 18 MG/DL (ref 6–23)
CALCIUM SERPL-MCNC: 8.7 MG/DL (ref 8.6–10.6)
CHLORIDE SERPL-SCNC: 107 MMOL/L (ref 98–107)
CO2 SERPL-SCNC: 23 MMOL/L (ref 21–32)
CREAT SERPL-MCNC: 0.82 MG/DL (ref 0.5–1.05)
EGFRCR SERPLBLD CKD-EPI 2021: >90 ML/MIN/1.73M*2
EOSINOPHIL # BLD AUTO: 0.06 X10*3/UL (ref 0–0.7)
EOSINOPHIL NFR BLD AUTO: 0.3 %
ERYTHROCYTE [DISTWIDTH] IN BLOOD BY AUTOMATED COUNT: 23 % (ref 11.5–14.5)
GLUCOSE SERPL-MCNC: 79 MG/DL (ref 74–99)
HCT VFR BLD AUTO: 14.8 % (ref 36–46)
HGB BLD-MCNC: 5 G/DL (ref 12–16)
HGB RETIC QN: 31 PG (ref 28–38)
HOWELL-JOLLY BOD BLD QL SMEAR: PRESENT
IMM GRANULOCYTES # BLD AUTO: 0.26 X10*3/UL (ref 0–0.7)
IMM GRANULOCYTES NFR BLD AUTO: 1.4 % (ref 0–0.9)
IMMATURE RETIC FRACTION: 33.1 %
LDH SERPL L TO P-CCNC: 264 U/L (ref 84–246)
LYMPHOCYTES # BLD AUTO: 6.05 X10*3/UL (ref 1.2–4.8)
LYMPHOCYTES NFR BLD AUTO: 32.8 %
MCH RBC QN AUTO: 29.6 PG (ref 26–34)
MCHC RBC AUTO-ENTMCNC: 33.8 G/DL (ref 32–36)
MCV RBC AUTO: 88 FL (ref 80–100)
MONOCYTES # BLD AUTO: 1.01 X10*3/UL (ref 0.1–1)
MONOCYTES NFR BLD AUTO: 5.5 %
NEUTROPHILS # BLD AUTO: 11.01 X10*3/UL (ref 1.2–7.7)
NEUTROPHILS NFR BLD AUTO: 59.8 %
NRBC BLD-RTO: 34.2 /100 WBCS (ref 0–0)
PAPPENHEIMER BOD BLD QL SMEAR: PRESENT
PLATELET # BLD AUTO: 469 X10*3/UL (ref 150–450)
POTASSIUM SERPL-SCNC: 3 MMOL/L (ref 3.5–5.3)
PROT SERPL-MCNC: 7.4 G/DL (ref 6.4–8.2)
RBC # BLD AUTO: 1.69 X10*6/UL (ref 4–5.2)
RBC MORPH BLD: NORMAL
RETICS #: 0.43 X10*6/UL (ref 0.02–0.08)
RETICS/RBC NFR AUTO: 25.6 % (ref 0.5–2)
SICKLE CELLS BLD QL SMEAR: NORMAL
SODIUM SERPL-SCNC: 139 MMOL/L (ref 136–145)
TARGETS BLD QL SMEAR: NORMAL
WBC # BLD AUTO: 18.4 X10*3/UL (ref 4.4–11.3)

## 2024-09-24 PROCEDURE — 2500000004 HC RX 250 GENERAL PHARMACY W/ HCPCS (ALT 636 FOR OP/ED)

## 2024-09-24 PROCEDURE — 85025 COMPLETE CBC W/AUTO DIFF WBC: CPT

## 2024-09-24 PROCEDURE — 85045 AUTOMATED RETICULOCYTE COUNT: CPT

## 2024-09-24 PROCEDURE — 2500000001 HC RX 250 WO HCPCS SELF ADMINISTERED DRUGS (ALT 637 FOR MEDICARE OP)

## 2024-09-24 PROCEDURE — 99233 SBSQ HOSP IP/OBS HIGH 50: CPT

## 2024-09-24 PROCEDURE — 2500000001 HC RX 250 WO HCPCS SELF ADMINISTERED DRUGS (ALT 637 FOR MEDICARE OP): Performed by: NURSE PRACTITIONER

## 2024-09-24 PROCEDURE — 83615 LACTATE (LD) (LDH) ENZYME: CPT

## 2024-09-24 PROCEDURE — 36415 COLL VENOUS BLD VENIPUNCTURE: CPT

## 2024-09-24 PROCEDURE — 1200000003 HC ONCOLOGY  ROOM WITH TELEMETRY DAILY

## 2024-09-24 PROCEDURE — 2500000004 HC RX 250 GENERAL PHARMACY W/ HCPCS (ALT 636 FOR OP/ED): Performed by: NURSE PRACTITIONER

## 2024-09-24 PROCEDURE — 2500000005 HC RX 250 GENERAL PHARMACY W/O HCPCS

## 2024-09-24 PROCEDURE — 2500000005 HC RX 250 GENERAL PHARMACY W/O HCPCS: Performed by: NURSE PRACTITIONER

## 2024-09-24 PROCEDURE — 2500000002 HC RX 250 W HCPCS SELF ADMINISTERED DRUGS (ALT 637 FOR MEDICARE OP, ALT 636 FOR OP/ED)

## 2024-09-24 PROCEDURE — 80053 COMPREHEN METABOLIC PANEL: CPT

## 2024-09-24 RX ORDER — HYDROMORPHONE HYDROCHLORIDE 1 MG/ML
1 INJECTION, SOLUTION INTRAMUSCULAR; INTRAVENOUS; SUBCUTANEOUS EVERY 4 HOURS PRN
Status: DISCONTINUED | OUTPATIENT
Start: 2024-09-24 | End: 2024-09-25 | Stop reason: HOSPADM

## 2024-09-24 RX ORDER — OXYCODONE HYDROCHLORIDE 10 MG/1
10 TABLET ORAL EVERY 4 HOURS PRN
Status: DISCONTINUED | OUTPATIENT
Start: 2024-09-24 | End: 2024-09-25 | Stop reason: HOSPADM

## 2024-09-24 RX ORDER — POTASSIUM CHLORIDE 20 MEQ/1
40 TABLET, EXTENDED RELEASE ORAL ONCE
Status: COMPLETED | OUTPATIENT
Start: 2024-09-24 | End: 2024-09-24

## 2024-09-24 ASSESSMENT — COGNITIVE AND FUNCTIONAL STATUS - GENERAL
DAILY ACTIVITIY SCORE: 24
DAILY ACTIVITIY SCORE: 24
MOBILITY SCORE: 24
DAILY ACTIVITIY SCORE: 24
MOBILITY SCORE: 24
MOBILITY SCORE: 24

## 2024-09-24 ASSESSMENT — PAIN SCALES - GENERAL
PAINLEVEL_OUTOF10: 5 - MODERATE PAIN
PAINLEVEL_OUTOF10: 0 - NO PAIN
PAINLEVEL_OUTOF10: 8
PAINLEVEL_OUTOF10: 7
PAINLEVEL_OUTOF10: 7
PAINLEVEL_OUTOF10: 6
PAINLEVEL_OUTOF10: 0 - NO PAIN
PAINLEVEL_OUTOF10: 0 - NO PAIN
PAINLEVEL_OUTOF10: 7
PAINLEVEL_OUTOF10: 3
PAINLEVEL_OUTOF10: 8

## 2024-09-24 ASSESSMENT — PAIN - FUNCTIONAL ASSESSMENT
PAIN_FUNCTIONAL_ASSESSMENT: 0-10
PAIN_FUNCTIONAL_ASSESSMENT: UNABLE TO SELF-REPORT
PAIN_FUNCTIONAL_ASSESSMENT: 0-10

## 2024-09-24 ASSESSMENT — PAIN DESCRIPTION - LOCATION
LOCATION: GENERALIZED
LOCATION: GENERALIZED
LOCATION: CHEST
LOCATION: BACK

## 2024-09-24 ASSESSMENT — PAIN DESCRIPTION - ORIENTATION
ORIENTATION: RIGHT;LEFT
ORIENTATION: RIGHT;LEFT
ORIENTATION: LOWER;MID

## 2024-09-24 NOTE — PROGRESS NOTES
Ruperto Pang is a 24 y.o. female on day 11 of admission presenting with Sickle cell crisis (Multi).    Subjective   Patient seen resting in bed. Reports increased pain this morning, in bilateral shoulders and knees. We discussed starting rotating regimen today with po oxy and IV dilaudid today in preparation for discharge, patient agreeable. Appetite remains diminished, encouraged po intake. Last BM 9/23. Otherwise denies any shortness of breath, chest pain, abdominal pain, N/V/D.        Objective     Physical Exam  Constitutional:       Appearance: Normal appearance.   HENT:      Mouth/Throat:      Mouth: Mucous membranes are moist.   Eyes:      Pupils: Pupils are equal, round, and reactive to light.   Cardiovascular:      Rate and Rhythm: Normal rate and regular rhythm.      Pulses: Normal pulses.      Heart sounds: Normal heart sounds.   Pulmonary:      Effort: Pulmonary effort is normal.      Breath sounds: Normal breath sounds.   Abdominal:      General: Abdomen is flat. Bowel sounds are normal.      Palpations: Abdomen is soft.   Musculoskeletal:         General: Normal range of motion.      Cervical back: Normal range of motion and neck supple.   Skin:     General: Skin is warm.   Neurological:      General: No focal deficit present.      Mental Status: She is alert.   Psychiatric:         Mood and Affect: Mood normal.         Last Recorded Vitals  Blood pressure 121/72, pulse (!) 48, temperature 36.4 °C (97.5 °F), temperature source Temporal, resp. rate 16, height 1.524 m (5'), weight 69.9 kg (154 lb 1.6 oz), SpO2 98%, unknown if currently breastfeeding.  Intake/Output last 3 Shifts:  I/O last 3 completed shifts:  In: 240 (3.3 mL/kg) [P.O.:240]  Out: - (0 mL/kg)   Weight: 72 kg     Relevant Results                 Scheduled medications  folic acid, 1 mg, oral, Daily  heparin (porcine), 5,000 Units, subcutaneous, q8h TABATHA  [Held by provider] hydroxyurea, 500 mg, oral, q12h TABATHA  [Held by provider] immune  globulin (human), 0.5 g/kg, intravenous, Daily  pantoprazole, 40 mg, oral, Daily before breakfast  polyethylene glycol, 17 g, oral, Daily  sennosides-docusate sodium, 2 tablet, oral, BID      Continuous medications     PRN medications  PRN medications: diphenhydrAMINE, HYDROmorphone, ondansetron, oxyCODONE, oxygen               Assessment/Plan   Assessment & Plan  Sickle cell crisis (Multi)  Ruperto Pang is a 24 y.o. 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 Patient febrile x1 (Tmax 38.5) and significant leukocytosis noted on admission. CXR (9/12) negative for acute cardiopulmonary process. UA (9/12) neg for nitrites and leuks. COVID / Influenza A + B / MRSA negative (9/13). Procal - 12.74 (9/14). CT PE (9/13) negative for PE or acute cardiopulmomary process. CT neck (9/13) no evidence of thromboses, left retropharyngeal and cervical lymph nodes, likely sequale of URI. On admit patient started on Vanc + Zosyn (9/12-9/13). On 9/13 patient started on azithromycin + ceftriaxone (9/13-9/14). 9/14- 9/17 s.p Zosyn to given continued uptrend of WBC, 9/17 start augmentin x 1 day. ID consulted 9/15 given persistent leukocytosis, however pt received IV steroids in prep for CT (d/t allergy) and believe leukocytosis likely 2/2 steroids, however ID rec continuing atbs for now. Additionally, patient with worsening SYED on 9/15. Increased rate of D5 1/2 to 100mL/hour on 9/15, repeat UA neg, urine lytes indicating pre renal, possibly in setting of profound anemia. Hgb 5.4 on 9/14, s/p 1 unit PRBC. Hgb with inappropriate incrementation on 9/15, MALISSA (negative) and extended MALISSA sent on 9/15 (still pending). Hgb again decreased to 4.3 (9/16) and initial c/f DHTR. Heme consulted 9/16, believe likely not in DHTR and rec'd additional 1u prbc (9/16 PM). Hgb incremented from 4.3 --> 4.8  (9/17). Pt additionally with reported numbness from rectum to urethra, per attending will consider investigating further after profound anemia improved. No bowel or bladder dysfunction. ID rec stopping atbs 9/17 and s/o. 9/17 s/p augmentin x1 day to complete atb couse. 9/18 reports improving perineal numbness. Hgb 4.9 (9/18) --> 4.5 (9/19). Hemoglobin 4.3 (9/20), per Dr. Dumas started  IVIG x3 doses (09/19-09/21). Planned for 2 additional days for a total of 5 days IVIG but patient refused because she didn't like the way it made her feel. 09/20 received additional unit PRBC for hgb 4.3.  US liver and spleen w/o hepatosplenomegaly,  UA w/o hematuria and fecal occult was negative. Dr. Dumas initially discussing possible Eculizumab but will hold off since hgb stable. Will continue to monitor hgb and pain and discharge after improvement. 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      # c/f Delayed hemolytic transfusion reaction   # Anemia   - Hgb (baseline ~6-7), 5.4 --> Hgb incremented briefly to 6.1 on evening labs 9/14 --> Hgb 5.3 of 9/15--> decreased to 4.3 (9/16) increasing suspicion for DHTR  - MALISSA (9/15) negative, extended MALISSA pending  - Heme consulted for c/f DHTR (9/16)- believe likely not in DHTR, peripheral smear read pending. Rec'd 1u prbc 9/16 PM. Pt incremented 4.3 --> 4.8 (9/17)   - 9/18 Hgb 4.9--> 9/19 hgb 4.5. Discussed with Dr. Dumas and hematology, plan for IVIG x3 doses (end date 9/21) and monitor hgb response  - Per Dr. Dumas, ordered 0.5g/kg IVIG x3 days with premedications (50mg benadryl, 650mg tylenol, 125mg IV methylprednisone before IVIG dose) (9/19-plan end date 9/21)   - 9/20, hgb 4.3; per discussion with Dr. Dumas, s/p 1u pRBCs on 9/21 --> Hgb 4.8 (9/21, s/p 1 unit)   - Discussed with Dr. Dumas 9/23 and no plans for additional interventions at this time. Will continue to monitor hgb and discharge after improvement   - Hgb 9/24 -  pending   - US abdomen (9/20): no evidence of hepato-splenomegaly   - UA (9/20): negative for hematuria   - Fecal occult negative  (9/20)      # Fever, resolved  # Leukocytosis likely reactive I/s/o steroids   - Unknown source of infection possibly acute chest vs PNA vs viral or bacterial sinusitis   - Febrile x2 days before admission, Tmax (38.5) on 9/12, afebrile since   - leukocytosis 33.5 --> 38.1 --> 44.7K (9/14)  --> 22.7K (9/15)--> 13.6K (9/17) --> 21.4K (9/19) --> WBC pending 9/24   - UA (9/13) negative WBC, leuks, nitrities   - Influenza A+B and COVD negative (9/12)   - MRSA - negative (9/13)   - Lactate - 1.5 (9/12), repeat 1.1 (9/15)   - Procal 12.74 (9/14), repeat procal 9/16 1.6  - Blood cultures x2 - NGTD (9/14)  - CXR (9/13) negative for acute cardiopulmonary process   - CTPE (9/14) negative for PE, or acute cardiopulmonary process   - s/p vanc + zosyn (9/12)  - s/p azithromycin + ceftriaxone (9/13-9/14)   - s/p zosyn (9/14- 9/17) given continued uptrend of WBC  - 9/17 start augmentin BID x 1 day    - ID consulted 9/15 given persistent leukocytosis, however pt received IV steroids in prep for CT (d/t allergy) and leukocytosis likely 2/2 steroids. ID rec stopping atbs 9/17 and s/o   - WBC 21.4K (9/19) now trending down to 18.4, denies infectious symptoms (denies cough, fever, congestion, SOB, chills), reported 1 loose BM 9/18, low threshold to repeat infectious workup and restart Zosyn if decompensates   - Started methylprednisolone with IVIG daily as premedication before transfusion (last dose 9/19-9/21); monitor WBC response and clinically correlate if any reported new infectious symptoms      # SYED - resolved   - sCr 2.03 (9/14) --> 2.46 (9/15) --> 1.92 (9.17) --> 1.66 (9/18) --> 1.23 (9/19) --> 1.22 (9/20)--> 1.11 (09/21)   - As of 9/23 SYED remains resolved   - s/p D5 1/2 @75mL/hour (9/14-9/15), increased rate to @100mL/hour on 9/15 per attending-- continuing for another 24hrs (plan stop 9/17-  ordered to fall off)  - Urine lytes (9/15) indicating pre-renal, possibly in setting of profound anemia   - Repeat UA (9/15): negative  - can consider renal US if SYED up-trends again  - Renally dose medications, avoid nephrotoxic medications       # Hgb SS with acute on chronic pain   - No active care path   - OARRS reviewed- no aberrancies, last refilled home oxycodone 10mg for 7 day supply (10T) on 8/15/2024   - Currently on Hydroxyurea-held on admit d/t concern for infection   - See Hgb as above  - Hgb S (9/13): 94.2%  - Lysis labs elevated on admission bili 13.2 (BL 7-9), now downtrending as of 9/15  - Given no new O2 requirement on admission, chest pain and no radiographic findings of opacities on CXR and CT PE, patient does not currently meet criteria for ACS but will continue to monitor closely and low threshold to repeat chest imaging   - 9/16 overnight with Spo2 of 90% while sleeping - CXR without acute process   - RBCex labs of type and screen, hgb S, ionized calcium sent 9/13 should patient require RBCex   - s/p (9/13-9/17)  Dilaudid 1mg q2 hours for severe pain  - 9/17 start oxcodone 15mg PO q3h PRN + IVP dilaudid 1mg x 3 doses for breakthrough pain   - s/p Toradol 30mg IV q6 hours scheduled x12 doses (9/13-9/15), stopped on 9/15 given SYED with protonix PPI, HCG neg on 9/13    - s/p oxcodone 15mg PO q3h PRN + IVP dilaudid 1mg Q6 (9/17-9/20)  - s/p increased pain in lower back and bilateral lower legs, oxcodone 15mg PO q3h PRN + IVP dilaudid 1mg Q3hrs  - 9/24 start rotating regiman with po oxycodone 10mg q4 hours rotating q2 hours with IVP dilaudid 1mg q4 hours   - Benadryl 25mg PO PRN for itching, Zofran 8mg q8 hours PRN for nausea, Miralax daily, DocuSenna 2tabs BID for opioid-induced constipation   - Continue home folic acid 1mg daily     # Numbness in Pudenal nerve distribution, resolved   - Possibly 2/2 decreased tissue oxygenation I/s/o significant anemia  - Pt reports numbness from rectum to  urethra  - Denies any bowel/bladder incontinence  - Per pt, can feel when she needs to urinate or have BM  - Per attending, will consider investigating further after anemia addressed  - 9/24 pt reports perineal numbness is resolved     # Prophy  - S/p Lovenox 40mg subcutaneous daily, 9/15 start heparin subcutaneous given SYED, encourage ambulation   - Started on Protonix 40mg daily with toradol      Dispo   - Full code, confirmed on admission    - DC pending improvement in anemia and pain, possibly 9/25   - FUV with sickle cell team 11/13  - Emergency contact mother Kat Ibarra [phone]           I spent 60 minutes in the professional and overall care of this patient.    Assessment and plan as above discussed with attending physician, Dr. Nemo Gill, PA-C

## 2024-09-24 NOTE — SIGNIFICANT EVENT
Rapid Response RN Note    RADAR score 6 due to the following VS: T 36.6 °C; HR 50; RR 16; /74; SPO2 91%.     Reviewed above VS with bedside RN via phone.  Vital signs within patient's current trends.  No acute change in condition.  No interventions by rapid response team indicated at this time.    Staff to page rapid response for any concerns or acute change in condition/VS.

## 2024-09-24 NOTE — CARE PLAN
Problem: Skin  Goal: Decreased wound size/increased tissue granulation at next dressing change  Outcome: Progressing  Goal: Participates in plan/prevention/treatment measures  Outcome: Progressing  Goal: Prevent/manage excess moisture  Outcome: Progressing  Goal: Prevent/minimize sheer/friction injuries  Outcome: Progressing  Goal: Promote/optimize nutrition  Outcome: Progressing  Goal: Promote skin healing  Outcome: Progressing     Problem: Fall/Injury  Goal: Not fall by end of shift  Outcome: Progressing  Goal: Be free from injury by end of the shift  Outcome: Progressing  Goal: Verbalize understanding of personal risk factors for fall in the hospital  Outcome: Progressing  Goal: Verbalize understanding of risk factor reduction measures to prevent injury from fall in the home  Outcome: Progressing  Goal: Use assistive devices by end of the shift  Outcome: Progressing  Goal: Pace activities to prevent fatigue by end of the shift  Outcome: Progressing     Problem: Pain  Goal: Takes deep breaths with improved pain control throughout the shift  Outcome: Progressing  Goal: Turns in bed with improved pain control throughout the shift  Outcome: Progressing  Goal: Walks with improved pain control throughout the shift  Outcome: Progressing  Goal: Performs ADL's with improved pain control throughout shift  Outcome: Progressing  Goal: Participates in PT with improved pain control throughout the shift  Outcome: Progressing  Goal: Free from opioid side effects throughout the shift  Outcome: Progressing  Goal: Free from acute confusion related to pain meds throughout the shift  Outcome: Progressing

## 2024-09-24 NOTE — SIGNIFICANT EVENT
CALVIN pg - SpO2 91%    Pt with O2 off - SpO2 rechecked 90% -92% - placed back on 2L NC which is baseline    SpO2 now 95% - 96%    Pt encouraged to reach out with any concerns

## 2024-09-24 NOTE — ASSESSMENT & PLAN NOTE
Ruperto Pang is a 24 y.o. 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 Patient febrile x1 (Tmax 38.5) and significant leukocytosis noted on admission. CXR (9/12) negative for acute cardiopulmonary process. UA (9/12) neg for nitrites and leuks. COVID / Influenza A + B / MRSA negative (9/13). Procal - 12.74 (9/14). CT PE (9/13) negative for PE or acute cardiopulmomary process. CT neck (9/13) no evidence of thromboses, left retropharyngeal and cervical lymph nodes, likely sequale of URI. On admit patient started on Vanc + Zosyn (9/12-9/13). On 9/13 patient started on azithromycin + ceftriaxone (9/13-9/14). 9/14- 9/17 s.p Zosyn to given continued uptrend of WBC, 9/17 start augmentin x 1 day. ID consulted 9/15 given persistent leukocytosis, however pt received IV steroids in prep for CT (d/t allergy) and believe leukocytosis likely 2/2 steroids, however ID rec continuing atbs for now. Additionally, patient with worsening SYDE on 9/15. Increased rate of D5 1/2 to 100mL/hour on 9/15, repeat UA neg, urine lytes indicating pre renal, possibly in setting of profound anemia. Hgb 5.4 on 9/14, s/p 1 unit PRBC. Hgb with inappropriate incrementation on 9/15, MALISSA (negative) and extended MALISSA sent on 9/15 (still pending). Hgb again decreased to 4.3 (9/16) and initial c/f DHTR. Heme consulted 9/16, believe likely not in DHTR and rec'd additional 1u prbc (9/16 PM). Hgb incremented from 4.3 --> 4.8 (9/17). Pt additionally with reported numbness from rectum to urethra, per attending will consider investigating further after profound anemia improved. No bowel or bladder dysfunction. ID rec stopping atbs 9/17 and s/o. 9/17 s/p augmentin x1 day to complete atb couse. 9/18 reports improving perineal numbness. Hgb 4.9 (9/18) --> 4.5 (9/19). Hemoglobin 4.3 (9/20), per Dr. Dumas started  IVIG x3  doses (09/19-09/21). Planned for 2 additional days for a total of 5 days IVIG but patient refused because she didn't like the way it made her feel. 09/20 received additional unit PRBC for hgb 4.3.  US liver and spleen w/o hepatosplenomegaly,  UA w/o hematuria and fecal occult was negative. Dr. Dumas initially discussing possible Eculizumab but will hold off since hgb stable. Will continue to monitor hgb and pain and discharge after improvement. 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      # c/f Delayed hemolytic transfusion reaction   # Anemia   - Hgb (baseline ~6-7), 5.4 --> Hgb incremented briefly to 6.1 on evening labs 9/14 --> Hgb 5.3 of 9/15--> decreased to 4.3 (9/16) increasing suspicion for DHTR  - MALISSA (9/15) negative, extended MALISSA pending  - Heme consulted for c/f DHTR (9/16)- believe likely not in DHTR, peripheral smear read pending. Rec'd 1u prbc 9/16 PM. Pt incremented 4.3 --> 4.8 (9/17)   - 9/18 Hgb 4.9--> 9/19 hgb 4.5. Discussed with Dr. Dumas and hematology, plan for IVIG x3 doses (end date 9/21) and monitor hgb response  - Per Dr. Dumas, ordered 0.5g/kg IVIG x3 days with premedications (50mg benadryl, 650mg tylenol, 125mg IV methylprednisone before IVIG dose) (9/19-plan end date 9/21)   - 9/20, hgb 4.3; per discussion with Dr. Dumas, s/p 1u pRBCs on 9/21 --> Hgb 4.8 (9/21, s/p 1 unit)   - Discussed with Dr. Dumas 9/23 and no plans for additional interventions at this time. Will continue to monitor hgb and discharge after improvement   - Hgb 9/24 - pending   - US abdomen (9/20): no evidence of hepato-splenomegaly   - UA (9/20): negative for hematuria   - Fecal occult negative  (9/20)      # Fever, resolved  # Leukocytosis likely reactive I/s/o steroids   - Unknown source of infection possibly acute chest vs PNA vs viral or bacterial sinusitis   - Febrile x2 days before admission, Tmax (38.5) on 9/12, afebrile since   - leukocytosis 33.5 -->  38.1 --> 44.7K (9/14)  --> 22.7K (9/15)--> 13.6K (9/17) --> 21.4K (9/19) --> WBC pending 9/24   - UA (9/13) negative WBC, leuks, nitrities   - Influenza A+B and COVD negative (9/12)   - MRSA - negative (9/13)   - Lactate - 1.5 (9/12), repeat 1.1 (9/15)   - Procal 12.74 (9/14), repeat procal 9/16 1.6  - Blood cultures x2 - NGTD (9/14)  - CXR (9/13) negative for acute cardiopulmonary process   - CTPE (9/14) negative for PE, or acute cardiopulmonary process   - s/p vanc + zosyn (9/12)  - s/p azithromycin + ceftriaxone (9/13-9/14)   - s/p zosyn (9/14- 9/17) given continued uptrend of WBC  - 9/17 start augmentin BID x 1 day    - ID consulted 9/15 given persistent leukocytosis, however pt received IV steroids in prep for CT (d/t allergy) and leukocytosis likely 2/2 steroids. ID rec stopping atbs 9/17 and s/o   - WBC 21.4K (9/19) now trending down to 18.4, denies infectious symptoms (denies cough, fever, congestion, SOB, chills), reported 1 loose BM 9/18, low threshold to repeat infectious workup and restart Zosyn if decompensates   - Started methylprednisolone with IVIG daily as premedication before transfusion (last dose 9/19-9/21); monitor WBC response and clinically correlate if any reported new infectious symptoms      # SYED - resolved   - sCr 2.03 (9/14) --> 2.46 (9/15) --> 1.92 (9.17) --> 1.66 (9/18) --> 1.23 (9/19) --> 1.22 (9/20)--> 1.11 (09/21)   - As of 9/23 SYED remains resolved   - s/p D5 1/2 @75mL/hour (9/14-9/15), increased rate to @100mL/hour on 9/15 per attending-- continuing for another 24hrs (plan stop 9/17- ordered to fall off)  - Urine lytes (9/15) indicating pre-renal, possibly in setting of profound anemia   - Repeat UA (9/15): negative  - can consider renal US if SYED up-trends again  - Renally dose medications, avoid nephrotoxic medications       # Hgb SS with acute on chronic pain   - No active care path   - OARRS reviewed- no aberrancies, last refilled home oxycodone 10mg for 7 day supply (10T) on  8/15/2024   - Currently on Hydroxyurea-held on admit d/t concern for infection   - See Hgb as above  - Hgb S (9/13): 94.2%  - Lysis labs elevated on admission bili 13.2 (BL 7-9), now downtrending as of 9/15  - Given no new O2 requirement on admission, chest pain and no radiographic findings of opacities on CXR and CT PE, patient does not currently meet criteria for ACS but will continue to monitor closely and low threshold to repeat chest imaging   - 9/16 overnight with Spo2 of 90% while sleeping - CXR without acute process   - RBCex labs of type and screen, hgb S, ionized calcium sent 9/13 should patient require RBCex   - s/p (9/13-9/17)  Dilaudid 1mg q2 hours for severe pain  - 9/17 start oxcodone 15mg PO q3h PRN + IVP dilaudid 1mg x 3 doses for breakthrough pain   - s/p Toradol 30mg IV q6 hours scheduled x12 doses (9/13-9/15), stopped on 9/15 given SYED with protonix PPI, HCG neg on 9/13    - s/p oxcodone 15mg PO q3h PRN + IVP dilaudid 1mg Q6 (9/17-9/20)  - s/p increased pain in lower back and bilateral lower legs, oxcodone 15mg PO q3h PRN + IVP dilaudid 1mg Q3hrs  - 9/24 start rotating regiman with po oxycodone 10mg q4 hours rotating q2 hours with IVP dilaudid 1mg q4 hours   - Benadryl 25mg PO PRN for itching, Zofran 8mg q8 hours PRN for nausea, Miralax daily, DocuSenna 2tabs BID for opioid-induced constipation   - Continue home folic acid 1mg daily     # Numbness in Pudenal nerve distribution, resolved   - Possibly 2/2 decreased tissue oxygenation I/s/o significant anemia  - Pt reports numbness from rectum to urethra  - Denies any bowel/bladder incontinence  - Per pt, can feel when she needs to urinate or have BM  - Per attending, will consider investigating further after anemia addressed  - 9/24 pt reports perineal numbness is resolved     # Prophy  - S/p Lovenox 40mg subcutaneous daily, 9/15 start heparin subcutaneous given SYED, encourage ambulation   - Started on Protonix 40mg daily with toradol      Dispo    - Full code, confirmed on admission    - DC pending improvement in anemia and pain, possibly 9/25   - FUV with sickle cell team 11/13  - Emergency contact mother Kat Ibarra [phone]

## 2024-09-24 NOTE — CARE PLAN
The patient's goals for the shift include Pain level of 3 or less    The clinical goals for the shift include Patient will remain safe, HDS, and pain will be managed appropriately throughout shift.      Problem: Skin  Goal: Decreased wound size/increased tissue granulation at next dressing change  Outcome: Progressing  Goal: Participates in plan/prevention/treatment measures  Outcome: Progressing  Goal: Prevent/manage excess moisture  Outcome: Progressing  Goal: Prevent/minimize sheer/friction injuries  Outcome: Progressing  Goal: Promote/optimize nutrition  Outcome: Progressing  Goal: Promote skin healing  Outcome: Progressing     Problem: Fall/Injury  Goal: Not fall by end of shift  Outcome: Progressing  Goal: Be free from injury by end of the shift  Outcome: Progressing  Goal: Verbalize understanding of personal risk factors for fall in the hospital  Outcome: Progressing  Goal: Verbalize understanding of risk factor reduction measures to prevent injury from fall in the home  Outcome: Progressing  Goal: Use assistive devices by end of the shift  Outcome: Progressing  Goal: Pace activities to prevent fatigue by end of the shift  Outcome: Progressing     Problem: Pain  Goal: Takes deep breaths with improved pain control throughout the shift  Outcome: Progressing  Goal: Turns in bed with improved pain control throughout the shift  Outcome: Progressing  Goal: Walks with improved pain control throughout the shift  Outcome: Progressing  Goal: Performs ADL's with improved pain control throughout shift  Outcome: Progressing  Goal: Participates in PT with improved pain control throughout the shift  Outcome: Progressing  Goal: Free from opioid side effects throughout the shift  Outcome: Progressing  Goal: Free from acute confusion related to pain meds throughout the shift  Outcome: Progressing

## 2024-09-25 ENCOUNTER — SOCIAL WORK (OUTPATIENT)
Dept: HEMATOLOGY/ONCOLOGY | Facility: HOSPITAL | Age: 24
End: 2024-09-25
Payer: COMMERCIAL

## 2024-09-25 ENCOUNTER — PHARMACY VISIT (OUTPATIENT)
Dept: PHARMACY | Facility: CLINIC | Age: 24
End: 2024-09-25
Payer: COMMERCIAL

## 2024-09-25 VITALS
WEIGHT: 154.1 LBS | SYSTOLIC BLOOD PRESSURE: 137 MMHG | DIASTOLIC BLOOD PRESSURE: 77 MMHG | HEIGHT: 60 IN | TEMPERATURE: 98.6 F | BODY MASS INDEX: 30.25 KG/M2 | OXYGEN SATURATION: 96 % | HEART RATE: 80 BPM | RESPIRATION RATE: 16 BRPM

## 2024-09-25 LAB
ALBUMIN SERPL BCP-MCNC: 3.5 G/DL (ref 3.4–5)
ALP SERPL-CCNC: 78 U/L (ref 33–110)
ALT SERPL W P-5'-P-CCNC: 53 U/L (ref 7–45)
ANION GAP SERPL CALC-SCNC: 13 MMOL/L (ref 10–20)
AST SERPL W P-5'-P-CCNC: 25 U/L (ref 9–39)
ATRIAL RATE: 46 BPM
ATRIAL RATE: 47 BPM
ATRIAL RATE: 55 BPM
BACTERIA BLD CULT: NORMAL
BACTERIA BLD CULT: NORMAL
BASOPHILS # BLD MANUAL: 0 X10*3/UL (ref 0–0.1)
BASOPHILS NFR BLD MANUAL: 0 %
BILIRUB SERPL-MCNC: 5.1 MG/DL (ref 0–1.2)
BUN SERPL-MCNC: 11 MG/DL (ref 6–23)
CALCIUM SERPL-MCNC: 8.3 MG/DL (ref 8.6–10.6)
CHLORIDE SERPL-SCNC: 105 MMOL/L (ref 98–107)
CO2 SERPL-SCNC: 25 MMOL/L (ref 21–32)
CREAT SERPL-MCNC: 0.78 MG/DL (ref 0.5–1.05)
EGFRCR SERPLBLD CKD-EPI 2021: >90 ML/MIN/1.73M*2
EOSINOPHIL # BLD MANUAL: 0.12 X10*3/UL (ref 0–0.7)
EOSINOPHIL NFR BLD MANUAL: 0.8 %
ERYTHROCYTE [DISTWIDTH] IN BLOOD BY AUTOMATED COUNT: 24.5 % (ref 11.5–14.5)
GLUCOSE SERPL-MCNC: 83 MG/DL (ref 74–99)
HCT VFR BLD AUTO: 15.4 % (ref 36–46)
HGB BLD-MCNC: 5 G/DL (ref 12–16)
HGB RETIC QN: 32 PG (ref 28–38)
HOWELL-JOLLY BOD BLD QL SMEAR: PRESENT
IMM GRANULOCYTES # BLD AUTO: 0.15 X10*3/UL (ref 0–0.7)
IMM GRANULOCYTES NFR BLD AUTO: 1 % (ref 0–0.9)
IMMATURE RETIC FRACTION: 37.6 %
LDH SERPL L TO P-CCNC: 224 U/L (ref 84–246)
LYMPHOCYTES # BLD MANUAL: 2.48 X10*3/UL (ref 1.2–4.8)
LYMPHOCYTES NFR BLD MANUAL: 16 %
MCH RBC QN AUTO: 29.2 PG (ref 26–34)
MCHC RBC AUTO-ENTMCNC: 32.5 G/DL (ref 32–36)
MCV RBC AUTO: 90 FL (ref 80–100)
MONOCYTES # BLD MANUAL: 1.18 X10*3/UL (ref 0.1–1)
MONOCYTES NFR BLD MANUAL: 7.6 %
NEUTROPHILS # BLD MANUAL: 11.71 X10*3/UL (ref 1.2–7.7)
NEUTS BAND # BLD MANUAL: 0.26 X10*3/UL (ref 0–0.7)
NEUTS BAND NFR BLD MANUAL: 1.7 %
NEUTS SEG # BLD MANUAL: 11.45 X10*3/UL (ref 1.2–7)
NEUTS SEG NFR BLD MANUAL: 73.9 %
NRBC BLD-RTO: 34.8 /100 WBCS (ref 0–0)
OVALOCYTES BLD QL SMEAR: ABNORMAL
P AXIS: 18 DEGREES
P AXIS: 26 DEGREES
P AXIS: 31 DEGREES
P OFFSET: 165 MS
P OFFSET: 178 MS
P OFFSET: 180 MS
P ONSET: 128 MS
P ONSET: 128 MS
P ONSET: 130 MS
PLATELET # BLD AUTO: 457 X10*3/UL (ref 150–450)
POLYCHROMASIA BLD QL SMEAR: ABNORMAL
POTASSIUM SERPL-SCNC: 2.9 MMOL/L (ref 3.5–5.3)
PR INTERVAL: 178 MS
PR INTERVAL: 180 MS
PR INTERVAL: 182 MS
PROT SERPL-MCNC: 7.2 G/DL (ref 6.4–8.2)
Q ONSET: 217 MS
Q ONSET: 218 MS
Q ONSET: 221 MS
QRS COUNT: 7 BEATS
QRS COUNT: 8 BEATS
QRS COUNT: 9 BEATS
QRS DURATION: 74 MS
QRS DURATION: 84 MS
QRS DURATION: 86 MS
QT INTERVAL: 450 MS
QT INTERVAL: 482 MS
QT INTERVAL: 486 MS
QTC CALCULATION(BAZETT): 425 MS
QTC CALCULATION(BAZETT): 426 MS
QTC CALCULATION(BAZETT): 430 MS
QTC FREDERICIA: 437 MS
QTC FREDERICIA: 444 MS
QTC FREDERICIA: 444 MS
R AXIS: 11 DEGREES
R AXIS: 7 DEGREES
R AXIS: 8 DEGREES
RBC # BLD AUTO: 1.71 X10*6/UL (ref 4–5.2)
RBC MORPH BLD: ABNORMAL
RETICS #: 0.51 X10*6/UL (ref 0.02–0.08)
RETICS/RBC NFR AUTO: 29.5 % (ref 0.5–2)
SICKLE CELLS BLD QL SMEAR: ABNORMAL
SODIUM SERPL-SCNC: 140 MMOL/L (ref 136–145)
T AXIS: 52 DEGREES
T AXIS: 67 DEGREES
T AXIS: 84 DEGREES
T OFFSET: 442 MS
T OFFSET: 459 MS
T OFFSET: 464 MS
TARGETS BLD QL SMEAR: ABNORMAL
TOTAL CELLS COUNTED BLD: 119
VENTRICULAR RATE: 46 BPM
VENTRICULAR RATE: 47 BPM
VENTRICULAR RATE: 55 BPM
WBC # BLD AUTO: 15.5 X10*3/UL (ref 4.4–11.3)

## 2024-09-25 PROCEDURE — 80053 COMPREHEN METABOLIC PANEL: CPT

## 2024-09-25 PROCEDURE — 2500000001 HC RX 250 WO HCPCS SELF ADMINISTERED DRUGS (ALT 637 FOR MEDICARE OP)

## 2024-09-25 PROCEDURE — 85045 AUTOMATED RETICULOCYTE COUNT: CPT

## 2024-09-25 PROCEDURE — 85007 BL SMEAR W/DIFF WBC COUNT: CPT

## 2024-09-25 PROCEDURE — 83615 LACTATE (LD) (LDH) ENZYME: CPT

## 2024-09-25 PROCEDURE — 99239 HOSP IP/OBS DSCHRG MGMT >30: CPT

## 2024-09-25 PROCEDURE — 36415 COLL VENOUS BLD VENIPUNCTURE: CPT

## 2024-09-25 PROCEDURE — 2500000002 HC RX 250 W HCPCS SELF ADMINISTERED DRUGS (ALT 637 FOR MEDICARE OP, ALT 636 FOR OP/ED)

## 2024-09-25 PROCEDURE — 85027 COMPLETE CBC AUTOMATED: CPT

## 2024-09-25 PROCEDURE — RXMED WILLOW AMBULATORY MEDICATION CHARGE

## 2024-09-25 RX ORDER — POTASSIUM CHLORIDE 20 MEQ/1
40 TABLET, EXTENDED RELEASE ORAL ONCE
Status: COMPLETED | OUTPATIENT
Start: 2024-09-25 | End: 2024-09-25

## 2024-09-25 RX ORDER — POTASSIUM CHLORIDE 750 MG/1
10 CAPSULE, EXTENDED RELEASE ORAL DAILY
Qty: 30 CAPSULE | Refills: 0 | Status: SHIPPED | OUTPATIENT
Start: 2024-09-25 | End: 2024-10-25

## 2024-09-25 RX ORDER — OXYCODONE HYDROCHLORIDE 10 MG/1
10 TABLET ORAL EVERY 6 HOURS PRN
Qty: 10 TABLET | Refills: 0 | Status: SHIPPED | OUTPATIENT
Start: 2024-09-25 | End: 2024-10-02 | Stop reason: SDUPTHER

## 2024-09-25 RX ORDER — POTASSIUM CHLORIDE 20 MEQ/1
20 TABLET, EXTENDED RELEASE ORAL ONCE
Status: COMPLETED | OUTPATIENT
Start: 2024-09-25 | End: 2024-09-25

## 2024-09-25 ASSESSMENT — COGNITIVE AND FUNCTIONAL STATUS - GENERAL
MOBILITY SCORE: 24
DAILY ACTIVITIY SCORE: 24

## 2024-09-25 ASSESSMENT — PAIN - FUNCTIONAL ASSESSMENT
PAIN_FUNCTIONAL_ASSESSMENT: 0-10
PAIN_FUNCTIONAL_ASSESSMENT: 0-10

## 2024-09-25 ASSESSMENT — PAIN SCALES - GENERAL: PAINLEVEL_OUTOF10: 0 - NO PAIN

## 2024-09-25 NOTE — CARE PLAN
Problem: Skin  Goal: Decreased wound size/increased tissue granulation at next dressing change  Outcome: Progressing  Goal: Participates in plan/prevention/treatment measures  Outcome: Progressing  Goal: Prevent/manage excess moisture  Outcome: Progressing  Goal: Prevent/minimize sheer/friction injuries  Outcome: Progressing  Goal: Promote/optimize nutrition  Outcome: Progressing  Goal: Promote skin healing  Outcome: Progressing     Problem: Fall/Injury  Goal: Not fall by end of shift  Outcome: Progressing  Goal: Be free from injury by end of the shift  Outcome: Progressing  Goal: Verbalize understanding of personal risk factors for fall in the hospital  Outcome: Progressing  Goal: Verbalize understanding of risk factor reduction measures to prevent injury from fall in the home  Outcome: Progressing  Goal: Use assistive devices by end of the shift  Outcome: Progressing  Goal: Pace activities to prevent fatigue by end of the shift  Outcome: Progressing     Problem: Pain  Goal: Takes deep breaths with improved pain control throughout the shift  Outcome: Progressing  Goal: Turns in bed with improved pain control throughout the shift  Outcome: Progressing  Goal: Walks with improved pain control throughout the shift  Outcome: Progressing  Goal: Performs ADL's with improved pain control throughout shift  Outcome: Progressing  Goal: Participates in PT with improved pain control throughout the shift  Outcome: Progressing  Goal: Free from opioid side effects throughout the shift  Outcome: Progressing  Goal: Free from acute confusion related to pain meds throughout the shift  Outcome: Progressing   The patient's goals for the shift include Pain level of 3 or less    The clinical goals for the shift include pt will remain safe and free of injury through EOS

## 2024-09-25 NOTE — DISCHARGE SUMMARY
Discharge Diagnosis  Sickle cell crisis (Multi)    Issues Requiring Follow-Up  Sickle cell     Test Results Pending At Discharge  Pending Labs       Order Current Status    Blood Culture Preliminary result    Blood Culture Preliminary result    CBC and Auto Differential Preliminary result    Reticulocytes Preliminary result            Hospital Course   Ruperto Pang is a 24 y.o. 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 Patient febrile x1 (Tmax 38.5) and significant leukocytosis noted on admission. CXR (9/12) negative for acute cardiopulmonary process. UA (9/12) neg for nitrites and leuks. COVID / Influenza A + B / MRSA negative (9/13). Procal - 12.74 (9/14). CT PE (9/13) negative for PE or acute cardiopulmomary process. CT neck (9/13) no evidence of thromboses, left retropharyngeal and cervical lymph nodes, likely sequale of URI. On admit patient started on Vanc + Zosyn (9/12-9/13). On 9/13 patient started on azithromycin + ceftriaxone (9/13-9/14). 9/14- 9/17 s.p Zosyn to given continued uptrend of WBC, 9/17 start augmentin x 1 day. ID consulted 9/15 given persistent leukocytosis, however pt received IV steroids in prep for CT (d/t allergy) and believe leukocytosis likely 2/2 steroids, however ID rec continuing atbs for now. Additionally, patient with worsening SYED on 9/15. Increased rate of D5 1/2 to 100mL/hour on 9/15, repeat UA neg, urine lytes indicating pre renal, possibly in setting of profound anemia. Hgb 5.4 on 9/14, s/p 1 unit PRBC. Hgb with inappropriate incrementation on 9/15, MALISSA (negative) and extended MALISSA sent on 9/15 (still pending). Hgb again decreased to 4.3 (9/16) and initial c/f DHTR. Heme consulted 9/16, believe likely not in DHTR and rec'd additional 1u prbc (9/16 PM). Hgb incremented from 4.3 --> 4.8 (9/17). Pt additionally with reported  numbness from rectum to urethra, per attending will consider investigating further after profound anemia improved. No bowel or bladder dysfunction. ID rec stopping atbs 9/17 and s/o. 9/17 s/p augmentin x1 day to complete atb couse. 9/18 reports improving perineal numbness. Hgb 4.9 (9/18) --> 4.5 (9/19). Hemoglobin 4.3 (9/20), per Dr. Dumas started  IVIG x3 doses (09/19-09/21). Planned for 2 additional days for a total of 5 days IVIG but patient refused because she didn't like the way it made her feel. 09/20 received additional unit PRBC for hgb 4.3.  US liver and spleen w/o hepatosplenomegaly,  UA w/o hematuria and fecal occult was negative. Dr. Dumas initially discussing possible Eculizumab but will hold off since hgb stable. 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 Hemoglobin stable at 5 on day of discharge and pain is well controlled.     Patient will see Dr. Dumas in the sickle cell clinic on 10/2/2024.     Prior to discharge refills of home oxycodone 10mg and potassium were delivered to patients bedside by [facility] pharmacy.     Pertinent Physical Exam At Time of Discharge  Physical Exam  Constitutional:       Appearance: Normal appearance.   HENT:      Head: Normocephalic.      Mouth/Throat:      Mouth: Mucous membranes are moist.   Eyes:      Pupils: Pupils are equal, round, and reactive to light.   Cardiovascular:      Rate and Rhythm: Normal rate and regular rhythm.      Pulses: Normal pulses.      Heart sounds: Normal heart sounds.   Pulmonary:      Effort: Pulmonary effort is normal.      Breath sounds: Normal breath sounds.   Abdominal:      General: Abdomen is flat. Bowel sounds are normal.      Palpations: Abdomen is soft.   Musculoskeletal:         General: Normal range of motion.      Cervical back: Normal range of motion and neck supple.   Skin:     General: Skin is warm.   Neurological:      General: No focal deficit present.      Mental  Status: She is alert.   Psychiatric:         Mood and Affect: Mood normal.         Home Medications     Medication List      START taking these medications     potassium chloride ER 10 mEq ER capsule; Commonly known as: Micro-K;   Take 1 capsule (10 mEq) by mouth once daily. Do not crush or chew.     CONTINUE taking these medications     folic acid 1 mg tablet; Commonly known as: Folvite; Take 1 tablet (1 mg)   by mouth once daily.   ibuprofen 200 mg tablet; Take 2 tablets (400 mg) by mouth every 6 hours   if needed for mild pain (1 - 3) or moderate pain (4 - 6).   oxyCODONE 10 mg immediate release tablet; Commonly known as: Roxicodone   polyethylene glycol 17 gram packet; Commonly known as: Glycolax, Miralax   sennosides-docusate sodium 8.6-50 mg tablet; Commonly known as: Senna-S;   Take 1 tablet by mouth once daily.     ASK your doctor about these medications     hydroxyurea 500 mg capsule; Commonly known as: Hydrea; Take 1 capsule   (500 mg total) by mouth 2 times a day.       Outpatient Follow-Up  Future Appointments   Date Time Provider Department Center   10/2/2024  9:30 AM Magnus Dumas MD [id number]       Carolina Gill PA-C

## 2024-09-25 NOTE — PROGRESS NOTES
Work Documentation    Documentation Type: Short Term Disability  Date Received:9/23/24  Individual Requesting Documentation: Patient  Completed By: Magnus Dumas MD   Documentation submitted to : [email or web address] via email ([email or web address] ) on 09/24/24  Additional Information: (Employee ID: [id number]) for Leave ID#: [id number].        Documentation scanned into EMR. Pt updated. SW will continue to follow as needed.

## 2024-09-25 NOTE — NURSING NOTE
Ruperto Pang discharged at 2:18 PM  and 09/25/24   Patient discharged home via lyft .  Discharge education and teaching completed with Ruperto Pang.  RN signature: Shante Jimenez

## 2024-09-25 NOTE — PROGRESS NOTES
Work Documentation    Documentation Type: FLMA   Date Received:9/24/24   Individual Requesting Documentation: patient  Completed By: Brook Harris CNP  Documentation submitted to : [employer] via fax ([phone]) on 09/25/24  Additional Information: n/a      Documentation scanned into EMR. Pt updated. SW will continue to follow as needed.

## 2024-10-02 ENCOUNTER — OFFICE VISIT (OUTPATIENT)
Dept: HEMATOLOGY/ONCOLOGY | Facility: HOSPITAL | Age: 24
End: 2024-10-02
Payer: COMMERCIAL

## 2024-10-02 ENCOUNTER — LAB (OUTPATIENT)
Dept: LAB | Facility: HOSPITAL | Age: 24
End: 2024-10-02
Payer: COMMERCIAL

## 2024-10-02 ENCOUNTER — SOCIAL WORK (OUTPATIENT)
Dept: HEMATOLOGY/ONCOLOGY | Facility: HOSPITAL | Age: 24
End: 2024-10-02
Payer: COMMERCIAL

## 2024-10-02 VITALS
TEMPERATURE: 96.4 F | BODY MASS INDEX: 27.73 KG/M2 | WEIGHT: 142 LBS | DIASTOLIC BLOOD PRESSURE: 77 MMHG | HEART RATE: 90 BPM | OXYGEN SATURATION: 98 % | SYSTOLIC BLOOD PRESSURE: 113 MMHG

## 2024-10-02 DIAGNOSIS — D57.20 SICKLE CELL DISEASE, TYPE SC, WITHOUT CRISIS (MULTI): ICD-10-CM

## 2024-10-02 DIAGNOSIS — D57.00 SICKLE CELL CRISIS (MULTI): ICD-10-CM

## 2024-10-02 DIAGNOSIS — E83.19 IRON OVERLOAD: ICD-10-CM

## 2024-10-02 DIAGNOSIS — G47.34 NOCTURNAL HYPOXIA: Primary | ICD-10-CM

## 2024-10-02 DIAGNOSIS — T80.911D DELAYED HEMOLYTIC TRANSFUSION REACTION, SUBSEQUENT ENCOUNTER: ICD-10-CM

## 2024-10-02 DIAGNOSIS — K59.00 CONSTIPATION, UNSPECIFIED CONSTIPATION TYPE: ICD-10-CM

## 2024-10-02 LAB
ABO GROUP (TYPE) IN BLOOD: NORMAL
ALBUMIN SERPL BCP-MCNC: 4.5 G/DL (ref 3.4–5)
ALP SERPL-CCNC: 80 U/L (ref 33–110)
ALT SERPL W P-5'-P-CCNC: 21 U/L (ref 7–45)
ANION GAP SERPL CALC-SCNC: 16 MMOL/L (ref 10–20)
ANTIBODY SCREEN: NORMAL
AST SERPL W P-5'-P-CCNC: 22 U/L (ref 9–39)
BASOPHILS # BLD AUTO: 0.15 X10*3/UL (ref 0–0.1)
BASOPHILS NFR BLD AUTO: 2.1 %
BILIRUB SERPL-MCNC: 3.4 MG/DL (ref 0–1.2)
BUN SERPL-MCNC: 8 MG/DL (ref 6–23)
CALCIUM SERPL-MCNC: 8.7 MG/DL (ref 8.6–10.3)
CHLORIDE SERPL-SCNC: 104 MMOL/L (ref 98–107)
CO2 SERPL-SCNC: 24 MMOL/L (ref 21–32)
CREAT SERPL-MCNC: 0.49 MG/DL (ref 0.5–1.05)
EGFRCR SERPLBLD CKD-EPI 2021: >90 ML/MIN/1.73M*2
EOSINOPHIL # BLD AUTO: 0.09 X10*3/UL (ref 0–0.7)
EOSINOPHIL NFR BLD AUTO: 1.2 %
ERYTHROCYTE [DISTWIDTH] IN BLOOD BY AUTOMATED COUNT: 19.9 % (ref 11.5–14.5)
FERRITIN SERPL-MCNC: 1802 NG/ML (ref 8–150)
GLUCOSE SERPL-MCNC: 85 MG/DL (ref 74–99)
HCT VFR BLD AUTO: 20.3 % (ref 36–46)
HGB BLD-MCNC: 6.6 G/DL (ref 12–16)
HGB RETIC QN: 29 PG (ref 28–38)
IMM GRANULOCYTES # BLD AUTO: 0.03 X10*3/UL (ref 0–0.7)
IMM GRANULOCYTES NFR BLD AUTO: 0.4 % (ref 0–0.9)
IMMATURE RETIC FRACTION: 6.9 %
LDH SERPL L TO P-CCNC: 250 U/L (ref 84–246)
LYMPHOCYTES # BLD AUTO: 3.03 X10*3/UL (ref 1.2–4.8)
LYMPHOCYTES NFR BLD AUTO: 41.6 %
MCH RBC QN AUTO: 29.3 PG (ref 26–34)
MCHC RBC AUTO-ENTMCNC: 32.5 G/DL (ref 32–36)
MCV RBC AUTO: 90 FL (ref 80–100)
MONOCYTES # BLD AUTO: 0.71 X10*3/UL (ref 0.1–1)
MONOCYTES NFR BLD AUTO: 9.7 %
NEUTROPHILS # BLD AUTO: 3.28 X10*3/UL (ref 1.2–7.7)
NEUTROPHILS NFR BLD AUTO: 45 %
NRBC BLD-RTO: 0 /100 WBCS (ref 0–0)
PLATELET # BLD AUTO: 557 X10*3/UL (ref 150–450)
POTASSIUM SERPL-SCNC: 3.4 MMOL/L (ref 3.5–5.3)
PROT SERPL-MCNC: 8.5 G/DL (ref 6.4–8.2)
RBC # BLD AUTO: 2.25 X10*6/UL (ref 4–5.2)
RETICS #: 0.07 X10*6/UL (ref 0.02–0.08)
RETICS/RBC NFR AUTO: 3 % (ref 0.5–2)
RH FACTOR (ANTIGEN D): NORMAL
SODIUM SERPL-SCNC: 141 MMOL/L (ref 136–145)
WBC # BLD AUTO: 7.3 X10*3/UL (ref 4.4–11.3)

## 2024-10-02 PROCEDURE — 99214 OFFICE O/P EST MOD 30 MIN: CPT | Performed by: PEDIATRICS

## 2024-10-02 PROCEDURE — 85025 COMPLETE CBC W/AUTO DIFF WBC: CPT

## 2024-10-02 PROCEDURE — 86901 BLOOD TYPING SEROLOGIC RH(D): CPT

## 2024-10-02 PROCEDURE — 82728 ASSAY OF FERRITIN: CPT

## 2024-10-02 PROCEDURE — 83615 LACTATE (LD) (LDH) ENZYME: CPT

## 2024-10-02 PROCEDURE — 85045 AUTOMATED RETICULOCYTE COUNT: CPT

## 2024-10-02 PROCEDURE — 80053 COMPREHEN METABOLIC PANEL: CPT

## 2024-10-02 PROCEDURE — 36415 COLL VENOUS BLD VENIPUNCTURE: CPT

## 2024-10-02 RX ORDER — HYDROXYUREA 500 MG/1
1000 CAPSULE ORAL DAILY
Qty: 60 CAPSULE | Refills: 3 | Status: SHIPPED | OUTPATIENT
Start: 2024-10-02 | End: 2024-11-01

## 2024-10-02 RX ORDER — OXYCODONE HYDROCHLORIDE 10 MG/1
10 TABLET ORAL EVERY 6 HOURS PRN
Qty: 15 TABLET | Refills: 0 | Status: SHIPPED | OUTPATIENT
Start: 2024-10-02 | End: 2024-10-09

## 2024-10-02 ASSESSMENT — PAIN SCALES - GENERAL: PAINLEVEL: 0-NO PAIN

## 2024-10-02 NOTE — PROGRESS NOTES
Work Documentation    Documentation Type: STD Attending Provider Form  Date Received:9/30/2024  Individual Requesting Documentation: Patient  Completed By: Magnus Dumas MD   Documentation submitted to : [facility] via fax ([phone]) on 10/02/24  Additional Information: Leave ID: [id number]      Documentation scanned into EMR. Pt updated. SW will continue to follow as needed.

## 2024-10-03 NOTE — PROGRESS NOTES
SICKLE CELL OUTPATIENT NOTE  Patient ID: Ruperto Pang   Visit Type: Follow up visit     ASSESSMENT AND PLAN  Ruperto Pang is 24 y.o. female with     History of Hb SS Sickle cell disease without acute sickle cell pain   Encounter for management of disease modifying therapy. 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 She is S/P IVIG and steroids and her Hb is up to 6.6 g/dl   Nocturnal hypoxia on supplemental night time oxygen, but has not been compliant with this   Iron overload secondary to blood transfusions with a ferritin of 1802 ng/mL. She is not on any chelation therapy   Microalbuminuria   Chronic constipation     PLAN   - No changes in her current pain regimen which will be with    Oral Tylenol 650 mg every 6 hours as needed and or ibuprofen 400 mg every 8 hours as needed for mild to moderate pain   Oral oxycodone 56 mg every 6 hours as needed for moderate, severe and breakthrough pain. Given a new prescription today  Non pharmacologic methods of pain control   Reviewed O[facility] and I do not have any concerns   - We will resume hydroxyurea 1000 mg daily. Unfortunately Oxbryta, which we had discussed at her last office visit encounter has been discontinued by manufacture   - Daily folic acid 1 mg   - She is interested in pursuing curative or transformative treatment and we will send for HLA typing. Her older Brother Orlando Pang  1996 has already had HLA typing done   - Supplemental night time oxygen 1-2 L as needed   - Repeat urine microalbumin at her next office visit encounter   - Repeat night time oximetry testing   - Senna and miralax as needed for constipation   - Hold off starting chelation therapy and follow ferritin levels closely   - Follow up will be in 1 months time     Chief Complaint: Hospital follow up for HB SS SCD      Interval History: Ruperto is a 24 year old female with history of HB SS SCD. She was discharged from hospital  on 9/25 following admission to hospital for sickle cell pain which was complicated by severe anemia secondary to DHTR and post transfusion of PRBC X 3. She received IVIG and steroids and her HB has steadily been increasing. She has been doing well since discharge home. She is without acute sickle cell pain but has muscle aches of about 3/10. Her energy continues to improve and she is back to work.   She has not been taking hydroxyurea and is interested in restarting. She has also not been compliant with supplemental oxygen. She denies fevers, cough, runny nose or congestion. Her urine is starting to clear up and she denies any urinary symptoms. She denies focal neurologic deficits, leg ulcers or peripheral edema.   Review of System:   Review of Systems   Constitutional:  Positive for fatigue. Negative for chills and fever.   HENT:   Negative for nosebleeds and sore throat.    Cardiovascular:  Negative for chest pain, leg swelling and palpitations.   Gastrointestinal:  Positive for constipation. Negative for abdominal pain, diarrhea, nausea and vomiting.   Genitourinary:  Negative for hematuria.    Musculoskeletal:  Positive for myalgias. Negative for arthralgias, back pain, gait problem and neck pain.   Skin:  Negative for itching, rash and wound.   Neurological:  Negative for dizziness, extremity weakness, gait problem, headaches, light-headedness and speech difficulty.   Hematological:  Does not bruise/bleed easily.   Psychiatric/Behavioral:  Negative for depression. The patient is not nervous/anxious.         Allergies:   Allergies   Allergen Reactions    Iodinated Contrast Media Hives       Current Medications:   Outpatient Encounter Medications as of 10/2/2024   Medication Sig Dispense Refill    folic acid (Folvite) 1 mg tablet Take 1 tablet (1 mg) by mouth once daily. 60 tablet 1    ibuprofen 200 mg tablet Take 2 tablets (400 mg) by mouth every 6 hours if needed for mild pain (1 - 3) or moderate pain (4 - 6).  60 tablet 2    polyethylene glycol (Glycolax, Miralax) 17 gram packet Take 17 g by mouth if needed.      potassium chloride ER (Micro-K) 10 mEq ER capsule Take 1 capsule (10 mEq) by mouth once daily. Do not crush or chew. 30 capsule 0    sennosides-docusate sodium (Senna-S) 8.6-50 mg tablet Take 1 tablet by mouth once daily. 30 tablet 2    [DISCONTINUED] hydroxyurea (Hydrea) 500 mg capsule Take 1 capsule (500 mg total) by mouth 2 times a day. 60 capsule 2    [DISCONTINUED] oxyCODONE (Roxicodone) 10 mg immediate release tablet Take 1 tablet (10 mg) by mouth every 6 hours if needed for severe pain (7 - 10) for up to 7 days. 10 tablet 0    hydroxyurea (Hydrea) 500 mg capsule Take 2 capsules (1,000 mg total) by mouth once daily. 60 capsule 3    oxyCODONE (Roxicodone) 10 mg immediate release tablet Take 1 tablet (10 mg) by mouth every 6 hours if needed for severe pain (7 - 10) for up to 7 days. 15 tablet 0     No facility-administered encounter medications on file as of 10/2/2024.       Past Medical History:    has a past medical history of Encounter for contraceptive management, unspecified (10/14/2016), Encounter for screening for disorder due to exposure to contaminants, Hb-SS disease with acute chest syndrome (Multi) (12/14/2016), and Personal history of diseases of the blood and blood-forming organs and certain disorders involving the immune mechanism (03/02/2022).     Hb SS disease, intermittently treated with hydroxyurea   History of miscarriage 1/2020   Chronic pain   COVID-19 infection, 12/2020   History of Acute Chest Syndrome   COVID-19 infection 12/2020    Past Surgical History:    has no past surgical history on file.    Family History:   Family History   Problem Relation Name Age of Onset    Sickle cell anemia Sister         Social History:   Ruperto Pang  reports that she has never smoked. She has never used smokeless tobacco.  reports no history of drug use.    EXAMINATION FINDINGS   /77 (BP  Location: Left arm, Patient Position: Sitting, BP Cuff Size: Adult)   Pulse 90   Temp 35.8 °C (96.4 °F) (Temporal)   Wt 64.4 kg (142 lb)   SpO2 98%   BMI 27.73 kg/m²   1.65 meters squared    Physical Exam  Vitals and nursing note reviewed.   Constitutional:       Appearance: Normal appearance.   HENT:      Nose: Nose normal.      Mouth/Throat:      Mouth: Mucous membranes are moist.      Pharynx: Oropharynx is clear.   Eyes:      General: Scleral icterus present.      Extraocular Movements: Extraocular movements intact.      Pupils: Pupils are equal, round, and reactive to light.   Cardiovascular:      Rate and Rhythm: Normal rate and regular rhythm.      Pulses: Normal pulses.      Heart sounds: Murmur heard.      No gallop.   Pulmonary:      Effort: Pulmonary effort is normal. No respiratory distress.      Breath sounds: Normal breath sounds. No wheezing or rales.   Abdominal:      General: Abdomen is flat. Bowel sounds are normal. There is no distension.      Tenderness: There is no guarding.   Musculoskeletal:         General: No swelling.      Cervical back: Normal range of motion and neck supple.      Right lower leg: No edema.      Left lower leg: No edema.   Skin:     General: Skin is warm.      Capillary Refill: Capillary refill takes less than 2 seconds.      Coloration: Skin is not jaundiced.      Findings: No lesion or rash.   Neurological:      General: No focal deficit present.      Mental Status: She is alert and oriented to person, place, and time.      Cranial Nerves: No cranial nerve deficit.      Motor: No weakness.      Gait: Gait normal.   Psychiatric:         Mood and Affect: Mood normal.         Behavior: Behavior normal.       LABS   Lab on 10/02/2024   Component Date Value Ref Range Status    WBC 10/02/2024 7.3  4.4 - 11.3 x10*3/uL Final    nRBC 10/02/2024 0.0  0.0 - 0.0 /100 WBCs Final    RBC 10/02/2024 2.25 (L)  4.00 - 5.20 x10*6/uL Final    Hemoglobin 10/02/2024 6.6 (L)  12.0 - 16.0  g/dL Final    Hematocrit 10/02/2024 20.3 (L)  36.0 - 46.0 % Final    MCV 10/02/2024 90  80 - 100 fL Final    MCH 10/02/2024 29.3  26.0 - 34.0 pg Final    MCHC 10/02/2024 32.5  32.0 - 36.0 g/dL Final    RDW 10/02/2024 19.9 (H)  11.5 - 14.5 % Final    Platelets 10/02/2024 557 (H)  150 - 450 x10*3/uL Final    Neutrophils % 10/02/2024 45.0  40.0 - 80.0 % Final    Immature Granulocytes %, Automated 10/02/2024 0.4  0.0 - 0.9 % Final    Immature Granulocyte Count (IG) includes promyelocytes, myelocytes and metamyelocytes but does not include bands. Percent differential counts (%) should be interpreted in the context of the absolute cell counts (cells/UL).    Lymphocytes % 10/02/2024 41.6  13.0 - 44.0 % Final    Monocytes % 10/02/2024 9.7  2.0 - 10.0 % Final    Eosinophils % 10/02/2024 1.2  0.0 - 6.0 % Final    Basophils % 10/02/2024 2.1  0.0 - 2.0 % Final    Neutrophils Absolute 10/02/2024 3.28  1.20 - 7.70 x10*3/uL Final    Percent differential counts (%) should be interpreted in the context of the absolute cell counts (cells/uL).    Immature Granulocytes Absolute, Au* 10/02/2024 0.03  0.00 - 0.70 x10*3/uL Final    Lymphocytes Absolute 10/02/2024 3.03  1.20 - 4.80 x10*3/uL Final    Monocytes Absolute 10/02/2024 0.71  0.10 - 1.00 x10*3/uL Final    Eosinophils Absolute 10/02/2024 0.09  0.00 - 0.70 x10*3/uL Final    Basophils Absolute 10/02/2024 0.15 (H)  0.00 - 0.10 x10*3/uL Final    Glucose 10/02/2024 85  74 - 99 mg/dL Final    Sodium 10/02/2024 141  136 - 145 mmol/L Final    Potassium 10/02/2024 3.4 (L)  3.5 - 5.3 mmol/L Final    Chloride 10/02/2024 104  98 - 107 mmol/L Final    Bicarbonate 10/02/2024 24  21 - 32 mmol/L Final    Anion Gap 10/02/2024 16  10 - 20 mmol/L Final    Urea Nitrogen 10/02/2024 8  6 - 23 mg/dL Final    Creatinine 10/02/2024 0.49 (L)  0.50 - 1.05 mg/dL Final    eGFR 10/02/2024 >90  >60 mL/min/1.73m*2 Final    Calculations of estimated GFR are performed using the 2021 CKD-EPI Study Refit equation  "without the race variable for the IDMS-Traceable creatinine methods.  https://jasn.asnjournals.org/content/early/2021/09/22/ASN.1953824321    Calcium 10/02/2024 8.7  8.6 - 10.3 mg/dL Final    Albumin 10/02/2024 4.5  3.4 - 5.0 g/dL Final    Alkaline Phosphatase 10/02/2024 80  33 - 110 U/L Final    Total Protein 10/02/2024 8.5 (H)  6.4 - 8.2 g/dL Final    AST 10/02/2024 22  9 - 39 U/L Final    Bilirubin, Total 10/02/2024 3.4 (H)  0.0 - 1.2 mg/dL Final    ALT 10/02/2024 21  7 - 45 U/L Final    Patients treated with Sulfasalazine may generate falsely decreased results for ALT.    Ferritin 10/02/2024 1,802 (H)  8 - 150 ng/mL Final    LDH 10/02/2024 250 (H)  84 - 246 U/L Final    Retic % 10/02/2024 3.0 (H)  0.5 - 2.0 % Final    Retic Absolute 10/02/2024 0.068  0.018 - 0.083 x10*6/uL Final    Reticulocyte Hemoglobin 10/02/2024 29  28 - 38 pg Final    Immature Retic fraction 10/02/2024 6.9  <=16.0 % Final    Reticulocytes are measured based on a fluorescent technique. The IRF, or immature reticulocyte fraction, is the percent of reticulocytes that show medium (MFR) or high (HFR) fluorescence.  This value can be used to assess the relative maturity of the reticulocyte population in response to anemia. The \"shift reticulocytes\" are not measured by this technique, eliminating the need for their correction in the reticulocyte index.    ABO TYPE 10/02/2024 B   Final    Rh TYPE 10/02/2024 POS   Final    ANTIBODY SCREEN 10/02/2024 NEG   Final                 Magnus Dumas MD                          "

## 2024-10-04 ASSESSMENT — ENCOUNTER SYMPTOMS
HEMATURIA: 0
ABDOMINAL PAIN: 0
LIGHT-HEADEDNESS: 0
DIZZINESS: 0
WOUND: 0
BACK PAIN: 0
DEPRESSION: 0
DIARRHEA: 0
PALPITATIONS: 0
BRUISES/BLEEDS EASILY: 0
ARTHRALGIAS: 0
CHILLS: 0
FEVER: 0
MYALGIAS: 1
FATIGUE: 1
LEG SWELLING: 0
VOMITING: 0
HEADACHES: 0
SORE THROAT: 0
EXTREMITY WEAKNESS: 0
SPEECH DIFFICULTY: 0
CONSTIPATION: 1
NAUSEA: 0
NERVOUS/ANXIOUS: 0
NECK PAIN: 0

## 2024-10-25 DIAGNOSIS — D57.00 SICKLE CELL CRISIS (MULTI): Primary | ICD-10-CM

## 2024-10-25 RX ORDER — OXYCODONE HYDROCHLORIDE 10 MG/1
10 TABLET ORAL EVERY 6 HOURS PRN
Qty: 15 TABLET | Refills: 0 | Status: SHIPPED | OUTPATIENT
Start: 2024-10-25 | End: 2024-11-01

## 2024-11-04 ENCOUNTER — OFFICE VISIT (OUTPATIENT)
Dept: HEMATOLOGY/ONCOLOGY | Facility: HOSPITAL | Age: 24
End: 2024-11-04
Payer: COMMERCIAL

## 2024-11-04 ENCOUNTER — DOCUMENTATION (OUTPATIENT)
Dept: HEMATOLOGY/ONCOLOGY | Facility: HOSPITAL | Age: 24
End: 2024-11-04
Payer: COMMERCIAL

## 2024-11-04 ENCOUNTER — LAB (OUTPATIENT)
Dept: LAB | Facility: HOSPITAL | Age: 24
End: 2024-11-04
Payer: COMMERCIAL

## 2024-11-04 VITALS
TEMPERATURE: 98.8 F | HEART RATE: 96 BPM | WEIGHT: 142.64 LBS | OXYGEN SATURATION: 94 % | BODY MASS INDEX: 27.86 KG/M2 | DIASTOLIC BLOOD PRESSURE: 59 MMHG | RESPIRATION RATE: 16 BRPM | SYSTOLIC BLOOD PRESSURE: 118 MMHG

## 2024-11-04 DIAGNOSIS — J01.01 ACUTE RECURRENT MAXILLARY SINUSITIS: Primary | ICD-10-CM

## 2024-11-04 DIAGNOSIS — Z79.64 ENCOUNTER FOR MONITORING OF HYDROXYUREA THERAPY: ICD-10-CM

## 2024-11-04 DIAGNOSIS — Z51.81 ENCOUNTER FOR MONITORING OF HYDROXYUREA THERAPY: ICD-10-CM

## 2024-11-04 DIAGNOSIS — D57.20 SICKLE CELL DISEASE, TYPE SC, WITHOUT CRISIS (MULTI): ICD-10-CM

## 2024-11-04 DIAGNOSIS — D57.00 SICKLE CELL CRISIS (MULTI): ICD-10-CM

## 2024-11-04 DIAGNOSIS — D57.00 HB-SS DISEASE WITH CRISIS: ICD-10-CM

## 2024-11-04 DIAGNOSIS — G47.34 NOCTURNAL HYPOXIA: ICD-10-CM

## 2024-11-04 LAB
ALBUMIN SERPL BCP-MCNC: 4.7 G/DL (ref 3.4–5)
ALP SERPL-CCNC: 89 U/L (ref 33–110)
ALT SERPL W P-5'-P-CCNC: 18 U/L (ref 7–45)
ANION GAP SERPL CALC-SCNC: 12 MMOL/L (ref 10–20)
AST SERPL W P-5'-P-CCNC: 23 U/L (ref 9–39)
BASOPHILS # BLD AUTO: 0.12 X10*3/UL (ref 0–0.1)
BASOPHILS NFR BLD AUTO: 0.6 %
BILIRUB SERPL-MCNC: 6.2 MG/DL (ref 0–1.2)
BUN SERPL-MCNC: 6 MG/DL (ref 6–23)
CALCIUM SERPL-MCNC: 9.4 MG/DL (ref 8.6–10.3)
CHLORIDE SERPL-SCNC: 105 MMOL/L (ref 98–107)
CO2 SERPL-SCNC: 25 MMOL/L (ref 21–32)
CREAT SERPL-MCNC: 0.43 MG/DL (ref 0.5–1.05)
EGFRCR SERPLBLD CKD-EPI 2021: >90 ML/MIN/1.73M*2
EOSINOPHIL # BLD AUTO: 0.11 X10*3/UL (ref 0–0.7)
EOSINOPHIL NFR BLD AUTO: 0.6 %
ERYTHROCYTE [DISTWIDTH] IN BLOOD BY AUTOMATED COUNT: 18.6 % (ref 11.5–14.5)
GLUCOSE SERPL-MCNC: 90 MG/DL (ref 74–99)
HCT VFR BLD AUTO: 17.2 % (ref 36–46)
HGB BLD-MCNC: 5.9 G/DL (ref 12–16)
HGB RETIC QN: 29 PG (ref 28–38)
IMM GRANULOCYTES # BLD AUTO: 0.1 X10*3/UL (ref 0–0.7)
IMM GRANULOCYTES NFR BLD AUTO: 0.5 % (ref 0–0.9)
IMMATURE RETIC FRACTION: 26 %
LDH SERPL L TO P-CCNC: 230 U/L (ref 84–246)
LYMPHOCYTES # BLD AUTO: 3.72 X10*3/UL (ref 1.2–4.8)
LYMPHOCYTES NFR BLD AUTO: 19.7 %
MCH RBC QN AUTO: 28.9 PG (ref 26–34)
MCHC RBC AUTO-ENTMCNC: 34.3 G/DL (ref 32–36)
MCV RBC AUTO: 84 FL (ref 80–100)
MONOCYTES # BLD AUTO: 0.65 X10*3/UL (ref 0.1–1)
MONOCYTES NFR BLD AUTO: 3.4 %
NEUTROPHILS # BLD AUTO: 14.18 X10*3/UL (ref 1.2–7.7)
NEUTROPHILS NFR BLD AUTO: 75.2 %
NRBC BLD-RTO: 0.8 /100 WBCS (ref 0–0)
PLATELET # BLD AUTO: 587 X10*3/UL (ref 150–450)
POTASSIUM SERPL-SCNC: 3.4 MMOL/L (ref 3.5–5.3)
PROT SERPL-MCNC: 8.1 G/DL (ref 6.4–8.2)
RBC # BLD AUTO: 2.04 X10*6/UL (ref 4–5.2)
RETICS #: 0.47 X10*6/UL (ref 0.02–0.08)
RETICS/RBC NFR AUTO: 23.2 % (ref 0.5–2)
SODIUM SERPL-SCNC: 139 MMOL/L (ref 136–145)
WBC # BLD AUTO: 18.9 X10*3/UL (ref 4.4–11.3)

## 2024-11-04 PROCEDURE — 85045 AUTOMATED RETICULOCYTE COUNT: CPT

## 2024-11-04 PROCEDURE — 86832 HLA CLASS I HIGH DEFIN QUAL: CPT

## 2024-11-04 PROCEDURE — 81379 HLA I TYPING COMPLETE HR: CPT

## 2024-11-04 PROCEDURE — 99215 OFFICE O/P EST HI 40 MIN: CPT | Performed by: PEDIATRICS

## 2024-11-04 PROCEDURE — 83615 LACTATE (LD) (LDH) ENZYME: CPT

## 2024-11-04 PROCEDURE — 85025 COMPLETE CBC W/AUTO DIFF WBC: CPT

## 2024-11-04 PROCEDURE — G2211 COMPLEX E/M VISIT ADD ON: HCPCS | Performed by: PEDIATRICS

## 2024-11-04 PROCEDURE — 36415 COLL VENOUS BLD VENIPUNCTURE: CPT

## 2024-11-04 PROCEDURE — 80053 COMPREHEN METABOLIC PANEL: CPT

## 2024-11-04 RX ORDER — ACETAMINOPHEN 325 MG/1
650 TABLET ORAL EVERY 6 HOURS PRN
Qty: 30 TABLET | Refills: 0 | Status: SHIPPED | OUTPATIENT
Start: 2024-11-04 | End: 2024-11-14

## 2024-11-04 RX ORDER — IBUPROFEN 200 MG
400 TABLET ORAL EVERY 6 HOURS PRN
Qty: 60 TABLET | Refills: 2 | Status: SHIPPED | OUTPATIENT
Start: 2024-11-04

## 2024-11-04 RX ORDER — HYDROXYUREA 500 MG/1
1000 CAPSULE ORAL DAILY
Qty: 60 CAPSULE | Refills: 3 | Status: SHIPPED | OUTPATIENT
Start: 2024-11-04 | End: 2024-12-04

## 2024-11-04 RX ORDER — AMOXICILLIN AND CLAVULANATE POTASSIUM 875; 125 MG/1; MG/1
875 TABLET, FILM COATED ORAL 2 TIMES DAILY
Qty: 14 TABLET | Refills: 0 | Status: SHIPPED | OUTPATIENT
Start: 2024-11-04 | End: 2024-11-11

## 2024-11-04 RX ORDER — OXYCODONE HYDROCHLORIDE 10 MG/1
10 TABLET ORAL EVERY 6 HOURS PRN
Qty: 20 TABLET | Refills: 0 | Status: SHIPPED | OUTPATIENT
Start: 2024-11-04 | End: 2024-11-11

## 2024-11-04 ASSESSMENT — COLUMBIA-SUICIDE SEVERITY RATING SCALE - C-SSRS
2. HAVE YOU ACTUALLY HAD ANY THOUGHTS OF KILLING YOURSELF?: NO
6. HAVE YOU EVER DONE ANYTHING, STARTED TO DO ANYTHING, OR PREPARED TO DO ANYTHING TO END YOUR LIFE?: NO
1. IN THE PAST MONTH, HAVE YOU WISHED YOU WERE DEAD OR WISHED YOU COULD GO TO SLEEP AND NOT WAKE UP?: NO

## 2024-11-04 ASSESSMENT — PATIENT HEALTH QUESTIONNAIRE - PHQ9
SUM OF ALL RESPONSES TO PHQ9 QUESTIONS 1 AND 2: 0
1. LITTLE INTEREST OR PLEASURE IN DOING THINGS: NOT AT ALL
2. FEELING DOWN, DEPRESSED OR HOPELESS: NOT AT ALL

## 2024-11-04 ASSESSMENT — PAIN SCALES - GENERAL: PAINLEVEL_OUTOF10: 7

## 2024-11-04 NOTE — PROGRESS NOTES
Notified by Ibis in [facility] that HGB 5.9. Secure Chat sent to Dr. Dumas and RN Partner.

## 2024-11-05 ENCOUNTER — SOCIAL WORK (OUTPATIENT)
Dept: HEMATOLOGY/ONCOLOGY | Facility: HOSPITAL | Age: 24
End: 2024-11-05
Payer: COMMERCIAL

## 2024-11-05 LAB
HLA RESULTS: NORMAL
HLA-A+B+C AB NFR SER: NORMAL %
HLA-DP+DQ+DR AB NFR SER: NORMAL %

## 2024-11-05 ASSESSMENT — ENCOUNTER SYMPTOMS
CONSTIPATION: 1
NECK PAIN: 0
LIGHT-HEADEDNESS: 0
EXTREMITY WEAKNESS: 0
ABDOMINAL PAIN: 0
HEADACHES: 0
SPEECH DIFFICULTY: 0
DEPRESSION: 0
BRUISES/BLEEDS EASILY: 0
FATIGUE: 1
WOUND: 0
LEG SWELLING: 0
FEVER: 0
NERVOUS/ANXIOUS: 0
ARTHRALGIAS: 0
NAUSEA: 0
SORE THROAT: 0
DIARRHEA: 0
PALPITATIONS: 0
CHILLS: 0
MYALGIAS: 1
DIZZINESS: 0
HEMATURIA: 0
VOMITING: 0
BACK PAIN: 0

## 2024-11-05 NOTE — PROGRESS NOTES
SICKLE CELL OUTPATIENT NOTE  Patient ID: Ruperto Pang   Visit Type: Follow up visit     ASSESSMENT AND PLAN  Ruperto aPng is 24 y.o. female with     History of Hb SS Sickle cell disease with acute sickle cell pain   URI and acute sinusitis   Encounter for management of disease modifying therapy with  hydroxyurea   Severe anemia with HB of 5.9 g/dl with appropriate reticulocytosis. She has a recent history of DHTR with no identifiable antibodies and S/P IVIG and steroids. She is asymptomatic of anemia except for mild fatigue   Nocturnal hypoxia on supplemental night time oxygen and remains non compliant with this    Iron overload secondary to blood transfusions, not on chelation therapy   Chronic constipation     PLAN   - Start a course of Augmentin for 7 days for acute sinusitis   - Continue hydroxyurea 1000 mg daily and will hold off dose increase for now because of ongoing sinusitis   - No changes in her current pain regimen which will be with    Oral Tylenol 650 mg every 6 hours as needed and or ibuprofen 400 mg every 8 hours as needed for mild to moderate pain   Oral oxycodone 10 mg every 6 hours as needed for moderate, severe and breakthrough pain  Non pharmacologic methods of pain control   Reviewed OARRS and I do not have any concerns   - Daily folic acid 1 mg   - Follow up on pending HLA testing for BMT consideration from her brother Orlando Pang  1996 has already had HLA typing done   - Supplemental night time oxygen 1-2 L as needed   - Repeat urine microalbumin at her next office visit encounter   - Repeat night time oximetry testing   - Senna and miralax as needed for constipation   - Hold off starting chelation therapy and follow ferritin levels closely   - Follow up will be in 1 months time     Chief Complaint: Hospital follow up for HB SS SCD      Interval History: Ruperto is a 24 year old female with history of HB SS SCD. She was discharged from hospital on 24, following admission  to hospital for sickle cell pain which was complicated by severe anemia secondary to DHTR and post transfusion of PRBC X 3. She received IVIG and steroids and her HB has steadily been increasing. She has been doing well since discharge home. She is without acute sickle cell pain but has muscle aches of about 3/10. Her energy continues to improve and she is back to work.   She has not been taking hydroxyurea and is interested in restarting. She has also not been compliant with supplemental oxygen. She denies fevers, but is coming down with a cough, runny nose and congestion and is worried about a flare up of her acute sinusitis. Her urine is starting to clear up and she denies any urinary symptoms. She denies focal neurologic deficits, leg ulcers or peripheral edema.     Review of System:   Review of Systems   Constitutional:  Positive for fatigue. Negative for chills and fever.   HENT:   Negative for nosebleeds and sore throat.    Respiratory:  Positive for cough. Negative for chest tightness, shortness of breath and wheezing.    Cardiovascular:  Negative for chest pain, leg swelling and palpitations.   Gastrointestinal:  Positive for constipation. Negative for abdominal pain, diarrhea, nausea and vomiting.   Genitourinary:  Negative for hematuria.    Musculoskeletal:  Positive for myalgias. Negative for arthralgias, back pain, gait problem and neck pain.   Skin:  Negative for itching, rash and wound.   Neurological:  Negative for dizziness, extremity weakness, gait problem, headaches, light-headedness and speech difficulty.   Hematological:  Does not bruise/bleed easily.   Psychiatric/Behavioral:  Negative for depression. The patient is not nervous/anxious.       Allergies:   Allergies   Allergen Reactions    Iodinated Contrast Media Hives     Current Medications:   Outpatient Encounter Medications as of 11/4/2024   Medication Sig Dispense Refill    acetaminophen (Tylenol) 325 mg tablet Take 2 tablets (650 mg) by  mouth every 6 hours if needed for mild pain (1 - 3) for up to 10 days. 30 tablet 0    amoxicillin-pot clavulanate (Augmentin) 875-125 mg tablet Take 1 tablet (875 mg) by mouth 2 times a day for 7 days. 14 tablet 0    folic acid (Folvite) 1 mg tablet Take 1 tablet (1 mg) by mouth once daily. 60 tablet 1    hydroxyurea (Hydrea) 500 mg capsule Take 2 capsules (1,000 mg total) by mouth once daily. 60 capsule 3    ibuprofen 200 mg tablet Take 2 tablets (400 mg) by mouth every 6 hours if needed for mild pain (1 - 3) or moderate pain (4 - 6). 60 tablet 2    oxyCODONE (Roxicodone) 10 mg immediate release tablet Take 1 tablet (10 mg) by mouth every 6 hours if needed for severe pain (7 - 10) for up to 7 days. 20 tablet 0    polyethylene glycol (Glycolax, Miralax) 17 gram packet Take 17 g by mouth if needed.      sennosides-docusate sodium (Senna-S) 8.6-50 mg tablet Take 1 tablet by mouth once daily. 30 tablet 2    [DISCONTINUED] hydroxyurea (Hydrea) 500 mg capsule Take 2 capsules (1,000 mg total) by mouth once daily. 60 capsule 3    [DISCONTINUED] ibuprofen 200 mg tablet Take 2 tablets (400 mg) by mouth every 6 hours if needed for mild pain (1 - 3) or moderate pain (4 - 6). 60 tablet 2    [DISCONTINUED] oxyCODONE (Roxicodone) 10 mg immediate release tablet Take 1 tablet (10 mg) by mouth every 6 hours if needed for severe pain (7 - 10) for up to 7 days. 15 tablet 0     No facility-administered encounter medications on file as of 11/4/2024.       Past Medical History:    has a past medical history of Encounter for contraceptive management, unspecified (10/14/2016), Encounter for screening for disorder due to exposure to contaminants, Hb-SS disease with acute chest syndrome (Multi) (12/14/2016), and Personal history of diseases of the blood and blood-forming organs and certain disorders involving the immune mechanism (03/02/2022).     Hb SS disease, intermittently treated with hydroxyurea   History of miscarriage 1/2020    Chronic pain   COVID-19 infection, 12/2020   History of Acute Chest Syndrome   COVID-19 infection 12/2020    Past Surgical History:    has no past surgical history on file.    Family History:   Family History   Problem Relation Name Age of Onset    Sickle cell anemia Sister         Social History:   Ruperto Pang  reports that she has never smoked. She has never used smokeless tobacco.  reports no history of drug use.    EXAMINATION FINDINGS   /59 (BP Location: Left arm, Patient Position: Sitting, BP Cuff Size: Adult)   Pulse 96   Temp 37.1 °C (98.8 °F) (Temporal)   Resp 16   Wt 64.7 kg (142 lb 10.2 oz)   SpO2 94%   BMI 27.86 kg/m²   1.65 meters squared    Physical Exam  Vitals and nursing note reviewed.   Constitutional:       Appearance: Normal appearance.   HENT:      Nose: Congestion and rhinorrhea present.      Mouth/Throat:      Mouth: Mucous membranes are moist.      Pharynx: Oropharynx is clear. No oropharyngeal exudate or posterior oropharyngeal erythema.   Eyes:      General: Scleral icterus present.      Extraocular Movements: Extraocular movements intact.      Pupils: Pupils are equal, round, and reactive to light.   Cardiovascular:      Rate and Rhythm: Normal rate and regular rhythm.      Pulses: Normal pulses.      Heart sounds: Murmur heard.      No gallop.   Pulmonary:      Effort: Pulmonary effort is normal. No respiratory distress.      Breath sounds: Normal breath sounds. No wheezing or rales.   Abdominal:      General: Abdomen is flat. Bowel sounds are normal. There is no distension.      Tenderness: There is no guarding.   Musculoskeletal:         General: No swelling.      Cervical back: Normal range of motion and neck supple.      Right lower leg: No edema.      Left lower leg: No edema.   Skin:     General: Skin is warm.      Capillary Refill: Capillary refill takes less than 2 seconds.      Coloration: Skin is not jaundiced.      Findings: No lesion or rash.   Neurological:       General: No focal deficit present.      Mental Status: She is alert and oriented to person, place, and time.      Cranial Nerves: No cranial nerve deficit.      Motor: No weakness.      Gait: Gait normal.   Psychiatric:         Mood and Affect: Mood normal.         Behavior: Behavior normal.       LABS   Lab on 11/04/2024   Component Date Value Ref Range Status    WBC 11/04/2024 18.9 (H)  4.4 - 11.3 x10*3/uL Final    nRBC 11/04/2024 0.8 (H)  0.0 - 0.0 /100 WBCs Final    RBC 11/04/2024 2.04 (L)  4.00 - 5.20 x10*6/uL Final    Hemoglobin 11/04/2024 5.9 (LL)  12.0 - 16.0 g/dL Final    Hematocrit 11/04/2024 17.2 (L)  36.0 - 46.0 % Final    MCV 11/04/2024 84  80 - 100 fL Final    MCH 11/04/2024 28.9  26.0 - 34.0 pg Final    MCHC 11/04/2024 34.3  32.0 - 36.0 g/dL Final    RDW 11/04/2024 18.6 (H)  11.5 - 14.5 % Final    Platelets 11/04/2024 587 (H)  150 - 450 x10*3/uL Final    Neutrophils % 11/04/2024 75.2  40.0 - 80.0 % Final    Immature Granulocytes %, Automated 11/04/2024 0.5  0.0 - 0.9 % Final    Immature Granulocyte Count (IG) includes promyelocytes, myelocytes and metamyelocytes but does not include bands. Percent differential counts (%) should be interpreted in the context of the absolute cell counts (cells/UL).    Lymphocytes % 11/04/2024 19.7  13.0 - 44.0 % Final    Monocytes % 11/04/2024 3.4  2.0 - 10.0 % Final    Eosinophils % 11/04/2024 0.6  0.0 - 6.0 % Final    Basophils % 11/04/2024 0.6  0.0 - 2.0 % Final    Neutrophils Absolute 11/04/2024 14.18 (H)  1.20 - 7.70 x10*3/uL Final    Percent differential counts (%) should be interpreted in the context of the absolute cell counts (cells/uL).    Immature Granulocytes Absolute, Au* 11/04/2024 0.10  0.00 - 0.70 x10*3/uL Final    Lymphocytes Absolute 11/04/2024 3.72  1.20 - 4.80 x10*3/uL Final    Monocytes Absolute 11/04/2024 0.65  0.10 - 1.00 x10*3/uL Final    Eosinophils Absolute 11/04/2024 0.11  0.00 - 0.70 x10*3/uL Final    Basophils Absolute 11/04/2024 0.12 (H)   "0.00 - 0.10 x10*3/uL Final    Glucose 11/04/2024 90  74 - 99 mg/dL Final    Sodium 11/04/2024 139  136 - 145 mmol/L Final    Potassium 11/04/2024 3.4 (L)  3.5 - 5.3 mmol/L Final    Chloride 11/04/2024 105  98 - 107 mmol/L Final    Bicarbonate 11/04/2024 25  21 - 32 mmol/L Final    Anion Gap 11/04/2024 12  10 - 20 mmol/L Final    Urea Nitrogen 11/04/2024 6  6 - 23 mg/dL Final    Creatinine 11/04/2024 0.43 (L)  0.50 - 1.05 mg/dL Final    eGFR 11/04/2024 >90  >60 mL/min/1.73m*2 Final    Calculations of estimated GFR are performed using the 2021 CKD-EPI Study Refit equation without the race variable for the IDMS-Traceable creatinine methods.  https://jasn.asnjournals.org/content/early/2021/09/22/ASN.0268487246    Calcium 11/04/2024 9.4  8.6 - 10.3 mg/dL Final    Albumin 11/04/2024 4.7  3.4 - 5.0 g/dL Final    Alkaline Phosphatase 11/04/2024 89  33 - 110 U/L Final    Total Protein 11/04/2024 8.1  6.4 - 8.2 g/dL Final    AST 11/04/2024 23  9 - 39 U/L Final    Bilirubin, Total 11/04/2024 6.2 (H)  0.0 - 1.2 mg/dL Final    ALT 11/04/2024 18  7 - 45 U/L Final    Patients treated with Sulfasalazine may generate falsely decreased results for ALT.    LDH 11/04/2024 230  84 - 246 U/L Final    Retic % 11/04/2024 23.2 (H)  0.5 - 2.0 % Final    Retic Absolute 11/04/2024 0.474 (H)  0.018 - 0.083 x10*6/uL Final    Reticulocyte Hemoglobin 11/04/2024 29  28 - 38 pg Final    Immature Retic fraction 11/04/2024 26.0 (H)  <=16.0 % Final    Reticulocytes are measured based on a fluorescent technique. The IRF, or immature reticulocyte fraction, is the percent of reticulocytes that show medium (MFR) or high (HFR) fluorescence.  This value can be used to assess the relative maturity of the reticulocyte population in response to anemia. The \"shift reticulocytes\" are not measured by this technique, eliminating the need for their correction in the reticulocyte index.                 Magnus Dumas MD                          "

## 2024-11-05 NOTE — PROGRESS NOTES
Social Work Note    Referral Source: Patient  Meeting Location: Phone  Person(s) Present: Pt  Identified Needs: FLMA  Impression and Plan: Pt emailed pt letter received from employer stating she has exhausted her protected FLMA benefits. Pt requested LISW to review and explain form. LISW reviewed documents and provided education on FLMA limits. The document informed pt her yearly benefits will reinstate on 11/25/2024. Pt is concerned about taking days off prior to this date. LISW encouraged pt to contact HR and manager to discuss other options in order to maintain employment. Pt agreeable to plan. Patient denies any further psychosocial or support service needs at this time.   Interventions Provided: Advocacy, Care Coordination, Education, and Empowerment/Coaching  Estimated Time Spent: 30 min    SW will continue to follow as needed.

## 2024-11-11 LAB
HLA CLS I TYP PNL BLD/T DONR HIGH RES: NORMAL
HLA RESULTS: NORMAL
HLA-DP2 QL: NORMAL
HLA-DQB1 HIGH RES: NORMAL
HLA-DRB1 HIGH RES: NORMAL

## 2024-11-13 ENCOUNTER — APPOINTMENT (OUTPATIENT)
Dept: HEMATOLOGY/ONCOLOGY | Facility: HOSPITAL | Age: 24
End: 2024-11-13
Payer: COMMERCIAL

## 2024-12-04 ENCOUNTER — TELEPHONE (OUTPATIENT)
Dept: ADMISSION | Facility: HOSPITAL | Age: 24
End: 2024-12-04
Payer: COMMERCIAL

## 2024-12-04 DIAGNOSIS — D57.1 SICKLE CELL DISEASE WITHOUT CRISIS (MULTI): Primary | ICD-10-CM

## 2024-12-04 RX ORDER — OXYCODONE HYDROCHLORIDE 10 MG/1
10 TABLET ORAL EVERY 6 HOURS PRN
Qty: 20 TABLET | Refills: 0 | Status: SHIPPED | OUTPATIENT
Start: 2024-12-04 | End: 2024-12-11

## 2024-12-04 NOTE — TELEPHONE ENCOUNTER
Pt left a VM on the refill line requesting a refill on Oxycodone 10mg. Message sent to the team.

## 2024-12-19 ENCOUNTER — HOSPITAL ENCOUNTER (EMERGENCY)
Facility: HOSPITAL | Age: 24
Discharge: HOME | End: 2024-12-19
Attending: EMERGENCY MEDICINE
Payer: COMMERCIAL

## 2024-12-19 VITALS
SYSTOLIC BLOOD PRESSURE: 126 MMHG | DIASTOLIC BLOOD PRESSURE: 82 MMHG | WEIGHT: 145 LBS | TEMPERATURE: 98.1 F | HEIGHT: 61 IN | BODY MASS INDEX: 27.38 KG/M2 | OXYGEN SATURATION: 100 % | HEART RATE: 72 BPM | RESPIRATION RATE: 14 BRPM

## 2024-12-19 DIAGNOSIS — D57.00 SICKLE CELL PAIN CRISIS (MULTI): Primary | ICD-10-CM

## 2024-12-19 LAB
ACANTHOCYTES BLD QL SMEAR: ABNORMAL
ALBUMIN SERPL BCP-MCNC: 4.8 G/DL (ref 3.4–5)
ALP SERPL-CCNC: 79 U/L (ref 33–110)
ALT SERPL W P-5'-P-CCNC: 21 U/L (ref 7–45)
ANION GAP SERPL CALC-SCNC: 13 MMOL/L (ref 10–20)
AST SERPL W P-5'-P-CCNC: 36 U/L (ref 9–39)
BASOPHILS # BLD MANUAL: 0.18 X10*3/UL (ref 0–0.1)
BASOPHILS NFR BLD MANUAL: 1.7 %
BILIRUB SERPL-MCNC: 7.6 MG/DL (ref 0–1.2)
BUN SERPL-MCNC: 8 MG/DL (ref 6–23)
CALCIUM SERPL-MCNC: 9.7 MG/DL (ref 8.6–10.6)
CHLORIDE SERPL-SCNC: 106 MMOL/L (ref 98–107)
CO2 SERPL-SCNC: 24 MMOL/L (ref 21–32)
CREAT SERPL-MCNC: 0.47 MG/DL (ref 0.5–1.05)
DACRYOCYTES BLD QL SMEAR: ABNORMAL
EGFRCR SERPLBLD CKD-EPI 2021: >90 ML/MIN/1.73M*2
EOSINOPHIL # BLD MANUAL: 0.1 X10*3/UL (ref 0–0.7)
EOSINOPHIL NFR BLD MANUAL: 0.9 %
ERYTHROCYTE [DISTWIDTH] IN BLOOD BY AUTOMATED COUNT: 23.5 % (ref 11.5–14.5)
GLUCOSE SERPL-MCNC: 90 MG/DL (ref 74–99)
HCT VFR BLD AUTO: 18 % (ref 36–46)
HGB BLD-MCNC: 6.3 G/DL (ref 12–16)
HGB RETIC QN: 28 PG (ref 28–38)
IMM GRANULOCYTES # BLD AUTO: 0.06 X10*3/UL (ref 0–0.7)
IMM GRANULOCYTES NFR BLD AUTO: 0.6 % (ref 0–0.9)
IMMATURE RETIC FRACTION: 27.1 %
LDH SERPL L TO P-CCNC: 372 U/L (ref 84–246)
LYMPHOCYTES # BLD MANUAL: 4.17 X10*3/UL (ref 1.2–4.8)
LYMPHOCYTES NFR BLD MANUAL: 38.6 %
MCH RBC QN AUTO: 28.8 PG (ref 26–34)
MCHC RBC AUTO-ENTMCNC: 35 G/DL (ref 32–36)
MCV RBC AUTO: 82 FL (ref 80–100)
MONOCYTES # BLD MANUAL: 0.48 X10*3/UL (ref 0.1–1)
MONOCYTES NFR BLD MANUAL: 4.4 %
NEUTS SEG # BLD MANUAL: 5.88 X10*3/UL (ref 1.2–7)
NEUTS SEG NFR BLD MANUAL: 54.4 %
NRBC BLD-RTO: 3 /100 WBCS (ref 0–0)
OVALOCYTES BLD QL SMEAR: ABNORMAL
PLATELET # BLD AUTO: 658 X10*3/UL (ref 150–450)
POLYCHROMASIA BLD QL SMEAR: ABNORMAL
POTASSIUM SERPL-SCNC: 4.2 MMOL/L (ref 3.5–5.3)
PROT SERPL-MCNC: 8.3 G/DL (ref 6.4–8.2)
RBC # BLD AUTO: 2.19 X10*6/UL (ref 4–5.2)
RBC MORPH BLD: ABNORMAL
RETICS #: 0.73 X10*6/UL (ref 0.02–0.08)
RETICS/RBC NFR AUTO: 33.6 % (ref 0.5–2)
SCHISTOCYTES BLD QL SMEAR: ABNORMAL
SICKLE CELLS BLD QL SMEAR: ABNORMAL
SODIUM SERPL-SCNC: 139 MMOL/L (ref 136–145)
TARGETS BLD QL SMEAR: ABNORMAL
TOTAL CELLS COUNTED BLD: 114
WBC # BLD AUTO: 10.8 X10*3/UL (ref 4.4–11.3)

## 2024-12-19 PROCEDURE — 2500000004 HC RX 250 GENERAL PHARMACY W/ HCPCS (ALT 636 FOR OP/ED)

## 2024-12-19 PROCEDURE — 83615 LACTATE (LD) (LDH) ENZYME: CPT

## 2024-12-19 PROCEDURE — 80053 COMPREHEN METABOLIC PANEL: CPT

## 2024-12-19 PROCEDURE — 36415 COLL VENOUS BLD VENIPUNCTURE: CPT

## 2024-12-19 PROCEDURE — 96376 TX/PRO/DX INJ SAME DRUG ADON: CPT

## 2024-12-19 PROCEDURE — 96374 THER/PROPH/DIAG INJ IV PUSH: CPT

## 2024-12-19 PROCEDURE — 85007 BL SMEAR W/DIFF WBC COUNT: CPT

## 2024-12-19 PROCEDURE — 85027 COMPLETE CBC AUTOMATED: CPT

## 2024-12-19 PROCEDURE — 85045 AUTOMATED RETICULOCYTE COUNT: CPT

## 2024-12-19 PROCEDURE — 99284 EMERGENCY DEPT VISIT MOD MDM: CPT | Performed by: EMERGENCY MEDICINE

## 2024-12-19 PROCEDURE — 96375 TX/PRO/DX INJ NEW DRUG ADDON: CPT

## 2024-12-19 PROCEDURE — 99285 EMERGENCY DEPT VISIT HI MDM: CPT | Performed by: EMERGENCY MEDICINE

## 2024-12-19 RX ORDER — HYDROMORPHONE HYDROCHLORIDE 1 MG/ML
INJECTION, SOLUTION INTRAMUSCULAR; INTRAVENOUS; SUBCUTANEOUS
Status: COMPLETED
Start: 2024-12-19 | End: 2024-12-19

## 2024-12-19 RX ORDER — HYDROMORPHONE HYDROCHLORIDE 1 MG/ML
1 INJECTION, SOLUTION INTRAMUSCULAR; INTRAVENOUS; SUBCUTANEOUS ONCE
Status: DISCONTINUED | OUTPATIENT
Start: 2024-12-19 | End: 2024-12-19 | Stop reason: HOSPADM

## 2024-12-19 RX ORDER — HYDROMORPHONE HYDROCHLORIDE 1 MG/ML
1 INJECTION, SOLUTION INTRAMUSCULAR; INTRAVENOUS; SUBCUTANEOUS ONCE
Status: COMPLETED | OUTPATIENT
Start: 2024-12-19 | End: 2024-12-19

## 2024-12-19 RX ORDER — HYDROMORPHONE HYDROCHLORIDE 1 MG/ML
1 INJECTION, SOLUTION INTRAMUSCULAR; INTRAVENOUS; SUBCUTANEOUS
Status: COMPLETED | OUTPATIENT
Start: 2024-12-19 | End: 2024-12-19

## 2024-12-19 RX ORDER — KETOROLAC TROMETHAMINE 15 MG/ML
15 INJECTION, SOLUTION INTRAMUSCULAR; INTRAVENOUS ONCE
Status: COMPLETED | OUTPATIENT
Start: 2024-12-19 | End: 2024-12-19

## 2024-12-19 ASSESSMENT — LIFESTYLE VARIABLES
EVER HAD A DRINK FIRST THING IN THE MORNING TO STEADY YOUR NERVES TO GET RID OF A HANGOVER: NO
EVER FELT BAD OR GUILTY ABOUT YOUR DRINKING: NO
TOTAL SCORE: 0
HAVE YOU EVER FELT YOU SHOULD CUT DOWN ON YOUR DRINKING: NO
HAVE PEOPLE ANNOYED YOU BY CRITICIZING YOUR DRINKING: NO

## 2024-12-19 ASSESSMENT — PAIN SCALES - GENERAL
PAINLEVEL_OUTOF10: 7
PAINLEVEL_OUTOF10: 7
PAINLEVEL_OUTOF10: 8
PAINLEVEL_OUTOF10: 7

## 2024-12-19 ASSESSMENT — PAIN - FUNCTIONAL ASSESSMENT
PAIN_FUNCTIONAL_ASSESSMENT: 0-10

## 2024-12-19 ASSESSMENT — PAIN DESCRIPTION - PAIN TYPE: TYPE: ACUTE PAIN

## 2024-12-19 ASSESSMENT — PAIN DESCRIPTION - LOCATION: LOCATION: ARM

## 2024-12-19 NOTE — ED PROVIDER NOTES
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  For the past 1 to 2 days she has been experiencing pain in both of her arms.  She also has pain down in her waist from around her hips down her legs.  This is very typical for sickle cell pain crisis for her.  She has had acute chest in the past but states that this feels different.  She has no chest pain or shortness of breath.  She wears oxygen at night because she tends to desat and is on her baseline 2 L right now.  She has a slight dry nonproductive cough and is afebrile.  She was admitted back in September for a fever and a leukocytosis but was taking steroids and was diagnosed with a URI.  Reports the symptoms have completely resolved and she has had no infectious symptoms since.  Denies fevers, chills, cough, congestion recent Manerix.  She denies any traumatic events, recent injuries or anything else she thinks is causing pain.  She has no leg swelling.  Other than sickle cell disease she is otherwise pretty healthy.  She lives in [place].  She took oxycodone last around 2 or 3 AM this morning which did not work so she is coming in because of the pain and states that IV Dilaudid typically helps.  No history of DVT or PE.  She is not on blood thinners.                        Claudette Coma Scale Score: 15                    Patient History   Past Medical History:   Diagnosis Date    Encounter for contraceptive management, unspecified 10/14/2016    Contraception management    Encounter for screening for disorder due to exposure to contaminants     Screening for lead exposure    Hb-SS disease with acute chest syndrome (Multi) 12/14/2016    Acute chest syndrome due to Hgb-S disease    Personal history of diseases of the blood and blood-forming organs and certain disorders involving the immune  "mechanism 03/02/2022    History of sickle cell anemia     No past surgical history on file.  Family History   Problem Relation Name Age of Onset    Sickle cell anemia Sister       Social History     Tobacco Use    Smoking status: Never    Smokeless tobacco: Never   Substance Use Topics    Alcohol use: Never    Drug use: Never       Physical Exam   Visit Vitals  /76   Pulse 95   Temp 36.2 °C (97.2 °F)   Resp 16   Ht 1.549 m (5' 1\")   Wt 65.8 kg (145 lb)   SpO2 (!) 91%   BMI 27.40 kg/m²   OB Status Having periods   Smoking Status Never   BSA 1.68 m²       Physical Exam  Vitals and nursing note reviewed.   Constitutional:       General: She is not in acute distress.     Appearance: She is well-developed. She is not ill-appearing.      Comments: Wearing oxygen   HENT:      Head: Normocephalic and atraumatic.      Right Ear: External ear normal.      Left Ear: External ear normal.      Nose: Nose normal. No congestion or rhinorrhea.      Mouth/Throat:      Mouth: Mucous membranes are dry.   Eyes:      Conjunctiva/sclera: Conjunctivae normal.   Cardiovascular:      Rate and Rhythm: Normal rate and regular rhythm.      Heart sounds: No murmur heard.  Pulmonary:      Effort: Pulmonary effort is normal. No respiratory distress.      Breath sounds: Normal breath sounds. No wheezing or rales.      Comments: Breathing comfortably on 2 L O2.  No increased work of breathing.  Clear to auscultation with no focal findings  Abdominal:      Palpations: Abdomen is soft.      Tenderness: There is no abdominal tenderness.   Musculoskeletal:         General: No swelling.      Cervical back: Neck supple.      Right lower leg: No edema.      Left lower leg: No edema.      Comments: Radial and PT distal pulses are intact.  Range of motion of both upper extremities and both lower extremities intact.  Normal strength.  No deformities.  No point tenderness to palpation or evidence of injuries.   Skin:     General: Skin is warm and dry. "      Capillary Refill: Capillary refill takes less than 2 seconds.      Findings: No rash.   Neurological:      Mental Status: She is alert.   Psychiatric:         Mood and Affect: Mood normal.         Procedure  Procedures    Medical Decision Making  Patient is a 24-year-old female coming into the emergency department this morning with pain likely secondary to an acute sickle cell pain crisis.  Patient with no infectious symptoms, no chest pain and history not strongly suggestive of alternative cause of symptoms.  Treated for sickle cell pain crisis with Dilaudid x3, Toradol  With improvement.  Patient comfortable for outpatient management of sickle cell pain crisis after 1 additional dose of Dilaudid.  Labs notable for hemoglobin 6.3 and patient with chronic anemia.  Patient otherwise asymptomatic and this is above patient's baseline.  Advised to follow-up with sickle cell clinic.  Return precautions and appropriate follow-up discussed with patient and patient discharged home.    Patient seen and discussed with Dr. Xavier Luciano MD, PhD  Emergency Medicine PGY3          ED Course & MDM     Diagnoses as of 12/19/24 1515   Sickle cell pain crisis (Multi)     Patient feeling better. Plan for discharge home after one more round of pain meds. Return precautions and outpatient follow up.     Hgb 6.3 which is actually better than recent baseline. No exertional symptoms, cp, or sob. No indication for transfusion at this time.        I saw and evaluated the patient. I personally obtained the key and critical portions of the history and physical exam or was physically present for key and critical portions performed by the resident/fellow/ELAINA. I reviewed the resident/fellow/ELAINA's documentation and discussed the patient with the resident/fellow/ELAINA. I agree with the resident/fellow/ELAINA's medical decision making as documented in the note.    ** Please excuse any errors in grammar or translation related to this  dictation. Voice recognition software was utilized to prepare this document. **       Navid Park MD  [facility] Emergency Medicine        Navid Park MD  12/20/24 1575

## 2024-12-19 NOTE — DISCHARGE INSTRUCTIONS
Continue following up with your sickle cell doctor and continue taking your home pain medications.

## 2024-12-30 ENCOUNTER — TELEPHONE (OUTPATIENT)
Dept: ADMISSION | Facility: HOSPITAL | Age: 24
End: 2024-12-30
Payer: COMMERCIAL

## 2024-12-30 DIAGNOSIS — D57.00 SICKLE CELL DISEASE WITH CRISIS (MULTI): Primary | ICD-10-CM

## 2024-12-30 RX ORDER — OXYCODONE HYDROCHLORIDE 10 MG/1
10 TABLET ORAL EVERY 6 HOURS PRN
Qty: 20 TABLET | Refills: 0 | Status: SHIPPED | OUTPATIENT
Start: 2024-12-30 | End: 2025-01-06

## 2024-12-30 NOTE — TELEPHONE ENCOUNTER
Pt is requesting a refill of oxycodone 10mg q6 prn, #20.  Last prescription was written 12/4/24.  Preferred pharmacy is [facility] in [place].

## 2025-01-04 ASSESSMENT — ENCOUNTER SYMPTOMS
SHORTNESS OF BREATH: 0
COUGH: 1
WHEEZING: 0
CHEST TIGHTNESS: 0

## 2025-01-09 ENCOUNTER — OFFICE VISIT (OUTPATIENT)
Dept: URGENT CARE | Age: 25
End: 2025-01-09
Payer: COMMERCIAL

## 2025-01-09 VITALS
HEART RATE: 88 BPM | TEMPERATURE: 96.6 F | RESPIRATION RATE: 20 BRPM | DIASTOLIC BLOOD PRESSURE: 59 MMHG | OXYGEN SATURATION: 92 % | SYSTOLIC BLOOD PRESSURE: 109 MMHG

## 2025-01-09 DIAGNOSIS — H66.91 RIGHT OTITIS MEDIA, UNSPECIFIED OTITIS MEDIA TYPE: Primary | ICD-10-CM

## 2025-01-09 PROCEDURE — 99213 OFFICE O/P EST LOW 20 MIN: CPT | Performed by: REGISTERED NURSE

## 2025-01-09 RX ORDER — AMOXICILLIN AND CLAVULANATE POTASSIUM 875; 125 MG/1; MG/1
1 TABLET, FILM COATED ORAL EVERY 12 HOURS
Qty: 14 TABLET | Refills: 0 | Status: SHIPPED | OUTPATIENT
Start: 2025-01-09 | End: 2025-01-16

## 2025-01-09 ASSESSMENT — ENCOUNTER SYMPTOMS
CHILLS: 0
FEVER: 0
FACIAL SWELLING: 1

## 2025-01-09 NOTE — PROGRESS NOTES
Subjective   Patient ID: Ruperto Pang is a 24 y.o. female. They present today with a chief complaint of Dental Pain (RT side of face, headaches, started yesterday. ).    History of Present Illness  24 yr old female presents with c/o right side face pain with swelling.  Hard time chewing on the right side, not sure if tooth is infected because is having pain when eating.  She also states the last time this happened, she had an ear infection on the side with all of the pain.  She denies any ear pain, drainage, fever, chills.        History provided by:  Patient      Past Medical History  Allergies as of 01/09/2025 - Reviewed 01/09/2025   Allergen Reaction Noted    Iodinated contrast media Hives 10/25/2023       (Not in a hospital admission)       Past Medical History:   Diagnosis Date    Encounter for contraceptive management, unspecified 10/14/2016    Contraception management    Encounter for screening for disorder due to exposure to contaminants     Screening for lead exposure    Hb-SS disease with acute chest syndrome (Multi) 12/14/2016    Acute chest syndrome due to Hgb-S disease    Personal history of diseases of the blood and blood-forming organs and certain disorders involving the immune mechanism 03/02/2022    History of sickle cell anemia       No past surgical history on file.     reports that she has never smoked. She has never used smokeless tobacco. She reports that she does not drink alcohol and does not use drugs.    Review of Systems  Review of Systems   Constitutional:  Negative for chills and fever.   HENT:  Positive for dental problem and facial swelling. Negative for drooling, ear discharge and ear pain.    All other systems reviewed and are negative.                                 Objective    Vitals:    01/09/25 1329   BP: 109/59   BP Location: Left arm   Patient Position: Sitting   BP Cuff Size: Adult   Pulse: 88   Resp: 20   Temp: 35.9 °C (96.6 °F)   TempSrc: Temporal   SpO2: 92%     No LMP  recorded (lmp unknown).    Physical Exam  Vitals and nursing note reviewed.   HENT:      Head:      Jaw: Tenderness and swelling present.        Right Ear: Ear canal and external ear normal. Tympanic membrane is erythematous and retracted.      Left Ear: Tympanic membrane, ear canal and external ear normal.         Procedures    Point of Care Test & Imaging Results from this visit  No results found for this visit on 01/09/25.   No results found.    Diagnostic study results (if any) were reviewed by Crystal L Severino, APRN-CNP.    Assessment/Plan   Allergies, medications, history, and pertinent labs/EKGs/Imaging reviewed by Crystal L Severino, APRN-CNP.     Medical Decision Making   24-year-old female presents with concern of possible dental abscess or other issue related to increased swelling and pain to her right side of her face.  She states she is having difficulty chewing on the right side related to the pain.  She also goes on to state that the last time she had this type of feeling she had an ear infection.  ENT exam complete and is consistent with right otitis media.  At time of discharge patient was clinically well-appearing and HDS for outpatient management. The patient and/or family was educated regarding diagnosis, supportive care, OTC and Rx medications. The patient and/or family was given the opportunity to ask questions prior to discharge.  They verbalized understanding of my discussion of the plans for treatment, expected course, indications to return to  or seek further evaluation in ED, and the need for timely follow up as directed.   They were provided with a work/school excuse if requested.      Orders and Diagnoses  Diagnoses and all orders for this visit:  Left otitis media, unspecified otitis media type  -     amoxicillin-pot clavulanate (Augmentin) 875-125 mg tablet; Take 1 tablet by mouth every 12 hours for 7 days.  Tylenol/Ibuprofen for pain/discomfort  Warm compress/heating pad to  affected ear  If symptoms persist/worsen, follow-up with your PCP or ENT for further evaluation      Medical Admin Record      Patient disposition: Home    Electronically signed by Crystal L Severino, APRN-CNP  1:54 PM

## 2025-01-09 NOTE — PATIENT INSTRUCTIONS
Tylenol/Ibuprofen for pain/discomfort  Warm compress/heating pad to affected ear  If symptoms persist/worsen, follow-up with your PCP or ENT for further evaluation

## 2025-01-27 ENCOUNTER — OFFICE VISIT (OUTPATIENT)
Dept: HEMATOLOGY/ONCOLOGY | Facility: HOSPITAL | Age: 25
End: 2025-01-27
Payer: COMMERCIAL

## 2025-01-27 ENCOUNTER — LAB (OUTPATIENT)
Dept: LAB | Facility: HOSPITAL | Age: 25
End: 2025-01-27
Payer: COMMERCIAL

## 2025-01-27 VITALS
TEMPERATURE: 99 F | DIASTOLIC BLOOD PRESSURE: 55 MMHG | SYSTOLIC BLOOD PRESSURE: 114 MMHG | WEIGHT: 144.4 LBS | RESPIRATION RATE: 18 BRPM | OXYGEN SATURATION: 96 % | BODY MASS INDEX: 27.28 KG/M2 | HEART RATE: 100 BPM

## 2025-01-27 DIAGNOSIS — D57.00 SICKLE CELL CRISIS (MULTI): ICD-10-CM

## 2025-01-27 DIAGNOSIS — G89.29 OTHER CHRONIC PAIN: ICD-10-CM

## 2025-01-27 DIAGNOSIS — R80.9 MICROALBUMINURIA: Primary | ICD-10-CM

## 2025-01-27 DIAGNOSIS — D57.20 SICKLE CELL DISEASE, TYPE SC, WITHOUT CRISIS (MULTI): ICD-10-CM

## 2025-01-27 DIAGNOSIS — R35.0 FREQUENCY OF URINATION: ICD-10-CM

## 2025-01-27 DIAGNOSIS — D57.00 HB-SS DISEASE WITH CRISIS: ICD-10-CM

## 2025-01-27 LAB
AMPHETAMINES UR QL SCN: ABNORMAL
APPEARANCE UR: CLEAR
BARBITURATES UR QL SCN: ABNORMAL
BENZODIAZ UR QL SCN: ABNORMAL
BILIRUB UR STRIP.AUTO-MCNC: NEGATIVE MG/DL
BZE UR QL SCN: ABNORMAL
CANNABINOIDS UR QL SCN: ABNORMAL
COLOR UR: YELLOW
CREAT UR-MCNC: 62.1 MG/DL (ref 20–320)
FENTANYL+NORFENTANYL UR QL SCN: ABNORMAL
GLUCOSE UR STRIP.AUTO-MCNC: NORMAL MG/DL
HOLD SPECIMEN: NORMAL
KETONES UR STRIP.AUTO-MCNC: NEGATIVE MG/DL
LEUKOCYTE ESTERASE UR QL STRIP.AUTO: NEGATIVE
METHADONE UR QL SCN: ABNORMAL
MICROALBUMIN UR-MCNC: 183.5 MG/L
MICROALBUMIN/CREAT UR: 295.5 UG/MG CREAT
NITRITE UR QL STRIP.AUTO: NEGATIVE
OPIATES UR QL SCN: ABNORMAL
OXYCODONE+OXYMORPHONE UR QL SCN: ABNORMAL
PCP UR QL SCN: ABNORMAL
PH UR STRIP.AUTO: 6.5 [PH]
PROT UR STRIP.AUTO-MCNC: NORMAL MG/DL
RBC # UR STRIP.AUTO: NEGATIVE /UL
RBC #/AREA URNS AUTO: NORMAL /HPF
SP GR UR STRIP.AUTO: 1.01
SQUAMOUS #/AREA URNS AUTO: NORMAL /HPF
UROBILINOGEN UR STRIP.AUTO-MCNC: NORMAL MG/DL
WBC #/AREA URNS AUTO: NORMAL /HPF

## 2025-01-27 PROCEDURE — 99214 OFFICE O/P EST MOD 30 MIN: CPT | Performed by: PEDIATRICS

## 2025-01-27 PROCEDURE — 81001 URINALYSIS AUTO W/SCOPE: CPT

## 2025-01-27 PROCEDURE — 80365 DRUG SCREENING OXYCODONE: CPT

## 2025-01-27 PROCEDURE — 80349 CANNABINOIDS NATURAL: CPT

## 2025-01-27 PROCEDURE — G2211 COMPLEX E/M VISIT ADD ON: HCPCS | Performed by: PEDIATRICS

## 2025-01-27 PROCEDURE — 80307 DRUG TEST PRSMV CHEM ANLYZR: CPT

## 2025-01-27 PROCEDURE — 82043 UR ALBUMIN QUANTITATIVE: CPT

## 2025-01-27 RX ORDER — IBUPROFEN 200 MG
400 TABLET ORAL EVERY 6 HOURS PRN
Qty: 60 TABLET | Refills: 2 | Status: SHIPPED | OUTPATIENT
Start: 2025-01-27

## 2025-01-27 RX ORDER — CAPSAICIN 0.03 G/100G
CREAM TOPICAL 2 TIMES DAILY
Qty: 60 G | Refills: 2 | Status: SHIPPED | OUTPATIENT
Start: 2025-01-27 | End: 2026-01-27

## 2025-01-27 RX ORDER — OXYCODONE HYDROCHLORIDE 10 MG/1
10 TABLET ORAL EVERY 6 HOURS PRN
Qty: 30 TABLET | Refills: 0 | Status: SHIPPED | OUTPATIENT
Start: 2025-01-27 | End: 2025-02-26

## 2025-01-27 RX ORDER — FOLIC ACID 1 MG/1
1 TABLET ORAL DAILY
Qty: 90 TABLET | Refills: 3 | Status: SHIPPED | OUTPATIENT
Start: 2025-01-27

## 2025-01-27 RX ORDER — HYDROXYUREA 500 MG/1
1000 CAPSULE ORAL DAILY
COMMUNITY

## 2025-01-27 ASSESSMENT — PAIN SCALES - GENERAL: PAINLEVEL_OUTOF10: 0-NO PAIN

## 2025-01-28 ASSESSMENT — ENCOUNTER SYMPTOMS
SORE THROAT: 0
CHEST TIGHTNESS: 0
MYALGIAS: 0
HEADACHES: 0
BACK PAIN: 0
FATIGUE: 1
BRUISES/BLEEDS EASILY: 0
ARTHRALGIAS: 0
WOUND: 0
LIGHT-HEADEDNESS: 0
LEG SWELLING: 0
CONSTIPATION: 1
EXTREMITY WEAKNESS: 0
NECK PAIN: 0
PALPITATIONS: 0
ABDOMINAL PAIN: 0
DIARRHEA: 0
VOMITING: 0
SHORTNESS OF BREATH: 0
DIZZINESS: 0
HEMATURIA: 0
WHEEZING: 0
SPEECH DIFFICULTY: 0
NERVOUS/ANXIOUS: 0
CHILLS: 0
DEPRESSION: 0
NAUSEA: 0
COUGH: 0
FEVER: 0

## 2025-01-28 NOTE — PROGRESS NOTES
SICKLE CELL OUTPATIENT NOTE  Patient ID: Ruperto Pang   Visit Type: Follow up visit   ASSESSMENT AND PLAN  Ruperto Pang is 24 y.o. female with     History of Hb SS Sickle cell disease and steady-state today.  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  Encounter for management of disease modifying therapy with hydroxyurea   Chronic anemia, secondary to sickle cell disease, with recent hemoglobin of 6.3 g/dL  Nocturnal hypoxia on supplemental night time oxygen and remains non compliant with this    Chronic constipation  Microalbuminuria with albumin creatinine ratio, of 295.5 mcg/g of creatinine.  Her renal function is otherwise normal    PLAN   - Continue hydroxyurea 1000 mg daily and will hold off dose increase for now because of ongoing sinusitis   - No changes in her current pain regimen which will be with    Oral Tylenol 650 mg every 6 hours as needed and or ibuprofen 400 mg every 8 hours as needed for mild to moderate pain   Oral oxycodone 10 mg every 6 hours as needed for moderate, severe and breakthrough pain.  Number of tablets dispensed increased to 30 per prescription.  Capsaicin applied to sites of sickle cell pain  Non pharmacologic methods of pain control   Reviewed [place] and I do not have any concerns  Care plan reviewed  Controlled substance agreement is signed  Urine drug screen reviewed and appropriate  -Continue Daily folic acid 1 mg   -Continue supplemental night time oxygen 1-2 L as needed   -Senna and miralax as needed for constipation   -She will stay well-hydrated at all times and will repeat urine albumin in about 3 months time  -Follow up will be in 1 months time     Chief Complaint: Follow-up for hemoglobin SS sickle cell disease       Interval History: Ruperto is present for follow-up of hemoglobin SS sickle cell disease.  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   She has noticed increased sickle cell pain and requiring more of her opioids.  She has been taking oxycodone almost every day, which helps with her pain, and last took this last night.  Her last admission to hospital was in September 2024.  She was seen in the ED in December for possible ear infection, and has since completed course of antibiotics.  Her appetite and energy levels are at her baseline, and denies any headaches dizziness nausea or vomiting.  She has been compliant with hydroxyurea as prescribed, and misses less than 1-2 doses a week.  She is intermittently compliant with supplemental nighttime oxygen  Is without any major complaints in clinic today except mild pain in her lower back.  She is afebrile, denies cough runny nose or congestion.  Ear pain and fullness have all resolved.  She denies focal neurologic deficits, bilateral leg edema, or leg ulcers.  She has had increased frequency of urination, but denies dysuria or hematuria    Review of System:   Review of Systems   Constitutional:  Positive for fatigue. Negative for chills and fever.   HENT:   Negative for nosebleeds and sore throat.    Respiratory:  Negative for chest tightness, cough, shortness of breath and wheezing.    Cardiovascular:  Negative for chest pain, leg swelling and palpitations.   Gastrointestinal:  Positive for constipation. Negative for abdominal pain, diarrhea, nausea and vomiting.   Genitourinary:  Negative for hematuria.    Musculoskeletal:  Negative for arthralgias, back pain, gait problem, myalgias and neck pain.   Skin:  Negative for itching, rash and wound.   Neurological:  Negative for dizziness, extremity weakness, gait problem, headaches, light-headedness and speech difficulty.   Hematological:  Does not bruise/bleed easily.   Psychiatric/Behavioral:  Negative for depression. The patient is not nervous/anxious.       Allergies:   Allergies   Allergen Reactions    Iodinated Contrast Media Hives     Current  Medications:   Outpatient Encounter Medications as of 1/27/2025   Medication Sig Dispense Refill    folic acid (Folvite) 1 mg tablet Take 1 tablet (1 mg) by mouth once daily. 90 tablet 3    hydroxyurea (Hydrea) 500 mg capsule Take 2 capsules (1,000 mg total) by mouth once daily.  Take at the same time each day.      ibuprofen 200 mg tablet Take 2 tablets (400 mg) by mouth every 6 hours if needed for mild pain (1 - 3) or moderate pain (4 - 6). 60 tablet 2    oxyCODONE (Roxicodone) 10 mg immediate release tablet Take 1 tablet (10 mg) by mouth every 6 hours if needed for severe pain (7 - 10). 30 tablet 0    polyethylene glycol (Glycolax, Miralax) 17 gram packet Take 17 g by mouth if needed.      sennosides-docusate sodium (Senna-S) 8.6-50 mg tablet Take 1 tablet by mouth once daily. 30 tablet 2    [DISCONTINUED] folic acid (Folvite) 1 mg tablet Take 1 tablet (1 mg) by mouth once daily. 60 tablet 1    [DISCONTINUED] ibuprofen 200 mg tablet Take 2 tablets (400 mg) by mouth every 6 hours if needed for mild pain (1 - 3) or moderate pain (4 - 6). 60 tablet 2    capsaicin (Zostrix) 0.025 % cream Apply topically 2 times a day. 60 g 2    [DISCONTINUED] amoxicillin-pot clavulanate (Augmentin) 875-125 mg tablet Take 1 tablet by mouth every 12 hours for 7 days. 14 tablet 0     Past Medical History:    has a past medical history of Encounter for contraceptive management, unspecified (10/14/2016), Encounter for screening for disorder due to exposure to contaminants, Hb-SS disease with acute chest syndrome (Multi) (12/14/2016), and Personal history of diseases of the blood and blood-forming organs and certain disorders involving the immune mechanism (03/02/2022).     Hb SS disease, intermittently treated with hydroxyurea   History of miscarriage 1/2020   Chronic pain   COVID-19 infection, 12/2020   History of Acute Chest Syndrome   COVID-19 infection 12/2020    Past Surgical History:    has no past surgical history on  file.    Family History:   Family History   Problem Relation Name Age of Onset    Sickle cell anemia Sister         Social History:   Ruperto Pang  reports that she has never smoked. She has never used smokeless tobacco.  reports no history of drug use.    EXAMINATION FINDINGS   /55 (BP Location: Left arm, Patient Position: Sitting, BP Cuff Size: Adult)   Pulse 100   Temp 37.2 °C (99 °F) (Temporal)   Resp 18   Wt 65.5 kg (144 lb 6.4 oz)   LMP  (LMP Unknown)   SpO2 96%   BMI 27.28 kg/m²   1.68 meters squared    Physical Exam  Vitals and nursing note reviewed.   Constitutional:       Appearance: Normal appearance.   HENT:      Mouth/Throat:      Mouth: Mucous membranes are moist.      Pharynx: Oropharynx is clear. No oropharyngeal exudate or posterior oropharyngeal erythema.   Eyes:      General: Scleral icterus present.      Extraocular Movements: Extraocular movements intact.      Pupils: Pupils are equal, round, and reactive to light.   Cardiovascular:      Rate and Rhythm: Normal rate and regular rhythm.      Pulses: Normal pulses.      Heart sounds: Murmur heard.      No gallop.   Pulmonary:      Effort: Pulmonary effort is normal. No respiratory distress.      Breath sounds: Normal breath sounds. No wheezing or rales.   Abdominal:      General: Abdomen is flat. Bowel sounds are normal. There is no distension.      Tenderness: There is no guarding.   Musculoskeletal:         General: No swelling.      Cervical back: Normal range of motion and neck supple.      Right lower leg: No edema.      Left lower leg: No edema.   Skin:     General: Skin is warm.      Capillary Refill: Capillary refill takes less than 2 seconds.      Coloration: Skin is not jaundiced.      Findings: No lesion or rash.   Neurological:      General: No focal deficit present.      Mental Status: She is alert and oriented to person, place, and time.      Cranial Nerves: No cranial nerve deficit.      Motor: No weakness.      Gait:  Gait normal.   Psychiatric:         Mood and Affect: Mood normal.         Behavior: Behavior normal.       LABS   Lab on 01/27/2025   Component Date Value Ref Range Status    Albumin, Urine Random 01/27/2025 183.5  Not established mg/L Final    Creatinine, Urine Random 01/27/2025 62.1  20.0 - 320.0 mg/dL Final    Albumin/Creatinine Ratio 01/27/2025 295.5 (H)  <30.0 ug/mg Creat Final    Amphetamine Screen, Urine 01/27/2025 Presumptive Negative  Presumptive Negative Final    CUTOFF LEVEL: 500 NG/ML   Cross-reactivity has been reported with high concentrations   of the following drugs: buproprion, chloroquine, chlorpromazine,   ephedrine, mephentermine, fenfluramine, phentermine,   phenylpropanolamine, pseudoephedrine, and propranolol.    Barbiturate Screen, Urine 01/27/2025 Presumptive Negative  Presumptive Negative Final    CUTOFF LEVEL: 200 NG/ML    Benzodiazepines Screen, Urine 01/27/2025 Presumptive Negative  Presumptive Negative Final    CUTOFF LEVEL: 200 NG/ML    Cannabinoid Screen, Urine 01/27/2025 Presumptive Positive (A)  Presumptive Negative Final    CUTOFF LEVEL: 50 NG/ML    Cocaine Metabolite Screen, Urine 01/27/2025 Presumptive Negative  Presumptive Negative Final    CUTOFF LEVEL: 150 NG/ML    Fentanyl Screen, Urine 01/27/2025 Presumptive Negative  Presumptive Negative Final    CUTOFF LEVEL: 5 NG/ML    Opiate Screen, Urine 01/27/2025 Presumptive Negative  Presumptive Negative Final    CUTOFF LEVEL: 300 NG/ML  The opiate screen does not detect fentanyl, meperidine, or   tramadol. Oxycodone is not consistently detected (refer to  Oxycodone Screen, Urine result).    Oxycodone Screen, Urine 01/27/2025 Presumptive Positive (A)  Presumptive Negative Final    CUTOFF LEVEL: 100 NG/ML  This test will accurately detect both oxycodone and oxymorphone.    PCP Screen, Urine 01/27/2025 Presumptive Negative  Presumptive Negative Final    CUTOFF LEVEL:  25 NG/ML  Cross-reactivity has been reported with dextromethorphan.     Methadone Screen, Urine 01/27/2025 Presumptive Negative  Presumptive Negative Final    CUTOFF LEVEL: 150 NG/ML  The metabolite L-alpha-acetylmethadol (LAAM) is not  detected by this method in concentrations that would  be found in the urine of patients on LAAM therapy.    Color, Urine 01/27/2025 Yellow  Light-Yellow, Yellow, Dark-Yellow Final    Appearance, Urine 01/27/2025 Clear  Clear Final    Specific Gravity, Urine 01/27/2025 1.009  1.005 - 1.035 Final    pH, Urine 01/27/2025 6.5  5.0, 5.5, 6.0, 6.5, 7.0, 7.5, 8.0 Final    Protein, Urine 01/27/2025 20 (TRACE)  NEGATIVE, 10 (TRACE), 20 (TRACE) mg/dL Final    Glucose, Urine 01/27/2025 Normal  Normal mg/dL Final    Blood, Urine 01/27/2025 NEGATIVE  NEGATIVE Final    Ketones, Urine 01/27/2025 NEGATIVE  NEGATIVE mg/dL Final    Bilirubin, Urine 01/27/2025 NEGATIVE  NEGATIVE Final    Urobilinogen, Urine 01/27/2025 Normal  Normal mg/dL Final    Nitrite, Urine 01/27/2025 NEGATIVE  NEGATIVE Final    Leukocyte Esterase, Urine 01/27/2025 NEGATIVE  NEGATIVE Final    Extra Tube 01/27/2025 Hold for add-ons.   Final    Auto resulted.    WBC, Urine 01/27/2025 1-5  1-5, NONE /HPF Final    RBC, Urine 01/27/2025 1-2  NONE, 1-2, 3-5 /HPF Final    Squamous Epithelial Cells, Urine 01/27/2025 1-9 (SPARSE)  Reference range not established. /HPF Final          Mangus Dumas MD

## 2025-01-30 LAB — CARBOXYTHC UR-MCNC: 106 NG/ML

## 2025-01-31 LAB
6MAM UR CFM-MCNC: <25 NG/ML
CODEINE UR CFM-MCNC: <50 NG/ML
HYDROCODONE CTO UR CFM-MCNC: <25 NG/ML
HYDROMORPHONE UR CFM-MCNC: <25 NG/ML
MORPHINE UR CFM-MCNC: <50 NG/ML
NORHYDROCODONE UR CFM-MCNC: <25 NG/ML
NOROXYCODONE UR CFM-MCNC: 402 NG/ML
OXYCODONE UR CFM-MCNC: 36 NG/ML
OXYMORPHONE UR CFM-MCNC: 121 NG/ML

## 2025-02-06 ENCOUNTER — APPOINTMENT (OUTPATIENT)
Dept: PRIMARY CARE | Facility: CLINIC | Age: 25
End: 2025-02-06
Payer: COMMERCIAL

## 2025-02-18 ENCOUNTER — TELEPHONE (OUTPATIENT)
Dept: HEMATOLOGY/ONCOLOGY | Facility: HOSPITAL | Age: 25
End: 2025-02-18
Payer: COMMERCIAL

## 2025-02-18 DIAGNOSIS — D57.00 SICKLE CELL CRISIS (MULTI): ICD-10-CM

## 2025-02-18 RX ORDER — OXYCODONE HYDROCHLORIDE 10 MG/1
10 TABLET ORAL EVERY 6 HOURS PRN
Qty: 30 TABLET | Refills: 0 | Status: SHIPPED | OUTPATIENT
Start: 2025-02-18 | End: 2025-03-20

## 2025-02-18 RX ORDER — NALOXONE HYDROCHLORIDE 4 MG/.1ML
1 SPRAY NASAL AS NEEDED
Qty: 2 EACH | Refills: 0 | Status: SHIPPED | OUTPATIENT
Start: 2025-02-18

## 2025-02-18 NOTE — TELEPHONE ENCOUNTER
Refill request received for Oxycodone 10mg.  Preferred pharmacy is [facility] at [address] in [place].  Message sent to Sickle Cell team.

## 2025-03-02 ENCOUNTER — HOSPITAL ENCOUNTER (EMERGENCY)
Facility: HOSPITAL | Age: 25
Discharge: HOME | End: 2025-03-02
Attending: EMERGENCY MEDICINE
Payer: COMMERCIAL

## 2025-03-02 ENCOUNTER — CLINICAL SUPPORT (OUTPATIENT)
Dept: EMERGENCY MEDICINE | Facility: HOSPITAL | Age: 25
End: 2025-03-02
Payer: COMMERCIAL

## 2025-03-02 ENCOUNTER — APPOINTMENT (OUTPATIENT)
Dept: RADIOLOGY | Facility: HOSPITAL | Age: 25
End: 2025-03-02
Payer: COMMERCIAL

## 2025-03-02 VITALS
HEIGHT: 61 IN | BODY MASS INDEX: 27.94 KG/M2 | WEIGHT: 148 LBS | RESPIRATION RATE: 18 BRPM | SYSTOLIC BLOOD PRESSURE: 102 MMHG | HEART RATE: 72 BPM | OXYGEN SATURATION: 98 % | DIASTOLIC BLOOD PRESSURE: 60 MMHG | TEMPERATURE: 97.9 F

## 2025-03-02 DIAGNOSIS — D57.00 SICKLE CELL PAIN CRISIS (MULTI): Primary | ICD-10-CM

## 2025-03-02 LAB
ALBUMIN SERPL BCP-MCNC: 4.7 G/DL (ref 3.4–5)
ALP SERPL-CCNC: 80 U/L (ref 33–110)
ALT SERPL W P-5'-P-CCNC: 24 U/L (ref 7–45)
ANION GAP SERPL CALC-SCNC: 14 MMOL/L (ref 10–20)
AST SERPL W P-5'-P-CCNC: 29 U/L (ref 9–39)
BASOPHILS # BLD MANUAL: 0.27 X10*3/UL (ref 0–0.1)
BASOPHILS NFR BLD MANUAL: 2.6 %
BILIRUB SERPL-MCNC: 5.2 MG/DL (ref 0–1.2)
BUN SERPL-MCNC: 5 MG/DL (ref 6–23)
CALCIUM SERPL-MCNC: 9.4 MG/DL (ref 8.6–10.6)
CHLORIDE SERPL-SCNC: 107 MMOL/L (ref 98–107)
CO2 SERPL-SCNC: 23 MMOL/L (ref 21–32)
CREAT SERPL-MCNC: 0.41 MG/DL (ref 0.5–1.05)
EGFRCR SERPLBLD CKD-EPI 2021: >90 ML/MIN/1.73M*2
EOSINOPHIL # BLD MANUAL: 0.09 X10*3/UL (ref 0–0.7)
EOSINOPHIL NFR BLD MANUAL: 0.9 %
ERYTHROCYTE [DISTWIDTH] IN BLOOD BY AUTOMATED COUNT: 20.2 % (ref 11.5–14.5)
FLUAV RNA RESP QL NAA+PROBE: NOT DETECTED
FLUBV RNA RESP QL NAA+PROBE: NOT DETECTED
GLUCOSE SERPL-MCNC: 82 MG/DL (ref 74–99)
HCT VFR BLD AUTO: 19.6 % (ref 36–46)
HGB BLD-MCNC: 6.8 G/DL (ref 12–16)
HGB RETIC QN: 32 PG (ref 28–38)
HOLD SPECIMEN: NORMAL
HYPOCHROMIA BLD QL SMEAR: ABNORMAL
IMM GRANULOCYTES # BLD AUTO: 0.04 X10*3/UL (ref 0–0.7)
IMM GRANULOCYTES NFR BLD AUTO: 0.4 % (ref 0–0.9)
IMMATURE RETIC FRACTION: 28.3 %
LDH SERPL L TO P-CCNC: 330 U/L (ref 84–246)
LYMPHOCYTES # BLD MANUAL: 2.15 X10*3/UL (ref 1.2–4.8)
LYMPHOCYTES NFR BLD MANUAL: 21.1 %
MCH RBC QN AUTO: 28.9 PG (ref 26–34)
MCHC RBC AUTO-ENTMCNC: 34.7 G/DL (ref 32–36)
MCV RBC AUTO: 83 FL (ref 80–100)
MONOCYTES # BLD MANUAL: 0.27 X10*3/UL (ref 0.1–1)
MONOCYTES NFR BLD MANUAL: 2.6 %
NEUTS SEG # BLD MANUAL: 6.98 X10*3/UL (ref 1.2–7)
NEUTS SEG NFR BLD MANUAL: 68.4 %
NRBC BLD-RTO: 1.9 /100 WBCS (ref 0–0)
PLATELET # BLD AUTO: 668 X10*3/UL (ref 150–450)
POLYCHROMASIA BLD QL SMEAR: ABNORMAL
POTASSIUM SERPL-SCNC: 3.7 MMOL/L (ref 3.5–5.3)
PROT SERPL-MCNC: 7.9 G/DL (ref 6.4–8.2)
RBC # BLD AUTO: 2.35 X10*6/UL (ref 4–5.2)
RBC MORPH BLD: ABNORMAL
RETICS #: 0.53 X10*6/UL (ref 0.02–0.08)
RETICS/RBC NFR AUTO: 22.5 % (ref 0.5–2)
SARS-COV-2 RNA RESP QL NAA+PROBE: NOT DETECTED
SCHISTOCYTES BLD QL SMEAR: ABNORMAL
SICKLE CELLS BLD QL SMEAR: ABNORMAL
SODIUM SERPL-SCNC: 140 MMOL/L (ref 136–145)
TARGETS BLD QL SMEAR: ABNORMAL
TOTAL CELLS COUNTED BLD: 114
VARIANT LYMPHS # BLD MANUAL: 0.45 X10*3/UL (ref 0–0.5)
VARIANT LYMPHS NFR BLD: 4.4 %
WBC # BLD AUTO: 10.2 X10*3/UL (ref 4.4–11.3)

## 2025-03-02 PROCEDURE — 71045 X-RAY EXAM CHEST 1 VIEW: CPT | Performed by: STUDENT IN AN ORGANIZED HEALTH CARE EDUCATION/TRAINING PROGRAM

## 2025-03-02 PROCEDURE — 2500000004 HC RX 250 GENERAL PHARMACY W/ HCPCS (ALT 636 FOR OP/ED)

## 2025-03-02 PROCEDURE — 36415 COLL VENOUS BLD VENIPUNCTURE: CPT

## 2025-03-02 PROCEDURE — 85045 AUTOMATED RETICULOCYTE COUNT: CPT

## 2025-03-02 PROCEDURE — 87636 SARSCOV2 & INF A&B AMP PRB: CPT

## 2025-03-02 PROCEDURE — 99285 EMERGENCY DEPT VISIT HI MDM: CPT | Mod: 25 | Performed by: EMERGENCY MEDICINE

## 2025-03-02 PROCEDURE — 83615 LACTATE (LD) (LDH) ENZYME: CPT

## 2025-03-02 PROCEDURE — 93005 ELECTROCARDIOGRAM TRACING: CPT

## 2025-03-02 PROCEDURE — 85027 COMPLETE CBC AUTOMATED: CPT

## 2025-03-02 PROCEDURE — 71045 X-RAY EXAM CHEST 1 VIEW: CPT

## 2025-03-02 PROCEDURE — 96376 TX/PRO/DX INJ SAME DRUG ADON: CPT

## 2025-03-02 PROCEDURE — 80053 COMPREHEN METABOLIC PANEL: CPT

## 2025-03-02 PROCEDURE — 96375 TX/PRO/DX INJ NEW DRUG ADDON: CPT

## 2025-03-02 PROCEDURE — 85007 BL SMEAR W/DIFF WBC COUNT: CPT

## 2025-03-02 PROCEDURE — 96374 THER/PROPH/DIAG INJ IV PUSH: CPT

## 2025-03-02 PROCEDURE — 96361 HYDRATE IV INFUSION ADD-ON: CPT

## 2025-03-02 RX ORDER — KETOROLAC TROMETHAMINE 15 MG/ML
15 INJECTION, SOLUTION INTRAMUSCULAR; INTRAVENOUS ONCE
Status: COMPLETED | OUTPATIENT
Start: 2025-03-02 | End: 2025-03-02

## 2025-03-02 RX ORDER — ONDANSETRON 4 MG/1
4 TABLET, ORALLY DISINTEGRATING ORAL EVERY 8 HOURS PRN
Status: DISCONTINUED | OUTPATIENT
Start: 2025-03-02 | End: 2025-03-02 | Stop reason: HOSPADM

## 2025-03-02 RX ORDER — HYDROMORPHONE HYDROCHLORIDE 1 MG/ML
1 INJECTION, SOLUTION INTRAMUSCULAR; INTRAVENOUS; SUBCUTANEOUS
Status: DISCONTINUED | OUTPATIENT
Start: 2025-03-02 | End: 2025-03-02 | Stop reason: HOSPADM

## 2025-03-02 RX ORDER — DIPHENHYDRAMINE HCL 25 MG
25 CAPSULE ORAL EVERY 6 HOURS PRN
Status: DISCONTINUED | OUTPATIENT
Start: 2025-03-02 | End: 2025-03-02 | Stop reason: HOSPADM

## 2025-03-02 RX ADMIN — SODIUM CHLORIDE, SODIUM LACTATE, POTASSIUM CHLORIDE, AND CALCIUM CHLORIDE 1000 ML: 600; 310; 30; 20 INJECTION, SOLUTION INTRAVENOUS at 06:15

## 2025-03-02 RX ADMIN — HYDROMORPHONE HYDROCHLORIDE 1 MG: 1 INJECTION, SOLUTION INTRAMUSCULAR; INTRAVENOUS; SUBCUTANEOUS at 10:36

## 2025-03-02 RX ADMIN — KETOROLAC TROMETHAMINE 15 MG: 15 INJECTION, SOLUTION INTRAMUSCULAR; INTRAVENOUS at 06:32

## 2025-03-02 RX ADMIN — HYDROMORPHONE HYDROCHLORIDE 1 MG: 1 INJECTION, SOLUTION INTRAMUSCULAR; INTRAVENOUS; SUBCUTANEOUS at 07:35

## 2025-03-02 ASSESSMENT — LIFESTYLE VARIABLES
EVER HAD A DRINK FIRST THING IN THE MORNING TO STEADY YOUR NERVES TO GET RID OF A HANGOVER: NO
TOTAL SCORE: 0
HAVE YOU EVER FELT YOU SHOULD CUT DOWN ON YOUR DRINKING: NO
HAVE PEOPLE ANNOYED YOU BY CRITICIZING YOUR DRINKING: NO
EVER FELT BAD OR GUILTY ABOUT YOUR DRINKING: NO

## 2025-03-02 ASSESSMENT — PAIN - FUNCTIONAL ASSESSMENT
PAIN_FUNCTIONAL_ASSESSMENT: 0-10
PAIN_FUNCTIONAL_ASSESSMENT: 0-10

## 2025-03-02 ASSESSMENT — PAIN SCALES - GENERAL
PAINLEVEL_OUTOF10: 7
PAINLEVEL_OUTOF10: 8
PAINLEVEL_OUTOF10: 8
PAINLEVEL_OUTOF10: 7
PAINLEVEL_OUTOF10: 2
PAINLEVEL_OUTOF10: 5 - MODERATE PAIN

## 2025-03-02 ASSESSMENT — PAIN DESCRIPTION - LOCATION
LOCATION: OTHER (COMMENT)
LOCATION: HIP
LOCATION: OTHER (COMMENT)

## 2025-03-02 ASSESSMENT — PAIN SCALES - WONG BAKER: WONGBAKER_NUMERICALRESPONSE: HURTS LITTLE BIT

## 2025-03-02 ASSESSMENT — PAIN DESCRIPTION - ORIENTATION: ORIENTATION: RIGHT

## 2025-03-02 NOTE — ED PROVIDER NOTES
EMERGENCY DEPARTMENT ENCOUNTER      Pt Name: Ruperto Pang  MRN: [id number]  Birthdate 2000  Date of evaluation: 3/2/2025    HISTORY OF PRESENT ILLNESS    Ruperto Pang is an 25 y.o. female with history including sickle cell anemia, sickle cell disease presenting to the emergency department for sickle cell pain crisis and lightheadedness.  Patient states her symptoms started yesterday morning.  She took naproxen in the morning.  She went to work where she felt mildly lightheaded but no near-syncope or syncopal episodes.  Patient denies any chest pain, shortness of breath, fevers, chills.  She started having aches in her bilateral shoulders and into her left groin.  These pains are similar to a sickle cell pain crisis.  She took oxycodone at 8 PM yesterday with no improvement.      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    CURRENT MEDICATIONS       Previous Medications    CAPSAICIN (ZOSTRIX) 0.025 % CREAM    Apply topically 2 times a day.    FOLIC ACID (FOLVITE) 1 MG TABLET    Take 1 tablet (1 mg) by mouth once daily.    HYDROXYUREA (HYDREA) 500 MG CAPSULE    Take 2 capsules (1,000 mg total) by mouth once daily.  Take at the same time each day.    IBUPROFEN 200 MG TABLET    Take 2 tablets (400 mg) by mouth every 6 hours if needed for mild pain (1 - 3) or moderate pain (4 - 6).    NALOXONE (NARCAN) 4 MG/0.1 ML NASAL SPRAY    Administer 1 spray (4 mg) into affected  nostril(s) if needed for opioid reversal. May repeat every 2-3 minutes if needed, alternating nostrils, until medical assistance becomes available.    OXYCODONE (ROXICODONE) 10 MG IMMEDIATE RELEASE TABLET    Take 1 tablet (10 mg) by mouth every 6 hours if needed for severe pain (7 - 10).    POLYETHYLENE GLYCOL (GLYCOLAX, MIRALAX) 17 GRAM PACKET    Take 17 g by mouth if needed.    SENNOSIDES-DOCUSATE SODIUM (SENNA-S) 8.6-50 MG TABLET    Take 1 tablet by mouth once daily.       ALLERGIES     Iodinated contrast media    FAMILY HISTORY       Family History   Problem Relation Name Age of Onset    Sickle cell anemia Sister          SOCIAL HISTORY       Social History     Socioeconomic History    Marital status: Single   Tobacco Use    Smoking status: Never    Smokeless tobacco: Never   Substance and Sexual Activity    Alcohol use: Never    Drug use: Never    Sexual activity: Defer     Social Drivers of Health     Financial Resource Strain: Low Risk  (9/16/2024)    Overall Financial Resource Strain (CARDIA)     Difficulty of Paying Living Expenses: Not very hard   Transportation Needs: No Transportation Needs (9/16/2024)    PRAPARE - Transportation     Lack of Transportation (Medical): No     Lack of Transportation (Non-Medical): No   Housing Stability: Low Risk  (9/16/2024)    Housing Stability Vital Sign     Unable to Pay for Housing in the Last Year: No     Number of Times Moved in the Last Year: 0     Homeless in the Last Year: No       PHYSICAL EXAM       ED Triage Vitals [03/02/25 0550]   Temperature Heart Rate Respirations BP   36.6 °C (97.9 °F) 75 18 114/76      Pulse Ox Temp Source Heart Rate Source Patient Position   98 % Temporal Monitor --      BP Location FiO2 (%)     -- --       Physical Exam  Vitals and nursing note reviewed.   Constitutional:       General: She is not in acute distress.     Appearance: She is well-developed.   HENT:      Head: Normocephalic and atraumatic.   Eyes:      Conjunctiva/sclera:  Conjunctivae normal.   Cardiovascular:      Rate and Rhythm: Normal rate and regular rhythm.      Heart sounds: No murmur heard.  Pulmonary:      Effort: Pulmonary effort is normal. No respiratory distress.      Breath sounds: Normal breath sounds.   Abdominal:      Palpations: Abdomen is soft.      Tenderness: There is no abdominal tenderness.   Musculoskeletal:         General: No swelling.   Skin:     General: Skin is warm and dry.   Neurological:      General: No focal deficit present.      Mental Status: She is alert and oriented to person, place, and time.          DIAGNOSTIC RESULTS     LABS:  Labs Reviewed - No data to display    All other labs were within normal range or not returned as of this dictation.    Imaging  No orders to display        Procedures  Procedures     EMERGENCY DEPARTMENT COURSE/MDM:   Medical Decision Making  Ruperto Pang is an 25 y.o. female with history including sickle cell anemia, sickle cell disease presenting to the emergency department for sickle cell pain crisis and lightheadedness.  If the lightheaded episode will give patient fluid resuscitation with LR and an EKG.  No chest pain or shortness of breath or fevers and chills.  Low concern for acute chest.  Lab workup ordered.    Patient pending pain medications and lab results at time of signout.  Care transition to day team.  They will follow-up on results.                    External records reviewed: recent inpatient, clinic, and prior ED notes  Labs and Diagnostic imaging independently reviewed/interpreted by me.    Patient plan, care, lab results and imaging were all discussed with attending.    ED Medications administered this visit:  Medications - No data to display  New Prescriptions from this visit:    New Prescriptions    No medications on file       (Please note that portions of this note were completed with a voice recognition program.  Efforts were made to edit the dictations but occasionally words are  mis-transcribed.)     Fiorella Hagen DO  Resident  03/02/25 0602

## 2025-03-02 NOTE — DISCHARGE INSTRUCTIONS
You have been evaluated in the Emergency Department today for pain. Your evaluation, including lab work, history, chest x-ray, suggests that your symptoms are due to pain from your sickle cell which has resolved after medication.  Please follow-up with your primary care physician and take your medicines as prescribed outpatient..    Please follow up with your primary care physician within two days. If you do not have a primary care doctor you may call [phone] to make an appointment.    Return to the Emergency Department if you experience chest pain, shortness of breath, fevers, cough. Return to the Emergency Department if you develop any new or worsening symptoms.   Thank you for choosing us for your care.

## 2025-03-02 NOTE — ED TRIAGE NOTES
Patient presents to the ED for sickle cell crisis. Patient is endorsing left hip/leg pain. Patient is also endorsing lightheaded that started this morning.

## 2025-03-02 NOTE — Clinical Note
Ruperto Pang was seen and treated in our emergency department on 3/2/2025.  She may return to work on 03/03/2025.       If you have any questions or concerns, please don't hesitate to call.      Yan Jackson MD

## 2025-03-02 NOTE — PROGRESS NOTES
I received Ruperto Pang in signout from outgoing provider.  Please see the previous note for all HPI, PE and MDM up to the time of signout at 0700.    In brief Ruperto Pang is an 25 y.o. female presenting for sickle cell related pain.  Chief Complaint   Patient presents with    Sickle Cell Pain Crisis   .  At the time of signout we were awaiting:  Reassessment, results of lab work and imaging  I examined the patient at time of assumption of care:  Patient is awake alert and oriented x 4, resting comfortably on the stretcher in no acute distress  She has regular rate and rhythm, 2+ radial pulses, 2+ DP and PT pulses, capillary refills intact, her abdomen is nontender, she speaks in full sentences        During my care patient was reassessed as above, lab work was pertinent for hemoglobin that was not any transfusion threshold, elevated reticulocyte percentage and absolute count indicating that this is not an aplastic crisis.  No significant electrolyte derangements.  After pain control with multimodal therapy the patient experience resolution of symptoms.  Discussed return precautions and the use were given verbally and in writing.  The patient was discharged in improved condition.      Patient seen and discussed with attending of record.    Yan Jackson MD

## 2025-03-03 LAB
ATRIAL RATE: 71 BPM
P AXIS: 14 DEGREES
P OFFSET: 174 MS
P ONSET: 128 MS
PR INTERVAL: 188 MS
Q ONSET: 222 MS
QRS COUNT: 12 BEATS
QRS DURATION: 88 MS
QT INTERVAL: 402 MS
QTC CALCULATION(BAZETT): 436 MS
QTC FREDERICIA: 425 MS
R AXIS: 54 DEGREES
T AXIS: -38 DEGREES
T OFFSET: 423 MS
VENTRICULAR RATE: 71 BPM

## 2025-03-24 ENCOUNTER — TELEPHONE (OUTPATIENT)
Dept: ADMISSION | Facility: HOSPITAL | Age: 25
End: 2025-03-24
Payer: COMMERCIAL

## 2025-03-24 DIAGNOSIS — D57.00 SICKLE CELL CRISIS (MULTI): ICD-10-CM

## 2025-03-24 RX ORDER — OXYCODONE HYDROCHLORIDE 10 MG/1
10 TABLET ORAL EVERY 6 HOURS PRN
Qty: 30 TABLET | Refills: 0 | Status: SHIPPED | OUTPATIENT
Start: 2025-03-24 | End: 2025-04-23

## 2025-04-08 ENCOUNTER — APPOINTMENT (OUTPATIENT)
Dept: PRIMARY CARE | Facility: CLINIC | Age: 25
End: 2025-04-08
Payer: COMMERCIAL

## 2025-04-13 ENCOUNTER — HOSPITAL ENCOUNTER (EMERGENCY)
Facility: HOSPITAL | Age: 25
Discharge: HOME | End: 2025-04-13
Payer: COMMERCIAL

## 2025-04-13 ENCOUNTER — APPOINTMENT (OUTPATIENT)
Dept: RADIOLOGY | Facility: HOSPITAL | Age: 25
End: 2025-04-13
Payer: COMMERCIAL

## 2025-04-13 ENCOUNTER — CLINICAL SUPPORT (OUTPATIENT)
Dept: EMERGENCY MEDICINE | Facility: HOSPITAL | Age: 25
End: 2025-04-13
Payer: COMMERCIAL

## 2025-04-13 VITALS
RESPIRATION RATE: 14 BRPM | BODY MASS INDEX: 27.23 KG/M2 | WEIGHT: 148 LBS | TEMPERATURE: 97.6 F | OXYGEN SATURATION: 95 % | HEART RATE: 74 BPM | SYSTOLIC BLOOD PRESSURE: 108 MMHG | DIASTOLIC BLOOD PRESSURE: 71 MMHG | HEIGHT: 62 IN

## 2025-04-13 DIAGNOSIS — D57.00 SICKLE CELL ANEMIA WITH PAIN: Primary | ICD-10-CM

## 2025-04-13 LAB
ALBUMIN SERPL BCP-MCNC: 4.6 G/DL (ref 3.4–5)
ALP SERPL-CCNC: 75 U/L (ref 33–110)
ALT SERPL W P-5'-P-CCNC: 18 U/L (ref 7–45)
ANION GAP SERPL CALC-SCNC: 16 MMOL/L (ref 10–20)
AST SERPL W P-5'-P-CCNC: 41 U/L (ref 9–39)
ATRIAL RATE: 63 BPM
BASOPHILS # BLD MANUAL: 0.79 X10*3/UL (ref 0–0.1)
BASOPHILS NFR BLD MANUAL: 6.9 %
BILIRUB SERPL-MCNC: 6.4 MG/DL (ref 0–1.2)
BLASTS # BLD MANUAL: 0 X10*3/UL
BLASTS NFR BLD MANUAL: 0 %
BUN SERPL-MCNC: 12 MG/DL (ref 6–23)
CALCIUM SERPL-MCNC: 9.6 MG/DL (ref 8.6–10.6)
CHLORIDE SERPL-SCNC: 107 MMOL/L (ref 98–107)
CO2 SERPL-SCNC: 20 MMOL/L (ref 21–32)
CREAT SERPL-MCNC: 0.43 MG/DL (ref 0.5–1.05)
EGFRCR SERPLBLD CKD-EPI 2021: >90 ML/MIN/1.73M*2
EOSINOPHIL # BLD MANUAL: 0 X10*3/UL (ref 0–0.7)
EOSINOPHIL NFR BLD MANUAL: 0 %
ERYTHROCYTE [DISTWIDTH] IN BLOOD BY AUTOMATED COUNT: 20.7 % (ref 11.5–14.5)
GLUCOSE SERPL-MCNC: 76 MG/DL (ref 74–99)
HCT VFR BLD AUTO: 17.9 % (ref 36–46)
HGB BLD-MCNC: 6.7 G/DL (ref 12–16)
HGB RETIC QN: 29 PG (ref 28–38)
HOWELL-JOLLY BOD BLD QL SMEAR: PRESENT
HYPOCHROMIA BLD QL SMEAR: ABNORMAL
IMM GRANULOCYTES # BLD AUTO: 0.08 X10*3/UL (ref 0–0.7)
IMM GRANULOCYTES NFR BLD AUTO: 0.7 % (ref 0–0.9)
IMMATURE RETIC FRACTION: 30.2 %
LDH SERPL L TO P-CCNC: 352 U/L (ref 84–246)
LYMPHOCYTES # BLD MANUAL: 4.06 X10*3/UL (ref 1.2–4.8)
LYMPHOCYTES NFR BLD MANUAL: 35.3 %
MCH RBC QN AUTO: 31 PG (ref 26–34)
MCHC RBC AUTO-ENTMCNC: 37.4 G/DL (ref 32–36)
MCV RBC AUTO: 83 FL (ref 80–100)
METAMYELOCYTES # BLD MANUAL: 0 X10*3/UL
METAMYELOCYTES NFR BLD MANUAL: 0 %
MONOCYTES # BLD MANUAL: 0.49 X10*3/UL (ref 0.1–1)
MONOCYTES NFR BLD MANUAL: 4.3 %
MYELOCYTES # BLD MANUAL: 0 X10*3/UL
MYELOCYTES NFR BLD MANUAL: 0 %
NEUTROPHILS # BLD MANUAL: 6.15 X10*3/UL (ref 1.2–7.7)
NEUTS BAND # BLD MANUAL: 0 X10*3/UL (ref 0–0.7)
NEUTS BAND NFR BLD MANUAL: 0 %
NEUTS SEG # BLD MANUAL: 6.15 X10*3/UL (ref 1.2–7)
NEUTS SEG NFR BLD MANUAL: 53.5 %
NRBC BLD MANUAL-RTO: 0 % (ref 0–0)
NRBC BLD-RTO: 2.6 /100 WBCS (ref 0–0)
P AXIS: 21 DEGREES
P OFFSET: 157 MS
P ONSET: 117 MS
PAPPENHEIMER BOD BLD QL SMEAR: PRESENT
PLASMA CELLS # BLD MANUAL: 0 X10*3/UL
PLASMA CELLS NFR BLD MANUAL: 0 %
PLATELET # BLD AUTO: 560 X10*3/UL (ref 150–450)
POLYCHROMASIA BLD QL SMEAR: ABNORMAL
POTASSIUM SERPL-SCNC: 4.7 MMOL/L (ref 3.5–5.3)
PR INTERVAL: 206 MS
PREGNANCY TEST URINE, POC: NEGATIVE
PROMYELOCYTES # BLD MANUAL: 0 X10*3/UL
PROMYELOCYTES NFR BLD MANUAL: 0 %
PROT SERPL-MCNC: 7.5 G/DL (ref 6.4–8.2)
Q ONSET: 220 MS
QRS COUNT: 10 BEATS
QRS DURATION: 84 MS
QT INTERVAL: 418 MS
QTC CALCULATION(BAZETT): 427 MS
QTC FREDERICIA: 424 MS
R AXIS: 1 DEGREES
RBC # BLD AUTO: 2.16 X10*6/UL (ref 4–5.2)
RBC MORPH BLD: ABNORMAL
RETICS #: 0.59 X10*6/UL (ref 0.02–0.08)
RETICS/RBC NFR AUTO: 27.5 % (ref 0.5–2)
SCHISTOCYTES BLD QL SMEAR: ABNORMAL
SICKLE CELLS BLD QL SMEAR: ABNORMAL
SODIUM SERPL-SCNC: 138 MMOL/L (ref 136–145)
T AXIS: 33 DEGREES
T OFFSET: 429 MS
TARGETS BLD QL SMEAR: ABNORMAL
TOTAL CELLS COUNTED BLD: 116
VARIANT LYMPHS # BLD MANUAL: 0 X10*3/UL (ref 0–0.5)
VARIANT LYMPHS NFR BLD: 0 %
VENTRICULAR RATE: 63 BPM
WBC # BLD AUTO: 11.5 X10*3/UL (ref 4.4–11.3)

## 2025-04-13 PROCEDURE — 2500000001 HC RX 250 WO HCPCS SELF ADMINISTERED DRUGS (ALT 637 FOR MEDICARE OP): Performed by: NURSE PRACTITIONER

## 2025-04-13 PROCEDURE — 85007 BL SMEAR W/DIFF WBC COUNT: CPT | Performed by: NURSE PRACTITIONER

## 2025-04-13 PROCEDURE — 96375 TX/PRO/DX INJ NEW DRUG ADDON: CPT

## 2025-04-13 PROCEDURE — 36415 COLL VENOUS BLD VENIPUNCTURE: CPT | Performed by: NURSE PRACTITIONER

## 2025-04-13 PROCEDURE — 85027 COMPLETE CBC AUTOMATED: CPT | Performed by: NURSE PRACTITIONER

## 2025-04-13 PROCEDURE — 85045 AUTOMATED RETICULOCYTE COUNT: CPT | Performed by: NURSE PRACTITIONER

## 2025-04-13 PROCEDURE — 81025 URINE PREGNANCY TEST: CPT | Performed by: NURSE PRACTITIONER

## 2025-04-13 PROCEDURE — 80053 COMPREHEN METABOLIC PANEL: CPT | Performed by: NURSE PRACTITIONER

## 2025-04-13 PROCEDURE — 96374 THER/PROPH/DIAG INJ IV PUSH: CPT

## 2025-04-13 PROCEDURE — 93010 ELECTROCARDIOGRAM REPORT: CPT

## 2025-04-13 PROCEDURE — 96376 TX/PRO/DX INJ SAME DRUG ADON: CPT

## 2025-04-13 PROCEDURE — 99285 EMERGENCY DEPT VISIT HI MDM: CPT | Mod: 25

## 2025-04-13 PROCEDURE — 93005 ELECTROCARDIOGRAM TRACING: CPT

## 2025-04-13 PROCEDURE — 71046 X-RAY EXAM CHEST 2 VIEWS: CPT

## 2025-04-13 PROCEDURE — 71046 X-RAY EXAM CHEST 2 VIEWS: CPT | Performed by: STUDENT IN AN ORGANIZED HEALTH CARE EDUCATION/TRAINING PROGRAM

## 2025-04-13 PROCEDURE — 99285 EMERGENCY DEPT VISIT HI MDM: CPT | Performed by: NURSE PRACTITIONER

## 2025-04-13 PROCEDURE — 2500000004 HC RX 250 GENERAL PHARMACY W/ HCPCS (ALT 636 FOR OP/ED): Performed by: NURSE PRACTITIONER

## 2025-04-13 PROCEDURE — 83615 LACTATE (LD) (LDH) ENZYME: CPT | Performed by: NURSE PRACTITIONER

## 2025-04-13 RX ORDER — DIPHENHYDRAMINE HCL 25 MG
25 CAPSULE ORAL ONCE
Status: COMPLETED | OUTPATIENT
Start: 2025-04-13 | End: 2025-04-13

## 2025-04-13 RX ORDER — KETOROLAC TROMETHAMINE 15 MG/ML
15 INJECTION, SOLUTION INTRAMUSCULAR; INTRAVENOUS ONCE
Status: COMPLETED | OUTPATIENT
Start: 2025-04-13 | End: 2025-04-13

## 2025-04-13 RX ORDER — HYDROMORPHONE HYDROCHLORIDE 1 MG/ML
1 INJECTION, SOLUTION INTRAMUSCULAR; INTRAVENOUS; SUBCUTANEOUS
Status: DISCONTINUED | OUTPATIENT
Start: 2025-04-13 | End: 2025-04-13 | Stop reason: HOSPADM

## 2025-04-13 RX ORDER — ONDANSETRON 4 MG/1
4 TABLET, ORALLY DISINTEGRATING ORAL ONCE
Status: COMPLETED | OUTPATIENT
Start: 2025-04-13 | End: 2025-04-13

## 2025-04-13 RX ADMIN — HYDROMORPHONE HYDROCHLORIDE 1 MG: 1 INJECTION, SOLUTION INTRAMUSCULAR; INTRAVENOUS; SUBCUTANEOUS at 08:47

## 2025-04-13 RX ADMIN — DIPHENHYDRAMINE HYDROCHLORIDE 25 MG: 25 CAPSULE ORAL at 08:47

## 2025-04-13 RX ADMIN — ONDANSETRON 4 MG: 4 TABLET, ORALLY DISINTEGRATING ORAL at 08:47

## 2025-04-13 RX ADMIN — KETOROLAC TROMETHAMINE 15 MG: 15 INJECTION, SOLUTION INTRAMUSCULAR; INTRAVENOUS at 10:03

## 2025-04-13 RX ADMIN — HYDROMORPHONE HYDROCHLORIDE 1 MG: 1 INJECTION, SOLUTION INTRAMUSCULAR; INTRAVENOUS; SUBCUTANEOUS at 10:05

## 2025-04-13 RX ADMIN — HYDROMORPHONE HYDROCHLORIDE 1 MG: 1 INJECTION, SOLUTION INTRAMUSCULAR; INTRAVENOUS; SUBCUTANEOUS at 11:11

## 2025-04-13 ASSESSMENT — LIFESTYLE VARIABLES
EVER FELT BAD OR GUILTY ABOUT YOUR DRINKING: NO
TOTAL SCORE: 0
HAVE YOU EVER FELT YOU SHOULD CUT DOWN ON YOUR DRINKING: NO
HAVE PEOPLE ANNOYED YOU BY CRITICIZING YOUR DRINKING: NO
EVER HAD A DRINK FIRST THING IN THE MORNING TO STEADY YOUR NERVES TO GET RID OF A HANGOVER: NO

## 2025-04-13 ASSESSMENT — PAIN DESCRIPTION - LOCATION
LOCATION: CHEST
LOCATION_3: LEG
LOCATION_2: SHOULDER

## 2025-04-13 ASSESSMENT — PAIN SCALES - GENERAL
PAINLEVEL_OUTOF10: 7
PAINLEVEL_OUTOF10: 8
PAINLEVEL_OUTOF10: 8
PAINLEVEL_OUTOF10: 7
PAINLEVEL_OUTOF10: 7
PAINLEVEL_OUTOF10: 8

## 2025-04-13 ASSESSMENT — PAIN - FUNCTIONAL ASSESSMENT: PAIN_FUNCTIONAL_ASSESSMENT: 0-10

## 2025-04-13 NOTE — ED PROVIDER NOTES
HPI   Chief Complaint   Patient presents with    Sickle Cell Pain Crisis       HPI    This is  25 year old female with pas medical history significant for sickle cell disease with acute chest presents to the ED today with pain in her sternum, legs and arms and shoulder.   The sternal pain is new and not the usual part of her sickle cell pain crisis.   She denies cough, no fever but endorses chills. Denies nausea, vomiting, dyspnea.   Takes Oxycodone at home and her last dose was this morning.     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 No pertinent surgical history.  Family History   Problem Relation Name Age of Onset    Sickle cell anemia Sister       Social History     Tobacco Use    Smoking status: Never    Smokeless tobacco: Never   Substance Use Topics    Alcohol use: Never    Drug use: Never       Physical Exam   ED Triage Vitals [04/13/25 0710]   Temperature Heart Rate Respirations BP   36.7 °C (98 °F) 67 16 118/72      Pulse Ox Temp Source Heart Rate Source Patient Position   95 % Oral -- --      BP Location FiO2 (%)     -- --       Physical Exam  Constitutional:       Appearance: Normal appearance.   HENT:      Head: Normocephalic and atraumatic.      Mouth/Throat:      Mouth: Mucous membranes are moist.   Eyes:      Extraocular Movements: Extraocular movements intact.      Pupils: Pupils are equal, round, and reactive to light.   Cardiovascular:      Rate and Rhythm: Normal rate and regular rhythm.    Pulmonary:      Effort: Pulmonary effort is normal.      Breath sounds: Normal breath sounds.   Abdominal:      Palpations: Abdomen is soft.   Musculoskeletal:         General: Normal range of motion.      Cervical back: Normal range of motion and neck supple.   Skin:     General: Skin is warm and dry.   Neurological:      General: No focal deficit present.      Mental Status: She is alert and oriented to person, place, and time.   Psychiatric:         Mood and Affect: Mood normal.         Behavior: Behavior normal.           ED Course & MDM   Diagnoses as of 04/13/25 1207   Sickle cell anemia with pain                 No data recorded     Claudette Coma Scale Score: 15 (04/13/25 0712 : Marilyn Hilton RN)                           Medical Decision Making  Pain management in the Emergency Department (ED) or Acute Care Clinic (ACC)                1.          Ask the patient: What pain medicine have you taken? When did you last take it, what was the dose, and how many/much did you take?   2.          Treat patient's pain first. This can be done while labs are pending. Reassess their pain 30 minutes after treatment. This will help you decide sooner how to triage the patient.   3.Hold opioid dose if patient is sedated  4.Individualized pain management plan for ED or ACC:                             For sickle cell pain crisis:     For sickle cell pain crisis:   SQ/IV Dilaudid 1 mg every 30-60 mins X 3 doses   IV Toradol 15 mg x 1 dose   PO Benadryl 25mg every 6 hours prn for opioid induced pruritus  PO Zofran 4 mg every 8 hours prn for nausea or vomiting  Can consider cyclobenzaprine and/or toradol (check renal) as adjuvants, if no contraindications      Her care plan was initiated and labs did not show any acute findings though her hemoglobin was 6.7 which is at baseline. LDH of 352. Chest xray showed no acute findings and her repeat O2 sat was 95%. She felt improved after the pain regimen and felt that she could  manage at home.  Work excuse was provided for her and she was discharged.     Procedure  Procedures     Jackie Alvarado, CARLOS-CNP, [facility]  04/13/25 5564

## 2025-04-13 NOTE — Clinical Note
Ruperto Pang was seen and treated in our emergency department on 4/13/2025.  She may return to work on 04/14/2025.       If you have any questions or concerns, please don't hesitate to call.      Jackie Alvarado, APRN-CNP, DNP

## 2025-04-13 NOTE — ED TRIAGE NOTES
Pt presents to the ED c/o midsternal CP, emil shoulders, emil leg pain. Pt states that this is typical for her except for the chest pain. Pt states that she does not believe she is in acute chest but she does feel uncomfortable.

## 2025-04-23 ENCOUNTER — TELEPHONE (OUTPATIENT)
Dept: HEMATOLOGY/ONCOLOGY | Facility: HOSPITAL | Age: 25
End: 2025-04-23

## 2025-04-23 ENCOUNTER — OFFICE VISIT (OUTPATIENT)
Dept: HEMATOLOGY/ONCOLOGY | Facility: HOSPITAL | Age: 25
End: 2025-04-23
Payer: COMMERCIAL

## 2025-04-23 VITALS
OXYGEN SATURATION: 94 % | HEART RATE: 69 BPM | SYSTOLIC BLOOD PRESSURE: 113 MMHG | TEMPERATURE: 99.1 F | DIASTOLIC BLOOD PRESSURE: 60 MMHG

## 2025-04-23 DIAGNOSIS — D57.00 SICKLE CELL CRISIS (MULTI): ICD-10-CM

## 2025-04-23 DIAGNOSIS — D57.09 SICKLE CELL DISEASE WITH CRISIS AND OTHER COMPLICATION: ICD-10-CM

## 2025-04-23 DIAGNOSIS — R52 ACUTE PAIN: Primary | ICD-10-CM

## 2025-04-23 LAB
ALBUMIN SERPL BCP-MCNC: 4.5 G/DL (ref 3.4–5)
ALP SERPL-CCNC: 65 U/L (ref 33–110)
ALT SERPL W P-5'-P-CCNC: 19 U/L (ref 7–45)
ANION GAP SERPL CALC-SCNC: 12 MMOL/L (ref 10–20)
APPEARANCE UR: CLEAR
AST SERPL W P-5'-P-CCNC: 27 U/L (ref 9–39)
BASOPHILS # BLD AUTO: 0.08 X10*3/UL (ref 0–0.1)
BASOPHILS NFR BLD AUTO: 0.9 %
BILIRUB SERPL-MCNC: 8.2 MG/DL (ref 0–1.2)
BILIRUB UR STRIP.AUTO-MCNC: NEGATIVE MG/DL
BUN SERPL-MCNC: 6 MG/DL (ref 6–23)
CALCIUM SERPL-MCNC: 9.2 MG/DL (ref 8.6–10.3)
CHLORIDE SERPL-SCNC: 109 MMOL/L (ref 98–107)
CO2 SERPL-SCNC: 23 MMOL/L (ref 21–32)
COLOR UR: YELLOW
CREAT SERPL-MCNC: 0.49 MG/DL (ref 0.5–1.05)
EGFRCR SERPLBLD CKD-EPI 2021: >90 ML/MIN/1.73M*2
EOSINOPHIL # BLD AUTO: 0.08 X10*3/UL (ref 0–0.7)
EOSINOPHIL NFR BLD AUTO: 0.9 %
ERYTHROCYTE [DISTWIDTH] IN BLOOD BY AUTOMATED COUNT: 23.7 % (ref 11.5–14.5)
GLUCOSE SERPL-MCNC: 89 MG/DL (ref 74–99)
GLUCOSE UR STRIP.AUTO-MCNC: NORMAL MG/DL
HCT VFR BLD AUTO: 17.2 % (ref 36–46)
HGB BLD-MCNC: 6.1 G/DL (ref 12–16)
HOWELL-JOLLY BOD BLD QL SMEAR: PRESENT
IMM GRANULOCYTES # BLD AUTO: 0.04 X10*3/UL (ref 0–0.7)
IMM GRANULOCYTES NFR BLD AUTO: 0.5 % (ref 0–0.9)
KETONES UR STRIP.AUTO-MCNC: NEGATIVE MG/DL
LDH SERPL L TO P-CCNC: 253 U/L (ref 84–246)
LEUKOCYTE ESTERASE UR QL STRIP.AUTO: NEGATIVE
LYMPHOCYTES # BLD AUTO: 3.57 X10*3/UL (ref 1.2–4.8)
LYMPHOCYTES NFR BLD AUTO: 41.6 %
MCH RBC QN AUTO: 30 PG (ref 26–34)
MCHC RBC AUTO-ENTMCNC: 35.5 G/DL (ref 32–36)
MCV RBC AUTO: 85 FL (ref 80–100)
MONOCYTES # BLD AUTO: 0.7 X10*3/UL (ref 0.1–1)
MONOCYTES NFR BLD AUTO: 8.2 %
MUCOUS THREADS #/AREA URNS AUTO: NORMAL /LPF
NEUTROPHILS # BLD AUTO: 4.11 X10*3/UL (ref 1.2–7.7)
NEUTROPHILS NFR BLD AUTO: 47.9 %
NITRITE UR QL STRIP.AUTO: NEGATIVE
NRBC BLD-RTO: 5.5 /100 WBCS (ref 0–0)
PAPPENHEIMER BOD BLD QL SMEAR: PRESENT
PH UR STRIP.AUTO: 6 [PH]
PLATELET # BLD AUTO: 543 X10*3/UL (ref 150–450)
POLYCHROMASIA BLD QL SMEAR: NORMAL
POTASSIUM SERPL-SCNC: 4.2 MMOL/L (ref 3.5–5.3)
PROT SERPL-MCNC: 7.2 G/DL (ref 6.4–8.2)
PROT UR STRIP.AUTO-MCNC: NORMAL MG/DL
RBC # BLD AUTO: 2.03 X10*6/UL (ref 4–5.2)
RBC # UR STRIP.AUTO: NEGATIVE MG/DL
RBC #/AREA URNS AUTO: NORMAL /HPF
RBC MORPH BLD: NORMAL
SCHISTOCYTES BLD QL SMEAR: NORMAL
SICKLE CELLS BLD QL SMEAR: NORMAL
SODIUM SERPL-SCNC: 140 MMOL/L (ref 136–145)
SP GR UR STRIP.AUTO: 1.01
SQUAMOUS #/AREA URNS AUTO: NORMAL /HPF
TARGETS BLD QL SMEAR: NORMAL
UROBILINOGEN UR STRIP.AUTO-MCNC: NORMAL MG/DL
WBC # BLD AUTO: 8.6 X10*3/UL (ref 4.4–11.3)
WBC #/AREA URNS AUTO: NORMAL /HPF

## 2025-04-23 PROCEDURE — 99213 OFFICE O/P EST LOW 20 MIN: CPT

## 2025-04-23 PROCEDURE — 2500000004 HC RX 250 GENERAL PHARMACY W/ HCPCS (ALT 636 FOR OP/ED): Mod: JZ,TB

## 2025-04-23 PROCEDURE — 2500000001 HC RX 250 WO HCPCS SELF ADMINISTERED DRUGS (ALT 637 FOR MEDICARE OP)

## 2025-04-23 PROCEDURE — 81001 URINALYSIS AUTO W/SCOPE: CPT

## 2025-04-23 PROCEDURE — 83615 LACTATE (LD) (LDH) ENZYME: CPT

## 2025-04-23 PROCEDURE — 96372 THER/PROPH/DIAG INJ SC/IM: CPT

## 2025-04-23 PROCEDURE — 85025 COMPLETE CBC W/AUTO DIFF WBC: CPT

## 2025-04-23 PROCEDURE — 84075 ASSAY ALKALINE PHOSPHATASE: CPT

## 2025-04-23 RX ORDER — DIPHENHYDRAMINE HCL 25 MG
25 CAPSULE ORAL ONCE
Status: COMPLETED | OUTPATIENT
Start: 2025-04-23 | End: 2025-04-23

## 2025-04-23 RX ORDER — OXYCODONE HYDROCHLORIDE 10 MG/1
10 TABLET ORAL EVERY 6 HOURS PRN
Qty: 30 TABLET | Refills: 0 | Status: SHIPPED | OUTPATIENT
Start: 2025-04-23 | End: 2025-05-23

## 2025-04-23 RX ORDER — ONDANSETRON 4 MG/1
4 TABLET, FILM COATED ORAL ONCE
Status: COMPLETED | OUTPATIENT
Start: 2025-04-23 | End: 2025-04-23

## 2025-04-23 RX ORDER — KETOROLAC TROMETHAMINE 30 MG/ML
15 INJECTION, SOLUTION INTRAMUSCULAR; INTRAVENOUS ONCE
Status: COMPLETED | OUTPATIENT
Start: 2025-04-23 | End: 2025-04-23

## 2025-04-23 RX ORDER — HYDROMORPHONE HYDROCHLORIDE 1 MG/ML
1 INJECTION, SOLUTION INTRAMUSCULAR; INTRAVENOUS; SUBCUTANEOUS
Status: COMPLETED | OUTPATIENT
Start: 2025-04-23 | End: 2025-04-23

## 2025-04-23 RX ORDER — NALOXONE HYDROCHLORIDE 0.4 MG/ML
0.4 INJECTION, SOLUTION INTRAMUSCULAR; INTRAVENOUS; SUBCUTANEOUS
Status: DISCONTINUED | OUTPATIENT
Start: 2025-04-23 | End: 2025-04-23 | Stop reason: HOSPADM

## 2025-04-23 RX ADMIN — HYDROMORPHONE HYDROCHLORIDE 1 MG: 1 INJECTION, SOLUTION INTRAMUSCULAR; INTRAVENOUS; SUBCUTANEOUS at 11:18

## 2025-04-23 RX ADMIN — DIPHENHYDRAMINE HYDROCHLORIDE 25 MG: 25 CAPSULE ORAL at 10:08

## 2025-04-23 RX ADMIN — ONDANSETRON HYDROCHLORIDE 4 MG: 4 TABLET, FILM COATED ORAL at 10:08

## 2025-04-23 RX ADMIN — HYDROMORPHONE HYDROCHLORIDE 1 MG: 1 INJECTION, SOLUTION INTRAMUSCULAR; INTRAVENOUS; SUBCUTANEOUS at 10:11

## 2025-04-23 RX ADMIN — HYDROMORPHONE HYDROCHLORIDE 1 MG: 1 INJECTION, SOLUTION INTRAMUSCULAR; INTRAVENOUS; SUBCUTANEOUS at 12:26

## 2025-04-23 RX ADMIN — KETOROLAC TROMETHAMINE 15 MG: 30 INJECTION, SOLUTION INTRAMUSCULAR; INTRAVENOUS at 10:11

## 2025-04-23 ASSESSMENT — ENCOUNTER SYMPTOMS
MYALGIAS: 1
FREQUENCY: 1

## 2025-04-23 NOTE — PROGRESS NOTES
Patient ID:  Ruperto Pang is a 25 y.o. female.  Subjective   Chief Complaint: Uncontrolled Pain    HPI  Ruperto is a 25 y.o. female with Sickle cell SS disease who presents to [facility] with pain in b/l legs. Patient reports she has been working and in school and also with the weather changes have had worsening ongoing pain for a few weeks. Also complaining of frequency with urination. Reports her pain is worse in her knees and around her joints. Denies any other complaints.       ROS  Review of Systems   Genitourinary:  Positive for frequency.    Musculoskeletal:  Positive for myalgias.        Allergies  RX Allergies[1]     Medications  Current Outpatient Medications   Medication Instructions    capsaicin (Zostrix) 0.025 % cream Topical, 2 times daily    folic acid (FOLVITE) 1 mg, oral, Daily    hydroxyurea (HYDREA) 1,000 mg, Daily    ibuprofen 400 mg, oral, Every 6 hours PRN    naloxone (NARCAN) 4 mg, nasal, As needed, May repeat every 2-3 minutes if needed, alternating nostrils, until medical assistance becomes available.    oxyCODONE (ROXICODONE) 10 mg, oral, Every 6 hours PRN    polyethylene glycol (GLYCOLAX, MIRALAX) 17 g, As needed    sennosides-docusate sodium (Senna-S) 8.6-50 mg tablet 1 tablet, oral, Daily        Past Medical History:   Past Medical History:  10/14/2016: Encounter for contraceptive management, unspecified      Comment:  Contraception management  No date: Encounter for screening for disorder due to exposure to   contaminants      Comment:  Screening for lead exposure  12/14/2016: Hb-SS disease with acute chest syndrome (Multi)      Comment:  Acute chest syndrome due to Hgb-S disease  03/02/2022: Personal history of diseases of the blood and blood-  forming organs and certain disorders involving the immune mechanism      Comment:  History of sickle cell anemia   Surgical History:    Surgical History[2]   Family History:    Family History[3]  Family Oncology History:    Cancer-related family  history is not on file.  Social History:    Social History[4]     Objective   Vitals: /60   Pulse 69   Temp 37.3 °C (99.1 °F) (Temporal)   SpO2 94%   Weight: There were no vitals filed for this visit.    Physical Exam  Vitals reviewed.   Constitutional:       Appearance: Normal appearance.   Eyes:      General: Scleral icterus present.   Cardiovascular:      Rate and Rhythm: Normal rate.      Pulses: Normal pulses.   Pulmonary:      Effort: Pulmonary effort is normal.      Breath sounds: Normal breath sounds.   Abdominal:      Palpations: Abdomen is soft.   Skin:     General: Skin is warm.      Capillary Refill: Capillary refill takes less than 2 seconds.   Neurological:      General: No focal deficit present.      Mental Status: She is alert and oriented to person, place, and time.   Psychiatric:         Mood and Affect: Mood normal.         Diagnostic Results     Labs  Results from last 7 days   Lab Units 04/23/25  0954   WBC AUTO x10*3/uL 8.6   HEMOGLOBIN g/dL 6.1*   HEMATOCRIT % 17.2*   PLATELETS AUTO x10*3/uL 543*      Results from last 7 days   Lab Units 04/23/25  0954   GLUCOSE mg/dL 89   SODIUM mmol/L 140   POTASSIUM mmol/L 4.2   CHLORIDE mmol/L 109*   CO2 mmol/L 23   BUN mg/dL 6   CREATININE mg/dL 0.49*   EGFR mL/min/1.73m*2 >90   CALCIUM mg/dL 9.2   ALBUMIN g/dL 4.5   PROTEIN TOTAL g/dL 7.2     Results from last 7 days   Lab Units 04/23/25  0954   BILIRUBIN TOTAL mg/dL 8.2*   ALK PHOS U/L 65   ALT U/L 19   AST U/L 27             Results from last 7 days   Lab Units 04/23/25  0954   LD U/L 253*         Lab Results   Component Value Date    HGB 6.1 (LL) 04/23/2025    HGB 6.7 (L) 04/13/2025    HGB 6.8 (L) 03/02/2025     (H) 04/23/2025     (H) 04/13/2025     (H) 03/02/2025     (H) 04/23/2025     (H) 04/13/2025     (H) 03/02/2025       Images  === 04/13/25 ===    XR CHEST 2 VIEWS    - Impression -  1.  No evidence of acute cardiopulmonary  process.        MACRO:  None    Signed by: Torres Crowder 4/13/2025 10:26 AM  Dictation workstation:   [id number]   === 09/13/24 ===    CT ANGIO CHEST FOR PULMONARY EMBOLISM    - Impression -  1. No evidence of acute pulmonary embolism or other acute  cardiopulmonary process.    I personally reviewed the images/study and resident's interpretation  and I agree with the findings as stated by aTra Donohue MD (resident  radiologist). This study was analyzed and interpreted at [place], [place].    MACRO:  None    Signed by: Kalia Henao 9/14/2024 7:04 AM  Dictation workstation:   [id number]     No echocardiogram results found for the past 12 months       Assessment/Plan   Ruperto Pang is a 25 y.o. female with sickle cell SS disease who presents to [facility] for uncontrolled pain.      ACC Course  - VSS  - Hemolysis labs near baseline; hgb 6.1, no CP or SOB, no  indication for blood transfusion   - Toradol 15mg IM, given for AVN related pain/inflammation  - dilaudid 1mg Q1hr given for sickle cell related pain  - Zofran 4mg PO once for opioid induced nausea/vomiting  - Benadryl 25mg PO once for opioid induced pruritus   - UA ordered for frequency- results pending, message sent to outpatient team to follow results    Disposition  - Patient discharged home with no further needs following ACC Course  - Return to clinic/ED instructions given  - Dr. Dumas called refill to home pharmacy for oxycodone       Jennifer Leyva, CARLOS-CNP         [1]   Allergies  Allergen Reactions    Iodinated Contrast Media Hives   [2] No past surgical history on file.  [3]   Family History  Problem Relation Name Age of Onset    Sickle cell anemia Sister     [4]   Social History  Tobacco Use    Smoking status: Never    Smokeless tobacco: Never   Substance Use Topics    Alcohol use: Never    Drug use: Never

## 2025-04-23 NOTE — LETTER
April 23, 2025     Patient: Ruperto Pang   YOB: 2000   Date of Visit: 4/23/2025       To Whom It May Concern:    Ruperto Pang was seen in my clinic on 4/23/2025 at 9:30 am. Please excuse Ruperto for her absence from work on this day to make the appointment.    If you have any questions or concerns, please don't hesitate to call.         Sincerely,         CARLOS Kruse-CNP        CC: No Recipients

## 2025-04-28 ENCOUNTER — LAB (OUTPATIENT)
Dept: LAB | Facility: HOSPITAL | Age: 25
End: 2025-04-28
Payer: COMMERCIAL

## 2025-04-28 ENCOUNTER — APPOINTMENT (OUTPATIENT)
Facility: CLINIC | Age: 25
End: 2025-04-28
Payer: COMMERCIAL

## 2025-04-28 ENCOUNTER — DOCUMENTATION (OUTPATIENT)
Dept: HEMATOLOGY/ONCOLOGY | Facility: HOSPITAL | Age: 25
End: 2025-04-28

## 2025-04-28 ENCOUNTER — OFFICE VISIT (OUTPATIENT)
Dept: HEMATOLOGY/ONCOLOGY | Facility: HOSPITAL | Age: 25
End: 2025-04-28
Payer: COMMERCIAL

## 2025-04-28 VITALS
BODY MASS INDEX: 26.05 KG/M2 | HEART RATE: 74 BPM | SYSTOLIC BLOOD PRESSURE: 114 MMHG | TEMPERATURE: 97.3 F | WEIGHT: 147 LBS | DIASTOLIC BLOOD PRESSURE: 70 MMHG | HEIGHT: 63 IN | OXYGEN SATURATION: 95 %

## 2025-04-28 VITALS
SYSTOLIC BLOOD PRESSURE: 116 MMHG | DIASTOLIC BLOOD PRESSURE: 59 MMHG | OXYGEN SATURATION: 94 % | TEMPERATURE: 97.7 F | RESPIRATION RATE: 15 BRPM | BODY MASS INDEX: 26.78 KG/M2 | WEIGHT: 146.4 LBS | HEART RATE: 62 BPM

## 2025-04-28 DIAGNOSIS — D57.00 SICKLE CELL DISEASE WITH CRISIS (MULTI): ICD-10-CM

## 2025-04-28 DIAGNOSIS — Z79.64 ENCOUNTER FOR MONITORING OF HYDROXYUREA THERAPY: ICD-10-CM

## 2025-04-28 DIAGNOSIS — Z51.81 ENCOUNTER FOR MONITORING OF HYDROXYUREA THERAPY: ICD-10-CM

## 2025-04-28 DIAGNOSIS — R17 ELEVATED BILIRUBIN: ICD-10-CM

## 2025-04-28 DIAGNOSIS — D57.00 SICKLE CELL CRISIS (MULTI): ICD-10-CM

## 2025-04-28 DIAGNOSIS — D57.20 SICKLE CELL DISEASE, TYPE SC, WITHOUT CRISIS (MULTI): ICD-10-CM

## 2025-04-28 DIAGNOSIS — K59.03 DRUG-INDUCED CONSTIPATION: Primary | ICD-10-CM

## 2025-04-28 DIAGNOSIS — D57.00 HB-SS DISEASE WITH CRISIS: ICD-10-CM

## 2025-04-28 DIAGNOSIS — G47.34 NOCTURNAL HYPOXIA: ICD-10-CM

## 2025-04-28 DIAGNOSIS — Z71.85 ENCOUNTER FOR COUNSELING REGARDING IMMUNIZATION: Primary | ICD-10-CM

## 2025-04-28 LAB
ALBUMIN SERPL BCP-MCNC: 5.3 G/DL (ref 3.4–5)
ALP SERPL-CCNC: 82 U/L (ref 33–110)
ALT SERPL W P-5'-P-CCNC: 25 U/L (ref 7–45)
AST SERPL W P-5'-P-CCNC: 21 U/L (ref 9–39)
BASOPHILS # BLD AUTO: 0.11 X10*3/UL (ref 0–0.1)
BASOPHILS NFR BLD AUTO: 1.1 %
BILIRUB DIRECT SERPL-MCNC: 1.1 MG/DL (ref 0–0.3)
BILIRUB SERPL-MCNC: 9.3 MG/DL (ref 0–1.2)
EOSINOPHIL # BLD AUTO: 0.03 X10*3/UL (ref 0–0.7)
EOSINOPHIL NFR BLD AUTO: 0.3 %
ERYTHROCYTE [DISTWIDTH] IN BLOOD BY AUTOMATED COUNT: 23.4 % (ref 11.5–14.5)
HBV SURFACE AB SER-ACNC: <3.1 MIU/ML
HCT VFR BLD AUTO: 18.5 % (ref 36–46)
HGB BLD-MCNC: 6.5 G/DL (ref 12–16)
HOWELL-JOLLY BOD BLD QL SMEAR: PRESENT
IMM GRANULOCYTES # BLD AUTO: 0.04 X10*3/UL (ref 0–0.7)
IMM GRANULOCYTES NFR BLD AUTO: 0.4 % (ref 0–0.9)
LYMPHOCYTES # BLD AUTO: 3.58 X10*3/UL (ref 1.2–4.8)
LYMPHOCYTES NFR BLD AUTO: 37.3 %
MCH RBC QN AUTO: 29.8 PG (ref 26–34)
MCHC RBC AUTO-ENTMCNC: 35.1 G/DL (ref 32–36)
MCV RBC AUTO: 85 FL (ref 80–100)
MEV IGG SER QL IA: NEGATIVE
MONOCYTES # BLD AUTO: 0.64 X10*3/UL (ref 0.1–1)
MONOCYTES NFR BLD AUTO: 6.7 %
MUMPS IGG ANTIBODY INDEX: 1.6 IA
MUV IGG SER IA-ACNC: POSITIVE
NEUTROPHILS # BLD AUTO: 5.19 X10*3/UL (ref 1.2–7.7)
NEUTROPHILS NFR BLD AUTO: 54.2 %
NRBC BLD-RTO: 1.6 /100 WBCS (ref 0–0)
OVALOCYTES BLD QL SMEAR: NORMAL
PAPPENHEIMER BOD BLD QL SMEAR: PRESENT
PLATELET # BLD AUTO: 677 X10*3/UL (ref 150–450)
PLATELET CLUMP BLD QL SMEAR: PRESENT
POLYCHROMASIA BLD QL SMEAR: NORMAL
PROT SERPL-MCNC: 7.9 G/DL (ref 6.4–8.2)
RBC # BLD AUTO: 2.18 X10*6/UL (ref 4–5.2)
RBC MORPH BLD: NORMAL
RUBEOLA IGG ANTIBODY INDEX: 0.4 IA
RUBV IGG SERPL IA-ACNC: 1 IA
RUBV IGG SERPL QL IA: POSITIVE
SCHISTOCYTES BLD QL SMEAR: NORMAL
SICKLE CELLS BLD QL SMEAR: NORMAL
TARGETS BLD QL SMEAR: NORMAL
VARICELLA ZOSTER IGG INDEX: 0.5 IA
VZV IGG SER QL IA: NEGATIVE
WBC # BLD AUTO: 9.6 X10*3/UL (ref 4.4–11.3)

## 2025-04-28 PROCEDURE — 86765 RUBEOLA ANTIBODY: CPT | Performed by: NURSE PRACTITIONER

## 2025-04-28 PROCEDURE — 85025 COMPLETE CBC W/AUTO DIFF WBC: CPT

## 2025-04-28 PROCEDURE — 99203 OFFICE O/P NEW LOW 30 MIN: CPT | Performed by: NURSE PRACTITIONER

## 2025-04-28 PROCEDURE — 86706 HEP B SURFACE ANTIBODY: CPT | Performed by: NURSE PRACTITIONER

## 2025-04-28 PROCEDURE — 99214 OFFICE O/P EST MOD 30 MIN: CPT | Performed by: PEDIATRICS

## 2025-04-28 PROCEDURE — 1036F TOBACCO NON-USER: CPT | Performed by: NURSE PRACTITIONER

## 2025-04-28 PROCEDURE — 86787 VARICELLA-ZOSTER ANTIBODY: CPT | Performed by: NURSE PRACTITIONER

## 2025-04-28 PROCEDURE — 86762 RUBELLA ANTIBODY: CPT | Performed by: NURSE PRACTITIONER

## 2025-04-28 PROCEDURE — 3008F BODY MASS INDEX DOCD: CPT | Performed by: NURSE PRACTITIONER

## 2025-04-28 PROCEDURE — 80076 HEPATIC FUNCTION PANEL: CPT

## 2025-04-28 PROCEDURE — 86735 MUMPS ANTIBODY: CPT | Performed by: NURSE PRACTITIONER

## 2025-04-28 PROCEDURE — 36415 COLL VENOUS BLD VENIPUNCTURE: CPT | Performed by: NURSE PRACTITIONER

## 2025-04-28 RX ORDER — AMOXICILLIN 250 MG
1 CAPSULE ORAL DAILY
Qty: 30 TABLET | Refills: 2 | Status: SHIPPED | OUTPATIENT
Start: 2025-04-28

## 2025-04-28 RX ORDER — IBUPROFEN 200 MG
400 TABLET ORAL EVERY 6 HOURS PRN
Qty: 60 TABLET | Refills: 2 | Status: SHIPPED | OUTPATIENT
Start: 2025-04-28

## 2025-04-28 RX ORDER — POLYETHYLENE GLYCOL 3350 17 G/17G
17 POWDER, FOR SOLUTION ORAL AS NEEDED
Qty: 30 PACKET | Refills: 2 | Status: SHIPPED | OUTPATIENT
Start: 2025-04-28 | End: 2025-04-28 | Stop reason: SDUPTHER

## 2025-04-28 RX ORDER — LIDOCAINE 50 MG/G
1 PATCH TOPICAL DAILY PRN
Qty: 30 PATCH | Refills: 1 | Status: SHIPPED | OUTPATIENT
Start: 2025-04-28

## 2025-04-28 RX ORDER — POLYETHYLENE GLYCOL 3350 17 G/17G
17 POWDER, FOR SOLUTION ORAL AS NEEDED
Qty: 30 PACKET | Refills: 2 | Status: SHIPPED | OUTPATIENT
Start: 2025-04-28 | End: 2025-05-28

## 2025-04-28 RX ORDER — FOLIC ACID 1 MG/1
1 TABLET ORAL DAILY
Qty: 90 TABLET | Refills: 3 | Status: SHIPPED | OUTPATIENT
Start: 2025-04-28

## 2025-04-28 RX ORDER — HYDROXYUREA 500 MG/1
1000 CAPSULE ORAL DAILY
Qty: 60 CAPSULE | Refills: 11 | Status: SHIPPED | OUTPATIENT
Start: 2025-04-28 | End: 2025-05-28

## 2025-04-28 ASSESSMENT — PAIN SCALES - GENERAL
PAINLEVEL_OUTOF10: 0-NO PAIN
PAINLEVEL_OUTOF10: 0-NO PAIN

## 2025-04-28 NOTE — ASSESSMENT & PLAN NOTE
Recent crisis three days ago. Was seen by Hem/Onc today and they are monitoring bili levels.

## 2025-04-28 NOTE — PROGRESS NOTES
"Subjective   Patient ID: Ruperto Pang is a 25 y.o. female who presents for [facility].  Pleasant 25 year old AAF presents to [facility]. Recovering from a recent sickle cell crisis, was seen by hematology/oncology today and has an elevated bilirubin as evidenced by acute yellowing of her eyes. She denies any current pain, discomfort or SOB. 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          Review of Systems   Eyes:         Jaundince   Allergic/Immunologic: Positive for food allergies.        Peanuts and iodine contrast   Hematological:         Sickle Cell Anemia   All other systems reviewed and are negative.      /70 (BP Location: Left arm, Patient Position: Sitting, BP Cuff Size: Adult)   Pulse 74   Temp 36.3 °C (97.3 °F) (Temporal)   Ht 1.6 m (5' 3\")   Wt 66.7 kg (147 lb)   SpO2 95%   BMI 26.04 kg/m²      Objective   Physical Exam  Vitals reviewed.   HENT:      Head: Normocephalic.   Eyes:      Comments: Jaundiced   Cardiovascular:      Rate and Rhythm: Normal rate and regular rhythm.      Pulses: Normal pulses.      Heart sounds: Normal heart sounds.   Pulmonary:      Effort: Pulmonary effort is normal.      Breath sounds: Normal breath sounds.   Abdominal:      General: Bowel sounds are normal.      Palpations: Abdomen is soft.   Skin:     General: Skin is warm and dry.      Coloration: Skin is jaundiced.   Neurological:      Mental Status: She is alert and oriented to person, place, and time.   Psychiatric:         Mood and Affect: Mood normal.         Behavior: Behavior normal.         Assessment/Plan   Assessment & Plan  Encounter for counseling regarding immunization  Will wait for titers to return and bilirubin numbers to decrease before giving vaccines. Hem/Onc following  Orders:    Rubeola Antibody, IgG; Future    Mumps Antibody, IgG; Future    Varicella Zoster Antibody, IgG; Future    Rubella Antibody, IgG; Future    Hepatitis B Surface Antibody; " Future    Hepatitis B vaccine, 18yrs and older (HEPLISAV); Future    Follow Up In Primary Care - Established; Future    Sickle cell disease with crisis (Multi)  Recent crisis three days ago. Was seen by Hem/Onc today and they are monitoring bili levels.        Elevated bilirubin  Mildly jaundiced. Defer to Hem/Onc

## 2025-04-30 ENCOUNTER — APPOINTMENT (OUTPATIENT)
Facility: HOSPITAL | Age: 25
End: 2025-04-30
Payer: COMMERCIAL

## 2025-04-30 ENCOUNTER — CLINICAL SUPPORT (OUTPATIENT)
Facility: HOSPITAL | Age: 25
End: 2025-04-30
Payer: COMMERCIAL

## 2025-04-30 DIAGNOSIS — Z23 ENCOUNTER FOR ADMINISTRATION OF VACCINE: Primary | ICD-10-CM

## 2025-04-30 PROCEDURE — 90707 MMR VACCINE SC: CPT | Performed by: NURSE PRACTITIONER

## 2025-04-30 PROCEDURE — 90715 TDAP VACCINE 7 YRS/> IM: CPT | Performed by: NURSE PRACTITIONER

## 2025-04-30 ASSESSMENT — ENCOUNTER SYMPTOMS
CHEST TIGHTNESS: 0
LIGHT-HEADEDNESS: 0
WHEEZING: 0
FEVER: 0
VOMITING: 0
FATIGUE: 1
COUGH: 0
BRUISES/BLEEDS EASILY: 0
SHORTNESS OF BREATH: 0
ARTHRALGIAS: 0
NERVOUS/ANXIOUS: 0
NAUSEA: 0
BACK PAIN: 0
MYALGIAS: 0
HEMATURIA: 0
CONSTIPATION: 1
SORE THROAT: 0
DIZZINESS: 0
HEADACHES: 0
WOUND: 0
CHILLS: 0
LEG SWELLING: 0
ABDOMINAL PAIN: 0
PALPITATIONS: 0
DIARRHEA: 0
NECK PAIN: 0
EXTREMITY WEAKNESS: 0
SPEECH DIFFICULTY: 0
DEPRESSION: 0

## 2025-04-30 NOTE — PROGRESS NOTES
SICKLE CELL OUTPATIENT NOTE  Patient ID: Ruperto Pang   Visit Type: Follow up visit   ASSESSMENT AND PLAN  Ruperto Pang is 25 y.o. female with     History of Hb SS Sickle cell disease. She has pain of 5/10 in her legs but has been getting relief with her current home oral regimen. Hb is 6.5 g/dl which is around her baseline   Encounter for management of disease modifying therapy with hydroxyurea. She has not been taking medication consistently    Nocturnal hypoxia on supplemental night time oxygen and is still non compliant     Chronic constipation    PLAN   - No changes in her current pain regimen which will be with    Oral Tylenol 650 mg every 6 hours as needed and or ibuprofen 400 mg every 8 hours as needed for mild to moderate pain   Oral oxycodone 10 mg every 6 hours as needed for moderate, severe and breakthrough pain.    Capsaicin applied to sites of sickle cell pain  Non pharmacologic methods of pain control   Reviewed OARRS and I do not have any concerns  Care plan reviewed  -Continue hydroxyurea 1000 mg daily. Discussed importance of being compliant  -Continue Daily folic acid 1 mg   -Continue supplemental night time oxygen 1-2 L as needed   -Senna and miralax as needed for constipation   -Follow up will be in 3 months time     Chief Complaint: Follow-up for hemoglobin SS sickle cell disease       Interval History: Ruperto is present for follow-up of hemoglobin SS sickle cell disease.  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  She has noticed increased sickle cell pain and requiring more of her opioids.  She has been taking oxycodone almost every day, which helps with her pain, and last took this last night.  Her last admission to hospital was in September 2024.  She was seen in the ED in December for possible ear infection, and has since completed course of antibiotics.  Her appetite and energy levels are at  her baseline, and denies any headaches dizziness nausea or vomiting.  She has been compliant with hydroxyurea as prescribed, and misses less than 1-2 doses a week.  She is intermittently compliant with supplemental nighttime oxygen  Is without any major complaints in clinic today except mild pain in her lower back.  She is afebrile, denies cough runny nose or congestion.  Ear pain and fullness have all resolved.  She denies focal neurologic deficits, bilateral leg edema, or leg ulcers.  She has had increased frequency of urination, but denies dysuria or hematuria    Review of System:   Review of Systems   Constitutional:  Positive for fatigue. Negative for chills and fever.   HENT:   Negative for nosebleeds and sore throat.    Respiratory:  Negative for chest tightness, cough, shortness of breath and wheezing.    Cardiovascular:  Negative for chest pain, leg swelling and palpitations.   Gastrointestinal:  Positive for constipation. Negative for abdominal pain, diarrhea, nausea and vomiting.   Genitourinary:  Negative for hematuria.    Musculoskeletal:  Negative for arthralgias, back pain, gait problem, myalgias and neck pain.   Skin:  Negative for itching, rash and wound.   Neurological:  Negative for dizziness, extremity weakness, gait problem, headaches, light-headedness and speech difficulty.   Hematological:  Does not bruise/bleed easily.   Psychiatric/Behavioral:  Negative for depression. The patient is not nervous/anxious.       Allergies:   Allergies   Allergen Reactions   • Iodinated Contrast Media Hives and Swelling   • Peanut Unknown and Itching     Pt reports she is allergic to peanut butter but not peanuts     Current Medications:         Medication Order Taking? Sig Documenting Provider Last Dose Status   capsaicin (Zostrix) 0.025 % cream [id number] Yes Apply topically 2 times a day. Magnus Dumas MD  Active   folic acid (Folvite) 1 mg tablet [id number] Yes Take 1 tablet (1 mg) by mouth once daily.  Magnus Dumas MD  Active   hydroxyurea (Hydrea) 500 mg capsule [id number] Yes Take 2 capsules (1,000 mg total) by mouth once daily.  Take at the same time each day. Magnus Dumas MD  Active   ibuprofen 200 mg tablet [id number] Yes Take 2 tablets (400 mg) by mouth every 6 hours if needed for mild pain (1 - 3) or moderate pain (4 - 6). Magnus Dumas MD  Active   lidocaine (Lidoderm) 5 % patch [id number] Yes Place 1 patch over 12 hours on the skin once daily as needed for mild pain (1 - 3) or moderate pain (4 - 6) (apply to sites of pain). Remove & discard patch within 12 hours or as directed by MD. Magnus Dumas MD  Active   naloxone (Narcan) 4 mg/0.1 mL nasal spray [id number] Yes Administer 1 spray (4 mg) into affected nostril(s) if needed for opioid reversal. May repeat every 2-3 minutes if needed, alternating nostrils, until medical assistance becomes available. Magnus Dumas MD  Active   oxyCODONE (Roxicodone) 10 mg immediate release tablet [id number] Yes Take 1 tablet (10 mg) by mouth every 6 hours if needed for severe pain (7 - 10). Magnus Dumas MD  Active   polyethylene glycol (Glycolax, Miralax) 17 gram packet [id number] Yes Take 17 g by mouth if needed (for pain). Magnus Dumas MD  Active   sennosides-docusate sodium (Senna-S) 8.6-50 mg tablet [id number] Yes Take 1 tablet by mouth once daily. Magnus Dumas MD  Active                    Past Medical History:    has a past medical history of Encounter for contraceptive management, unspecified (10/14/2016), Encounter for screening for disorder due to exposure to contaminants, Hb-SS disease with acute chest syndrome (Multi) (12/14/2016), and Personal history of diseases of the blood and blood-forming organs and certain disorders involving the immune mechanism (03/02/2022).     Hb SS disease, intermittently treated with hydroxyurea   History of miscarriage 1/2020   Chronic pain   COVID-19 infection, 12/2020   History of Acute Chest Syndrome   COVID-19 infection  12/2020    Past Surgical History:    has no past surgical history on file.    Family History:   Family History   Problem Relation Name Age of Onset   • Sickle cell anemia Sister         Social History:   Ruperto Pang  reports that she has never smoked. She has never used smokeless tobacco.  reports no history of drug use.    EXAMINATION FINDINGS   /59 (BP Location: Left arm, Patient Position: Sitting, BP Cuff Size: Small adult)   Pulse 62   Temp 36.5 °C (97.7 °F) (Skin)   Resp 15   Wt 66.4 kg (146 lb 6.4 oz)   SpO2 94%   BMI 26.78 kg/m²   1.7 meters squared    Physical Exam  Vitals and nursing note reviewed.   Constitutional:       Appearance: Normal appearance.   HENT:      Mouth/Throat:      Mouth: Mucous membranes are moist.      Pharynx: Oropharynx is clear. No oropharyngeal exudate or posterior oropharyngeal erythema.   Eyes:      General: Scleral icterus present.      Extraocular Movements: Extraocular movements intact.      Pupils: Pupils are equal, round, and reactive to light.   Cardiovascular:      Rate and Rhythm: Normal rate and regular rhythm.      Pulses: Normal pulses.      Heart sounds: Murmur heard.      No gallop.   Pulmonary:      Effort: Pulmonary effort is normal. No respiratory distress.      Breath sounds: Normal breath sounds. No wheezing or rales.   Abdominal:      General: Abdomen is flat. Bowel sounds are normal. There is no distension.      Tenderness: There is no guarding.   Musculoskeletal:         General: No swelling.      Cervical back: Normal range of motion and neck supple.      Right lower leg: No edema.      Left lower leg: No edema.   Skin:     General: Skin is warm.      Capillary Refill: Capillary refill takes less than 2 seconds.      Coloration: Skin is not jaundiced.      Findings: No lesion or rash.   Neurological:      General: No focal deficit present.      Mental Status: She is alert and oriented to person, place, and time.      Cranial Nerves: No cranial  nerve deficit.      Motor: No weakness.      Gait: Gait normal.   Psychiatric:         Mood and Affect: Mood normal.         Behavior: Behavior normal.     LABS   Lab on 04/28/2025   Component Date Value Ref Range Status   • Albumin 04/28/2025 5.3 (H)  3.4 - 5.0 g/dL Final   • Bilirubin, Total 04/28/2025 9.3 (H)  0.0 - 1.2 mg/dL Final   • Bilirubin, Direct 04/28/2025 1.1 (H)  0.0 - 0.3 mg/dL Final   • Alkaline Phosphatase 04/28/2025 82  33 - 110 U/L Final   • ALT 04/28/2025 25  7 - 45 U/L Final    Patients treated with Sulfasalazine may generate falsely decreased results for ALT.   • AST 04/28/2025 21  9 - 39 U/L Final   • Total Protein 04/28/2025 7.9  6.4 - 8.2 g/dL Final   • WBC 04/28/2025 9.6  4.4 - 11.3 x10*3/uL Final   • nRBC 04/28/2025 1.6 (H)  0.0 - 0.0 /100 WBCs Final   • RBC 04/28/2025 2.18 (L)  4.00 - 5.20 x10*6/uL Final   • Hemoglobin 04/28/2025 6.5 (LL)  12.0 - 16.0 g/dL Final   • Hematocrit 04/28/2025 18.5 (L)  36.0 - 46.0 % Final   • MCV 04/28/2025 85  80 - 100 fL Final   • MCH 04/28/2025 29.8  26.0 - 34.0 pg Final   • MCHC 04/28/2025 35.1  32.0 - 36.0 g/dL Final   • RDW 04/28/2025 23.4 (H)  11.5 - 14.5 % Final   • Platelets 04/28/2025 677 (H)  150 - 450 x10*3/uL Final   • Neutrophils % 04/28/2025 54.2  40.0 - 80.0 % Final   • Immature Granulocytes %, Automated 04/28/2025 0.4  0.0 - 0.9 % Final    Immature Granulocyte Count (IG) includes promyelocytes, myelocytes and metamyelocytes but does not include bands. Percent differential counts (%) should be interpreted in the context of the absolute cell counts (cells/UL).   • Lymphocytes % 04/28/2025 37.3  13.0 - 44.0 % Final   • Monocytes % 04/28/2025 6.7  2.0 - 10.0 % Final   • Eosinophils % 04/28/2025 0.3  0.0 - 6.0 % Final   • Basophils % 04/28/2025 1.1  0.0 - 2.0 % Final   • Neutrophils Absolute 04/28/2025 5.19  1.20 - 7.70 x10*3/uL Final    Percent differential counts (%) should be interpreted in the context of the absolute cell counts (cells/uL).   •  Immature Granulocytes Absolute, Au* 04/28/2025 0.04  0.00 - 0.70 x10*3/uL Final   • Lymphocytes Absolute 04/28/2025 3.58  1.20 - 4.80 x10*3/uL Final   • Monocytes Absolute 04/28/2025 0.64  0.10 - 1.00 x10*3/uL Final   • Eosinophils Absolute 04/28/2025 0.03  0.00 - 0.70 x10*3/uL Final   • Basophils Absolute 04/28/2025 0.11 (H)  0.00 - 0.10 x10*3/uL Final    Automated WBC differential has been confirmed by manual smear.   • RBC Morphology 04/28/2025 See Below   Final   • Polychromasia 04/28/2025 Mild   Final   • RBC Fragments 04/28/2025 Few   Final   • Sickle Cells 04/28/2025 Few   Final   • Target Cells 04/28/2025 Few   Final   • Ovalocytes 04/28/2025 Few   Final   • [place] Bodies 04/28/2025 Present   Final   • Pappenheimer Bodies 04/28/2025 Present   Final   • Clumped Platelets 04/28/2025 Present   Final          Magnus Dumas MD

## 2025-04-30 NOTE — PROGRESS NOTES
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 I had some concern about her bilirubin and current jaundice as she was recently hospitalized for a sickle cell crisis. I spoke with her hem/on provider, Dr. Dumas and he said he also just saw her and reviewed recent labs that he ordered. He is aware and ok with her receiving her vaccines. She will have MMR and Tdap today and HepB and Varicella Monday May 7, 2025.

## 2025-05-05 ENCOUNTER — CLINICAL SUPPORT (OUTPATIENT)
Facility: HOSPITAL | Age: 25
End: 2025-05-05
Payer: COMMERCIAL

## 2025-05-05 PROCEDURE — 90716 VAR VACCINE LIVE SUBQ: CPT | Performed by: NURSE PRACTITIONER

## 2025-05-17 ASSESSMENT — ENCOUNTER SYMPTOMS
EYE PROBLEMS: 0
SCLERAL ICTERUS: 1

## 2025-05-21 ENCOUNTER — TELEPHONE (OUTPATIENT)
Dept: HEMATOLOGY/ONCOLOGY | Facility: HOSPITAL | Age: 25
End: 2025-05-21

## 2025-05-21 ENCOUNTER — OFFICE VISIT (OUTPATIENT)
Dept: HEMATOLOGY/ONCOLOGY | Facility: HOSPITAL | Age: 25
End: 2025-05-21
Payer: COMMERCIAL

## 2025-05-21 VITALS
OXYGEN SATURATION: 94 % | BODY MASS INDEX: 26.13 KG/M2 | RESPIRATION RATE: 16 BRPM | WEIGHT: 147.49 LBS | TEMPERATURE: 98.8 F | DIASTOLIC BLOOD PRESSURE: 66 MMHG | HEART RATE: 79 BPM | SYSTOLIC BLOOD PRESSURE: 116 MMHG

## 2025-05-21 DIAGNOSIS — O03.9 MISCARRIAGE (HHS-HCC): ICD-10-CM

## 2025-05-21 DIAGNOSIS — D57.00 SICKLE CELL DISEASE WITH CRISIS (MULTI): Primary | ICD-10-CM

## 2025-05-21 LAB
ALBUMIN SERPL BCP-MCNC: 4.7 G/DL (ref 3.4–5)
ALP SERPL-CCNC: 76 U/L (ref 33–110)
ALT SERPL W P-5'-P-CCNC: 30 U/L (ref 7–45)
ANION GAP SERPL CALC-SCNC: 14 MMOL/L (ref 10–20)
AST SERPL W P-5'-P-CCNC: 31 U/L (ref 9–39)
B-HCG SERPL-ACNC: 27 MIU/ML
BASOPHILS # BLD AUTO: 0.1 X10*3/UL (ref 0–0.1)
BASOPHILS NFR BLD AUTO: 0.9 %
BILIRUB SERPL-MCNC: 10.2 MG/DL (ref 0–1.2)
BUN SERPL-MCNC: 8 MG/DL (ref 6–23)
BURR CELLS BLD QL SMEAR: NORMAL
CALCIUM SERPL-MCNC: 9.6 MG/DL (ref 8.6–10.3)
CHLORIDE SERPL-SCNC: 107 MMOL/L (ref 98–107)
CO2 SERPL-SCNC: 25 MMOL/L (ref 21–32)
CREAT SERPL-MCNC: 0.51 MG/DL (ref 0.5–1.05)
EGFRCR SERPLBLD CKD-EPI 2021: >90 ML/MIN/1.73M*2
EOSINOPHIL # BLD AUTO: 0.07 X10*3/UL (ref 0–0.7)
EOSINOPHIL NFR BLD AUTO: 0.6 %
ERYTHROCYTE [DISTWIDTH] IN BLOOD BY AUTOMATED COUNT: 21.4 % (ref 11.5–14.5)
GLUCOSE SERPL-MCNC: 86 MG/DL (ref 74–99)
HCT VFR BLD AUTO: 22.4 % (ref 36–46)
HGB BLD-MCNC: 8 G/DL (ref 12–16)
HGB RETIC QN: 31 PG (ref 28–38)
HOWELL-JOLLY BOD BLD QL SMEAR: PRESENT
IMM GRANULOCYTES # BLD AUTO: 0.07 X10*3/UL (ref 0–0.7)
IMM GRANULOCYTES NFR BLD AUTO: 0.6 % (ref 0–0.9)
IMMATURE RETIC FRACTION: 21.7 %
LDH SERPL L TO P-CCNC: 275 U/L (ref 84–246)
LYMPHOCYTES # BLD AUTO: 2.55 X10*3/UL (ref 1.2–4.8)
LYMPHOCYTES NFR BLD AUTO: 22.1 %
MCH RBC QN AUTO: 31.9 PG (ref 26–34)
MCHC RBC AUTO-ENTMCNC: 35.7 G/DL (ref 32–36)
MCV RBC AUTO: 89 FL (ref 80–100)
MONOCYTES # BLD AUTO: 0.8 X10*3/UL (ref 0.1–1)
MONOCYTES NFR BLD AUTO: 6.9 %
NEUTROPHILS # BLD AUTO: 7.94 X10*3/UL (ref 1.2–7.7)
NEUTROPHILS NFR BLD AUTO: 68.9 %
NRBC BLD-RTO: 3.2 /100 WBCS (ref 0–0)
OVALOCYTES BLD QL SMEAR: NORMAL
PLATELET # BLD AUTO: 625 X10*3/UL (ref 150–450)
PLATELET CLUMP BLD QL SMEAR: PRESENT
POLYCHROMASIA BLD QL SMEAR: NORMAL
POTASSIUM SERPL-SCNC: 3.6 MMOL/L (ref 3.5–5.3)
PROT SERPL-MCNC: 7.6 G/DL (ref 6.4–8.2)
RBC # BLD AUTO: 2.51 X10*6/UL (ref 4–5.2)
RBC MORPH BLD: NORMAL
RETICS #: 0.61 X10*6/UL (ref 0.02–0.08)
RETICS/RBC NFR AUTO: 24.4 % (ref 0.5–2)
SCHISTOCYTES BLD QL SMEAR: NORMAL
SICKLE CELLS BLD QL SMEAR: NORMAL
SODIUM SERPL-SCNC: 142 MMOL/L (ref 136–145)
TARGETS BLD QL SMEAR: NORMAL
WBC # BLD AUTO: 11.5 X10*3/UL (ref 4.4–11.3)

## 2025-05-21 PROCEDURE — 96372 THER/PROPH/DIAG INJ SC/IM: CPT

## 2025-05-21 PROCEDURE — 85025 COMPLETE CBC W/AUTO DIFF WBC: CPT

## 2025-05-21 PROCEDURE — 2500000001 HC RX 250 WO HCPCS SELF ADMINISTERED DRUGS (ALT 637 FOR MEDICARE OP)

## 2025-05-21 PROCEDURE — 83615 LACTATE (LD) (LDH) ENZYME: CPT

## 2025-05-21 PROCEDURE — 99213 OFFICE O/P EST LOW 20 MIN: CPT

## 2025-05-21 PROCEDURE — 80053 COMPREHEN METABOLIC PANEL: CPT

## 2025-05-21 PROCEDURE — 2500000004 HC RX 250 GENERAL PHARMACY W/ HCPCS (ALT 636 FOR OP/ED): Mod: JZ,TB

## 2025-05-21 PROCEDURE — 84702 CHORIONIC GONADOTROPIN TEST: CPT

## 2025-05-21 PROCEDURE — 85045 AUTOMATED RETICULOCYTE COUNT: CPT

## 2025-05-21 RX ORDER — HYDROMORPHONE HYDROCHLORIDE 1 MG/ML
1 INJECTION, SOLUTION INTRAMUSCULAR; INTRAVENOUS; SUBCUTANEOUS EVERY 30 MIN PRN
Status: COMPLETED | OUTPATIENT
Start: 2025-05-21 | End: 2025-05-21

## 2025-05-21 RX ORDER — NALOXONE HYDROCHLORIDE 0.4 MG/ML
0.4 INJECTION, SOLUTION INTRAMUSCULAR; INTRAVENOUS; SUBCUTANEOUS
Status: DISCONTINUED | OUTPATIENT
Start: 2025-05-21 | End: 2025-05-22 | Stop reason: HOSPADM

## 2025-05-21 RX ORDER — ONDANSETRON 8 MG/1
8 TABLET, FILM COATED ORAL ONCE
Status: COMPLETED | OUTPATIENT
Start: 2025-05-21 | End: 2025-05-21

## 2025-05-21 RX ORDER — DIPHENHYDRAMINE HCL 25 MG
25 CAPSULE ORAL ONCE
Status: COMPLETED | OUTPATIENT
Start: 2025-05-21 | End: 2025-05-21

## 2025-05-21 RX ADMIN — DIPHENHYDRAMINE HYDROCHLORIDE 25 MG: 25 CAPSULE ORAL at 12:51

## 2025-05-21 RX ADMIN — HYDROMORPHONE HYDROCHLORIDE 1 MG: 1 INJECTION, SOLUTION INTRAMUSCULAR; INTRAVENOUS; SUBCUTANEOUS at 12:53

## 2025-05-21 RX ADMIN — ONDANSETRON HYDROCHLORIDE 8 MG: 8 TABLET, FILM COATED ORAL at 12:51

## 2025-05-21 RX ADMIN — HYDROMORPHONE HYDROCHLORIDE 1 MG: 1 INJECTION, SOLUTION INTRAMUSCULAR; INTRAVENOUS; SUBCUTANEOUS at 14:06

## 2025-05-21 RX ADMIN — HYDROMORPHONE HYDROCHLORIDE 1 MG: 1 INJECTION, SOLUTION INTRAMUSCULAR; INTRAVENOUS; SUBCUTANEOUS at 13:24

## 2025-05-21 ASSESSMENT — PAIN SCALES - GENERAL: PAINLEVEL_OUTOF10: 7

## 2025-05-21 NOTE — PROGRESS NOTES
Patient ID: Ruperto Pang is a 25 y.o. female.    Subjective    HPI    Ruperto Pang is a 26yo female with a PMHx of Hemoglobin SS disease who presents to [facility] today for lower extremity and back pain that started last evening on 5/20. Patient was seen at [facility] yesterday for nausea / vomitting, abdominal cramping, and vaginal bleeding. She was subsequently found to have an elevated hcg was told she was likely miscarrying. Patient states that she has miscarried in the past and her abdominal pain yesterday felt similar to that. She also received 2 units of PRBC at [facility] ED. Today, her abdominal pain and N/V is resolved. Leg and back pain was a 7/10, improved to 5/10 s/p IVP dilaudid x2 in ACC. We discussed repeating hcg level while she's in [facility] and setting her up with OB and ordering an ultrasound to be completed in 2 weeks. Patient agreeable to plan.     RX Allergies[1]     Medications Ordered Prior to Encounter[2]      Objective    BSA: 1.72 meters squared  /66 (BP Location: Right arm, Patient Position: Sitting)   Pulse 79   Temp 37.1 °C (98.8 °F) (Temporal)   Resp 16   Wt 66.9 kg (147 lb 7.8 oz)   SpO2 94%   BMI 26.13 kg/m²      Physical Exam  Constitutional:       Appearance: Normal appearance.   HENT:      Head: Normocephalic.      Mouth/Throat:      Mouth: Mucous membranes are moist.   Eyes:      Pupils: Pupils are equal, round, and reactive to light.   Cardiovascular:      Rate and Rhythm: Normal rate and regular rhythm.      Pulses: Normal pulses.      Heart sounds: Normal heart sounds.   Pulmonary:      Effort: Pulmonary effort is normal.      Breath sounds: Normal breath sounds.   Abdominal:      General: Abdomen is flat. Bowel sounds are normal.      Palpations: Abdomen is soft.   Musculoskeletal:         General: Normal range of motion.      Cervical back: Normal range of motion and neck supple.   Skin:     General: Skin is warm.   Neurological:      General: No focal deficit present.      Mental  Status: She is alert.   Psychiatric:         Mood and Affect: Mood normal.       Assessment and Plan:   Ruperto Pang is a 24yo female with a PMHx of Hemoglobin SS disease who presents to [facility] today for lower extremity and back pain that started last evening on 5/20. Patient was seen at [facility] yesterday for nausea / vomitting, abdominal cramping, and vaginal bleeding. She was subsequently found to have an elevated hcg was told she was likely miscarrying.     [facility] course:   - VSS  - Hemoglobin above patients baseline today at 8, baseline ~6. She received 2 units of PRBC on 5/20 at [facility].  - Lysis labs of WBC elevated, likely reactive in setting of sickle cell. No s/sx of infection. LDH elevated but within patient's baseline. Tbili above patients baseline today at 10.2    - hcg 27  - 3x doses of dilaudid 1mg subcutaneous q3 minutes given for severe pain   - 1x dose of PO benadryl given for opioid induced pruritus   - 1x dose of PO Zofran 8mg once for opioid induced N/V     Dispo:  - Patient discharged home from [facility] in stable condition   - Return to ED / clinic instructions given   - Outpatient OB referral sent   - Outpatient US ordered to be completed in 2 weeks ordered   - Follow-up with sickle cell clinic as scheduled     Carolina Gill PA-C          [1]   Allergies  Allergen Reactions    Iodinated Contrast Media Hives and Swelling    Peanut Unknown and Itching     Pt reports she is allergic to peanut butter but not peanuts   [2]   Current Outpatient Medications on File Prior to Visit   Medication Sig Dispense Refill    capsaicin (Zostrix) 0.025 % cream Apply topically 2 times a day. 60 g 2    folic acid (Folvite) 1 mg tablet Take 1 tablet (1 mg) by mouth once daily. 90 tablet 3    hydroxyurea (Hydrea) 500 mg capsule Take 2 capsules (1,000 mg total) by mouth once daily.  Take at the same time each day. 60 capsule 11    ibuprofen 200 mg tablet Take 2 tablets (400 mg) by mouth every 6 hours if needed for mild pain (1 - 3) or  moderate pain (4 - 6). 60 tablet 2    lidocaine (Lidoderm) 5 % patch Place 1 patch over 12 hours on the skin once daily as needed for mild pain (1 - 3) or moderate pain (4 - 6) (apply to sites of pain). Remove & discard patch within 12 hours or as directed by MD. 30 patch 1    naloxone (Narcan) 4 mg/0.1 mL nasal spray Administer 1 spray (4 mg) into affected nostril(s) if needed for opioid reversal. May repeat every 2-3 minutes if needed, alternating nostrils, until medical assistance becomes available. 2 each 0    oxyCODONE (Roxicodone) 10 mg immediate release tablet Take 1 tablet (10 mg) by mouth every 6 hours if needed for severe pain (7 - 10). 30 tablet 0    polyethylene glycol (Glycolax, Miralax) 17 gram packet Take 17 g by mouth if needed (for pain). 30 packet 2    sennosides-docusate sodium (Senna-S) 8.6-50 mg tablet Take 1 tablet by mouth once daily. 30 tablet 2     No current facility-administered medications on file prior to visit.

## 2025-05-21 NOTE — LETTER
May 22, 2025     Patient: Ruperto Pang   YOB: 2000   Date of Visit: 5/21/2025       To Whom It May Concern:    Ruperto Pang was seen in clinic on 5/21/2025 at 12:30 pm. Please excuse Ruperto for her absence from work on this day to make the appointment.    If you have any questions or concerns, please don't hesitate to call.         Sincerely,     Radha Gill PA-C

## 2025-05-23 ENCOUNTER — TELEPHONE (OUTPATIENT)
Dept: ADMISSION | Facility: HOSPITAL | Age: 25
End: 2025-05-23
Payer: COMMERCIAL

## 2025-05-23 DIAGNOSIS — D57.00 SICKLE CELL CRISIS (MULTI): ICD-10-CM

## 2025-05-23 RX ORDER — OXYCODONE HYDROCHLORIDE 10 MG/1
10 TABLET ORAL EVERY 6 HOURS PRN
Qty: 30 TABLET | Refills: 0 | Status: SHIPPED | OUTPATIENT
Start: 2025-05-23 | End: 2025-06-22

## 2025-05-23 NOTE — TELEPHONE ENCOUNTER
Ruperto Pang called the refill line for Oxycodone. Requesting refills be sent to [facility] pharmacy; message sent to Sickle Cell team to submit.

## 2025-06-14 ENCOUNTER — CLINICAL SUPPORT (OUTPATIENT)
Dept: EMERGENCY MEDICINE | Facility: HOSPITAL | Age: 25
End: 2025-06-14
Payer: COMMERCIAL

## 2025-06-14 LAB
ATRIAL RATE: 70 BPM
P AXIS: 15 DEGREES
P OFFSET: 173 MS
P ONSET: 133 MS
PR INTERVAL: 178 MS
Q ONSET: 222 MS
QRS COUNT: 11 BEATS
QRS DURATION: 78 MS
QT INTERVAL: 354 MS
QTC CALCULATION(BAZETT): 382 MS
QTC FREDERICIA: 372 MS
R AXIS: 16 DEGREES
T AXIS: -78 DEGREES
T OFFSET: 399 MS
VENTRICULAR RATE: 70 BPM

## 2025-06-14 PROCEDURE — 99285 EMERGENCY DEPT VISIT HI MDM: CPT | Performed by: EMERGENCY MEDICINE

## 2025-06-14 PROCEDURE — 93005 ELECTROCARDIOGRAM TRACING: CPT

## 2025-06-14 PROCEDURE — 93010 ELECTROCARDIOGRAM REPORT: CPT

## 2025-06-14 PROCEDURE — 99284 EMERGENCY DEPT VISIT MOD MDM: CPT | Performed by: EMERGENCY MEDICINE

## 2025-06-15 ENCOUNTER — CLINICAL SUPPORT (OUTPATIENT)
Dept: EMERGENCY MEDICINE | Facility: HOSPITAL | Age: 25
End: 2025-06-15
Payer: COMMERCIAL

## 2025-06-15 ENCOUNTER — HOSPITAL ENCOUNTER (EMERGENCY)
Facility: HOSPITAL | Age: 25
Discharge: HOME | End: 2025-06-15
Attending: EMERGENCY MEDICINE
Payer: COMMERCIAL

## 2025-06-15 VITALS
HEIGHT: 62 IN | WEIGHT: 145 LBS | DIASTOLIC BLOOD PRESSURE: 60 MMHG | BODY MASS INDEX: 26.68 KG/M2 | HEART RATE: 63 BPM | OXYGEN SATURATION: 96 % | RESPIRATION RATE: 18 BRPM | SYSTOLIC BLOOD PRESSURE: 97 MMHG | TEMPERATURE: 98.4 F

## 2025-06-15 DIAGNOSIS — D57.00 SICKLE CELL PAIN CRISIS (MULTI): Primary | ICD-10-CM

## 2025-06-15 LAB
ALBUMIN SERPL BCP-MCNC: 5.1 G/DL (ref 3.4–5)
ALP SERPL-CCNC: 94 U/L (ref 33–110)
ALT SERPL W P-5'-P-CCNC: 21 U/L (ref 7–45)
ANION GAP SERPL CALC-SCNC: 12 MMOL/L (ref 10–20)
AST SERPL W P-5'-P-CCNC: 23 U/L (ref 9–39)
B-HCG SERPL-ACNC: <3 MIU/ML
BASOPHILS # BLD AUTO: 0.17 X10*3/UL (ref 0–0.1)
BASOPHILS NFR BLD AUTO: 1.2 %
BILIRUB SERPL-MCNC: 6.8 MG/DL (ref 0–1.2)
BUN SERPL-MCNC: 11 MG/DL (ref 6–23)
CALCIUM SERPL-MCNC: 10.2 MG/DL (ref 8.6–10.6)
CHLORIDE SERPL-SCNC: 105 MMOL/L (ref 98–107)
CO2 SERPL-SCNC: 25 MMOL/L (ref 21–32)
CREAT SERPL-MCNC: 0.55 MG/DL (ref 0.5–1.05)
EGFRCR SERPLBLD CKD-EPI 2021: >90 ML/MIN/1.73M*2
EOSINOPHIL # BLD AUTO: 0.08 X10*3/UL (ref 0–0.7)
EOSINOPHIL NFR BLD AUTO: 0.6 %
ERYTHROCYTE [DISTWIDTH] IN BLOOD BY AUTOMATED COUNT: 20.2 % (ref 11.5–14.5)
GLUCOSE SERPL-MCNC: 92 MG/DL (ref 74–99)
HCT VFR BLD AUTO: 20.2 % (ref 36–46)
HGB BLD-MCNC: 7.2 G/DL (ref 12–16)
HGB RETIC QN: 31 PG (ref 28–38)
IMM GRANULOCYTES # BLD AUTO: 0.07 X10*3/UL (ref 0–0.7)
IMM GRANULOCYTES NFR BLD AUTO: 0.5 % (ref 0–0.9)
IMMATURE RETIC FRACTION: 31.2 %
LDH SERPL L TO P-CCNC: 237 U/L (ref 84–246)
LYMPHOCYTES # BLD AUTO: 3.2 X10*3/UL (ref 1.2–4.8)
LYMPHOCYTES NFR BLD AUTO: 22.2 %
MCH RBC QN AUTO: 31.2 PG (ref 26–34)
MCHC RBC AUTO-ENTMCNC: 35.6 G/DL (ref 32–36)
MCV RBC AUTO: 87 FL (ref 80–100)
MONOCYTES # BLD AUTO: 0.75 X10*3/UL (ref 0.1–1)
MONOCYTES NFR BLD AUTO: 5.2 %
NEUTROPHILS # BLD AUTO: 10.12 X10*3/UL (ref 1.2–7.7)
NEUTROPHILS NFR BLD AUTO: 70.3 %
NRBC BLD-RTO: 1.4 /100 WBCS (ref 0–0)
PLATELET # BLD AUTO: 668 X10*3/UL (ref 150–450)
POTASSIUM SERPL-SCNC: 3.7 MMOL/L (ref 3.5–5.3)
PREGNANCY TEST URINE, POC: NEGATIVE
PROT SERPL-MCNC: 8.6 G/DL (ref 6.4–8.2)
RBC # BLD AUTO: 2.31 X10*6/UL (ref 4–5.2)
RETICS #: 0.5 X10*6/UL (ref 0.02–0.08)
RETICS/RBC NFR AUTO: 21.8 % (ref 0.5–2)
SODIUM SERPL-SCNC: 138 MMOL/L (ref 136–145)
WBC # BLD AUTO: 14.4 X10*3/UL (ref 4.4–11.3)

## 2025-06-15 PROCEDURE — 81025 URINE PREGNANCY TEST: CPT | Performed by: EMERGENCY MEDICINE

## 2025-06-15 PROCEDURE — 85045 AUTOMATED RETICULOCYTE COUNT: CPT

## 2025-06-15 PROCEDURE — 96374 THER/PROPH/DIAG INJ IV PUSH: CPT

## 2025-06-15 PROCEDURE — 80053 COMPREHEN METABOLIC PANEL: CPT

## 2025-06-15 PROCEDURE — 2500000004 HC RX 250 GENERAL PHARMACY W/ HCPCS (ALT 636 FOR OP/ED)

## 2025-06-15 PROCEDURE — 85025 COMPLETE CBC W/AUTO DIFF WBC: CPT

## 2025-06-15 PROCEDURE — 96376 TX/PRO/DX INJ SAME DRUG ADON: CPT

## 2025-06-15 PROCEDURE — 83615 LACTATE (LD) (LDH) ENZYME: CPT

## 2025-06-15 PROCEDURE — 36415 COLL VENOUS BLD VENIPUNCTURE: CPT

## 2025-06-15 PROCEDURE — 93005 ELECTROCARDIOGRAM TRACING: CPT

## 2025-06-15 PROCEDURE — 96375 TX/PRO/DX INJ NEW DRUG ADDON: CPT

## 2025-06-15 PROCEDURE — 84702 CHORIONIC GONADOTROPIN TEST: CPT

## 2025-06-15 RX ORDER — HYDROMORPHONE HYDROCHLORIDE 1 MG/ML
1 INJECTION, SOLUTION INTRAMUSCULAR; INTRAVENOUS; SUBCUTANEOUS ONCE
Status: COMPLETED | OUTPATIENT
Start: 2025-06-15 | End: 2025-06-15

## 2025-06-15 RX ORDER — KETOROLAC TROMETHAMINE 15 MG/ML
15 INJECTION, SOLUTION INTRAMUSCULAR; INTRAVENOUS ONCE
Status: DISCONTINUED | OUTPATIENT
Start: 2025-06-15 | End: 2025-06-15

## 2025-06-15 RX ORDER — KETOROLAC TROMETHAMINE 15 MG/ML
15 INJECTION, SOLUTION INTRAMUSCULAR; INTRAVENOUS ONCE
Status: COMPLETED | OUTPATIENT
Start: 2025-06-15 | End: 2025-06-15

## 2025-06-15 RX ORDER — ONDANSETRON HYDROCHLORIDE 2 MG/ML
4 INJECTION, SOLUTION INTRAVENOUS ONCE
Status: COMPLETED | OUTPATIENT
Start: 2025-06-15 | End: 2025-06-15

## 2025-06-15 RX ADMIN — HYDROMORPHONE HYDROCHLORIDE 1 MG: 1 INJECTION, SOLUTION INTRAMUSCULAR; INTRAVENOUS; SUBCUTANEOUS at 02:07

## 2025-06-15 RX ADMIN — HYDROMORPHONE HYDROCHLORIDE 1 MG: 1 INJECTION, SOLUTION INTRAMUSCULAR; INTRAVENOUS; SUBCUTANEOUS at 00:40

## 2025-06-15 RX ADMIN — KETOROLAC TROMETHAMINE 15 MG: 15 INJECTION, SOLUTION INTRAMUSCULAR; INTRAVENOUS at 01:25

## 2025-06-15 RX ADMIN — ONDANSETRON 4 MG: 2 INJECTION INTRAMUSCULAR; INTRAVENOUS at 00:40

## 2025-06-15 RX ADMIN — HYDROMORPHONE HYDROCHLORIDE 1 MG: 1 INJECTION, SOLUTION INTRAMUSCULAR; INTRAVENOUS; SUBCUTANEOUS at 02:53

## 2025-06-15 ASSESSMENT — PAIN DESCRIPTION - DESCRIPTORS
DESCRIPTORS: ACHING;DULL;DISCOMFORT
DESCRIPTORS: BURNING
DESCRIPTORS: ACHING
DESCRIPTORS: ACHING

## 2025-06-15 ASSESSMENT — PAIN DESCRIPTION - PROGRESSION: CLINICAL_PROGRESSION: GRADUALLY WORSENING

## 2025-06-15 ASSESSMENT — PAIN DESCRIPTION - ORIENTATION
ORIENTATION: RIGHT;LEFT
ORIENTATION: RIGHT

## 2025-06-15 ASSESSMENT — PAIN DESCRIPTION - LOCATION
LOCATION: LEG
LOCATION: OTHER (COMMENT)

## 2025-06-15 ASSESSMENT — PAIN SCALES - GENERAL
PAINLEVEL_OUTOF10: 7
PAINLEVEL_OUTOF10: 5 - MODERATE PAIN
PAINLEVEL_OUTOF10: 5 - MODERATE PAIN
PAINLEVEL_OUTOF10: 10 - WORST POSSIBLE PAIN
PAINLEVEL_OUTOF10: 5 - MODERATE PAIN

## 2025-06-15 ASSESSMENT — LIFESTYLE VARIABLES
HAVE PEOPLE ANNOYED YOU BY CRITICIZING YOUR DRINKING: NO
EVER HAD A DRINK FIRST THING IN THE MORNING TO STEADY YOUR NERVES TO GET RID OF A HANGOVER: NO
TOTAL SCORE: 0
EVER FELT BAD OR GUILTY ABOUT YOUR DRINKING: NO
HAVE YOU EVER FELT YOU SHOULD CUT DOWN ON YOUR DRINKING: NO

## 2025-06-15 ASSESSMENT — PAIN - FUNCTIONAL ASSESSMENT
PAIN_FUNCTIONAL_ASSESSMENT: 0-10

## 2025-06-15 ASSESSMENT — PAIN DESCRIPTION - PAIN TYPE: TYPE: CHRONIC PAIN

## 2025-06-15 ASSESSMENT — PAIN DESCRIPTION - ONSET: ONSET: GRADUAL

## 2025-06-15 NOTE — ED TRIAGE NOTES
Patient comes in endorsing SCC, states every time she has her period it triggers her sickle cell pain; endorses pain to legs, back and chest, which are her normal crisis sites; denies any SOB or other flu-like symptoms; denies any other PMHx

## 2025-06-15 NOTE — ED PROVIDER NOTES
"History of Present Illness     History provided by: Patient  Limitations to History: None  External Records Reviewed with Brief Summary: EMR    HPI:  Ruperto Pang is a 25 y.o. female with past medical history of sickle cell disease type SS who presents to the emergency department complaining of sickle cell pain crisis.  Patient reports lower back pain, bilateral hip pain, bilateral thigh pain which she states is usual of her sickle cell pain.  States that she just began her menstrual cycle and that usually precipitates a pain crisis for her each month.  States that she was feeling nauseated at home, is prescribed oxycodone but was unable to \"keep them down\".  Feeling nauseous currently.  Otherwise denies chest pain, shortness of breath, abdominal pain, finger or toe pain, neck pain, fevers chills, any other acute complaints at this time.    Physical Exam   Triage vitals:  T 36.8 °C (98.2 °F)  HR 78  /79  RR 16  O2 98 % None (Room air)    General: Awake, alert, in no acute distress, resting comfortably  Eyes: Gaze conjugate.  No scleral icterus or injection  HENT: Normo-cephalic, atraumatic. No stridor  CV: Regular rate, regular rhythm. Radial pulses 2+ bilaterally  Resp: Breathing non-labored, speaking in full sentences.  Clear to auscultation bilaterally  GI: Soft, non-distended, non-tender. No rebound or guarding.  MSK/Extremities: No gross bony deformities. Moving all extremities  Skin: Warm. Appropriate color  Neuro: Alert. Oriented. Face symmetric. Speech is fluent.  Gross strength and sensation intact in b/l UE and LEs  Psych: Appropriate mood and affect    Medical Decision Making & ED Course   Medical Decision Makin y.o. female with sickle cell disease here with low back pain, bilateral hip pain, bilateral thigh pain, describes the pain as similar to prior sickle cell crisis episodes.  Feeling nauseous, unable to take home oxycodone.  Physical exam largely unremarkable, patient " well-appearing, comfortable appearing.  Plan for labs, Dilaudid, Toradol, Zofran, will reassess.    Patient reassessed multiple times throughout ED stay, Dilaudid redosed 2 additional times.  On final reassessment, patient states symptomatic improvement, would like to go home.  Tolerating p.o., states she is able to take her home oxycodone if needed.  Instructed follow-up with hematology and her PCP, given strict return precautions, discharged home.  ----    Differential diagnoses considered include but are not limited to: Sickle cell pain crisis, acute on chronic pain     Social Determinants of Health which Significantly Impact Care: None identified     EKG Independent Interpretation: EKG not obtained    Independent Result Review and Interpretation: Relevant laboratory and radiographic results were reviewed and independently interpreted by myself.  As necessary, they are commented on in the ED Course.    Chronic conditions affecting the patient's care: As documented above in [facility]    The patient was discussed with the following consultants/services: None    Care Considerations: As documented above in [facility]    ED Course:  Diagnoses as of 06/15/25 2477   Sickle cell pain crisis (Multi)     Disposition   As a result of the work-up, the patient was discharged home.  she was informed of her diagnosis and instructed to come back with any concerns or worsening of condition.  she and was agreeable to the plan as discussed above.  she was given the opportunity to ask questions.  All of the patient's questions were answered.    Procedures   Procedures    Patient seen and discussed with ED attending physician.    Monster Sam DO  Emergency Medicine       Monster Sam DO  Resident  06/15/25 [id number]

## 2025-06-21 ENCOUNTER — TELEPHONE (OUTPATIENT)
Dept: HEMATOLOGY/ONCOLOGY | Facility: HOSPITAL | Age: 25
End: 2025-06-21
Payer: COMMERCIAL

## 2025-06-21 DIAGNOSIS — D57.00 SICKLE CELL CRISIS (MULTI): ICD-10-CM

## 2025-06-21 RX ORDER — OXYCODONE HYDROCHLORIDE 10 MG/1
10 TABLET ORAL EVERY 6 HOURS PRN
Qty: 20 TABLET | Refills: 0 | Status: SHIPPED | OUTPATIENT
Start: 2025-06-21 | End: 2025-06-26

## 2025-06-21 NOTE — TELEPHONE ENCOUNTER
Patient states out of oxy requesting refill be sent to [id number]. Request sent to fellow on call. Short fill sent by fellow.

## 2025-06-23 ENCOUNTER — TELEPHONE (OUTPATIENT)
Dept: HEMATOLOGY/ONCOLOGY | Facility: HOSPITAL | Age: 25
End: 2025-06-23
Payer: COMMERCIAL

## 2025-06-23 NOTE — TELEPHONE ENCOUNTER
She is having pain in her arms, back, and legs that started Saturday. She said she called in for a prescription refill but did not hear anything back. She didn't have any meds to take at home. Pt denies chest pain, cough, SOB, headaches, blurry vision, falls, fever or chills, n/v/d/abd pain, or urinary complaints. She thinks that if she had a prescription she would be able to manage at home.    Chart review indicates that a prescription was sent to her [facility] over the weekend. She was instructed to follow up with her pharmacy and call back if she is unable to get her refill.

## 2025-07-01 ENCOUNTER — TELEPHONE (OUTPATIENT)
Dept: HEMATOLOGY/ONCOLOGY | Facility: HOSPITAL | Age: 25
End: 2025-07-01

## 2025-07-01 ENCOUNTER — OFFICE VISIT (OUTPATIENT)
Dept: HEMATOLOGY/ONCOLOGY | Facility: HOSPITAL | Age: 25
End: 2025-07-01
Payer: COMMERCIAL

## 2025-07-01 VITALS
TEMPERATURE: 97.9 F | DIASTOLIC BLOOD PRESSURE: 63 MMHG | SYSTOLIC BLOOD PRESSURE: 120 MMHG | OXYGEN SATURATION: 98 % | BODY MASS INDEX: 27.14 KG/M2 | RESPIRATION RATE: 18 BRPM | HEART RATE: 69 BPM | WEIGHT: 148.37 LBS

## 2025-07-01 DIAGNOSIS — D57.00 SICKLE CELL CRISIS (MULTI): ICD-10-CM

## 2025-07-01 DIAGNOSIS — R52 ACUTE PAIN: Primary | ICD-10-CM

## 2025-07-01 LAB
ALBUMIN SERPL BCP-MCNC: 4.6 G/DL (ref 3.4–5)
ALP SERPL-CCNC: 77 U/L (ref 33–110)
ALT SERPL W P-5'-P-CCNC: 20 U/L (ref 7–45)
ANION GAP SERPL CALC-SCNC: 14 MMOL/L (ref 10–20)
AST SERPL W P-5'-P-CCNC: 26 U/L (ref 9–39)
BASOPHILS # BLD AUTO: 0.12 X10*3/UL (ref 0–0.1)
BASOPHILS NFR BLD AUTO: 1.3 %
BILIRUB SERPL-MCNC: 7.3 MG/DL (ref 0–1.2)
BUN SERPL-MCNC: 5 MG/DL (ref 6–23)
CALCIUM SERPL-MCNC: 9.5 MG/DL (ref 8.6–10.3)
CHLORIDE SERPL-SCNC: 106 MMOL/L (ref 98–107)
CO2 SERPL-SCNC: 21 MMOL/L (ref 21–32)
CREAT SERPL-MCNC: 0.45 MG/DL (ref 0.5–1.05)
EGFRCR SERPLBLD CKD-EPI 2021: >90 ML/MIN/1.73M*2
EOSINOPHIL # BLD AUTO: 0.1 X10*3/UL (ref 0–0.7)
EOSINOPHIL NFR BLD AUTO: 1.1 %
ERYTHROCYTE [DISTWIDTH] IN BLOOD BY AUTOMATED COUNT: 22.5 % (ref 11.5–14.5)
GLUCOSE SERPL-MCNC: 93 MG/DL (ref 74–99)
HCT VFR BLD AUTO: 21.2 % (ref 36–46)
HGB BLD-MCNC: 7.2 G/DL (ref 12–16)
HGB RETIC QN: 32 PG (ref 28–38)
IMM GRANULOCYTES # BLD AUTO: 0.04 X10*3/UL (ref 0–0.7)
IMM GRANULOCYTES NFR BLD AUTO: 0.4 % (ref 0–0.9)
IMMATURE RETIC FRACTION: 25.5 %
LDH SERPL L TO P-CCNC: 283 U/L (ref 84–246)
LYMPHOCYTES # BLD AUTO: 3.25 X10*3/UL (ref 1.2–4.8)
LYMPHOCYTES NFR BLD AUTO: 34.5 %
MCH RBC QN AUTO: 30.9 PG (ref 26–34)
MCHC RBC AUTO-ENTMCNC: 34 G/DL (ref 32–36)
MCV RBC AUTO: 91 FL (ref 80–100)
MONOCYTES # BLD AUTO: 0.7 X10*3/UL (ref 0.1–1)
MONOCYTES NFR BLD AUTO: 7.4 %
NEUTROPHILS # BLD AUTO: 5.21 X10*3/UL (ref 1.2–7.7)
NEUTROPHILS NFR BLD AUTO: 55.3 %
NRBC BLD-RTO: 5.4 /100 WBCS (ref 0–0)
PLATELET # BLD AUTO: 583 X10*3/UL (ref 150–450)
POTASSIUM SERPL-SCNC: 3.8 MMOL/L (ref 3.5–5.3)
PROT SERPL-MCNC: 7.9 G/DL (ref 6.4–8.2)
RBC # BLD AUTO: 2.33 X10*6/UL (ref 4–5.2)
RETICS #: 0.64 X10*6/UL (ref 0.02–0.08)
RETICS/RBC NFR AUTO: 27.6 % (ref 0.5–2)
SODIUM SERPL-SCNC: 137 MMOL/L (ref 136–145)
WBC # BLD AUTO: 9.4 X10*3/UL (ref 4.4–11.3)

## 2025-07-01 PROCEDURE — 2500000004 HC RX 250 GENERAL PHARMACY W/ HCPCS (ALT 636 FOR OP/ED): Mod: JZ,TB | Performed by: PHYSICIAN ASSISTANT

## 2025-07-01 PROCEDURE — 99215 OFFICE O/P EST HI 40 MIN: CPT | Performed by: PHYSICIAN ASSISTANT

## 2025-07-01 PROCEDURE — 80053 COMPREHEN METABOLIC PANEL: CPT | Performed by: PHYSICIAN ASSISTANT

## 2025-07-01 PROCEDURE — 99417 PROLNG OP E/M EACH 15 MIN: CPT | Performed by: PHYSICIAN ASSISTANT

## 2025-07-01 PROCEDURE — 2500000001 HC RX 250 WO HCPCS SELF ADMINISTERED DRUGS (ALT 637 FOR MEDICARE OP): Performed by: PHYSICIAN ASSISTANT

## 2025-07-01 PROCEDURE — 85045 AUTOMATED RETICULOCYTE COUNT: CPT

## 2025-07-01 PROCEDURE — 85025 COMPLETE CBC W/AUTO DIFF WBC: CPT

## 2025-07-01 PROCEDURE — 83615 LACTATE (LD) (LDH) ENZYME: CPT

## 2025-07-01 RX ORDER — DIPHENHYDRAMINE HCL 25 MG
25 CAPSULE ORAL ONCE
Status: COMPLETED | OUTPATIENT
Start: 2025-07-01 | End: 2025-07-01

## 2025-07-01 RX ORDER — ONDANSETRON 8 MG/1
8 TABLET, FILM COATED ORAL ONCE
Status: COMPLETED | OUTPATIENT
Start: 2025-07-01 | End: 2025-07-01

## 2025-07-01 RX ORDER — KETOROLAC TROMETHAMINE 30 MG/ML
15 INJECTION, SOLUTION INTRAMUSCULAR; INTRAVENOUS ONCE
Status: COMPLETED | OUTPATIENT
Start: 2025-07-01 | End: 2025-07-01

## 2025-07-01 RX ORDER — HYDROMORPHONE HYDROCHLORIDE 1 MG/ML
1 INJECTION, SOLUTION INTRAMUSCULAR; INTRAVENOUS; SUBCUTANEOUS EVERY 30 MIN PRN
Status: COMPLETED | OUTPATIENT
Start: 2025-07-01 | End: 2025-07-01

## 2025-07-01 RX ADMIN — KETOROLAC TROMETHAMINE 15 MG: 30 INJECTION, SOLUTION INTRAMUSCULAR; INTRAVENOUS at 11:08

## 2025-07-01 RX ADMIN — HYDROMORPHONE HYDROCHLORIDE 1 MG: 1 INJECTION, SOLUTION INTRAMUSCULAR; INTRAVENOUS; SUBCUTANEOUS at 12:13

## 2025-07-01 RX ADMIN — HYDROMORPHONE HYDROCHLORIDE 1 MG: 1 INJECTION, SOLUTION INTRAMUSCULAR; INTRAVENOUS; SUBCUTANEOUS at 11:39

## 2025-07-01 RX ADMIN — HYDROMORPHONE HYDROCHLORIDE 1 MG: 1 INJECTION, SOLUTION INTRAMUSCULAR; INTRAVENOUS; SUBCUTANEOUS at 11:01

## 2025-07-01 RX ADMIN — DIPHENHYDRAMINE HYDROCHLORIDE 25 MG: 25 CAPSULE ORAL at 11:02

## 2025-07-01 RX ADMIN — ONDANSETRON HYDROCHLORIDE 8 MG: 8 TABLET, FILM COATED ORAL at 11:02

## 2025-07-01 ASSESSMENT — ENCOUNTER SYMPTOMS
DYSURIA: 0
SHORTNESS OF BREATH: 0
BACK PAIN: 1
COUGH: 0
MYALGIAS: 1
CHILLS: 1
FEVER: 0

## 2025-07-01 ASSESSMENT — PAIN SCALES - GENERAL
PAINLEVEL_OUTOF10: 7
PAINLEVEL_OUTOF10: 8

## 2025-07-01 ASSESSMENT — PAIN DESCRIPTION - LOCATION: LOCATION: LEG

## 2025-07-01 ASSESSMENT — PAIN DESCRIPTION - DESCRIPTORS: DESCRIPTORS: DISCOMFORT;THROBBING

## 2025-07-01 ASSESSMENT — PAIN - FUNCTIONAL ASSESSMENT: PAIN_FUNCTIONAL_ASSESSMENT: 0-10

## 2025-07-01 ASSESSMENT — PAIN DESCRIPTION - ORIENTATION: ORIENTATION: LEFT

## 2025-07-01 NOTE — PROGRESS NOTES
Patient ID: Ruperto Pang is a 25 y.o. female.    Subjective    HPI        RX Allergies[1]     Medications Ordered Prior to Encounter[2]      Objective    BSA: There is no height or weight on file to calculate BSA.  There were no vitals taken for this visit.     Physical Exam  Constitutional:       Appearance: Normal appearance.   HENT:      Head: Normocephalic.      Mouth/Throat:      Mouth: Mucous membranes are moist.   Eyes:      Pupils: Pupils are equal, round, and reactive to light.   Cardiovascular:      Rate and Rhythm: Normal rate and regular rhythm.      Pulses: Normal pulses.      Heart sounds: Normal heart sounds.   Pulmonary:      Effort: Pulmonary effort is normal.      Breath sounds: Normal breath sounds.   Abdominal:      General: Abdomen is flat. Bowel sounds are normal.      Palpations: Abdomen is soft.   Musculoskeletal:         General: Normal range of motion.      Cervical back: Normal range of motion and neck supple.   Skin:     General: Skin is warm.   Neurological:      General: No focal deficit present.      Mental Status: She is alert.   Psychiatric:         Mood and Affect: Mood normal.       Assessment and Plan:            [1]   Allergies  Allergen Reactions    Iodinated Contrast Media Hives and Swelling    Peanut Unknown and Itching     Pt reports she is allergic to peanut butter but not peanuts   [2]   Current Outpatient Medications on File Prior to Visit   Medication Sig Dispense Refill    capsaicin (Zostrix) 0.025 % cream Apply topically 2 times a day. 60 g 2    folic acid (Folvite) 1 mg tablet Take 1 tablet (1 mg) by mouth once daily. 90 tablet 3    ibuprofen 200 mg tablet Take 2 tablets (400 mg) by mouth every 6 hours if needed for mild pain (1 - 3) or moderate pain (4 - 6). 60 tablet 2    lidocaine (Lidoderm) 5 % patch Place 1 patch over 12 hours on the skin once daily as needed for mild pain (1 - 3) or moderate pain (4 - 6) (apply to sites of pain). Remove & discard patch within  12 hours or as directed by MD. 30 patch 1    naloxone (Narcan) 4 mg/0.1 mL nasal spray Administer 1 spray (4 mg) into affected nostril(s) if needed for opioid reversal. May repeat every 2-3 minutes if needed, alternating nostrils, until medical assistance becomes available. 2 each 0    [] oxyCODONE (Roxicodone) 10 mg immediate release tablet Take 1 tablet (10 mg) by mouth every 6 hours if needed for severe pain (7 - 10) for up to 5 days. 20 tablet 0    sennosides-docusate sodium (Senna-S) 8.6-50 mg tablet Take 1 tablet by mouth once daily. 30 tablet 2     No current facility-administered medications on file prior to visit.

## 2025-07-01 NOTE — PROGRESS NOTES
Patient ID:  Ruperto Pang is a 25 y.o. female.  Subjective   Chief Complaint: Uncontrolled Pain    HPI  Ruperto is a 25 y.o. female with a PMH of Hemoglobin SS disease who presents to [facility] today for  left lower extremity and back pain that started last evening. She took her pain meds yesterday and this morning without much relief.  Pain is typical for her pain crisis. Denies cp, N/V/D, fever/chills, abd pain.       ROS  Review of Systems   Constitutional:  Positive for chills. Negative for fever.   Respiratory:  Negative for cough and shortness of breath.    Genitourinary:  Negative for dysuria.    Musculoskeletal:  Positive for back pain and myalgias.        Allergies  RX Allergies[1]     Medications  Current Outpatient Medications   Medication Instructions    capsaicin (Zostrix) 0.025 % cream Topical, 2 times daily    folic acid (FOLVITE) 1 mg, oral, Daily    ibuprofen 400 mg, oral, Every 6 hours PRN    lidocaine (Lidoderm) 5 % patch 1 patch, transdermal, Daily PRN, Remove & discard patch within 12 hours or as directed by MD.    naloxone (NARCAN) 4 mg, nasal, As needed, May repeat every 2-3 minutes if needed, alternating nostrils, until medical assistance becomes available.    sennosides-docusate sodium (Senna-S) 8.6-50 mg tablet 1 tablet, oral, Daily        Past Medical History:   Past Medical History:  10/14/2016: Encounter for contraceptive management, unspecified      Comment:  Contraception management  No date: Encounter for screening for disorder due to exposure to   contaminants      Comment:  Screening for lead exposure  12/14/2016: Hb-SS disease with acute chest syndrome (Multi)      Comment:  Acute chest syndrome due to Hgb-S disease  03/02/2022: Personal history of diseases of the blood and blood-  forming organs and certain disorders involving the immune mechanism      Comment:  History of sickle cell anemia   Surgical History:    Surgical History[2]   Family History:    Family History[3]  Family  Oncology History:    Cancer-related family history is not on file.  Social History:    Social History[4]     Objective   Vitals: /63 (BP Location: Right arm, Patient Position: Sitting, BP Cuff Size: Adult)   Pulse 69   Temp 36.6 °C (97.9 °F) (Temporal)   Resp 18   Wt 67.3 kg (148 lb 5.9 oz)   SpO2 98%   BMI 27.14 kg/m²   Weight:   Vitals:    07/01/25 1052   Weight: 67.3 kg (148 lb 5.9 oz)       Physical Exam  Constitutional:       Appearance: Normal appearance.   Eyes:      Extraocular Movements: Extraocular movements intact.   Cardiovascular:      Rate and Rhythm: Normal rate and regular rhythm.   Pulmonary:      Effort: Pulmonary effort is normal.      Breath sounds: Normal breath sounds.   Abdominal:      General: Abdomen is flat. Bowel sounds are normal.      Palpations: Abdomen is soft.   Skin:     General: Skin is warm and dry.   Neurological:      General: No focal deficit present.      Mental Status: She is alert and oriented to person, place, and time.   Psychiatric:         Behavior: Behavior normal.         Diagnostic Results     Labs  Results from last 7 days   Lab Units 07/01/25  1106   WBC AUTO x10*3/uL 9.4   HEMOGLOBIN g/dL 7.2*   HEMATOCRIT % 21.2*   PLATELETS AUTO x10*3/uL 583*   NEUTROS ABS x10*3/uL 5.21   LYMPHS ABS AUTO x10*3/uL 3.25   MONOS ABS AUTO x10*3/uL 0.70   EOS ABS AUTO x10*3/uL 0.10   NEUTROS PCT AUTO % 55.3   LYMPHS PCT AUTO % 34.5   MONOS PCT AUTO % 7.4   EOS PCT AUTO % 1.1      Results from last 7 days   Lab Units 07/01/25  1106   GLUCOSE mg/dL 93   SODIUM mmol/L 137   POTASSIUM mmol/L 3.8   CHLORIDE mmol/L 106   CO2 mmol/L 21   BUN mg/dL 5*   CREATININE mg/dL 0.45*   EGFR mL/min/1.73m*2 >90   CALCIUM mg/dL 9.5   ALBUMIN g/dL 4.6   PROTEIN TOTAL g/dL 7.9     Results from last 7 days   Lab Units 07/01/25  1106   BILIRUBIN TOTAL mg/dL 7.3*   ALK PHOS U/L 77   ALT U/L 20   AST U/L 26         Results from last 7 days   Lab Units 07/01/25  1106   RETIC CT PCT % 27.6*   RETIC  CT ABS x10*6/uL 0.644*   IMMATURE RETIC FRACTION % 25.5*   RETIC HGB pg 32     Results from last 7 days   Lab Units 07/01/25  1106   LD U/L 283*         Lab Results   Component Value Date    HGB 7.2 (L) 07/01/2025    HGB 7.2 (L) 06/15/2025    HGB 8.0 (L) 05/21/2025     (H) 07/01/2025     (H) 06/15/2025     (H) 05/21/2025     (H) 07/01/2025     06/15/2025     (H) 05/21/2025       Images  === 04/13/25 ===    XR CHEST 2 VIEWS    - Impression -  1.  No evidence of acute cardiopulmonary process.        MACRO:  None    Signed by: Torres Crowder 4/13/2025 10:26 AM  Dictation workstation:   [id number]   === 09/13/24 ===    CT ANGIO CHEST FOR PULMONARY EMBOLISM    - Impression -  1. No evidence of acute pulmonary embolism or other acute  cardiopulmonary process.    I personally reviewed the images/study and resident's interpretation  and I agree with the findings as stated by Tara Donohue MD (resident  radiologist). This study was analyzed and interpreted at [facility], [place], [place].    MACRO:  None    Signed by: Kalia Henao 9/14/2024 7:04 AM  Dictation workstation:   [id number]     No echocardiogram results found for the past 12 months       Assessment/Plan   Ruperto Pang is a 25 y.o. female w with a PMH of Hemoglobin SS disease who presents to [facility] today for  uncontrolled pain.     [facility] Course  - VSS  -  Hemolysis labs near baseline, no indication of acute vaso-occlusive crisis or indication for blood transfusion   - Toradol 15mg, given for AVN related pain/inflammation  - Dilaudid I mg sq q30 minutes X 3 doses  given for sickle cell related pain  - Zofran 8mg  once for opioid induced nausea/vomiting  - Benadryl 25mg  once for opioid induced pruritus     Disposition  - Patient discharged home with no further needs following [facility] Course  - Return to clinic/ED instructions given      Evelin Linares PA-C         [1]   Allergies  Allergen  Reactions    Iodinated Contrast Media Hives and Swelling    Peanut Unknown and Itching     Pt reports she is allergic to peanut butter but not peanuts   [2] No past surgical history on file.  [3]   Family History  Problem Relation Name Age of Onset    Sickle cell anemia Sister     [4]   Social History  Tobacco Use    Smoking status: Never    Smokeless tobacco: Never   Substance Use Topics    Alcohol use: Never    Drug use: Never

## 2025-07-08 ENCOUNTER — APPOINTMENT (OUTPATIENT)
Facility: CLINIC | Age: 25
End: 2025-07-08
Payer: COMMERCIAL

## 2025-07-08 ENCOUNTER — CLINICAL SUPPORT (OUTPATIENT)
Facility: CLINIC | Age: 25
End: 2025-07-08
Payer: COMMERCIAL

## 2025-07-08 ENCOUNTER — APPOINTMENT (OUTPATIENT)
Dept: OBSTETRICS AND GYNECOLOGY | Facility: CLINIC | Age: 25
End: 2025-07-08
Payer: COMMERCIAL

## 2025-07-08 ENCOUNTER — APPOINTMENT (OUTPATIENT)
Facility: HOSPITAL | Age: 25
End: 2025-07-08
Payer: COMMERCIAL

## 2025-07-08 ENCOUNTER — PATIENT MESSAGE (OUTPATIENT)
Facility: CLINIC | Age: 25
End: 2025-07-08

## 2025-07-08 ENCOUNTER — CLINICAL SUPPORT (OUTPATIENT)
Facility: HOSPITAL | Age: 25
End: 2025-07-08
Payer: COMMERCIAL

## 2025-07-08 VITALS
DIASTOLIC BLOOD PRESSURE: 69 MMHG | SYSTOLIC BLOOD PRESSURE: 116 MMHG | HEART RATE: 92 BPM | WEIGHT: 150.9 LBS | BODY MASS INDEX: 27.6 KG/M2

## 2025-07-08 DIAGNOSIS — Z23 NEED FOR MMRV (MEASLES-MUMPS-RUBELLA-VARICELLA) VACCINE: ICD-10-CM

## 2025-07-08 DIAGNOSIS — Z01.419 WELL WOMAN EXAM: Primary | ICD-10-CM

## 2025-07-08 DIAGNOSIS — Z23 NEED FOR MMRV (MEASLES-MUMPS-RUBELLA-VARICELLA) VACCINE: Primary | ICD-10-CM

## 2025-07-08 DIAGNOSIS — Z11.3 ROUTINE SCREENING FOR STI (SEXUALLY TRANSMITTED INFECTION): ICD-10-CM

## 2025-07-08 DIAGNOSIS — N96 HISTORY OF RECURRENT MISCARRIAGES: ICD-10-CM

## 2025-07-08 DIAGNOSIS — O03.9 MISCARRIAGE (HHS-HCC): ICD-10-CM

## 2025-07-08 PROCEDURE — 99395 PREV VISIT EST AGE 18-39: CPT

## 2025-07-08 PROCEDURE — 1036F TOBACCO NON-USER: CPT

## 2025-07-08 PROCEDURE — 90716 VAR VACCINE LIVE SUBQ: CPT | Performed by: NURSE PRACTITIONER

## 2025-07-08 PROCEDURE — 90707 MMR VACCINE SC: CPT | Performed by: NURSE PRACTITIONER

## 2025-07-08 ASSESSMENT — PAIN SCALES - GENERAL: PAINLEVEL_OUTOF10: 0-NO PAIN

## 2025-07-08 ASSESSMENT — ENCOUNTER SYMPTOMS
GASTROINTESTINAL NEGATIVE: 0
EYES NEGATIVE: 0
ALLERGIC/IMMUNOLOGIC NEGATIVE: 0
NEUROLOGICAL NEGATIVE: 0
CONSTITUTIONAL NEGATIVE: 0
HEMATOLOGIC/LYMPHATIC NEGATIVE: 0
RESPIRATORY NEGATIVE: 0
ENDOCRINE NEGATIVE: 0
MUSCULOSKELETAL NEGATIVE: 0
CARDIOVASCULAR NEGATIVE: 0
PSYCHIATRIC NEGATIVE: 0

## 2025-07-08 ASSESSMENT — PATIENT HEALTH QUESTIONNAIRE - PHQ9
1. LITTLE INTEREST OR PLEASURE IN DOING THINGS: NOT AT ALL
2. FEELING DOWN, DEPRESSED OR HOPELESS: NOT AT ALL
SUM OF ALL RESPONSES TO PHQ9 QUESTIONS 1 AND 2: 0

## 2025-07-08 NOTE — TELEPHONE ENCOUNTER
Kasia - please see my response:       Martha Hickey,    I am not comfortable placing these orders as you just had an ED visit two days ago. Both of these vaccines are live and I do not want to overwhelm your system. If you'd like, I will give you a letter for school for an extension. You can also reach out to your hematologist for clearance. If he clears you, I will place the order. Have him send you a MyChart letter then forward it to me. Please let me know.

## 2025-07-08 NOTE — PROGRESS NOTES
Subjective   Ruperto Pang is a 25 y.o. female who is here for a routine exam. No vaginal concerns at this time including abnormal discharge, odor or discomfort. She is sexually active with one new  partner and would like sti screening. She has no pain or bleeding with sex. She denies bladder or bowel concerns.    Patient reports concerns for recurrent miscarriages. Patient reports she will be late for her menses and take a test which is positive and then start bleeding shortly. All miscarriages have been at approximately 5-6 weeks. Patient denies any hx of D and C.     Current contraception: none  LMP: 25  Menses: regular monthly  Last pap:  nml  History of abnormal Pap smear: no  Due for pap: no  Family history of uterine or ovarian cancer: no  Family history of prostate cancer: no  Family history of pancreatic cancer: no  Family hx of BRCA1/BRCA2: no  Family history of breast cancer: no  Last mammogram: never  Gardasil: had      OB History          5    Para   0    Term                AB   5    Living             SAB   5    IAB        Ectopic        Multiple        Live Births                     Review of Systems    Objective   /69   Pulse 92   Wt 68.4 kg (150 lb 14.4 oz)   LMP 2025 (Exact Date)   BMI 27.60 kg/m²     Physical Exam  HENT:      Mouth/Throat:      Mouth: Mucous membranes are moist.   Eyes:      Conjunctiva/sclera: Conjunctivae normal.      Comments: Eyes jaundiced bilaterally   Neck:      Thyroid: No thyroid mass, thyromegaly or thyroid tenderness.   Cardiovascular:      Rate and Rhythm: Normal rate and regular rhythm.      Pulses: Normal pulses.   Pulmonary:      Effort: Pulmonary effort is normal.      Breath sounds: Normal breath sounds.   Chest:   Breasts:     Breasts are symmetrical.      Right: Normal.      Left: Normal.   Abdominal:      General: Abdomen is flat.      Tenderness: There is no abdominal tenderness.      Hernia: There is no hernia in the  left inguinal area or right inguinal area.   Genitourinary:     General: Normal vulva.      Uterus: Normal.       Adnexa: Right adnexa normal and left adnexa normal.   Musculoskeletal:         General: Normal range of motion.      Cervical back: Normal range of motion.   Lymphadenopathy:      Cervical: No cervical adenopathy.      Right cervical: No superficial, deep or posterior cervical adenopathy.     Left cervical: No superficial, deep or posterior cervical adenopathy.      Lower Body: No right inguinal adenopathy. No left inguinal adenopathy.   Skin:     General: Skin is warm.      Capillary Refill: Capillary refill takes less than 2 seconds.   Neurological:      Mental Status: She is alert and oriented to person, place, and time.   Psychiatric:         Mood and Affect: Mood normal.         Behavior: Behavior normal.          Assessment/Plan   A&P --> 25 y.o. y.o woman for annual GYN exam.     - Health Maintenance -->  - Routine follow up with PCP for health maintenance examination encouraged, including TSH, cholesterol, and Vit. D evaluation.  Self breast awareness encouraged; concerning characteristics of breasts reviewed - Pt. will report general concerns, any adherent lumps, skin dimpling/puckering or color changes, and any nipple discharge.    Pap UTD:- Reviewed ACOG and ASCCP guidelines with pt.     Eyes jaundiced: advised patient to follow up with PCP/ Sickle cell specialist.     - STD Screening: GC/CT, trich, hep B and C, syphilis, and HIV    Recurrent miscarriage: referral to [facility] placed     - Contraception Plan: none     - F/U 1 year or as needed.      CARLOS Horn-DILEEP

## 2025-07-09 ENCOUNTER — APPOINTMENT (OUTPATIENT)
Facility: CLINIC | Age: 25
End: 2025-07-09
Payer: COMMERCIAL

## 2025-07-09 LAB
C TRACH RRNA SPEC QL NAA+PROBE: NOT DETECTED
N GONORRHOEA RRNA SPEC QL NAA+PROBE: NOT DETECTED
QUEST GC CT AMPLIFIED (ALWAYS MESSAGE): NORMAL
T VAGINALIS RRNA SPEC QL NAA+PROBE: NOT DETECTED

## 2025-07-22 ENCOUNTER — TELEPHONE (OUTPATIENT)
Dept: ADMISSION | Facility: HOSPITAL | Age: 25
End: 2025-07-22
Payer: COMMERCIAL

## 2025-07-22 NOTE — TELEPHONE ENCOUNTER
Refill Request  Oxycodone 10mg every 6 hrs PRN    Preferred Pharmacy  [facility] #   Ana Medrano   There are no Wet Read(s) to document.

## 2025-07-23 DIAGNOSIS — D57.1 SICKLE CELL DISEASE WITHOUT CRISIS (MULTI): Primary | ICD-10-CM

## 2025-07-23 RX ORDER — OXYCODONE HYDROCHLORIDE 5 MG/1
10 TABLET ORAL EVERY 4 HOURS PRN
Qty: 30 TABLET | Refills: 0 | Status: SHIPPED | OUTPATIENT
Start: 2025-07-23 | End: 2025-07-30

## 2025-07-30 DIAGNOSIS — Z00.6 RESEARCH SUBJECT: Primary | ICD-10-CM

## 2025-08-04 ENCOUNTER — TELEPHONE (OUTPATIENT)
Dept: HEMATOLOGY/ONCOLOGY | Facility: HOSPITAL | Age: 25
End: 2025-08-04

## 2025-08-04 ENCOUNTER — DOCUMENTATION (OUTPATIENT)
Dept: HEMATOLOGY/ONCOLOGY | Facility: HOSPITAL | Age: 25
End: 2025-08-04
Payer: COMMERCIAL

## 2025-08-04 ENCOUNTER — OFFICE VISIT (OUTPATIENT)
Dept: HEMATOLOGY/ONCOLOGY | Facility: HOSPITAL | Age: 25
End: 2025-08-04
Payer: COMMERCIAL

## 2025-08-04 VITALS
HEART RATE: 100 BPM | BODY MASS INDEX: 27.38 KG/M2 | SYSTOLIC BLOOD PRESSURE: 113 MMHG | DIASTOLIC BLOOD PRESSURE: 59 MMHG | OXYGEN SATURATION: 100 % | RESPIRATION RATE: 19 BRPM | WEIGHT: 149.7 LBS | TEMPERATURE: 97.9 F

## 2025-08-04 DIAGNOSIS — D57.00 SICKLE CELL CRISIS (MULTI): Primary | ICD-10-CM

## 2025-08-04 LAB
ABO GROUP (TYPE) IN BLOOD: NORMAL
ALBUMIN SERPL BCP-MCNC: 4.2 G/DL (ref 3.4–5)
ALP SERPL-CCNC: 74 U/L (ref 33–110)
ALT SERPL W P-5'-P-CCNC: 22 U/L (ref 7–45)
ANION GAP SERPL CALC-SCNC: 12 MMOL/L (ref 10–20)
ANTIBODY SCREEN: NORMAL
AST SERPL W P-5'-P-CCNC: 28 U/L (ref 9–39)
BASOPHILS # BLD AUTO: 0.17 X10*3/UL (ref 0–0.1)
BASOPHILS NFR BLD AUTO: 1.5 %
BILIRUB SERPL-MCNC: 7.6 MG/DL (ref 0–1.2)
BUN SERPL-MCNC: 8 MG/DL (ref 6–23)
CALCIUM SERPL-MCNC: 8.9 MG/DL (ref 8.6–10.3)
CHLORIDE SERPL-SCNC: 109 MMOL/L (ref 98–107)
CO2 SERPL-SCNC: 21 MMOL/L (ref 21–32)
CREAT SERPL-MCNC: 0.65 MG/DL (ref 0.5–1.05)
EGFRCR SERPLBLD CKD-EPI 2021: >90 ML/MIN/1.73M*2
EOSINOPHIL # BLD AUTO: 0.09 X10*3/UL (ref 0–0.7)
EOSINOPHIL NFR BLD AUTO: 0.8 %
ERYTHROCYTE [DISTWIDTH] IN BLOOD BY AUTOMATED COUNT: 24.2 % (ref 11.5–14.5)
GLUCOSE SERPL-MCNC: 101 MG/DL (ref 74–99)
HCT VFR BLD AUTO: 16.9 % (ref 36–46)
HGB BLD-MCNC: 5.9 G/DL (ref 12–16)
HGB RETIC QN: 29 PG (ref 28–38)
IMM GRANULOCYTES # BLD AUTO: 0.08 X10*3/UL (ref 0–0.7)
IMM GRANULOCYTES NFR BLD AUTO: 0.7 % (ref 0–0.9)
IMMATURE RETIC FRACTION: 28.9 %
LDH SERPL L TO P-CCNC: 263 U/L (ref 84–246)
LYMPHOCYTES # BLD AUTO: 3.37 X10*3/UL (ref 1.2–4.8)
LYMPHOCYTES NFR BLD AUTO: 29.8 %
MCH RBC QN AUTO: 30.9 PG (ref 26–34)
MCHC RBC AUTO-ENTMCNC: 34.9 G/DL (ref 32–36)
MCV RBC AUTO: 89 FL (ref 80–100)
MONOCYTES # BLD AUTO: 0.96 X10*3/UL (ref 0.1–1)
MONOCYTES NFR BLD AUTO: 8.5 %
NEUTROPHILS # BLD AUTO: 6.63 X10*3/UL (ref 1.2–7.7)
NEUTROPHILS NFR BLD AUTO: 58.7 %
NRBC BLD-RTO: 3.5 /100 WBCS (ref 0–0)
PLATELET # BLD AUTO: 599 X10*3/UL (ref 150–450)
POTASSIUM SERPL-SCNC: 3.7 MMOL/L (ref 3.5–5.3)
PROT SERPL-MCNC: 7.5 G/DL (ref 6.4–8.2)
RBC # BLD AUTO: 1.91 X10*6/UL (ref 4–5.2)
RETICS #: 0.63 X10*6/UL (ref 0.02–0.08)
RETICS/RBC NFR AUTO: 33 % (ref 0.5–2)
RH FACTOR (ANTIGEN D): NORMAL
SODIUM SERPL-SCNC: 138 MMOL/L (ref 136–145)
WBC # BLD AUTO: 11.3 X10*3/UL (ref 4.4–11.3)

## 2025-08-04 PROCEDURE — 2500000001 HC RX 250 WO HCPCS SELF ADMINISTERED DRUGS (ALT 637 FOR MEDICARE OP): Performed by: NURSE PRACTITIONER

## 2025-08-04 PROCEDURE — 96372 THER/PROPH/DIAG INJ SC/IM: CPT

## 2025-08-04 PROCEDURE — 99215 OFFICE O/P EST HI 40 MIN: CPT | Performed by: NURSE PRACTITIONER

## 2025-08-04 PROCEDURE — 83615 LACTATE (LD) (LDH) ENZYME: CPT

## 2025-08-04 PROCEDURE — 86922 COMPATIBILITY TEST ANTIGLOB: CPT

## 2025-08-04 PROCEDURE — 2500000004 HC RX 250 GENERAL PHARMACY W/ HCPCS (ALT 636 FOR OP/ED): Performed by: NURSE PRACTITIONER

## 2025-08-04 PROCEDURE — 86850 RBC ANTIBODY SCREEN: CPT | Performed by: NURSE PRACTITIONER

## 2025-08-04 PROCEDURE — 85045 AUTOMATED RETICULOCYTE COUNT: CPT

## 2025-08-04 PROCEDURE — 85025 COMPLETE CBC W/AUTO DIFF WBC: CPT

## 2025-08-04 PROCEDURE — 80053 COMPREHEN METABOLIC PANEL: CPT

## 2025-08-04 RX ORDER — CYCLOBENZAPRINE HCL 10 MG
10 TABLET ORAL ONCE
Status: COMPLETED | OUTPATIENT
Start: 2025-08-04 | End: 2025-08-04

## 2025-08-04 RX ORDER — KETOROLAC TROMETHAMINE 30 MG/ML
15 INJECTION, SOLUTION INTRAMUSCULAR; INTRAVENOUS ONCE
Status: COMPLETED | OUTPATIENT
Start: 2025-08-04 | End: 2025-08-04

## 2025-08-04 RX ORDER — ONDANSETRON 4 MG/1
4 TABLET, FILM COATED ORAL ONCE AS NEEDED
Status: COMPLETED | OUTPATIENT
Start: 2025-08-04 | End: 2025-08-04

## 2025-08-04 RX ORDER — DIPHENHYDRAMINE HCL 25 MG
25 CAPSULE ORAL ONCE AS NEEDED
Status: COMPLETED | OUTPATIENT
Start: 2025-08-04 | End: 2025-08-04

## 2025-08-04 RX ORDER — HYDROMORPHONE HYDROCHLORIDE 1 MG/ML
1 INJECTION, SOLUTION INTRAMUSCULAR; INTRAVENOUS; SUBCUTANEOUS EVERY 30 MIN PRN
Status: COMPLETED | OUTPATIENT
Start: 2025-08-04 | End: 2025-08-04

## 2025-08-04 RX ADMIN — KETOROLAC TROMETHAMINE 15 MG: 30 INJECTION, SOLUTION INTRAMUSCULAR; INTRAVENOUS at 12:29

## 2025-08-04 RX ADMIN — DIPHENHYDRAMINE HYDROCHLORIDE 25 MG: 25 CAPSULE ORAL at 12:29

## 2025-08-04 RX ADMIN — HYDROMORPHONE HYDROCHLORIDE 1 MG: 1 INJECTION, SOLUTION INTRAMUSCULAR; INTRAVENOUS; SUBCUTANEOUS at 12:29

## 2025-08-04 RX ADMIN — HYDROMORPHONE HYDROCHLORIDE 1 MG: 1 INJECTION, SOLUTION INTRAMUSCULAR; INTRAVENOUS; SUBCUTANEOUS at 13:11

## 2025-08-04 RX ADMIN — CYCLOBENZAPRINE 10 MG: 10 TABLET, FILM COATED ORAL at 12:29

## 2025-08-04 RX ADMIN — HYDROMORPHONE HYDROCHLORIDE 1 MG: 1 INJECTION, SOLUTION INTRAMUSCULAR; INTRAVENOUS; SUBCUTANEOUS at 13:43

## 2025-08-04 RX ADMIN — ONDANSETRON HYDROCHLORIDE 4 MG: 4 TABLET, FILM COATED ORAL at 12:29

## 2025-08-04 ASSESSMENT — PAIN SCALES - GENERAL: PAINLEVEL_OUTOF10: 0-NO PAIN

## 2025-08-04 NOTE — PROGRESS NOTES
Notified by Zonia Thompson in Lab Services that HGB 5.9. Secure Chat sent to Cheryl Matthews NP who is seeing patient today in Acute Care Clinic.

## 2025-08-04 NOTE — LETTER
August 4, 2025     Patient: Ruperto Pang   YOB: 2000   Date of Visit: 8/4/2025       To Whom It May Concern:    Ruperto Pang was seen in my clinic on 8/4/2025 at 12:00 pm. Please excuse Ruperto for her absence from work on this day to make the appointment.    If you have any questions or concerns, please don't hesitate to call.         Sincerely,     CARLOS Hedrick-CNP    [facility]        CC: No Recipients

## 2025-08-04 NOTE — PROGRESS NOTES
Patient ID:  Ruperto Pang is a 25 y.o. female.  Subjective   Chief Complaint: Uncontrolled Pain    HPI  Ruperto is a 25 y.o. female with PMH HgbSS disease who presents to [facility] today for pain in her BLE consistent with her typical sickle cell pain. She took her home oxy this morning at 0500 without relief. Denies any radiation of pain. Describes pain as a burning, aching sensation. Denies any HA, dizziness/lightheadedness, fevers/chills, cough, congestion, rhinorrhea, sore throat, SOB, CP, palpitations, abdominal pain, n/v/d/c, melena, dysuria, urgency/frequency, hematuria, numbness/tingling, bruising/bleeding or rashes. Denies any sick contacts. ROS otherwise negative.    Allergies  RX Allergies[1]     Medications  Current Outpatient Medications   Medication Instructions    capsaicin (Zostrix) 0.025 % cream Topical, 2 times daily    folic acid (FOLVITE) 1 mg, oral, Daily    ibuprofen 400 mg, oral, Every 6 hours PRN    lidocaine (Lidoderm) 5 % patch 1 patch, transdermal, Daily PRN, Remove & discard patch within 12 hours or as directed by MD.    naloxone (NARCAN) 4 mg, nasal, As needed, May repeat every 2-3 minutes if needed, alternating nostrils, until medical assistance becomes available.    prenatal vitamin, iron-folic, 27 mg iron-800 mcg folic acid tablet 1 tablet, oral, Daily    sennosides-docusate sodium (Senna-S) 8.6-50 mg tablet 1 tablet, oral, Daily        Past Medical History:   Past Medical History:  10/14/2016: Encounter for contraceptive management, unspecified      Comment:  Contraception management  No date: Encounter for screening for disorder due to exposure to   contaminants      Comment:  Screening for lead exposure  12/14/2016: Hb-SS disease with acute chest syndrome (Multi)      Comment:  Acute chest syndrome due to Hgb-S disease  03/02/2022: Personal history of diseases of the blood and blood-  forming organs and certain disorders involving the immune mechanism      Comment:  History of sickle  cell anemia   Surgical History:    Surgical History[2]   Family History:    Family History[3]  Family Oncology History:    Cancer-related family history is not on file.  Social History:    Social History[4]     Objective   Vitals: /59 (BP Location: Right arm, Patient Position: Sitting)   Pulse 100   Temp 36.6 °C (97.9 °F) (Temporal)   Resp 19   Wt 67.9 kg (149 lb 11.2 oz)   LMP 06/14/2025 (Exact Date)   SpO2 100%   BMI 27.38 kg/m²   Weight:   Vitals:    08/04/25 1206   Weight: 67.9 kg (149 lb 11.2 oz)       Physical Exam  Vitals reviewed.   Constitutional:       Appearance: Normal appearance.   HENT:      Head: Normocephalic and atraumatic.      Nose: Nose normal.      Mouth/Throat:      Mouth: Mucous membranes are moist.      Pharynx: Oropharynx is clear.     Eyes:      Extraocular Movements: Extraocular movements intact.      Pupils: Pupils are equal, round, and reactive to light.       Cardiovascular:      Rate and Rhythm: Normal rate and regular rhythm.      Pulses: Normal pulses.      Heart sounds: Normal heart sounds.   Pulmonary:      Effort: Pulmonary effort is normal.      Breath sounds: Normal breath sounds.   Abdominal:      General: Bowel sounds are normal.      Palpations: Abdomen is soft.     Musculoskeletal:         General: Normal range of motion.     Skin:     General: Skin is warm.     Neurological:      General: No focal deficit present.      Mental Status: She is alert and oriented to person, place, and time. Mental status is at baseline.     Psychiatric:         Mood and Affect: Mood normal.         Behavior: Behavior normal.         Diagnostic Results     Labs    Lab Results   Component Value Date    HGB 5.9 (LL) 08/04/2025    HGB 7.2 (L) 07/01/2025    HGB 7.2 (L) 06/15/2025     (H) 08/04/2025     (H) 07/01/2025     (H) 06/15/2025     (H) 08/04/2025     (H) 07/01/2025     06/15/2025       Images  === 04/13/25 ===    XR CHEST 2 VIEWS    -  Impression -  1.  No evidence of acute cardiopulmonary process.        MACRO:  None    Signed by: Torres Crowder 4/13/2025 10:26 AM  Dictation workstation:   [id number]   === 09/13/24 ===    CT ANGIO CHEST FOR PULMONARY EMBOLISM    - Impression -  1. No evidence of acute pulmonary embolism or other acute  cardiopulmonary process.    I personally reviewed the images/study and resident's interpretation  and I agree with the findings as stated by Tara Donohue MD (resident  radiologist). This study was analyzed and interpreted at [place], [place].    MACRO:  None    Signed by: Kalia Henao 9/14/2024 7:04 AM  Dictation workstation:   [id number]     No echocardiogram results found for the past 12 months       Assessment/Plan   Ruperto is a 25 y.o. female with PMH HgbSS disease who presents to [facility] today for pain in her BLE consistent with her typical sickle cell pain.      [facility] Course:  - VSS  - Hgb 5.9- pre scheduled in infusion for blood transfusion for tomorrow 8/5; T&S sent today  - IM Toradol 15mg x1  - Flexeril 10mg x1  - SQ dilaudid 1mg x3 given for sickle cell related pain  - Zofran 4mg x1 for opioid induced nausea/vomiting  - Benadryl 25mg x1 for opioid induced pruritus     DISPO:  - Patient discharged home with no further needs following [facility] Course  - Scheduled in infusion for tomorrow 8/5 @ 1330 for 1unit pRBC  - Return to clinic/ED instructions given    CARLOS Holt-CNP       [1]   Allergies  Allergen Reactions    Iodinated Contrast Media Hives and Swelling    Peanut Unknown and Itching     Pt reports she is allergic to peanut butter but not peanuts   [2] No past surgical history on file.  [3]   Family History  Problem Relation Name Age of Onset    Sickle cell anemia Sister     [4]   Social History  Tobacco Use    Smoking status: Never    Smokeless tobacco: Never   Vaping Use    Vaping status: Never Used   Substance Use Topics    Alcohol use: Yes      Comment: socially    Drug use: Never

## 2025-08-05 ENCOUNTER — INFUSION (OUTPATIENT)
Dept: HEMATOLOGY/ONCOLOGY | Facility: HOSPITAL | Age: 25
End: 2025-08-05
Payer: COMMERCIAL

## 2025-08-05 VITALS
HEART RATE: 65 BPM | TEMPERATURE: 98.6 F | BODY MASS INDEX: 27.46 KG/M2 | RESPIRATION RATE: 18 BRPM | OXYGEN SATURATION: 95 % | DIASTOLIC BLOOD PRESSURE: 53 MMHG | WEIGHT: 150.13 LBS | SYSTOLIC BLOOD PRESSURE: 102 MMHG

## 2025-08-05 DIAGNOSIS — D57.00 SICKLE CELL CRISIS (MULTI): ICD-10-CM

## 2025-08-05 LAB
BLOOD EXPIRATION DATE: NORMAL
DISPENSE STATUS: NORMAL
PRODUCT BLOOD TYPE: 1700
PRODUCT CODE: NORMAL
UNIT ABO: NORMAL
UNIT NUMBER: NORMAL
UNIT RH: NORMAL
UNIT VOLUME: 286
XM INTEP: NORMAL

## 2025-08-05 PROCEDURE — P9040 RBC LEUKOREDUCED IRRADIATED: HCPCS

## 2025-08-05 PROCEDURE — 2500000001 HC RX 250 WO HCPCS SELF ADMINISTERED DRUGS (ALT 637 FOR MEDICARE OP): Performed by: NURSE PRACTITIONER

## 2025-08-05 PROCEDURE — 36430 TRANSFUSION BLD/BLD COMPNT: CPT

## 2025-08-05 RX ORDER — ACETAMINOPHEN 325 MG/1
650 TABLET ORAL ONCE
Status: COMPLETED | OUTPATIENT
Start: 2025-08-05 | End: 2025-08-05

## 2025-08-05 RX ORDER — DIPHENHYDRAMINE HCL 25 MG
25 CAPSULE ORAL ONCE
Status: COMPLETED | OUTPATIENT
Start: 2025-08-05 | End: 2025-08-05

## 2025-08-05 RX ADMIN — DIPHENHYDRAMINE HYDROCHLORIDE 25 MG: 25 CAPSULE ORAL at 12:29

## 2025-08-05 RX ADMIN — ACETAMINOPHEN 650 MG: 325 TABLET ORAL at 12:29

## 2025-08-05 NOTE — PROGRESS NOTES
Pt arrived ambulatory to infusion for treatment of blood tranfusion.  Denies any new or worsening symptoms. Tolerated infusion without issue over 2 hours per order. Pt encouraged to call or go to ER with changes in condition. Discharged in stable condition.

## 2025-08-13 ENCOUNTER — APPOINTMENT (OUTPATIENT)
Facility: CLINIC | Age: 25
End: 2025-08-13
Payer: COMMERCIAL

## 2025-08-19 ENCOUNTER — TELEPHONE (OUTPATIENT)
Dept: ADMISSION | Facility: HOSPITAL | Age: 25
End: 2025-08-19
Payer: COMMERCIAL

## 2025-08-20 DIAGNOSIS — D57.1 SICKLE CELL DISEASE WITHOUT CRISIS (MULTI): Primary | ICD-10-CM

## 2025-08-20 RX ORDER — OXYCODONE HYDROCHLORIDE 10 MG/1
10 TABLET ORAL EVERY 6 HOURS PRN
Qty: 28 TABLET | Refills: 0 | Status: SHIPPED | OUTPATIENT
Start: 2025-08-20 | End: 2025-08-27

## 2025-08-25 ENCOUNTER — CLINICAL SUPPORT (OUTPATIENT)
Dept: EMERGENCY MEDICINE | Facility: HOSPITAL | Age: 25
End: 2025-08-25
Payer: COMMERCIAL

## 2025-08-25 ENCOUNTER — OFFICE VISIT (OUTPATIENT)
Dept: URGENT CARE | Age: 25
End: 2025-08-25
Payer: COMMERCIAL

## 2025-08-25 ENCOUNTER — HOSPITAL ENCOUNTER (EMERGENCY)
Facility: HOSPITAL | Age: 25
Discharge: LEFT BEFORE DISCHARGE | End: 2025-08-25
Payer: COMMERCIAL

## 2025-08-25 VITALS
BODY MASS INDEX: 27.6 KG/M2 | HEIGHT: 62 IN | TEMPERATURE: 98.4 F | WEIGHT: 150 LBS | SYSTOLIC BLOOD PRESSURE: 113 MMHG | RESPIRATION RATE: 16 BRPM | OXYGEN SATURATION: 95 % | HEART RATE: 90 BPM | DIASTOLIC BLOOD PRESSURE: 72 MMHG

## 2025-08-25 VITALS
HEART RATE: 69 BPM | SYSTOLIC BLOOD PRESSURE: 111 MMHG | TEMPERATURE: 99.1 F | RESPIRATION RATE: 16 BRPM | DIASTOLIC BLOOD PRESSURE: 68 MMHG | OXYGEN SATURATION: 100 % | BODY MASS INDEX: 27.44 KG/M2 | WEIGHT: 150 LBS

## 2025-08-25 VITALS
TEMPERATURE: 98.4 F | DIASTOLIC BLOOD PRESSURE: 73 MMHG | OXYGEN SATURATION: 98 % | SYSTOLIC BLOOD PRESSURE: 111 MMHG | RESPIRATION RATE: 19 BRPM | HEART RATE: 77 BPM

## 2025-08-25 DIAGNOSIS — Z86.2 HISTORY OF SICKLE CELL DISEASE: Primary | ICD-10-CM

## 2025-08-25 DIAGNOSIS — R11.2 NAUSEA AND VOMITING, UNSPECIFIED VOMITING TYPE: ICD-10-CM

## 2025-08-25 DIAGNOSIS — R10.84 GENERALIZED ABDOMINAL PAIN: ICD-10-CM

## 2025-08-25 DIAGNOSIS — R39.9 UTI SYMPTOMS: ICD-10-CM

## 2025-08-25 DIAGNOSIS — R19.7 DIARRHEA, UNSPECIFIED TYPE: ICD-10-CM

## 2025-08-25 LAB
ATRIAL RATE: 67 BPM
P AXIS: 6 DEGREES
P OFFSET: 155 MS
P ONSET: 130 MS
POC APPEARANCE, URINE: CLEAR
POC BILIRUBIN, URINE: NEGATIVE
POC BLOOD, URINE: NEGATIVE
POC COLOR, URINE: ABNORMAL
POC GLUCOSE, URINE: NEGATIVE MG/DL
POC KETONES, URINE: NEGATIVE MG/DL
POC LEUKOCYTES, URINE: NEGATIVE
POC NITRITE,URINE: NEGATIVE
POC PH, URINE: 6 PH
POC PROTEIN, URINE: NEGATIVE MG/DL
POC SPECIFIC GRAVITY, URINE: 1.01
POC UROBILINOGEN, URINE: 0.2 EU/DL
PR INTERVAL: 180 MS
PREGNANCY TEST URINE, POC: NEGATIVE
Q ONSET: 220 MS
QRS COUNT: 11 BEATS
QRS DURATION: 78 MS
QT INTERVAL: 382 MS
QTC CALCULATION(BAZETT): 403 MS
QTC FREDERICIA: 396 MS
R AXIS: 58 DEGREES
T AXIS: -14 DEGREES
T OFFSET: 411 MS
VENTRICULAR RATE: 67 BPM

## 2025-08-25 PROCEDURE — 99215 OFFICE O/P EST HI 40 MIN: CPT | Performed by: PHYSICIAN ASSISTANT

## 2025-08-25 PROCEDURE — 81025 URINE PREGNANCY TEST: CPT | Performed by: PHYSICIAN ASSISTANT

## 2025-08-25 PROCEDURE — 4500999001 HC ED NO CHARGE: Performed by: EMERGENCY MEDICINE

## 2025-08-25 PROCEDURE — 99283 EMERGENCY DEPT VISIT LOW MDM: CPT

## 2025-08-25 PROCEDURE — 81003 URINALYSIS AUTO W/O SCOPE: CPT | Performed by: PHYSICIAN ASSISTANT

## 2025-08-25 PROCEDURE — 99281 EMR DPT VST MAYX REQ PHY/QHP: CPT

## 2025-08-25 PROCEDURE — G8433 SCR FOR DEP NOT CPT DOC RSN: HCPCS | Performed by: PHYSICIAN ASSISTANT

## 2025-08-25 PROCEDURE — 1036F TOBACCO NON-USER: CPT | Performed by: PHYSICIAN ASSISTANT

## 2025-08-25 ASSESSMENT — PAIN - FUNCTIONAL ASSESSMENT: PAIN_FUNCTIONAL_ASSESSMENT: 0-10

## 2025-08-25 ASSESSMENT — ENCOUNTER SYMPTOMS: VOMITING: 1

## 2025-08-25 ASSESSMENT — PAIN SCALES - GENERAL: PAINLEVEL_OUTOF10: 9

## 2025-08-26 ENCOUNTER — HOSPITAL ENCOUNTER (EMERGENCY)
Facility: HOSPITAL | Age: 25
Discharge: HOME | End: 2025-08-26
Payer: COMMERCIAL

## 2025-09-03 ENCOUNTER — APPOINTMENT (OUTPATIENT)
Dept: HEMATOLOGY/ONCOLOGY | Facility: HOSPITAL | Age: 25
End: 2025-09-03
Payer: COMMERCIAL

## 2025-09-03 ENCOUNTER — OFFICE VISIT (OUTPATIENT)
Dept: HEMATOLOGY/ONCOLOGY | Facility: HOSPITAL | Age: 25
End: 2025-09-03
Payer: COMMERCIAL

## 2025-09-03 VITALS
RESPIRATION RATE: 18 BRPM | DIASTOLIC BLOOD PRESSURE: 70 MMHG | OXYGEN SATURATION: 95 % | SYSTOLIC BLOOD PRESSURE: 131 MMHG | HEART RATE: 72 BPM | TEMPERATURE: 98.6 F

## 2025-09-03 DIAGNOSIS — G89.4 CHRONIC PAIN SYNDROME: ICD-10-CM

## 2025-09-03 DIAGNOSIS — D57.1 SICKLE CELL DISEASE WITHOUT CRISIS (MULTI): ICD-10-CM

## 2025-09-03 DIAGNOSIS — D57.00 SICKLE CELL CRISIS (MULTI): Primary | ICD-10-CM

## 2025-09-03 LAB
ABO GROUP (TYPE) IN BLOOD: NORMAL
ALBUMIN SERPL BCP-MCNC: 4.8 G/DL (ref 3.4–5)
ALP SERPL-CCNC: 69 U/L (ref 33–110)
ALT SERPL W P-5'-P-CCNC: 19 U/L (ref 7–45)
ANION GAP SERPL CALC-SCNC: 13 MMOL/L (ref 10–20)
ANTIBODY SCREEN: NORMAL
AST SERPL W P-5'-P-CCNC: 17 U/L (ref 9–39)
BASOPHILS # BLD AUTO: 0.1 X10*3/UL (ref 0–0.1)
BASOPHILS NFR BLD AUTO: 0.8 %
BILIRUB SERPL-MCNC: 5.7 MG/DL (ref 0–1.2)
BUN SERPL-MCNC: 4 MG/DL (ref 6–23)
CALCIUM SERPL-MCNC: 9.5 MG/DL (ref 8.6–10.3)
CHLORIDE SERPL-SCNC: 112 MMOL/L (ref 98–107)
CO2 SERPL-SCNC: 21 MMOL/L (ref 21–32)
CREAT SERPL-MCNC: 0.49 MG/DL (ref 0.5–1.05)
EGFRCR SERPLBLD CKD-EPI 2021: >90 ML/MIN/1.73M*2
EOSINOPHIL # BLD AUTO: 0.08 X10*3/UL (ref 0–0.7)
EOSINOPHIL NFR BLD AUTO: 0.7 %
ERYTHROCYTE [DISTWIDTH] IN BLOOD BY AUTOMATED COUNT: 25.2 % (ref 11.5–14.5)
GLUCOSE SERPL-MCNC: 100 MG/DL (ref 74–99)
HCT VFR BLD AUTO: 19.4 % (ref 36–46)
HGB BLD-MCNC: 6.9 G/DL (ref 12–16)
HGB RETIC QN: 33 PG (ref 28–38)
IMM GRANULOCYTES # BLD AUTO: 0.04 X10*3/UL (ref 0–0.7)
IMM GRANULOCYTES NFR BLD AUTO: 0.3 % (ref 0–0.9)
IMMATURE RETIC FRACTION: 34 %
LDH SERPL L TO P-CCNC: 215 U/L (ref 84–246)
LYMPHOCYTES # BLD AUTO: 2.7 X10*3/UL (ref 1.2–4.8)
LYMPHOCYTES NFR BLD AUTO: 22.4 %
MAGNESIUM SERPL-MCNC: 1.96 MG/DL (ref 1.6–2.4)
MCH RBC QN AUTO: 31.4 PG (ref 26–34)
MCHC RBC AUTO-ENTMCNC: 35.6 G/DL (ref 32–36)
MCV RBC AUTO: 88 FL (ref 80–100)
MONOCYTES # BLD AUTO: 0.72 X10*3/UL (ref 0.1–1)
MONOCYTES NFR BLD AUTO: 6 %
NEUTROPHILS # BLD AUTO: 8.39 X10*3/UL (ref 1.2–7.7)
NEUTROPHILS NFR BLD AUTO: 69.8 %
NRBC BLD-RTO: 3.7 /100 WBCS (ref 0–0)
PLATELET # BLD AUTO: 658 X10*3/UL (ref 150–450)
POTASSIUM SERPL-SCNC: 3.6 MMOL/L (ref 3.5–5.3)
PROT SERPL-MCNC: 7.7 G/DL (ref 6.4–8.2)
RBC # BLD AUTO: 2.2 X10*6/UL (ref 4–5.2)
RETICS #: 0.61 X10*6/UL (ref 0.02–0.08)
RETICS/RBC NFR AUTO: 27.6 % (ref 0.5–2)
RH FACTOR (ANTIGEN D): NORMAL
SODIUM SERPL-SCNC: 142 MMOL/L (ref 136–145)
WBC # BLD AUTO: 12 X10*3/UL (ref 4.4–11.3)

## 2025-09-03 PROCEDURE — 86901 BLOOD TYPING SEROLOGIC RH(D): CPT

## 2025-09-03 PROCEDURE — 99215 OFFICE O/P EST HI 40 MIN: CPT | Performed by: NURSE PRACTITIONER

## 2025-09-03 PROCEDURE — 85045 AUTOMATED RETICULOCYTE COUNT: CPT

## 2025-09-03 PROCEDURE — 2500000004 HC RX 250 GENERAL PHARMACY W/ HCPCS (ALT 636 FOR OP/ED): Mod: JW,TB

## 2025-09-03 PROCEDURE — 85025 COMPLETE CBC W/AUTO DIFF WBC: CPT

## 2025-09-03 PROCEDURE — 2500000001 HC RX 250 WO HCPCS SELF ADMINISTERED DRUGS (ALT 637 FOR MEDICARE OP): Performed by: NURSE PRACTITIONER

## 2025-09-03 PROCEDURE — 2500000004 HC RX 250 GENERAL PHARMACY W/ HCPCS (ALT 636 FOR OP/ED): Mod: JZ,TB | Performed by: NURSE PRACTITIONER

## 2025-09-03 PROCEDURE — 83735 ASSAY OF MAGNESIUM: CPT

## 2025-09-03 PROCEDURE — 80053 COMPREHEN METABOLIC PANEL: CPT

## 2025-09-03 PROCEDURE — 96372 THER/PROPH/DIAG INJ SC/IM: CPT

## 2025-09-03 PROCEDURE — 83615 LACTATE (LD) (LDH) ENZYME: CPT

## 2025-09-03 RX ORDER — KETOROLAC TROMETHAMINE 30 MG/ML
15 INJECTION, SOLUTION INTRAMUSCULAR; INTRAVENOUS ONCE
Status: COMPLETED | OUTPATIENT
Start: 2025-09-03 | End: 2025-09-03

## 2025-09-03 RX ORDER — DIPHENHYDRAMINE HCL 25 MG
25 CAPSULE ORAL ONCE AS NEEDED
Status: COMPLETED | OUTPATIENT
Start: 2025-09-03 | End: 2025-09-03

## 2025-09-03 RX ORDER — ONDANSETRON 4 MG/1
4 TABLET, FILM COATED ORAL ONCE AS NEEDED
Status: COMPLETED | OUTPATIENT
Start: 2025-09-03 | End: 2025-09-03

## 2025-09-03 RX ORDER — HYDROMORPHONE HYDROCHLORIDE 1 MG/ML
1 INJECTION, SOLUTION INTRAMUSCULAR; INTRAVENOUS; SUBCUTANEOUS EVERY 30 MIN PRN
Status: COMPLETED | OUTPATIENT
Start: 2025-09-03 | End: 2025-09-03

## 2025-09-03 RX ORDER — OXYCODONE HYDROCHLORIDE 10 MG/1
10 TABLET ORAL EVERY 6 HOURS PRN
Qty: 15 TABLET | Refills: 0 | Status: SHIPPED | OUTPATIENT
Start: 2025-09-03 | End: 2025-09-10

## 2025-09-03 RX ADMIN — HYDROMORPHONE HYDROCHLORIDE 1 MG: 1 INJECTION, SOLUTION INTRAMUSCULAR; INTRAVENOUS; SUBCUTANEOUS at 10:16

## 2025-09-03 RX ADMIN — DIPHENHYDRAMINE HYDROCHLORIDE 25 MG: 25 CAPSULE ORAL at 09:34

## 2025-09-03 RX ADMIN — ONDANSETRON HYDROCHLORIDE 4 MG: 4 TABLET, FILM COATED ORAL at 09:34

## 2025-09-03 RX ADMIN — KETOROLAC TROMETHAMINE 15 MG: 30 INJECTION, SOLUTION INTRAMUSCULAR; INTRAVENOUS at 09:35

## 2025-09-03 RX ADMIN — HYDROMORPHONE HYDROCHLORIDE 1 MG: 1 INJECTION, SOLUTION INTRAMUSCULAR; INTRAVENOUS; SUBCUTANEOUS at 09:36

## 2025-09-03 RX ADMIN — HYDROMORPHONE HYDROCHLORIDE 1 MG: 1 INJECTION, SOLUTION INTRAMUSCULAR; INTRAVENOUS; SUBCUTANEOUS at 10:59

## 2025-09-03 RX ADMIN — HYDROMORPHONE HYDROCHLORIDE 1 MG: 2 INJECTION, SOLUTION INTRAMUSCULAR; INTRAVENOUS; SUBCUTANEOUS at 11:40

## 2025-09-03 ASSESSMENT — PAIN SCALES - GENERAL: PAINLEVEL_OUTOF10: 9

## 2025-09-19 ENCOUNTER — APPOINTMENT (OUTPATIENT)
Facility: CLINIC | Age: 25
End: 2025-09-19
Payer: COMMERCIAL

## 2026-07-14 ENCOUNTER — APPOINTMENT (OUTPATIENT)
Dept: OBSTETRICS AND GYNECOLOGY | Facility: CLINIC | Age: 26
End: 2026-07-14
Payer: COMMERCIAL